# Patient Record
Sex: FEMALE | Race: WHITE | NOT HISPANIC OR LATINO | Employment: OTHER | URBAN - METROPOLITAN AREA
[De-identification: names, ages, dates, MRNs, and addresses within clinical notes are randomized per-mention and may not be internally consistent; named-entity substitution may affect disease eponyms.]

---

## 2019-10-07 ENCOUNTER — TELEPHONE (OUTPATIENT)
Dept: FAMILY MEDICINE | Facility: CLINIC | Age: 69
End: 2019-10-07

## 2019-10-07 NOTE — TELEPHONE ENCOUNTER
From [facility] action:       JEANNETTE CAMPUZANO 7/9/2019 2:28:28 PM > please call pt and remind her she's due for labs- ordered entered

## 2019-10-29 LAB
ALBUMIN SERPL-MCNC: 4.1 G/DL (ref 3.6–5.1)
ALBUMIN/GLOB SERPL: 1.7 (CALC) (ref 1–2.5)
ALP SERPL-CCNC: 18 U/L (ref 33–130)
ALT SERPL-CCNC: 35 U/L (ref 6–29)
AST SERPL-CCNC: 28 U/L (ref 10–35)
BASOPHILS # BLD AUTO: 21 CELLS/UL (ref 0–200)
BASOPHILS NFR BLD AUTO: 0.7 %
BILIRUB SERPL-MCNC: 0.7 MG/DL (ref 0.2–1.2)
BUN SERPL-MCNC: 24 MG/DL (ref 7–25)
BUN/CREAT SERPL: ABNORMAL (CALC) (ref 6–22)
CALCIUM SERPL-MCNC: 9.6 MG/DL (ref 8.6–10.4)
CHLORIDE SERPL-SCNC: 105 MMOL/L (ref 98–110)
CHOLEST SERPL-MCNC: 185 MG/DL
CHOLEST/HDLC SERPL: 2.4 (CALC)
CO2 SERPL-SCNC: 30 MMOL/L (ref 20–32)
CREAT SERPL-MCNC: 0.7 MG/DL (ref 0.5–0.99)
EOSINOPHIL # BLD AUTO: 93 CELLS/UL (ref 15–500)
EOSINOPHIL NFR BLD AUTO: 3.1 %
ERYTHROCYTE [DISTWIDTH] IN BLOOD BY AUTOMATED COUNT: 13.4 % (ref 11–15)
GFRSERPLBLD MDRD-ARVRAT: 88 ML/MIN/1.73M2
GLOBULIN SER CALC-MCNC: 2.4 G/DL (CALC) (ref 1.9–3.7)
GLUCOSE SERPL-MCNC: 83 MG/DL (ref 65–99)
HCT VFR BLD AUTO: 38.7 % (ref 35–45)
HDLC SERPL-MCNC: 78 MG/DL
HGB BLD-MCNC: 13.3 G/DL (ref 11.7–15.5)
LDLC SERPL CALC-MCNC: 94 MG/DL (CALC)
LYMPHOCYTES # BLD AUTO: 1434 CELLS/UL (ref 850–3900)
LYMPHOCYTES NFR BLD AUTO: 47.8 %
MCH RBC QN AUTO: 30.7 PG (ref 27–33)
MCHC RBC AUTO-ENTMCNC: 34.4 G/DL (ref 32–36)
MCV RBC AUTO: 89.4 FL (ref 80–100)
MONOCYTES # BLD AUTO: 264 CELLS/UL (ref 200–950)
MONOCYTES NFR BLD AUTO: 8.8 %
NEUTROPHILS # BLD AUTO: 1188 CELLS/UL (ref 1500–7800)
NEUTROPHILS NFR BLD AUTO: 39.6 %
NONHDLC SERPL-MCNC: 107 MG/DL (CALC)
PLATELET # BLD AUTO: 174 THOUSAND/UL (ref 140–400)
PMV BLD REES-ECKER: 12 FL (ref 7.5–12.5)
POTASSIUM SERPL-SCNC: 4.1 MMOL/L (ref 3.5–5.3)
PROT SERPL-MCNC: 6.5 G/DL (ref 6.1–8.1)
RBC # BLD AUTO: 4.33 MILLION/UL (ref 3.8–5.1)
SODIUM SERPL-SCNC: 143 MMOL/L (ref 135–146)
TRIGL SERPL-MCNC: 44 MG/DL
WBC # BLD AUTO: 3 THOUSAND/UL (ref 3.8–10.8)

## 2019-11-01 ENCOUNTER — TELEPHONE (OUTPATIENT)
Dept: FAMILY MEDICINE | Facility: CLINIC | Age: 69
End: 2019-11-01

## 2019-11-01 NOTE — PROGRESS NOTES
Please let pt know labs are stable. Cholesterol levels well controlled.Normal blood sugar, kidney and liver function. White blood cell count slightly below normal range but stable compared to previous labs.

## 2019-11-01 NOTE — TELEPHONE ENCOUNTER
Notes recorded by Eloise Gilbert NP on 10/31/2019 at 9:44 PM CDT  Please let pt know labs are stable. Cholesterol levels well controlled.Normal blood sugar, kidney and liver function. White blood cell count slightly below normal range but stable compared to previous labs.    11/1-LMTC /jose

## 2020-01-08 DIAGNOSIS — R92.8 ABNORMAL MAMMOGRAM: Primary | ICD-10-CM

## 2020-01-08 PROBLEM — Z98.84 HISTORY OF BARIATRIC SURGERY: Status: ACTIVE | Noted: 2020-01-08

## 2020-01-08 PROBLEM — E78.5 DYSLIPIDEMIA: Status: ACTIVE | Noted: 2017-12-18

## 2020-01-09 ENCOUNTER — OFFICE VISIT (OUTPATIENT)
Dept: FAMILY MEDICINE | Facility: CLINIC | Age: 70
End: 2020-01-09
Payer: MEDICARE

## 2020-01-09 VITALS
SYSTOLIC BLOOD PRESSURE: 154 MMHG | HEIGHT: 63 IN | WEIGHT: 143 LBS | DIASTOLIC BLOOD PRESSURE: 82 MMHG | HEART RATE: 60 BPM | BODY MASS INDEX: 25.34 KG/M2

## 2020-01-09 DIAGNOSIS — Z98.84 HISTORY OF BARIATRIC SURGERY: ICD-10-CM

## 2020-01-09 DIAGNOSIS — G47.33 OSA (OBSTRUCTIVE SLEEP APNEA): ICD-10-CM

## 2020-01-09 DIAGNOSIS — M19.049 PRIMARY OSTEOARTHRITIS OF HAND, UNSPECIFIED LATERALITY: ICD-10-CM

## 2020-01-09 DIAGNOSIS — Z78.0 MENOPAUSE: ICD-10-CM

## 2020-01-09 DIAGNOSIS — Z13.820 SPECIAL SCREENING FOR OSTEOPOROSIS: ICD-10-CM

## 2020-01-09 DIAGNOSIS — I10 ESSENTIAL HYPERTENSION: Primary | ICD-10-CM

## 2020-01-09 DIAGNOSIS — E78.5 DYSLIPIDEMIA: ICD-10-CM

## 2020-01-09 DIAGNOSIS — M17.10 PRIMARY LOCALIZED OSTEOARTHRITIS OF KNEE: ICD-10-CM

## 2020-01-09 PROCEDURE — 1159F PR MEDICATION LIST DOCUMENTED IN MEDICAL RECORD: ICD-10-PCS | Mod: S$GLB,,, | Performed by: FAMILY MEDICINE

## 2020-01-09 PROCEDURE — 3079F PR MOST RECENT DIASTOLIC BLOOD PRESSURE 80-89 MM HG: ICD-10-PCS | Mod: S$GLB,,, | Performed by: FAMILY MEDICINE

## 2020-01-09 PROCEDURE — 1101F PR PT FALLS ASSESS DOC 0-1 FALLS W/OUT INJ PAST YR: ICD-10-PCS | Mod: S$GLB,,, | Performed by: FAMILY MEDICINE

## 2020-01-09 PROCEDURE — 1159F MED LIST DOCD IN RCRD: CPT | Mod: S$GLB,,, | Performed by: FAMILY MEDICINE

## 2020-01-09 PROCEDURE — 3077F PR MOST RECENT SYSTOLIC BLOOD PRESSURE >= 140 MM HG: ICD-10-PCS | Mod: S$GLB,,, | Performed by: FAMILY MEDICINE

## 2020-01-09 PROCEDURE — 99214 OFFICE O/P EST MOD 30 MIN: CPT | Mod: S$GLB,,, | Performed by: FAMILY MEDICINE

## 2020-01-09 PROCEDURE — 99214 PR OFFICE/OUTPT VISIT, EST, LEVL IV, 30-39 MIN: ICD-10-PCS | Mod: S$GLB,,, | Performed by: FAMILY MEDICINE

## 2020-01-09 PROCEDURE — 1101F PT FALLS ASSESS-DOCD LE1/YR: CPT | Mod: S$GLB,,, | Performed by: FAMILY MEDICINE

## 2020-01-09 PROCEDURE — 3079F DIAST BP 80-89 MM HG: CPT | Mod: S$GLB,,, | Performed by: FAMILY MEDICINE

## 2020-01-09 PROCEDURE — 3077F SYST BP >= 140 MM HG: CPT | Mod: S$GLB,,, | Performed by: FAMILY MEDICINE

## 2020-01-09 RX ORDER — LOSARTAN POTASSIUM 50 MG/1
50 TABLET ORAL DAILY
Refills: 1 | COMMUNITY
Start: 2019-10-04 | End: 2020-01-09 | Stop reason: SDUPTHER

## 2020-01-09 RX ORDER — LOSARTAN POTASSIUM 50 MG/1
50 TABLET ORAL DAILY
Qty: 90 TABLET | Refills: 1 | Status: SHIPPED | OUTPATIENT
Start: 2020-01-09 | End: 2020-07-09 | Stop reason: SDUPTHER

## 2020-01-09 NOTE — PROGRESS NOTES
SUBJECTIVE:    Patient ID: Batool Mensah is a 69 y.o. female.    Chief Complaint: Hypertension (has only been taking 1/2 of BP pill for the last week due to running low. bottles brought -ac )    This 69-year-old female has had gastric sleeve surgery and has maintained her weight loss very well.  She was running out of her losartan 50 mg and has been taking half a tablet of that for a few weeks to get to our office.  She goes to water aerobics for exercise 5 times a week.  She had a colonoscopy 2016 and is  Not due back till 2026.  She is getting a follow-up mammogram here in January and is due for DEXA scan as well.      Orders Only on 10/28/2019   Component Date Value Ref Range Status    Cholesterol 10/28/2019 185  <200 mg/dL Final    HDL 10/28/2019 78  >50 mg/dL Final    Triglycerides 10/28/2019 44  <150 mg/dL Final    LDL Cholesterol 10/28/2019 94  mg/dL (calc) Final    Hdl/Cholesterol Ratio 10/28/2019 2.4  <5.0 (calc) Final    Non HDL Chol. (LDL+VLDL) 10/28/2019 107  <130 mg/dL (calc) Final    Glucose 10/28/2019 83  65 - 99 mg/dL Final    BUN, Bld 10/28/2019 24  7 - 25 mg/dL Final    Creatinine 10/28/2019 0.70  0.50 - 0.99 mg/dL Final    eGFR if non African American 10/28/2019 88  > OR = 60 mL/min/1.73m2 Final    eGFR if  10/28/2019 102  > OR = 60 mL/min/1.73m2 Final    BUN/Creatinine Ratio 10/28/2019 NOT APPLICABLE  6 - 22 (calc) Final    Sodium 10/28/2019 143  135 - 146 mmol/L Final    Potassium 10/28/2019 4.1  3.5 - 5.3 mmol/L Final    Chloride 10/28/2019 105  98 - 110 mmol/L Final    CO2 10/28/2019 30  20 - 32 mmol/L Final    Calcium 10/28/2019 9.6  8.6 - 10.4 mg/dL Final    Total Protein 10/28/2019 6.5  6.1 - 8.1 g/dL Final    Albumin 10/28/2019 4.1  3.6 - 5.1 g/dL Final    Globulin, Total 10/28/2019 2.4  1.9 - 3.7 g/dL (calc) Final    Albumin/Globulin Ratio 10/28/2019 1.7  1.0 - 2.5 (calc) Final    Total Bilirubin 10/28/2019 0.7  0.2 - 1.2 mg/dL Final     Alkaline Phosphatase 10/28/2019 18* 33 - 130 U/L Final    AST 10/28/2019 28  10 - 35 U/L Final    ALT 10/28/2019 35* 6 - 29 U/L Final    WBC 10/28/2019 3.0* 3.8 - 10.8 Thousand/uL Final    RBC 10/28/2019 4.33  3.80 - 5.10 Million/uL Final    Hemoglobin 10/28/2019 13.3  11.7 - 15.5 g/dL Final    Hematocrit 10/28/2019 38.7  35.0 - 45.0 % Final    Mean Corpuscular Volume 10/28/2019 89.4  80.0 - 100.0 fL Final    Mean Corpuscular Hemoglobin 10/28/2019 30.7  27.0 - 33.0 pg Final    Mean Corpuscular Hemoglobin Conc 10/28/2019 34.4  32.0 - 36.0 g/dL Final    RDW 10/28/2019 13.4  11.0 - 15.0 % Final    Platelets 10/28/2019 174  140 - 400 Thousand/uL Final    MPV 10/28/2019 12.0  7.5 - 12.5 fL Final    Neutrophils Absolute 10/28/2019 1,188* 1,500 - 7,800 cells/uL Final    Lymph # 10/28/2019 1,434  850 - 3,900 cells/uL Final    Mono # 10/28/2019 264  200 - 950 cells/uL Final    Eos # 10/28/2019 93  15 - 500 cells/uL Final    Baso # 10/28/2019 21  0 - 200 cells/uL Final    Neutrophils Relative 10/28/2019 39.6  % Final    Lymph% 10/28/2019 47.8  % Final    Mono% 10/28/2019 8.8  % Final    Eosinophil% 10/28/2019 3.1  % Final    Basophil% 10/28/2019 0.7  % Final       Past Medical History:   Diagnosis Date    Hypertension      Past Surgical History:   Procedure Laterality Date    BREAST LUMPECTOMY       SECTION      X2    COLONOSCOPY  2016    Dr. Parker -RTC 10 years    Gastric sleeve  2018    Dr Hernandez- hiatal hernia repair     History reviewed. No pertinent family history.    Marital Status:   Alcohol History:  has no alcohol history on file.  Tobacco History:  reports that she has never smoked. She has never used smokeless tobacco.  Drug History:  has no drug history on file.    Review of patient's allergies indicates:   Allergen Reactions    Penicillin g benzathine Other (See Comments)       Current Outpatient Medications:     losartan (COZAAR) 50 MG tablet, Take 50 mg by mouth once  "daily., Disp: , Rfl: 1    Review of Systems   Constitutional: Negative for appetite change, chills, fatigue, fever and unexpected weight change.   HENT: Negative for congestion, ear pain, sinus pain, sore throat and trouble swallowing.    Eyes: Negative for pain, discharge and visual disturbance.   Respiratory: Negative for apnea, cough, shortness of breath and wheezing.         Byron on cpap   Cardiovascular: Negative for chest pain, palpitations and leg swelling.   Gastrointestinal: Negative for abdominal pain, blood in stool, constipation (smooth move tea helps), diarrhea, nausea and vomiting.   Endocrine: Negative for heat intolerance, polydipsia and polyuria.   Genitourinary: Negative for difficulty urinating, dyspareunia, dysuria, frequency, hematuria and menstrual problem.        No nocturia   Musculoskeletal: Negative for arthralgias (rt knee aches  x yrs , can walk fine ), back pain, gait problem, joint swelling and myalgias.        Nodule on the right palm of the hand   Allergic/Immunologic: Negative for environmental allergies, food allergies and immunocompromised state.   Neurological: Negative for dizziness, tremors, seizures, numbness and headaches.   Psychiatric/Behavioral: Positive for sleep disturbance (cpap at  night). Negative for behavioral problems, confusion, hallucinations and suicidal ideas. The patient is not nervous/anxious.           Objective:      Vitals:    01/09/20 1435   BP: (!) 154/82   Pulse: 60   Weight: 64.9 kg (143 lb)   Height: 5' 3" (1.6 m)     Body mass index is 25.33 kg/m².  Physical Exam   Constitutional: She is oriented to person, place, and time. She appears well-developed and well-nourished.   Thin white female in no apparent distress   HENT:   Head: Normocephalic and atraumatic.   Right Ear: External ear normal.   Left Ear: External ear normal.   Nose: Nose normal.   Mouth/Throat: Oropharynx is clear and moist.   Eyes: Pupils are equal, round, and reactive to light. EOM " are normal.   Neck: Normal range of motion. Neck supple. Carotid bruit is not present. No thyromegaly present.   Cardiovascular: Normal rate, regular rhythm, normal heart sounds and intact distal pulses.   No murmur heard.  Pulmonary/Chest: Effort normal and breath sounds normal. She has no wheezes. She has no rales.   Abdominal: Soft. Bowel sounds are normal. She exhibits no distension. There is no hepatosplenomegaly. There is no tenderness.   Musculoskeletal: Normal range of motion. She exhibits no tenderness or deformity.        Lumbar back: Normal. She exhibits no pain and no spasm.   Bends 90 degrees at  Waist, shoulders and knees have full range of motion.  There is no crepitance to the knees.  She walks with no limp.  Her right hand has a 3rd finger palmar side small nodule.  She can close her hands in a fist well   Lymphadenopathy:     She has no cervical adenopathy.   Neurological: She is alert and oriented to person, place, and time. No cranial nerve deficit. Coordination normal.   Skin: Skin is warm and dry. No rash noted.   Psychiatric: She has a normal mood and affect. Her behavior is normal. Judgment and thought content normal.   Nursing note and vitals reviewed.        Assessment:       1. Essential hypertension    2. History of bariatric surgery    3. Special screening for osteoporosis    4. Dyslipidemia    5. Primary localized osteoarthritis of knee    6. Primary osteoarthritis of hand, unspecified laterality    7. HUI (obstructive sleep apnea)    8. Menopause         Plan:       Essential hypertension  Blood pressure will is slightly elevated due to her reducing the losartan to 25 mg.  Let's go back up to 50 mg daily  History of bariatric surgery  Doing well with her gastric sleeve.  She has bariatric surgery visit and lab work due in February of 2020  Special screening for osteoporosis  DEXA scan ordered  Dyslipidemia  Cholesterol is at goal on no medications for cholesterol  Primary localized  osteoarthritis of knee  She is managing this well with no medications  Primary osteoarthritis of hand, unspecified laterality    HUI (obstructive sleep apnea)  Continue CPAP at night  Menopause      Follow up in about 6 months (around 7/9/2020) for Eloise-checkup.

## 2020-01-22 ENCOUNTER — HOSPITAL ENCOUNTER (OUTPATIENT)
Dept: RADIOLOGY | Facility: HOSPITAL | Age: 70
Discharge: HOME | End: 2020-01-22
Attending: NURSE PRACTITIONER
Payer: MEDICARE

## 2020-01-22 ENCOUNTER — HOSPITAL ENCOUNTER (OUTPATIENT)
Dept: RADIOLOGY | Facility: HOSPITAL | Age: 70
Discharge: HOME | End: 2020-01-22
Attending: FAMILY MEDICINE
Payer: MEDICARE

## 2020-01-22 VITALS — BODY MASS INDEX: 25.01 KG/M2 | HEIGHT: 63 IN | WEIGHT: 141.13 LBS

## 2020-01-22 DIAGNOSIS — Z78.0 MENOPAUSE: ICD-10-CM

## 2020-01-22 DIAGNOSIS — Z13.820 SPECIAL SCREENING FOR OSTEOPOROSIS: ICD-10-CM

## 2020-01-22 DIAGNOSIS — R92.8 ABNORMAL MAMMOGRAM: ICD-10-CM

## 2020-01-22 PROCEDURE — 77080 DXA BONE DENSITY AXIAL: CPT | Mod: TC,PO

## 2020-01-22 PROCEDURE — 77062 BREAST TOMOSYNTHESIS BI: CPT | Mod: TC,PO

## 2020-01-23 ENCOUNTER — TELEPHONE (OUTPATIENT)
Dept: FAMILY MEDICINE | Facility: CLINIC | Age: 70
End: 2020-01-23

## 2020-01-23 NOTE — PROGRESS NOTES
Please let patient know diagnostic mammogram shows no change with no new suspicious areas.  Repeat screening mammogram in 1 year

## 2020-01-23 NOTE — TELEPHONE ENCOUNTER
----- Message from Eloise Gilbert NP sent at 1/22/2020  9:09 PM CST -----  Please let patient know diagnostic mammogram shows no change with no new suspicious areas.  Repeat screening mammogram in 1 year

## 2020-01-27 ENCOUNTER — TELEPHONE (OUTPATIENT)
Dept: FAMILY MEDICINE | Facility: CLINIC | Age: 70
End: 2020-01-27

## 2020-01-27 DIAGNOSIS — M81.0 OSTEOPOROSIS: Primary | ICD-10-CM

## 2020-01-27 RX ORDER — FERROUS SULFATE, DRIED 160(50) MG
1 TABLET, EXTENDED RELEASE ORAL 2 TIMES DAILY WITH MEALS
COMMUNITY

## 2020-01-27 NOTE — TELEPHONE ENCOUNTER
----- Message from Sage Dickson MD sent at 1/25/2020  7:40 PM CST -----  Call patient.  DEXA scan shows normal bone density in the lumbar spine.  It does show osteoporosis of the hip bones however.  We recommend calcium plus D vitamins twice a day and add alendronate 70 mg once a week.  We will call this in to your pharmacy # 4.    5 refills, we plan on using alendronate continually for up to 4 years.  Repeat DEXA scan in 2 years

## 2020-01-27 NOTE — TELEPHONE ENCOUNTER
Spoke to patient with results verbatim per Dr Dickson. Verbalized understanding on all. Said she will start calcium/vitamin D BID, added to med list, and understands Alendronate is being sent to pharmacy to take once a week. Order pended. Allergies and pharmacy verified.

## 2020-01-29 RX ORDER — ALENDRONATE SODIUM 70 MG/1
70 TABLET ORAL
Qty: 4 TABLET | Refills: 11 | Status: SHIPPED | OUTPATIENT
Start: 2020-01-29 | End: 2020-07-09 | Stop reason: SDUPTHER

## 2020-04-06 ENCOUNTER — TELEPHONE (OUTPATIENT)
Dept: FAMILY MEDICINE | Facility: CLINIC | Age: 70
End: 2020-04-06

## 2020-04-06 NOTE — TELEPHONE ENCOUNTER
Spoke with pt she states pharmacy told her there is no refill left on her Losartan. Spoke with pharmacy to let them know there was a 6 month supply sent in on 1/9/2020, pharmacy states they found it and will refill it for her.

## 2020-04-06 NOTE — TELEPHONE ENCOUNTER
----- Message from Loraine Tesfaye sent at 4/6/2020  8:10 AM CDT -----   8:04 [address] L[address]

## 2020-07-02 ENCOUNTER — TELEPHONE (OUTPATIENT)
Dept: FAMILY MEDICINE | Facility: CLINIC | Age: 70
End: 2020-07-02

## 2020-07-02 NOTE — TELEPHONE ENCOUNTER
Spoke to pt regarding her appt on 7/9. Pt stated she wants in office appt. Pt advised to wear mask/call upon arrival

## 2020-07-09 ENCOUNTER — OFFICE VISIT (OUTPATIENT)
Dept: FAMILY MEDICINE | Facility: CLINIC | Age: 70
End: 2020-07-09
Payer: MEDICARE

## 2020-07-09 VITALS
BODY MASS INDEX: 27.46 KG/M2 | SYSTOLIC BLOOD PRESSURE: 138 MMHG | WEIGHT: 155 LBS | HEIGHT: 63 IN | TEMPERATURE: 99 F | HEART RATE: 52 BPM | DIASTOLIC BLOOD PRESSURE: 68 MMHG

## 2020-07-09 DIAGNOSIS — I10 ESSENTIAL HYPERTENSION: Primary | ICD-10-CM

## 2020-07-09 DIAGNOSIS — N64.52 DISCHARGE FROM RIGHT NIPPLE: ICD-10-CM

## 2020-07-09 DIAGNOSIS — R79.89 ELEVATED LFTS: ICD-10-CM

## 2020-07-09 DIAGNOSIS — D70.9 NEUTROPENIA, UNSPECIFIED TYPE: ICD-10-CM

## 2020-07-09 DIAGNOSIS — M81.0 AGE-RELATED OSTEOPOROSIS WITHOUT CURRENT PATHOLOGICAL FRACTURE: ICD-10-CM

## 2020-07-09 PROCEDURE — 1101F PR PT FALLS ASSESS DOC 0-1 FALLS W/OUT INJ PAST YR: ICD-10-PCS | Mod: S$GLB,,, | Performed by: NURSE PRACTITIONER

## 2020-07-09 PROCEDURE — 1159F MED LIST DOCD IN RCRD: CPT | Mod: S$GLB,,, | Performed by: NURSE PRACTITIONER

## 2020-07-09 PROCEDURE — 3008F PR BODY MASS INDEX (BMI) DOCUMENTED: ICD-10-PCS | Mod: S$GLB,,, | Performed by: NURSE PRACTITIONER

## 2020-07-09 PROCEDURE — 1101F PT FALLS ASSESS-DOCD LE1/YR: CPT | Mod: S$GLB,,, | Performed by: NURSE PRACTITIONER

## 2020-07-09 PROCEDURE — 1159F PR MEDICATION LIST DOCUMENTED IN MEDICAL RECORD: ICD-10-PCS | Mod: S$GLB,,, | Performed by: NURSE PRACTITIONER

## 2020-07-09 PROCEDURE — 3075F PR MOST RECENT SYSTOLIC BLOOD PRESS GE 130-139MM HG: ICD-10-PCS | Mod: S$GLB,,, | Performed by: NURSE PRACTITIONER

## 2020-07-09 PROCEDURE — 3075F SYST BP GE 130 - 139MM HG: CPT | Mod: S$GLB,,, | Performed by: NURSE PRACTITIONER

## 2020-07-09 PROCEDURE — 3078F PR MOST RECENT DIASTOLIC BLOOD PRESSURE < 80 MM HG: ICD-10-PCS | Mod: S$GLB,,, | Performed by: NURSE PRACTITIONER

## 2020-07-09 PROCEDURE — 99214 OFFICE O/P EST MOD 30 MIN: CPT | Mod: S$GLB,,, | Performed by: NURSE PRACTITIONER

## 2020-07-09 PROCEDURE — 3078F DIAST BP <80 MM HG: CPT | Mod: S$GLB,,, | Performed by: NURSE PRACTITIONER

## 2020-07-09 PROCEDURE — 3008F BODY MASS INDEX DOCD: CPT | Mod: S$GLB,,, | Performed by: NURSE PRACTITIONER

## 2020-07-09 PROCEDURE — 99214 PR OFFICE/OUTPT VISIT, EST, LEVL IV, 30-39 MIN: ICD-10-PCS | Mod: S$GLB,,, | Performed by: NURSE PRACTITIONER

## 2020-07-09 RX ORDER — LOSARTAN POTASSIUM 50 MG/1
50 TABLET ORAL DAILY
Qty: 90 TABLET | Refills: 1 | Status: SHIPPED | OUTPATIENT
Start: 2020-07-09 | End: 2021-01-04 | Stop reason: SDUPTHER

## 2020-07-09 RX ORDER — ALENDRONATE SODIUM 70 MG/1
70 TABLET ORAL
Qty: 12 TABLET | Refills: 3 | Status: SHIPPED | OUTPATIENT
Start: 2020-07-09 | End: 2021-01-04 | Stop reason: SDUPTHER

## 2020-07-09 NOTE — PROGRESS NOTES
SUBJECTIVE:    Patient ID: Batool Mensah is a 70 y.o. female.    Chief Complaint: Medication Refill (6mth, brought bottles// SW)    Pt here for regular f/u. Reports overall has been doing well but admits hasn't been exercising d/t COVID19 and eating more so has gained some weight. Pre COVID19 was exercising at water aerobics 5 days/week.  Pt reports for over 6 months she's noticed an occasional clear discharge from right nipple- first thought it was from water aerobics but a couple times the past few months has noticed small amt of discharge on bra or gown. Denies any blood discharge, breast pain or inverted nipple. Had diagnostic mammo in 1/2020 which showed stable small microcalcifications right breast  She brings in copy of labs she had drawn in April ordered by bariatric surgeon for us to review      No visits with results within 6 Month(s) from this visit.   Latest known visit with results is:   Orders Only on 10/28/2019   Component Date Value Ref Range Status    Cholesterol 10/28/2019 185  <200 mg/dL Final    HDL 10/28/2019 78  >50 mg/dL Final    Triglycerides 10/28/2019 44  <150 mg/dL Final    LDL Cholesterol 10/28/2019 94  mg/dL (calc) Final    Hdl/Cholesterol Ratio 10/28/2019 2.4  <5.0 (calc) Final    Non HDL Chol. (LDL+VLDL) 10/28/2019 107  <130 mg/dL (calc) Final    Glucose 10/28/2019 83  65 - 99 mg/dL Final    BUN, Bld 10/28/2019 24  7 - 25 mg/dL Final    Creatinine 10/28/2019 0.70  0.50 - 0.99 mg/dL Final    eGFR if non African American 10/28/2019 88  > OR = 60 mL/min/1.73m2 Final    eGFR if  10/28/2019 102  > OR = 60 mL/min/1.73m2 Final    BUN/Creatinine Ratio 10/28/2019 NOT APPLICABLE  6 - 22 (calc) Final    Sodium 10/28/2019 143  135 - 146 mmol/L Final    Potassium 10/28/2019 4.1  3.5 - 5.3 mmol/L Final    Chloride 10/28/2019 105  98 - 110 mmol/L Final    CO2 10/28/2019 30  20 - 32 mmol/L Final    Calcium 10/28/2019 9.6  8.6 - 10.4 mg/dL Final    Total  Protein 10/28/2019 6.5  6.1 - 8.1 g/dL Final    Albumin 10/28/2019 4.1  3.6 - 5.1 g/dL Final    Globulin, Total 10/28/2019 2.4  1.9 - 3.7 g/dL (calc) Final    Albumin/Globulin Ratio 10/28/2019 1.7  1.0 - 2.5 (calc) Final    Total Bilirubin 10/28/2019 0.7  0.2 - 1.2 mg/dL Final    Alkaline Phosphatase 10/28/2019 18* 33 - 130 U/L Final    AST 10/28/2019 28  10 - 35 U/L Final    ALT 10/28/2019 35* 6 - 29 U/L Final    WBC 10/28/2019 3.0* 3.8 - 10.8 Thousand/uL Final    RBC 10/28/2019 4.33  3.80 - 5.10 Million/uL Final    Hemoglobin 10/28/2019 13.3  11.7 - 15.5 g/dL Final    Hematocrit 10/28/2019 38.7  35.0 - 45.0 % Final    Mean Corpuscular Volume 10/28/2019 89.4  80.0 - 100.0 fL Final    Mean Corpuscular Hemoglobin 10/28/2019 30.7  27.0 - 33.0 pg Final    Mean Corpuscular Hemoglobin Conc 10/28/2019 34.4  32.0 - 36.0 g/dL Final    RDW 10/28/2019 13.4  11.0 - 15.0 % Final    Platelets 10/28/2019 174  140 - 400 Thousand/uL Final    MPV 10/28/2019 12.0  7.5 - 12.5 fL Final    Neutrophils Absolute 10/28/2019 1,188* 1,500 - 7,800 cells/uL Final    Lymph # 10/28/2019 1,434  850 - 3,900 cells/uL Final    Mono # 10/28/2019 264  200 - 950 cells/uL Final    Eos # 10/28/2019 93  15 - 500 cells/uL Final    Baso # 10/28/2019 21  0 - 200 cells/uL Final    Neutrophils Relative 10/28/2019 39.6  % Final    Lymph% 10/28/2019 47.8  % Final    Mono% 10/28/2019 8.8  % Final    Eosinophil% 10/28/2019 3.1  % Final    Basophil% 10/28/2019 0.7  % Final       Past Medical History:   Diagnosis Date    Hypertension      Past Surgical History:   Procedure Laterality Date    BREAST BIOPSY Left     BREAST LUMPECTOMY       SECTION      X2    COLONOSCOPY  2016    Dr. Parker -RTC 10 years    Gastric sleeve  2018    Dr Hernandez- hiatal hernia repair     History reviewed. No pertinent family history.    Marital Status:   Alcohol History:  has no history on file for alcohol.  Tobacco History:  reports that she  has never smoked. She has never used smokeless tobacco.  Drug History:  has no history on file for drug.    Health Maintenance Topics with due status: Not Due       Topic Last Completion Date    Colorectal Cancer Screening 09/06/2016    Influenza Vaccine 10/18/2019    Lipid Panel 10/28/2019    Mammogram 01/22/2020    DEXA SCAN 01/22/2020     Immunization History   Administered Date(s) Administered    Influenza - Trivalent (ADULT) 10/15/2014    Pneumococcal Conjugate - 13 Valent 12/08/2016    Pneumococcal Polysaccharide - 23 Valent 01/03/2019    Zoster Recombinant 05/17/2019, 07/17/2019       Review of patient's allergies indicates:   Allergen Reactions    Penicillin g benzathine Other (See Comments)       Current Outpatient Medications:     alendronate (FOSAMAX) 70 MG tablet, Take 1 tablet (70 mg total) by mouth every 7 days. Take on empty stomach with full glass of water-do not eat or lie down for 1 hour For osteoporosis, Disp: 12 tablet, Rfl: 3    calcium-vitamin D3 (CALCIUM 500 + D) 500 mg(1,250mg) -200 unit per tablet, Take 1 tablet by mouth 2 (two) times daily with meals., Disp: , Rfl:     losartan (COZAAR) 50 MG tablet, Take 1 tablet (50 mg total) by mouth once daily., Disp: 90 tablet, Rfl: 1    Review of Systems   Constitutional: Positive for unexpected weight change (weight up 14lbs since Jan 2020). Negative for appetite change, chills and fever.   Respiratory: Negative for cough, shortness of breath and wheezing.    Cardiovascular: Negative for chest pain, palpitations and leg swelling.   Gastrointestinal: Positive for constipation (controlled with stool softner). Negative for abdominal pain and diarrhea.   Genitourinary: Negative for dysuria, frequency and hematuria.   Musculoskeletal: Negative for back pain and gait problem.   Skin: Negative for rash.   Neurological: Negative for dizziness, syncope and numbness.   Psychiatric/Behavioral: Negative for dysphoric mood. The patient is not  "nervous/anxious.           Objective:      Vitals:    07/09/20 1424   BP: 138/68   Pulse: (!) 52   Temp: 98.9 °F (37.2 °C)   Weight: 70.3 kg (155 lb)   Height: 5' 3" (1.6 m)     Physical Exam  Vitals signs and nursing note reviewed.   Constitutional:       Appearance: She is well-developed.   HENT:      Head: Normocephalic and atraumatic.      Right Ear: Tympanic membrane and ear canal normal.      Left Ear: Tympanic membrane and ear canal normal.      Mouth/Throat:      Pharynx: No posterior oropharyngeal erythema.   Neck:      Musculoskeletal: Neck supple.      Vascular: No carotid bruit.   Cardiovascular:      Rate and Rhythm: Normal rate and regular rhythm.      Heart sounds: No murmur. No friction rub. No gallop.    Pulmonary:      Effort: Pulmonary effort is normal. No respiratory distress.      Breath sounds: Normal breath sounds. No wheezing or rales.   Chest:      Breasts:         Right: Mass and nipple discharge (small clear thin discharge expressed) present. No swelling, inverted nipple, skin change or tenderness.         Left: No swelling, inverted nipple, mass or nipple discharge.       Abdominal:      General: There is no distension.      Palpations: Abdomen is soft.      Tenderness: There is no abdominal tenderness.   Lymphadenopathy:      Cervical: No cervical adenopathy.      Upper Body:      Right upper body: No axillary adenopathy.      Left upper body: No axillary adenopathy.   Skin:     General: Skin is warm and dry.      Findings: No rash.   Neurological:      Mental Status: She is alert and oriented to person, place, and time.      Gait: Gait normal.           Assessment:       1. Essential hypertension    2. Age-related osteoporosis without current pathological fracture    3. Discharge from right nipple    4. Elevated LFTs    5. Neutropenia, unspecified type           Plan:       Essential hypertension  Comments:  BP stable  Orders:  -     losartan (COZAAR) 50 MG tablet; Take 1 tablet (50 mg " total) by mouth once daily.  Dispense: 90 tablet; Refill: 1    Osteoporosis  Comments:  reviewed last DEXA with new dx of osteoporosis- now on biphosphanate and stressed Calcium/vitamin D supplement and weight bearing exercise melissa given bariatric surgery/weight loss  Orders:  -     alendronate (FOSAMAX) 70 MG tablet; Take 1 tablet (70 mg total) by mouth every 7 days. Take on empty stomach with full glass of water-do not eat or lie down for 1 hour For osteoporosis  Dispense: 12 tablet; Refill: 3    Discharge from right nipple  Comments:  recommend breast US and diagnostic mammo for eval  Orders:  -     US Breast Left Limited; Future; Expected date: 07/09/2020  -     Mammo Digital Diagnostic Left w/ Deuce; Future; Expected date: 07/09/2020    Elevated LFTs  Comments:  reviewed labs from April ordered by bariatric surgeon- LFTs elevated- advised may be diet/weight gain related- encouraged getting back on track with diet/exercise and will recheck in August  Orders:  -     Comprehensive metabolic panel; Future; Expected date: 07/09/2020  -     Hepatitis Panel, Acute; Future; Expected date: 07/09/2020    Neutropenia, unspecified type  Comments:  mild chronic neutropenia which is stable  Orders:  -     CBC auto differential; Future; Expected date: 07/09/2020      Follow up in about 6 months (around 1/9/2021) for labs in August, HTN, Dr. Dickson next visit.        7/10/2020 Eloise Gilbert NP

## 2020-07-15 ENCOUNTER — HOSPITAL ENCOUNTER (OUTPATIENT)
Dept: RADIOLOGY | Facility: HOSPITAL | Age: 70
Discharge: HOME | End: 2020-07-15
Attending: NURSE PRACTITIONER
Payer: MEDICARE

## 2020-07-15 ENCOUNTER — LAB VISIT (OUTPATIENT)
Dept: PRIMARY CARE CLINIC | Facility: OTHER | Age: 70
End: 2020-07-15
Attending: INTERNAL MEDICINE
Payer: MEDICARE

## 2020-07-15 VITALS — BODY MASS INDEX: 27.46 KG/M2 | HEIGHT: 63 IN | WEIGHT: 155 LBS

## 2020-07-15 DIAGNOSIS — N64.52 DISCHARGE FROM RIGHT NIPPLE: ICD-10-CM

## 2020-07-15 DIAGNOSIS — Z11.59 SPECIAL SCREENING EXAMINATION FOR UNSPECIFIED VIRAL DISEASE: Primary | ICD-10-CM

## 2020-07-15 PROCEDURE — 77061 BREAST TOMOSYNTHESIS UNI: CPT | Mod: TC,PO,LT

## 2020-07-15 PROCEDURE — 76642 ULTRASOUND BREAST LIMITED: CPT | Mod: TC,PO,LT

## 2020-07-15 PROCEDURE — U0003 INFECTIOUS AGENT DETECTION BY NUCLEIC ACID (DNA OR RNA); SEVERE ACUTE RESPIRATORY SYNDROME CORONAVIRUS 2 (SARS-COV-2) (CORONAVIRUS DISEASE [COVID-19]), AMPLIFIED PROBE TECHNIQUE, MAKING USE OF HIGH THROUGHPUT TECHNOLOGIES AS DESCRIBED BY CMS-2020-01-R: HCPCS

## 2020-07-15 PROCEDURE — 77065 DX MAMMO INCL CAD UNI: CPT | Mod: TC,PO,LT

## 2020-07-16 ENCOUNTER — TELEPHONE (OUTPATIENT)
Dept: FAMILY MEDICINE | Facility: CLINIC | Age: 70
End: 2020-07-16

## 2020-07-16 NOTE — TELEPHONE ENCOUNTER
Spoke to patient with result verbatim per Eloise. Verbalized understanding. Remind me for 6 month f/u.

## 2020-07-16 NOTE — PROGRESS NOTES
Please call pt and let her know breast US/mammo does not show any suspicous masses- discharge likely from dilated breast ducts in the left breast however radiologist recommends to repeat breast US in 6mos.

## 2020-07-16 NOTE — TELEPHONE ENCOUNTER
----- Message from Eloise Gilbert NP sent at 7/15/2020  7:37 PM CDT -----  Please call pt and let her know breast US/mammo does not show any suspicous masses- discharge likely from dilated breast ducts in the left breast however radiologist recommends to repeat breast US in 6mos.

## 2020-07-19 LAB — SARS-COV-2 RNA RESP QL NAA+PROBE: NEGATIVE

## 2020-08-27 LAB
ALBUMIN SERPL-MCNC: 4.1 G/DL (ref 3.6–5.1)
ALBUMIN/GLOB SERPL: 2.3 (CALC) (ref 1–2.5)
ALP SERPL-CCNC: 9 U/L (ref 37–153)
ALT SERPL-CCNC: 23 U/L (ref 6–29)
AST SERPL-CCNC: 21 U/L (ref 10–35)
BASOPHILS # BLD AUTO: 30 CELLS/UL (ref 0–200)
BASOPHILS NFR BLD AUTO: 1 %
BILIRUB SERPL-MCNC: 0.7 MG/DL (ref 0.2–1.2)
BUN SERPL-MCNC: 21 MG/DL (ref 7–25)
BUN/CREAT SERPL: ABNORMAL (CALC) (ref 6–22)
CALCIUM SERPL-MCNC: 8.7 MG/DL (ref 8.6–10.4)
CHLORIDE SERPL-SCNC: 108 MMOL/L (ref 98–110)
CO2 SERPL-SCNC: 32 MMOL/L (ref 20–32)
COMMENT: NORMAL
CREAT SERPL-MCNC: 0.77 MG/DL (ref 0.6–0.93)
EOSINOPHIL # BLD AUTO: 129 CELLS/UL (ref 15–500)
EOSINOPHIL NFR BLD AUTO: 4.3 %
ERYTHROCYTE [DISTWIDTH] IN BLOOD BY AUTOMATED COUNT: 13.4 % (ref 11–15)
GFRSERPLBLD MDRD-ARVRAT: 78 ML/MIN/1.73M2
GLOBULIN SER CALC-MCNC: 1.8 G/DL (CALC) (ref 1.9–3.7)
GLUCOSE SERPL-MCNC: 84 MG/DL (ref 65–99)
HAV IGM SERPL QL IA: NORMAL
HBV CORE IGM SERPL QL IA: NORMAL
HBV SURFACE AG SERPL QL IA: NORMAL
HCT VFR BLD AUTO: 39.1 % (ref 35–45)
HCV AB S/CO SERPL IA: 0.01
HCV AB SERPL QL IA: NORMAL
HGB BLD-MCNC: 12.6 G/DL (ref 11.7–15.5)
LYMPHOCYTES # BLD AUTO: 1536 CELLS/UL (ref 850–3900)
LYMPHOCYTES NFR BLD AUTO: 51.2 %
MCH RBC QN AUTO: 29.5 PG (ref 27–33)
MCHC RBC AUTO-ENTMCNC: 32.2 G/DL (ref 32–36)
MCV RBC AUTO: 91.6 FL (ref 80–100)
MONOCYTES # BLD AUTO: 279 CELLS/UL (ref 200–950)
MONOCYTES NFR BLD AUTO: 9.3 %
NEUTROPHILS # BLD AUTO: 1026 CELLS/UL (ref 1500–7800)
NEUTROPHILS NFR BLD AUTO: 34.2 %
PLATELET # BLD AUTO: 144 THOUSAND/UL (ref 140–400)
PMV BLD REES-ECKER: 11.9 FL (ref 7.5–12.5)
POTASSIUM SERPL-SCNC: 4.3 MMOL/L (ref 3.5–5.3)
PROT SERPL-MCNC: 5.9 G/DL (ref 6.1–8.1)
RBC # BLD AUTO: 4.27 MILLION/UL (ref 3.8–5.1)
SODIUM SERPL-SCNC: 144 MMOL/L (ref 135–146)
WBC # BLD AUTO: 3 THOUSAND/UL (ref 3.8–10.8)

## 2020-10-18 ENCOUNTER — HOSPITAL ENCOUNTER (INPATIENT)
Facility: HOSPITAL | Age: 70
LOS: 2 days | Discharge: HOME | DRG: 340 | End: 2020-10-21
Attending: EMERGENCY MEDICINE | Admitting: HOSPITALIST
Payer: MEDICARE

## 2020-10-18 DIAGNOSIS — K35.32 RUPTURED APPENDICITIS: Primary | ICD-10-CM

## 2020-10-18 DIAGNOSIS — K35.80 ACUTE APPENDICITIS, UNSPECIFIED ACUTE APPENDICITIS TYPE: ICD-10-CM

## 2020-10-18 DIAGNOSIS — Z90.49 S/P LAPAROSCOPIC APPENDECTOMY: ICD-10-CM

## 2020-10-18 DIAGNOSIS — K35.30 ACUTE APPENDICITIS WITH LOCALIZED PERITONITIS, WITHOUT PERFORATION, ABSCESS, OR GANGRENE: ICD-10-CM

## 2020-10-18 LAB
ALBUMIN SERPL BCP-MCNC: 4.2 G/DL (ref 3.5–5.2)
ALP SERPL-CCNC: 12 U/L (ref 55–135)
ALT SERPL W/O P-5'-P-CCNC: 23 U/L (ref 10–44)
ANION GAP SERPL CALC-SCNC: 12 MMOL/L (ref 8–16)
AST SERPL-CCNC: 25 U/L (ref 10–40)
BACTERIA #/AREA URNS HPF: ABNORMAL /HPF
BASOPHILS # BLD AUTO: 0.01 K/UL (ref 0–0.2)
BASOPHILS NFR BLD: 0.1 % (ref 0–1.9)
BILIRUB SERPL-MCNC: 1.2 MG/DL (ref 0.1–1)
BILIRUB UR QL STRIP: ABNORMAL
BUN SERPL-MCNC: 25 MG/DL (ref 8–23)
CALCIUM SERPL-MCNC: 9.5 MG/DL (ref 8.7–10.5)
CHLORIDE SERPL-SCNC: 97 MMOL/L (ref 95–110)
CLARITY UR: CLEAR
CO2 SERPL-SCNC: 26 MMOL/L (ref 23–29)
COLOR UR: YELLOW
CREAT SERPL-MCNC: 0.9 MG/DL (ref 0.5–1.4)
DIFFERENTIAL METHOD: ABNORMAL
EOSINOPHIL # BLD AUTO: 0 K/UL (ref 0–0.5)
EOSINOPHIL NFR BLD: 0 % (ref 0–8)
ERYTHROCYTE [DISTWIDTH] IN BLOOD BY AUTOMATED COUNT: 12.5 % (ref 11.5–14.5)
EST. GFR  (AFRICAN AMERICAN): >60 ML/MIN/1.73 M^2
EST. GFR  (NON AFRICAN AMERICAN): >60 ML/MIN/1.73 M^2
GLUCOSE SERPL-MCNC: 112 MG/DL (ref 70–110)
GLUCOSE UR QL STRIP: NEGATIVE
HCT VFR BLD AUTO: 43.1 % (ref 37–48.5)
HGB BLD-MCNC: 14.7 G/DL (ref 12–16)
HGB UR QL STRIP: ABNORMAL
HYALINE CASTS #/AREA URNS LPF: 78 /LPF
IMM GRANULOCYTES # BLD AUTO: 0.03 K/UL (ref 0–0.04)
IMM GRANULOCYTES NFR BLD AUTO: 0.3 % (ref 0–0.5)
KETONES UR QL STRIP: NEGATIVE
LEUKOCYTE ESTERASE UR QL STRIP: ABNORMAL
LIPASE SERPL-CCNC: 27 U/L (ref 4–60)
LYMPHOCYTES # BLD AUTO: 1.1 K/UL (ref 1–4.8)
LYMPHOCYTES NFR BLD: 10.9 % (ref 18–48)
MCH RBC QN AUTO: 30.3 PG (ref 27–31)
MCHC RBC AUTO-ENTMCNC: 34.1 G/DL (ref 32–36)
MCV RBC AUTO: 89 FL (ref 82–98)
MICROSCOPIC COMMENT: ABNORMAL
MONOCYTES # BLD AUTO: 0.5 K/UL (ref 0.3–1)
MONOCYTES NFR BLD: 4.6 % (ref 4–15)
NEUTROPHILS # BLD AUTO: 8.7 K/UL (ref 1.8–7.7)
NEUTROPHILS NFR BLD: 84.1 % (ref 38–73)
NITRITE UR QL STRIP: NEGATIVE
NRBC BLD-RTO: 0 /100 WBC
PH UR STRIP: 6 [PH] (ref 5–8)
PLATELET # BLD AUTO: 169 K/UL (ref 150–350)
PMV BLD AUTO: 11.9 FL (ref 9.2–12.9)
POTASSIUM SERPL-SCNC: 3.9 MMOL/L (ref 3.5–5.1)
PROT SERPL-MCNC: 7.6 G/DL (ref 6–8.4)
PROT UR QL STRIP: NEGATIVE
RBC # BLD AUTO: 4.85 M/UL (ref 4–5.4)
RBC #/AREA URNS HPF: 1 /HPF (ref 0–4)
SARS-COV-2 RDRP RESP QL NAA+PROBE: NEGATIVE
SODIUM SERPL-SCNC: 135 MMOL/L (ref 136–145)
SP GR UR STRIP: 1.02 (ref 1–1.03)
SQUAMOUS #/AREA URNS HPF: 2 /HPF
URN SPEC COLLECT METH UR: ABNORMAL
UROBILINOGEN UR STRIP-ACNC: NEGATIVE EU/DL
WBC # BLD AUTO: 10.34 K/UL (ref 3.9–12.7)
WBC #/AREA URNS HPF: 31 /HPF (ref 0–5)

## 2020-10-18 PROCEDURE — 87086 URINE CULTURE/COLONY COUNT: CPT

## 2020-10-18 PROCEDURE — 85025 COMPLETE CBC W/AUTO DIFF WBC: CPT

## 2020-10-18 PROCEDURE — U0002 COVID-19 LAB TEST NON-CDC: HCPCS

## 2020-10-18 PROCEDURE — 25500020 PHARM REV CODE 255: Performed by: NURSE PRACTITIONER

## 2020-10-18 PROCEDURE — 80053 COMPREHEN METABOLIC PANEL: CPT

## 2020-10-18 PROCEDURE — 63600175 PHARM REV CODE 636 W HCPCS: Performed by: NURSE PRACTITIONER

## 2020-10-18 PROCEDURE — 81001 URINALYSIS AUTO W/SCOPE: CPT

## 2020-10-18 PROCEDURE — 96374 THER/PROPH/DIAG INJ IV PUSH: CPT

## 2020-10-18 PROCEDURE — 83690 ASSAY OF LIPASE: CPT

## 2020-10-18 PROCEDURE — G0378 HOSPITAL OBSERVATION PER HR: HCPCS

## 2020-10-18 PROCEDURE — 96375 TX/PRO/DX INJ NEW DRUG ADDON: CPT

## 2020-10-18 PROCEDURE — 99285 EMERGENCY DEPT VISIT HI MDM: CPT | Mod: 25

## 2020-10-18 RX ORDER — SODIUM CHLORIDE 0.9 % (FLUSH) 0.9 %
10 SYRINGE (ML) INJECTION
Status: DISCONTINUED | OUTPATIENT
Start: 2020-10-18 | End: 2020-10-21 | Stop reason: HOSPADM

## 2020-10-18 RX ORDER — MORPHINE SULFATE 2 MG/ML
1 INJECTION, SOLUTION INTRAMUSCULAR; INTRAVENOUS EVERY 4 HOURS PRN
Status: DISCONTINUED | OUTPATIENT
Start: 2020-10-18 | End: 2020-10-21 | Stop reason: HOSPADM

## 2020-10-18 RX ORDER — FERROUS SULFATE, DRIED 160(50) MG
1 TABLET, EXTENDED RELEASE ORAL 2 TIMES DAILY WITH MEALS
Status: DISCONTINUED | OUTPATIENT
Start: 2020-10-19 | End: 2020-10-21 | Stop reason: HOSPADM

## 2020-10-18 RX ORDER — MORPHINE SULFATE 2 MG/ML
2 INJECTION, SOLUTION INTRAMUSCULAR; INTRAVENOUS
Status: COMPLETED | OUTPATIENT
Start: 2020-10-18 | End: 2020-10-18

## 2020-10-18 RX ORDER — LEVOFLOXACIN 5 MG/ML
750 INJECTION, SOLUTION INTRAVENOUS
Status: DISCONTINUED | OUTPATIENT
Start: 2020-10-18 | End: 2020-10-19

## 2020-10-18 RX ORDER — ONDANSETRON 2 MG/ML
4 INJECTION INTRAMUSCULAR; INTRAVENOUS EVERY 8 HOURS PRN
Status: DISCONTINUED | OUTPATIENT
Start: 2020-10-18 | End: 2020-10-21 | Stop reason: HOSPADM

## 2020-10-18 RX ORDER — LEVOFLOXACIN 5 MG/ML
750 INJECTION, SOLUTION INTRAVENOUS
Status: DISCONTINUED | OUTPATIENT
Start: 2020-10-18 | End: 2020-10-18

## 2020-10-18 RX ORDER — ACETAMINOPHEN 325 MG/1
650 TABLET ORAL EVERY 8 HOURS PRN
Status: DISCONTINUED | OUTPATIENT
Start: 2020-10-18 | End: 2020-10-21 | Stop reason: HOSPADM

## 2020-10-18 RX ORDER — FAMOTIDINE 10 MG/ML
20 INJECTION INTRAVENOUS DAILY
Status: DISCONTINUED | OUTPATIENT
Start: 2020-10-19 | End: 2020-10-19

## 2020-10-18 RX ORDER — LOSARTAN POTASSIUM 50 MG/1
50 TABLET ORAL DAILY
Status: DISCONTINUED | OUTPATIENT
Start: 2020-10-19 | End: 2020-10-21 | Stop reason: HOSPADM

## 2020-10-18 RX ORDER — TALC
6 POWDER (GRAM) TOPICAL NIGHTLY PRN
Status: DISCONTINUED | OUTPATIENT
Start: 2020-10-18 | End: 2020-10-21 | Stop reason: HOSPADM

## 2020-10-18 RX ORDER — SODIUM CHLORIDE, SODIUM LACTATE, POTASSIUM CHLORIDE, CALCIUM CHLORIDE 600; 310; 30; 20 MG/100ML; MG/100ML; MG/100ML; MG/100ML
INJECTION, SOLUTION INTRAVENOUS CONTINUOUS
Status: ACTIVE | OUTPATIENT
Start: 2020-10-18 | End: 2020-10-20

## 2020-10-18 RX ORDER — ONDANSETRON 2 MG/ML
4 INJECTION INTRAMUSCULAR; INTRAVENOUS
Status: COMPLETED | OUTPATIENT
Start: 2020-10-18 | End: 2020-10-18

## 2020-10-18 RX ADMIN — ONDANSETRON 4 MG: 2 INJECTION INTRAMUSCULAR; INTRAVENOUS at 07:10

## 2020-10-18 RX ADMIN — MORPHINE SULFATE 1 MG: 2 INJECTION, SOLUTION INTRAMUSCULAR; INTRAVENOUS at 11:10

## 2020-10-18 RX ADMIN — IOHEXOL 100 ML: 350 INJECTION, SOLUTION INTRAVENOUS at 09:10

## 2020-10-18 RX ADMIN — LEVOFLOXACIN 750 MG: 750 INJECTION, SOLUTION INTRAVENOUS at 08:10

## 2020-10-18 RX ADMIN — MORPHINE SULFATE 2 MG: 2 INJECTION, SOLUTION INTRAMUSCULAR; INTRAVENOUS at 07:10

## 2020-10-18 NOTE — FIRST PROVIDER EVALUATION
Medical screening exam completed.  I have conducted a focused provider triage encounter, findings are as follows:    Brief history of present illness:  Presents with generalized abdominal pain  Onset yesterday  Denies NVD  Pt reports [id number]  She is afebrile here and has not taken anything for the fever    There were no vitals filed for this visit.    Pertinent physical exam:  Abdominal pain generalized with rebound right lower quadrant     Brief workup plan:  ABd pain work up     Preliminary workup initiated; this workup will be continued and followed by the physician or advanced practice provider that is assigned to the patient when roomed.

## 2020-10-19 ENCOUNTER — ANESTHESIA (OUTPATIENT)
Dept: SURGERY | Facility: HOSPITAL | Age: 70
DRG: 340 | End: 2020-10-19
Payer: MEDICARE

## 2020-10-19 ENCOUNTER — ANESTHESIA EVENT (OUTPATIENT)
Dept: SURGERY | Facility: HOSPITAL | Age: 70
DRG: 340 | End: 2020-10-19
Payer: MEDICARE

## 2020-10-19 PROBLEM — K35.32 RUPTURED APPENDICITIS: Status: ACTIVE | Noted: 2020-10-19

## 2020-10-19 LAB
ALBUMIN SERPL BCP-MCNC: 3.5 G/DL (ref 3.5–5.2)
ALP SERPL-CCNC: 14 U/L (ref 55–135)
ALT SERPL W/O P-5'-P-CCNC: 18 U/L (ref 10–44)
ANION GAP SERPL CALC-SCNC: 11 MMOL/L (ref 8–16)
AST SERPL-CCNC: 17 U/L (ref 10–40)
BASOPHILS # BLD AUTO: 0.01 K/UL (ref 0–0.2)
BASOPHILS NFR BLD: 0.1 % (ref 0–1.9)
BILIRUB SERPL-MCNC: 1.3 MG/DL (ref 0.1–1)
BUN SERPL-MCNC: 21 MG/DL (ref 8–23)
CALCIUM SERPL-MCNC: 8.9 MG/DL (ref 8.7–10.5)
CHLORIDE SERPL-SCNC: 98 MMOL/L (ref 95–110)
CO2 SERPL-SCNC: 27 MMOL/L (ref 23–29)
CREAT SERPL-MCNC: 0.8 MG/DL (ref 0.5–1.4)
CRP SERPL-MCNC: 34.88 MG/DL
DIFFERENTIAL METHOD: ABNORMAL
EOSINOPHIL # BLD AUTO: 0 K/UL (ref 0–0.5)
EOSINOPHIL NFR BLD: 0.2 % (ref 0–8)
ERYTHROCYTE [DISTWIDTH] IN BLOOD BY AUTOMATED COUNT: 12.6 % (ref 11.5–14.5)
EST. GFR  (AFRICAN AMERICAN): >60 ML/MIN/1.73 M^2
EST. GFR  (NON AFRICAN AMERICAN): >60 ML/MIN/1.73 M^2
GLUCOSE SERPL-MCNC: 91 MG/DL (ref 70–110)
HCT VFR BLD AUTO: 39.3 % (ref 37–48.5)
HGB BLD-MCNC: 13.1 G/DL (ref 12–16)
IMM GRANULOCYTES # BLD AUTO: 0.03 K/UL (ref 0–0.04)
IMM GRANULOCYTES NFR BLD AUTO: 0.4 % (ref 0–0.5)
LYMPHOCYTES # BLD AUTO: 0.7 K/UL (ref 1–4.8)
LYMPHOCYTES NFR BLD: 8.5 % (ref 18–48)
MAGNESIUM SERPL-MCNC: 2 MG/DL (ref 1.6–2.6)
MCH RBC QN AUTO: 29.8 PG (ref 27–31)
MCHC RBC AUTO-ENTMCNC: 33.3 G/DL (ref 32–36)
MCV RBC AUTO: 89 FL (ref 82–98)
MONOCYTES # BLD AUTO: 0.5 K/UL (ref 0.3–1)
MONOCYTES NFR BLD: 5.6 % (ref 4–15)
NEUTROPHILS # BLD AUTO: 7.1 K/UL (ref 1.8–7.7)
NEUTROPHILS NFR BLD: 85.2 % (ref 38–73)
NRBC BLD-RTO: 0 /100 WBC
PHOSPHATE SERPL-MCNC: 3.2 MG/DL (ref 2.7–4.5)
PLATELET # BLD AUTO: 146 K/UL (ref 150–350)
PMV BLD AUTO: 12 FL (ref 9.2–12.9)
POTASSIUM SERPL-SCNC: 4 MMOL/L (ref 3.5–5.1)
PROT SERPL-MCNC: 6.8 G/DL (ref 6–8.4)
RBC # BLD AUTO: 4.4 M/UL (ref 4–5.4)
SODIUM SERPL-SCNC: 136 MMOL/L (ref 136–145)
WBC # BLD AUTO: 8.33 K/UL (ref 3.9–12.7)

## 2020-10-19 PROCEDURE — 25000003 PHARM REV CODE 250: Performed by: NURSE ANESTHETIST, CERTIFIED REGISTERED

## 2020-10-19 PROCEDURE — 63600175 PHARM REV CODE 636 W HCPCS: Performed by: NURSE ANESTHETIST, CERTIFIED REGISTERED

## 2020-10-19 PROCEDURE — 63600175 PHARM REV CODE 636 W HCPCS: Performed by: STUDENT IN AN ORGANIZED HEALTH CARE EDUCATION/TRAINING PROGRAM

## 2020-10-19 PROCEDURE — 63600175 PHARM REV CODE 636 W HCPCS: Performed by: SURGERY

## 2020-10-19 PROCEDURE — 36000709 HC OR TIME LEV III EA ADD 15 MIN: Performed by: SURGERY

## 2020-10-19 PROCEDURE — 44970 PR LAP,APPENDECTOMY: ICD-10-PCS | Mod: ,,, | Performed by: SURGERY

## 2020-10-19 PROCEDURE — 25000003 PHARM REV CODE 250: Performed by: SURGERY

## 2020-10-19 PROCEDURE — 27000284 HC CANNULA NASAL: Performed by: STUDENT IN AN ORGANIZED HEALTH CARE EDUCATION/TRAINING PROGRAM

## 2020-10-19 PROCEDURE — 27000671 HC TUBING MICROBORE EXT: Performed by: STUDENT IN AN ORGANIZED HEALTH CARE EDUCATION/TRAINING PROGRAM

## 2020-10-19 PROCEDURE — 27000673 HC TUBING BLOOD Y: Performed by: STUDENT IN AN ORGANIZED HEALTH CARE EDUCATION/TRAINING PROGRAM

## 2020-10-19 PROCEDURE — 25000003 PHARM REV CODE 250: Performed by: STUDENT IN AN ORGANIZED HEALTH CARE EDUCATION/TRAINING PROGRAM

## 2020-10-19 PROCEDURE — 27202107 HC XP QUATRO SENSOR: Performed by: STUDENT IN AN ORGANIZED HEALTH CARE EDUCATION/TRAINING PROGRAM

## 2020-10-19 PROCEDURE — 84100 ASSAY OF PHOSPHORUS: CPT

## 2020-10-19 PROCEDURE — 37000008 HC ANESTHESIA 1ST 15 MINUTES: Performed by: SURGERY

## 2020-10-19 PROCEDURE — 85025 COMPLETE CBC W/AUTO DIFF WBC: CPT

## 2020-10-19 PROCEDURE — 36415 COLL VENOUS BLD VENIPUNCTURE: CPT

## 2020-10-19 PROCEDURE — 71000033 HC RECOVERY, INTIAL HOUR: Performed by: SURGERY

## 2020-10-19 PROCEDURE — 87040 BLOOD CULTURE FOR BACTERIA: CPT

## 2020-10-19 PROCEDURE — 44970 LAPAROSCOPY APPENDECTOMY: CPT | Mod: ,,, | Performed by: SURGERY

## 2020-10-19 PROCEDURE — 36000708 HC OR TIME LEV III 1ST 15 MIN: Performed by: SURGERY

## 2020-10-19 PROCEDURE — 83735 ASSAY OF MAGNESIUM: CPT

## 2020-10-19 PROCEDURE — C9290 INJ, BUPIVACAINE LIPOSOME: HCPCS | Performed by: SURGERY

## 2020-10-19 PROCEDURE — 12000002 HC ACUTE/MED SURGE SEMI-PRIVATE ROOM

## 2020-10-19 PROCEDURE — 71000039 HC RECOVERY, EACH ADD'L HOUR: Performed by: SURGERY

## 2020-10-19 PROCEDURE — 80053 COMPREHEN METABOLIC PANEL: CPT

## 2020-10-19 PROCEDURE — 37000009 HC ANESTHESIA EA ADD 15 MINS: Performed by: SURGERY

## 2020-10-19 PROCEDURE — 27201107 HC STYLET, STANDARD: Performed by: STUDENT IN AN ORGANIZED HEALTH CARE EDUCATION/TRAINING PROGRAM

## 2020-10-19 PROCEDURE — 88304 TISSUE EXAM BY PATHOLOGIST: CPT | Mod: TC

## 2020-10-19 PROCEDURE — 25000003 PHARM REV CODE 250: Performed by: NURSE PRACTITIONER

## 2020-10-19 PROCEDURE — 86140 C-REACTIVE PROTEIN: CPT

## 2020-10-19 PROCEDURE — 27201423 OPTIME MED/SURG SUP & DEVICES STERILE SUPPLY: Performed by: SURGERY

## 2020-10-19 PROCEDURE — 63600175 PHARM REV CODE 636 W HCPCS: Performed by: NURSE PRACTITIONER

## 2020-10-19 RX ORDER — HYDROMORPHONE HYDROCHLORIDE 1 MG/ML
0.2 INJECTION, SOLUTION INTRAMUSCULAR; INTRAVENOUS; SUBCUTANEOUS
Status: DISCONTINUED | OUTPATIENT
Start: 2020-10-19 | End: 2020-10-21 | Stop reason: HOSPADM

## 2020-10-19 RX ORDER — LIDOCAINE HYDROCHLORIDE 10 MG/ML
INJECTION, SOLUTION EPIDURAL; INFILTRATION; INTRACAUDAL; PERINEURAL
Status: DISCONTINUED | OUTPATIENT
Start: 2020-10-19 | End: 2020-10-19

## 2020-10-19 RX ORDER — SODIUM CHLORIDE 9 MG/ML
INJECTION, SOLUTION INTRAVENOUS CONTINUOUS
Status: DISCONTINUED | OUTPATIENT
Start: 2020-10-19 | End: 2020-10-20

## 2020-10-19 RX ORDER — HYDROCODONE BITARTRATE AND ACETAMINOPHEN 5; 325 MG/1; MG/1
1 TABLET ORAL EVERY 4 HOURS PRN
Status: DISCONTINUED | OUTPATIENT
Start: 2020-10-19 | End: 2020-10-21 | Stop reason: HOSPADM

## 2020-10-19 RX ORDER — BUPIVACAINE HYDROCHLORIDE AND EPINEPHRINE 2.5; 5 MG/ML; UG/ML
INJECTION, SOLUTION EPIDURAL; INFILTRATION; INTRACAUDAL; PERINEURAL
Status: DISCONTINUED | OUTPATIENT
Start: 2020-10-19 | End: 2020-10-19 | Stop reason: HOSPADM

## 2020-10-19 RX ORDER — ONDANSETRON 2 MG/ML
4 INJECTION INTRAMUSCULAR; INTRAVENOUS DAILY PRN
Status: DISCONTINUED | OUTPATIENT
Start: 2020-10-19 | End: 2020-10-21 | Stop reason: HOSPADM

## 2020-10-19 RX ORDER — ONDANSETRON 4 MG/1
8 TABLET, ORALLY DISINTEGRATING ORAL EVERY 8 HOURS PRN
Status: DISCONTINUED | OUTPATIENT
Start: 2020-10-19 | End: 2020-10-21 | Stop reason: HOSPADM

## 2020-10-19 RX ORDER — OXYCODONE HYDROCHLORIDE 5 MG/1
5 TABLET ORAL
Status: DISCONTINUED | OUTPATIENT
Start: 2020-10-19 | End: 2020-10-21 | Stop reason: HOSPADM

## 2020-10-19 RX ORDER — ONDANSETRON 2 MG/ML
INJECTION INTRAMUSCULAR; INTRAVENOUS
Status: DISCONTINUED | OUTPATIENT
Start: 2020-10-19 | End: 2020-10-19

## 2020-10-19 RX ORDER — DIPHENHYDRAMINE HYDROCHLORIDE 50 MG/ML
12.5 INJECTION INTRAMUSCULAR; INTRAVENOUS
Status: DISCONTINUED | OUTPATIENT
Start: 2020-10-19 | End: 2020-10-21 | Stop reason: HOSPADM

## 2020-10-19 RX ORDER — SODIUM CHLORIDE, SODIUM LACTATE, POTASSIUM CHLORIDE, CALCIUM CHLORIDE 600; 310; 30; 20 MG/100ML; MG/100ML; MG/100ML; MG/100ML
INJECTION, SOLUTION INTRAVENOUS CONTINUOUS PRN
Status: DISCONTINUED | OUTPATIENT
Start: 2020-10-19 | End: 2020-10-19

## 2020-10-19 RX ORDER — HYDROMORPHONE HYDROCHLORIDE 1 MG/ML
1 INJECTION, SOLUTION INTRAMUSCULAR; INTRAVENOUS; SUBCUTANEOUS EVERY 4 HOURS PRN
Status: DISCONTINUED | OUTPATIENT
Start: 2020-10-19 | End: 2020-10-21 | Stop reason: HOSPADM

## 2020-10-19 RX ORDER — FAMOTIDINE 20 MG/1
20 TABLET, FILM COATED ORAL 2 TIMES DAILY
Status: DISCONTINUED | OUTPATIENT
Start: 2020-10-19 | End: 2020-10-21 | Stop reason: HOSPADM

## 2020-10-19 RX ORDER — PROPOFOL 10 MG/ML
VIAL (ML) INTRAVENOUS
Status: DISCONTINUED | OUTPATIENT
Start: 2020-10-19 | End: 2020-10-19

## 2020-10-19 RX ORDER — ROCURONIUM BROMIDE 10 MG/ML
INJECTION, SOLUTION INTRAVENOUS
Status: DISCONTINUED | OUTPATIENT
Start: 2020-10-19 | End: 2020-10-19

## 2020-10-19 RX ORDER — FENTANYL CITRATE 50 UG/ML
INJECTION, SOLUTION INTRAMUSCULAR; INTRAVENOUS
Status: DISCONTINUED | OUTPATIENT
Start: 2020-10-19 | End: 2020-10-19

## 2020-10-19 RX ORDER — MEPERIDINE HYDROCHLORIDE 50 MG/ML
12.5 INJECTION INTRAMUSCULAR; INTRAVENOUS; SUBCUTANEOUS EVERY 10 MIN PRN
Status: ACTIVE | OUTPATIENT
Start: 2020-10-19 | End: 2020-10-19

## 2020-10-19 RX ORDER — FAMOTIDINE 10 MG/ML
INJECTION INTRAVENOUS
Status: DISCONTINUED | OUTPATIENT
Start: 2020-10-19 | End: 2020-10-19

## 2020-10-19 RX ORDER — MUPIROCIN 20 MG/G
1 OINTMENT TOPICAL 2 TIMES DAILY
Status: DISCONTINUED | OUTPATIENT
Start: 2020-10-19 | End: 2020-10-21 | Stop reason: HOSPADM

## 2020-10-19 RX ORDER — SUCCINYLCHOLINE CHLORIDE 20 MG/ML
INJECTION INTRAMUSCULAR; INTRAVENOUS
Status: DISCONTINUED | OUTPATIENT
Start: 2020-10-19 | End: 2020-10-19

## 2020-10-19 RX ORDER — SODIUM CHLORIDE 0.9 % (FLUSH) 0.9 %
10 SYRINGE (ML) INJECTION
Status: DISCONTINUED | OUTPATIENT
Start: 2020-10-19 | End: 2020-10-21 | Stop reason: HOSPADM

## 2020-10-19 RX ADMIN — LIDOCAINE HYDROCHLORIDE 50 MG: 10 INJECTION, SOLUTION EPIDURAL; INFILTRATION; INTRACAUDAL; PERINEURAL at 11:10

## 2020-10-19 RX ADMIN — SODIUM CHLORIDE, SODIUM LACTATE, POTASSIUM CHLORIDE, AND CALCIUM CHLORIDE: .6; .31; .03; .02 INJECTION, SOLUTION INTRAVENOUS at 12:10

## 2020-10-19 RX ADMIN — FAMOTIDINE 20 MG: 10 INJECTION, SOLUTION INTRAVENOUS at 11:10

## 2020-10-19 RX ADMIN — MEROPENEM 1 G: 1 INJECTION, POWDER, FOR SOLUTION INTRAVENOUS at 05:10

## 2020-10-19 RX ADMIN — MORPHINE SULFATE 1 MG: 2 INJECTION, SOLUTION INTRAMUSCULAR; INTRAVENOUS at 05:10

## 2020-10-19 RX ADMIN — FAMOTIDINE 20 MG: 20 TABLET ORAL at 09:10

## 2020-10-19 RX ADMIN — MEROPENEM 1 G: 1 INJECTION, POWDER, FOR SOLUTION INTRAVENOUS at 10:10

## 2020-10-19 RX ADMIN — ONDANSETRON 4 MG: 2 INJECTION INTRAMUSCULAR; INTRAVENOUS at 02:10

## 2020-10-19 RX ADMIN — OXYCODONE HYDROCHLORIDE 5 MG: 5 TABLET ORAL at 06:10

## 2020-10-19 RX ADMIN — OXYCODONE HYDROCHLORIDE 5 MG: 5 TABLET ORAL at 02:10

## 2020-10-19 RX ADMIN — ONDANSETRON 4 MG: 2 INJECTION INTRAMUSCULAR; INTRAVENOUS at 11:10

## 2020-10-19 RX ADMIN — SODIUM CHLORIDE, SODIUM LACTATE, POTASSIUM CHLORIDE, AND CALCIUM CHLORIDE: .6; .31; .03; .02 INJECTION, SOLUTION INTRAVENOUS at 11:10

## 2020-10-19 RX ADMIN — SUGAMMADEX 200 MG: 100 INJECTION, SOLUTION INTRAVENOUS at 12:10

## 2020-10-19 RX ADMIN — Medication 120 MG: at 11:10

## 2020-10-19 RX ADMIN — SODIUM CHLORIDE: 0.9 INJECTION, SOLUTION INTRAVENOUS at 05:10

## 2020-10-19 RX ADMIN — ROCURONIUM BROMIDE 5 MG: 10 INJECTION, SOLUTION INTRAVENOUS at 11:10

## 2020-10-19 RX ADMIN — FENTANYL CITRATE 50 MCG: 50 INJECTION INTRAMUSCULAR; INTRAVENOUS at 11:10

## 2020-10-19 RX ADMIN — ROCURONIUM BROMIDE 25 MG: 10 INJECTION, SOLUTION INTRAVENOUS at 11:10

## 2020-10-19 RX ADMIN — MEROPENEM 1 G: 1 INJECTION, POWDER, FOR SOLUTION INTRAVENOUS at 02:10

## 2020-10-19 RX ADMIN — SODIUM CHLORIDE, SODIUM LACTATE, POTASSIUM CHLORIDE, AND CALCIUM CHLORIDE: .6; .31; .03; .02 INJECTION, SOLUTION INTRAVENOUS at 10:10

## 2020-10-19 RX ADMIN — PROPOFOL 110 MG: 10 INJECTION, EMULSION INTRAVENOUS at 11:10

## 2020-10-19 RX ADMIN — Medication 1 TABLET: at 05:10

## 2020-10-19 RX ADMIN — FAMOTIDINE 20 MG: 10 INJECTION INTRAVENOUS at 09:10

## 2020-10-19 NOTE — PROGRESS NOTES
Pt seen and examined at bedside.  Abdominal pain controlled, NPO  Admitted for acute appendicitis   right lower tenderness+ with sluggish bowel sounds   Marked elevation of CRP, retrocecal appendicitis without abscess on CT  On IV fluids and pain  Awaiting for surgery consult  Blood pressure controlled.  Afebrile.

## 2020-10-19 NOTE — TRANSFER OF CARE
"Anesthesia Transfer of Care Note    Patient: Batool Mensah    Procedure(s) Performed: Procedure(s) (LRB):  APPENDECTOMY, LAPAROSCOPIC (N/A)    Patient location: PACU    Anesthesia Type: general    Transport from OR: Transported from OR on room air with adequate spontaneous ventilation    Post pain: adequate analgesia    Post assessment: no apparent anesthetic complications    Post vital signs: stable    Level of consciousness: awake and alert    Nausea/Vomiting: no nausea/vomiting    Complications: none    Transfer of care protocol was followed      Last vitals:   Visit Vitals  /62   Pulse 73   Temp 36.7 °C (98 °F) (Oral)   Resp 18   Ht 5' 3" (1.6 m)   Wt 71.5 kg (157 lb 10.1 oz)   SpO2 97%   Breastfeeding No   BMI 27.92 kg/m²     "

## 2020-10-19 NOTE — ANESTHESIA PREPROCEDURE EVALUATION
10/19/2020  Batool Mensah is a 70 y.o., female.      Patient Active Problem List   Diagnosis    Dyslipidemia    Essential hypertension    OA (osteoarthritis)    HUI (obstructive sleep apnea)    Primary localized osteoarthritis of knee    History of bariatric surgery    Menopause    Acute appendicitis       Past Surgical History:   Procedure Laterality Date    BREAST BIOPSY Left     BREAST LUMPECTOMY       SECTION      X2    COLONOSCOPY  2016    Dr. Parker -RTC 10 years    Gastric sleeve  2018    Dr Hernandez- hiatal hernia repair        Tobacco Use:  The patient  reports that she has never smoked. She has never used smokeless tobacco.     No results found for this or any previous visit.     Imaging Results          CT Abdomen Pelvis With Contrast (Final result)  Result time 10/18/20 19:04:00    Final result by Boston Rodriguez MD (10/18/20 19:04:00)                 Narrative:    EXAM DESCRIPTION: CT ABDOMEN PELVIS WITH CONTRAST 10/18/2020 9:37 PM CDT    CLINICAL HISTORY: 70 years, Female, RLQ abdominal pain, appendicitis suspected (Age => 14y)    COMPARISON: None    PROCEDURE:    Contrast-enhanced images of the abdomen and pelvis were performed utilizing 2 mm slice thickness at 2 mm interval reconstruction from the lung bases to the ischial tuberosities after the administration of 100 cc of Omnipaque 350 at the rate of 3.0 cc/sec.  In addition multiplanar reformats in the coronal and sagittal plane were obtained and reviewed.  An individualized dose optimization technique, Automated Exposure Control, was utilized for the performed procedure.    FINDINGS:    The lung bases demonstrate very minimal dependent atelectatic changes lung bases.    There is distended gallbladder with the presence of cholelithiasis.    There is a retrocecal posterior tubular structure with enhancement and radial  lines material at the midportion/tip measuring 14 mm on axial 115 with surrounding haziness corresponding to infection/inflammation. Findings are highly suggestive of retrocecal posterior acute appendicitis. There is no evidence for abscess formation and/or perforation. Significant surrounding inflammatory changes are demonstrated.    The liver, pancreas, spleen and adrenal glands demonstrate to be unremarkable, no focal lesions are noted.  The kidneys demonstrate normal uptake of contrast media.  No hydronephrosis and/or stones were identified. There is a lower pole parapelvic left renal cyst on image 66 and 53  Grossly the unopacified stomach demonstrate the presence of a previous gastric bypass sleeve, small bowel and large bowel demonstrate to be within normal limits. Fecal residue and underdistention within the large bowel limits the evaluation.  Left site colon is decompressed limiting diagnostic value.  The urinary bladder demonstrate to be unremarkable.  The uterus demonstrate to be within normal limits. Minimal trace of left fluid posterior cul-de-sac. Benign consolidations within the pelvic floor corresponding to phleboliths.  The aorta demonstrate demonstrate to be within normal limits.  There is no retroperitoneal lymphadenopathy. There is no evidence for ascites and/or significant abnormal fluid collections. The bone windows demonstrate minimal anterior spondylosis with no significant skeletal lesions.    IMPRESSION:    PREVIOUS GASTRIC BYPASS SURGERY.    CHOLELITHIASIS WITH MINIMALLY DISTENDED GALLBLADDER.    FINDINGS ARE HIGHLY SUGGESTIVE OF ACUTE APPENDICITIS WHICH IS RETROCECAL AND EXTENDING JUST INFERIOR TO THE EDGE OF THE LIVER WITH SURROUNDING INFLAMMATORY CHANGES AND APPENDICOLITH. NO EVIDENCE FOR ABSCESS FORMATION AND/OR PERFORATION.      Electronically signed by:  Boston Rodriguez MD  10/18/2020 9:44 PM CDT Workstation: [id number]                               Lab Results   Component Value Date     WBC 8.33 10/19/2020    HGB 13.1 10/19/2020    HCT 39.3 10/19/2020    MCV 89 10/19/2020     (L) 10/19/2020     BMP  Lab Results   Component Value Date     10/19/2020    K 4.0 10/19/2020    CL 98 10/19/2020    CO2 27 10/19/2020    BUN 21 10/19/2020    CREATININE 0.8 10/19/2020    CALCIUM 8.9 10/19/2020    ANIONGAP 11 10/19/2020    GLU 91 10/19/2020     (H) 10/18/2020    GLU 84 08/26/2020       No results found for this or any previous visit.          Anesthesia Evaluation    I have reviewed the Patient Summary Reports.    I have reviewed the Nursing Notes. I have reviewed the NPO Status.   I have reviewed the Medications.     Review of Systems  Anesthesia Hx:  Denies Family Hx of Anesthesia complications.   Denies Personal Hx of Anesthesia complications.   Social:  Non-Smoker    Cardiovascular:   Hypertension ECG has been reviewed.    Pulmonary:   Sleep Apnea, CPAP    Education provided regarding risk of obstructive sleep apnea     Musculoskeletal:   Arthritis         Physical Exam  General:  Well nourished    Airway/Jaw/Neck:  Airway Findings: Mouth Opening: Normal Tongue: Normal  General Airway Assessment: Adult  Mallampati: II  TM Distance: Normal, at least 6 cm  Jaw/Neck Findings:  Neck ROM: Normal ROM      Dental:  Dental Findings: In tact   Chest/Lungs:  Chest/Lungs Findings: Clear to auscultation, Normal Respiratory Rate     Heart/Vascular:  Heart Findings: Rate: Normal  Rhythm: Regular Rhythm  Sounds: Normal        Mental Status:  Mental Status Findings:  Alert and Oriented         Anesthesia Plan  Type of Anesthesia, risks & benefits discussed:  Anesthesia Type:  general  Patient's Preference:   Intra-op Monitoring Plan: standard ASA monitors  Intra-op Monitoring Plan Comments:   Post Op Pain Control Plan: multimodal analgesia  Post Op Pain Control Plan Comments:   Induction:   IV  Beta Blocker:  Patient is not currently on a Beta-Blocker (No further documentation required).       Informed  Consent: Patient understands risks and agrees with Anesthesia plan.  Questions answered. Anesthesia consent signed with patient.  ASA Score: 2     Day of Surgery Review of History & Physical:        Anesthesia Plan Notes: GETA  Pepcid 40 mg   1g Ofirmev        Ready For Surgery From Anesthesia Perspective.

## 2020-10-19 NOTE — OP NOTE
Surgery Date: 10/19/2020     Surgeon(s) and Role:     * Konrad Almonte III, MD - Primary    Assisting Surgeon: None    Pre-op Diagnosis:  Acute appendicitis with localized peritonitis, without perforation, abscess, or gangrene [K35.30]    Post-op Diagnosis:  Post-Op Diagnosis Codes:     * Acute appendicitis with localized peritonitis, with perforation, without gangrene    Procedure(s) (LRB):  APPENDECTOMY, LAPAROSCOPIC (N/A)    Anesthesia: General    Description of Procedure: The patient was taken to the operating room and transferred to the operating room table in the supine position.  The patient was given general anesthesia and intubated.  Armenta catheter was placed.  Abdomen was sterilely prepped and draped.  A small left upper quadrant incision was made.  Veress needle placed through the abdominal wall and the abdomen was insufflated.  The abdomen was then entered using the 5 mm Visiport.  She had omental adhesions to the anterior abdominal wall at the umbilicus and below toward the pelvis. The patient was put in Trendelenburg position.  Under direct visualization, a 5 mm port was placed in the suprapubic position in the midline.  Another 5 mm port was placed in the left lower quadrant.  The adhesions were taken down with LigaSure.  This cleared the omental adhesions from the anterior abdominal wall.  The patient was then tilted a little to the left and the small bowel was swept away from the right lower quadrant.  The cecum was identified.  The appendix was identified.  It was retrocecal.  It was extremely inflamed and dilated. It was very firm.   Appendix was retracted anteriorly.  The Ligasure was used to divide mesoappendix.  Once the appendix was dissected from behind the cecum we could see that the mid appendix was ruptured. The appendix was then transected at its base using a laparoscopic stapler.  The appendix was removed using an endocatch bag. The lower quadrants of the abdomen and pelvis were  irrigated with suction .  A 10 flat ARVIND was left in the right lower quadrant.  There was no evidence of any bleeding or leak at the end of the case, so our instruments were removed, the ports were removed.  The abdomen was deflated. The skin sites were closed using 4-0 Monocryl.  The patient's Armenta catheter was removed.  Patient was extubated and brought to the recovery room in stable condition.    Description of the findings of the procedure:  Retrocecal perforated appendicitis    Estimated Blood Loss: 10 mL    Estimated Blood Loss has been documented.     Complications none    Instrument counts correct         Specimens:   Specimen (12h ago, onward)     Start     Ordered    10/19/20 1200  Specimen to Pathology - Surgery  Once     Comments: Pre-op Diagnosis: Acute appendicitis with localized peritonitis, without perforation, abscess, or gangrene [K35.30]Procedure(s):APPENDECTOMY, LAPAROSCOPIC Number of specimens: 1Name of specimens: appendix      10/19/20 1201                [id number]

## 2020-10-19 NOTE — NURSING
Patient admitted to room 1223 via stretcher from the ER.  Patient ambulatory from stretcher to the bathroom without assistance.  NAD.  AA&O x4.

## 2020-10-19 NOTE — ED PROVIDER NOTES
Encounter Date: 10/18/2020       History     Chief Complaint   Patient presents with    Abdominal Pain     rebound tenderness in RLQ. Still has appendix     Presents with complaint of generalized abdominal pain  Onset yesterday  Worse to the right lower quadrant  Denies NVD  Reports temp earlier as 100.1 She is afebrile at present pt increased pain hitting bump while riding in the car  She reports decreased appetite         Review of patient's allergies indicates:   Allergen Reactions    Penicillin g benzathine Other (See Comments)     Past Medical History:   Diagnosis Date    Hypertension      Past Surgical History:   Procedure Laterality Date    BREAST BIOPSY Left     BREAST LUMPECTOMY       SECTION      X2    COLONOSCOPY  2016    Dr. Parker -RTC 10 years    Gastric sleeve  2018    Dr Hernandez- hiatal hernia repair     No family history on file.  Social History     Tobacco Use    Smoking status: Never Smoker    Smokeless tobacco: Never Used   Substance Use Topics    Alcohol use: Not on file    Drug use: Not on file     Review of Systems   Constitutional: Positive for fever.   Respiratory: Negative for cough, shortness of breath and wheezing.    Cardiovascular: Negative for chest pain, palpitations and leg swelling.   Gastrointestinal: Positive for abdominal pain. Negative for diarrhea, nausea and vomiting.   Genitourinary: Negative for dysuria.   Musculoskeletal: Negative for back pain.   Skin: Negative for rash.   Neurological: Negative for weakness.       Physical Exam     Initial Vitals [10/18/20 1901]   BP Pulse Resp Temp SpO2   125/71 82 18 98.8 °F (37.1 °C) 98 %      MAP       --         Physical Exam    Constitutional: She appears well-developed and well-nourished.   HENT:   Head: Normocephalic and atraumatic.   Mouth/Throat: Oropharynx is clear and moist.   Eyes: Conjunctivae are normal.   Neck: Normal range of motion. Neck supple.   Cardiovascular: Normal rate, regular rhythm and normal  heart sounds.   Pulmonary/Chest: Breath sounds normal. No respiratory distress.   Abdominal: Soft. Bowel sounds are normal. She exhibits no distension. There is abdominal tenderness. There is rebound.   Generalized pain with palpation Rebound tenderness right lower quadrant    Musculoskeletal: Normal range of motion.   Neurological: She is alert and oriented to person, place, and time. No sensory deficit. GCS score is 15. GCS eye subscore is 4. GCS verbal subscore is 5. GCS motor subscore is 6.   Skin: Skin is warm and dry. Capillary refill takes less than 2 seconds.   Psychiatric: She has a normal mood and affect. Thought content normal.         ED Course   Procedures  Labs Reviewed   CBC W/ AUTO DIFFERENTIAL - Abnormal; Notable for the following components:       Result Value    Gran # (ANC) 8.7 (*)     Gran% 84.1 (*)     Lymph% 10.9 (*)     All other components within normal limits   COMPREHENSIVE METABOLIC PANEL - Abnormal; Notable for the following components:    Sodium 135 (*)     Glucose 112 (*)     BUN, Bld 25 (*)     Total Bilirubin 1.2 (*)     Alkaline Phosphatase 12 (*)     All other components within normal limits   URINALYSIS, REFLEX TO URINE CULTURE - Abnormal; Notable for the following components:    Bilirubin (UA) 1+ (*)     Occult Blood UA Trace (*)     Leukocytes, UA 1+ (*)     All other components within normal limits    Narrative:     Specimen Source->Urine   URINALYSIS MICROSCOPIC - Abnormal; Notable for the following components:    WBC, UA 31 (*)     Bacteria Moderate (*)     Hyaline Casts, UA 78 (*)     All other components within normal limits    Narrative:     Specimen Source->Urine   CULTURE, URINE   LIPASE   SARS-COV-2 RNA AMPLIFICATION, QUAL          Imaging Results          CT Abdomen Pelvis With Contrast (Final result)  Result time 10/18/20 19:04:00    Final result by Boston Rodriguez MD (10/18/20 19:04:00)                 Narrative:    EXAM DESCRIPTION: CT ABDOMEN PELVIS WITH CONTRAST  10/18/2020 9:37 PM CDT    CLINICAL HISTORY: 70 years, Female, RLQ abdominal pain, appendicitis suspected (Age => 14y)    COMPARISON: None    PROCEDURE:    Contrast-enhanced images of the abdomen and pelvis were performed utilizing 2 mm slice thickness at 2 mm interval reconstruction from the lung bases to the ischial tuberosities after the administration of 100 cc of Omnipaque 350 at the rate of 3.0 cc/sec.  In addition multiplanar reformats in the coronal and sagittal plane were obtained and reviewed.  An individualized dose optimization technique, Automated Exposure Control, was utilized for the performed procedure.    FINDINGS:    The lung bases demonstrate very minimal dependent atelectatic changes lung bases.    There is distended gallbladder with the presence of cholelithiasis.    There is a retrocecal posterior tubular structure with enhancement and radial lines material at the midportion/tip measuring 14 mm on axial 115 with surrounding haziness corresponding to infection/inflammation. Findings are highly suggestive of retrocecal posterior acute appendicitis. There is no evidence for abscess formation and/or perforation. Significant surrounding inflammatory changes are demonstrated.    The liver, pancreas, spleen and adrenal glands demonstrate to be unremarkable, no focal lesions are noted.  The kidneys demonstrate normal uptake of contrast media.  No hydronephrosis and/or stones were identified. There is a lower pole parapelvic left renal cyst on image 66 and 53  Grossly the unopacified stomach demonstrate the presence of a previous gastric bypass sleeve, small bowel and large bowel demonstrate to be within normal limits. Fecal residue and underdistention within the large bowel limits the evaluation.  Left site colon is decompressed limiting diagnostic value.  The urinary bladder demonstrate to be unremarkable.  The uterus demonstrate to be within normal limits. Minimal trace of left fluid posterior  cul-de-sac. Benign consolidations within the pelvic floor corresponding to phleboliths.  The aorta demonstrate demonstrate to be within normal limits.  There is no retroperitoneal lymphadenopathy. There is no evidence for ascites and/or significant abnormal fluid collections. The bone windows demonstrate minimal anterior spondylosis with no significant skeletal lesions.    IMPRESSION:    PREVIOUS GASTRIC BYPASS SURGERY.    CHOLELITHIASIS WITH MINIMALLY DISTENDED GALLBLADDER.    FINDINGS ARE HIGHLY SUGGESTIVE OF ACUTE APPENDICITIS WHICH IS RETROCECAL AND EXTENDING JUST INFERIOR TO THE EDGE OF THE LIVER WITH SURROUNDING INFLAMMATORY CHANGES AND APPENDICOLITH. NO EVIDENCE FOR ABSCESS FORMATION AND/OR PERFORATION.      Electronically signed by:  Boston Rodriguez MD  10/18/2020 9:44 PM CDT Workstation: [id number]                               Medical Decision Making:   Initial Assessment:   Presents with generalized abdominal pain Onset yesterday. worse at right lower quadrant pain. Pt reports temp of 100.1 today She is afebrile at present Denies NVD  Differential Diagnosis:   Constipation   ED Management:  Pt has been given morphine and zofran for pain   Dr. Abdi in to examine pt  CT report reveals acute appendicitis pt will be admitted               Attending Attestation:             Attending ED Notes:   I did see and evaluate this patient.  Patient presents right lower quadrant pain it started yesterday.  Patient tender on exam the right lower quadrant over McBurney's point.  Imaging does indeed reveal findings discussed above acute appendicitis.  Patient consulted to Dr. Almonte who will see the patient in the morning and likely surgery will be in the morning.  Patient received Levaquin in the emergency department she has a penicillin allergy.  She remains clinically stable.                    Clinical Impression:     ICD-10-CM ICD-9-CM   1. Acute appendicitis, unspecified acute appendicitis type  K35.80 540.9                           ED Disposition Condition    Admit                             Dino Abdi MD  10/18/20 2462

## 2020-10-19 NOTE — PLAN OF CARE
Initial discharge planning assessment completed at bedside with patient and spouse, Luisito at bedside. Patient is independent of ADLs, drives and drives. PCP Dr. Sage Dickson. Pharm [facility] on [address]. No prior [facility]. Case management following.      10/19/20 0918   Discharge Assessment   Assessment Type Discharge Planning Assessment   Confirmed/corrected address and phone number on facesheet? Yes   Assessment information obtained from? Patient   Expected Length of Stay (days) 2   Communicated expected length of stay with patient/caregiver yes   Prior to hospitilization cognitive status: Alert/Oriented   Prior to hospitalization functional status: Independent   Current cognitive status: Alert/Oriented   Current Functional Status: Independent   Lives With spouse   Able to Return to Prior Arrangements yes   Is patient able to care for self after discharge? Yes   Patient's perception of discharge disposition home or selfcare   Readmission Within the Last 30 Days no previous admission in last 30 days   Patient currently being followed by outpatient case management? No   Patient currently receives any other outside agency services? No   Equipment Currently Used at Home none   Part D Coverage PHN   Do you have any problems affording any of your prescribed medications? No   Is the patient taking medications as prescribed? yes   Does the patient have transportation home? Yes   Transportation Anticipated family or friend will provide   Does the patient receive services at the [facility]? No   Discharge Plan A Home   Discharge Plan B Home Health   DME Needed Upon Discharge  none   Patient/Family in Agreement with Plan yes

## 2020-10-19 NOTE — ANESTHESIA POSTPROCEDURE EVALUATION
Anesthesia Post Evaluation    Patient: Batool Mensah    Procedure(s) Performed: Procedure(s) (LRB):  APPENDECTOMY, LAPAROSCOPIC (N/A)    Final Anesthesia Type: general    Patient location during evaluation: PACU  Patient participation: Yes- Able to Participate  Level of consciousness: awake and alert  Post-procedure vital signs: reviewed and stable  Pain management: adequate  Airway patency: patent  HUI mitigation strategies: Multimodal analgesia, Extubation while patient is awake and Extubation and recovery carried out in lateral, semiupright, or other nonsupine position  PONV status at discharge: No PONV  Anesthetic complications: no      Cardiovascular status: blood pressure returned to baseline  Respiratory status: unassisted  Hydration status: euvolemic  Follow-up not needed.          Vitals Value Taken Time   /61 10/19/20 1345   Temp 36.7 °C (98 °F) 10/19/20 1330   Pulse 77 10/19/20 1347   Resp 16 10/19/20 1347   SpO2 97 % 10/19/20 1347   Vitals shown include unvalidated device data.      Event Time   Out of Recovery 10/19/2020 13:46:53         Pain/Fartun Score: Pain Rating Prior to Med Admin: 6 (10/19/2020  5:35 AM)  Fatrun Score: 10 (10/19/2020  1:45 PM)

## 2020-10-19 NOTE — CONSULTS
[facility]  General Surgery  Consult Note    Inpatient consult to General surgery  Consult performed by: Konrad Almonte III, MD  Consult ordered by: Dino Abdi MD  Reason for consult: acute appendicitis         Subjective:     Chief Complaint/Reason for Admission:  Acute appendicitis    History of Present Illness:  Patient is 70-year-old female admitted overnight with acute appendicitis.  She presented with one-day history of severe diffuse abdominal pain worse in the right mid to lower abdomen.  CT scan showed dilated and inflamed retrocecal appendix without evidence of abscess or rupture.  Patient was admitted and started on meropenem.  She states that pain is associated with nausea without vomiting.  She denies fevers or chills.  She has history of multiple abdominal operations including laparoscopic gastric sleeve 2 years ago and multiple C-sections via mid lower midline incision.     No current facility-administered medications on file prior to encounter.      Current Outpatient Medications on File Prior to Encounter   Medication Sig    alendronate (FOSAMAX) 70 MG tablet Take 1 tablet (70 mg total) by mouth every 7 days. Take on empty stomach with full glass of water-do not eat or lie down for 1 hour For osteoporosis    calcium-vitamin D3 (CALCIUM 500 + D) 500 mg(1,250mg) -200 unit per tablet Take 1 tablet by mouth 2 (two) times daily with meals.    losartan (COZAAR) 50 MG tablet Take 1 tablet (50 mg total) by mouth once daily.       Review of patient's allergies indicates:   Allergen Reactions    Penicillin g benzathine Other (See Comments)       Past Medical History:   Diagnosis Date    Hypertension      Past Surgical History:   Procedure Laterality Date    BREAST BIOPSY Left     BREAST LUMPECTOMY       SECTION      X2    COLONOSCOPY  2016    Dr. Parker -RTC 10 years    Gastric sleeve  2018    Dr Hernandez- hiatal hernia repair     Family History     None        Tobacco  Use    Smoking status: Never Smoker    Smokeless tobacco: Never Used   Substance and Sexual Activity    Alcohol use: Not on file    Drug use: Not on file    Sexual activity: Not on file     Review of Systems   Constitutional: Negative for appetite change, chills, fever and unexpected weight change.   HENT: Negative for hearing loss, rhinorrhea, sore throat and voice change.    Eyes: Negative for photophobia and visual disturbance.   Respiratory: Negative for cough, choking and shortness of breath.    Cardiovascular: Negative for chest pain, palpitations and leg swelling.   Gastrointestinal: Negative for abdominal pain, blood in stool, constipation, diarrhea, nausea and vomiting.   Endocrine: Negative for cold intolerance, heat intolerance, polydipsia and polyuria.   Musculoskeletal: Negative for arthralgias, back pain, joint swelling and neck stiffness.   Skin: Negative for color change, pallor and rash.   Neurological: Negative for dizziness, seizures, syncope and headaches.   Hematological: Negative for adenopathy. Does not bruise/bleed easily.   Psychiatric/Behavioral: Negative for agitation, behavioral problems and confusion.     Objective:     Vital Signs (Most Recent):  Temp: 98 °F (36.7 °C) (10/19/20 0753)  Pulse: 73 (10/19/20 0753)  Resp: 18 (10/19/20 0753)  BP: 107/62 (10/19/20 0753)  SpO2: 97 % (10/19/20 0753) Vital Signs (24h Range):  Temp:  [98 °F (36.7 °C)-98.9 °F (37.2 °C)] 98 °F (36.7 °C)  Pulse:  [72-87] 73  Resp:  [16-18] 18  SpO2:  [97 %-99 %] 97 %  BP: (100-125)/(50-71) 107/62     Weight: 71.5 kg (157 lb 10.1 oz)  Body mass index is 27.92 kg/m².    No intake or output data in the 24 hours ending 10/19/20 0853    Physical Exam  Constitutional:       General: She is not in acute distress.     Appearance: Normal appearance. She is well-developed. She is not toxic-appearing.   HENT:      Head: Normocephalic and atraumatic. No abrasion or laceration.      Right Ear: External ear normal.      Left  Ear: External ear normal.      Nose: Nose normal.   Eyes:      Pupils: Pupils are equal, round, and reactive to light.   Neck:      Musculoskeletal: Neck supple. Normal range of motion.      Trachea: Trachea and phonation normal. No tracheal deviation.   Cardiovascular:      Rate and Rhythm: Normal rate and regular rhythm.   Pulmonary:      Effort: Pulmonary effort is normal. No tachypnea, accessory muscle usage or respiratory distress.   Abdominal:      General: There is no distension.      Palpations: Abdomen is soft.      Tenderness: There is abdominal tenderness in the right upper quadrant and right lower quadrant. There is guarding and rebound.      Comments: abd tenderness with guarding along right abdomen.    Lymphadenopathy:      Cervical: No cervical adenopathy.   Skin:     General: Skin is warm.      Coloration: Skin is not jaundiced.   Neurological:      Mental Status: She is alert and oriented to person, place, and time.      Coordination: Coordination normal.      Gait: Gait normal.   Psychiatric:         Speech: Speech normal.         Behavior: Behavior normal.         Significant Labs:  CBC:   Recent Labs   Lab 10/19/20  0518   WBC 8.33   RBC 4.40   HGB 13.1   HCT 39.3   *   MCV 89   MCH 29.8   MCHC 33.3     CMP:   Recent Labs   Lab 10/19/20  0518   GLU 91   CALCIUM 8.9   ALBUMIN 3.5   PROT 6.8      K 4.0   CO2 27   CL 98   BUN 21   CREATININE 0.8   ALKPHOS 14*   ALT 18   AST 17   BILITOT 1.3*       Significant Diagnostics:  I have reviewed all pertinent imaging results/findings within the past 24 hours.     PREVIOUS GASTRIC BYPASS SURGERY.     CHOLELITHIASIS WITH MINIMALLY DISTENDED GALLBLADDER.     FINDINGS ARE HIGHLY SUGGESTIVE OF ACUTE APPENDICITIS WHICH IS RETROCECAL AND EXTENDING JUST INFERIOR TO THE EDGE OF THE LIVER WITH SURROUNDING INFLAMMATORY CHANGES AND APPENDICOLITH. NO EVIDENCE FOR ABSCESS FORMATION AND/OR PERFORATION.    Assessment/Plan:     Active Diagnoses:    Diagnosis  Date Noted POA    PRINCIPAL PROBLEM:  Acute appendicitis [K35.80] 10/18/2020 Yes    History of bariatric surgery [Z98.84] 01/08/2020 Not Applicable    Dyslipidemia [E78.5] 12/18/2017 Yes    Essential hypertension [I10] 05/27/2015 Yes      Problems Resolved During this Admission:     -patient admitted and started on meropenem.  She will go the operating room for appendectomy today.  The risks and benefits of surgery discussed with the patient and family.     Thank you for your consult.     Konrad Almonte III, MD  General Surgery  [facility] hospital

## 2020-10-19 NOTE — ANESTHESIA PROCEDURE NOTES
Intubation  Performed by: Dakotah Campo CRNA  Authorized by: Gurvinder Clarke MD     Intubation:     Induction:  Intravenous    Intubated:  Postinduction    Mask Ventilation:  N/a    Attempts:  1    Attempted By:  CRNA    Method of Intubation:  Direct    Blade:  Dalton 2    Laryngeal View Grade: Grade I - full view of chords      Difficult Airway Encountered?: No      Complications:  None    Airway Device:  Oral endotracheal tube    Airway Device Size:  7.5    Style/Cuff Inflation:  Cuffed    Inflation Amount (mL):  8    Tube secured:  21    Secured at:  The lips    Placement Verified By:  Capnometry    Complicating Factors:  None    Findings Post-Intubation:  BS equal bilateral

## 2020-10-19 NOTE — H&P
[facility] History & Physical Examination   Patient Name: Batool Mensah  MRN: [id number]  Patient Class: OP- Observation   Admission Date: 10/18/2020  7:04 PM  Length of Stay: 0  Attending Physician:   Primary Care Provider: Sage Dickson MD  Face-to-Face encounter date: 10/18/2020  Code Status:Full Code  MPOA:  Chief Complaint: Abdominal Pain (rebound tenderness in RLQ. Still has appendix)        Patient information was obtained from patient, past medical records and ER records.   HISTORY OF PRESENT ILLNESS:   Batool Mensah is a 70 y.o. old female who  has a past medical history of Hypertension.. The patient presented to [facility] on 10/18/2020 with a primary complaint of Abdominal Pain (rebound tenderness in RLQ. Still has appendix)  .   70-year-old  female presents emergency room with abdominal pain.      The patient states for the past 2 days she has been having diffuse lower quadrant abdominal pain.      Today the pain was primarily located to her right lower quadrant.  She endorse some chills and subjective fever and nausea.  She described the pain as a tight sharp burning sensation with no alleviating or exacerbating factors.      Palpating her abdomen worsens the abdominal pain.      She denied hematemesis hemoptysis black or bloody stools lightheadedness dizziness or syncope.  She describes her symptoms as severe in severity.    The ER doctor did speak with the surgeon Dr. Almonte who will see the patient in consult    REVIEW OF SYSTEMS:   10 Point Review of System was performed and was found to be negative except for that mentioned already in the HPI and   Review of Systems (Negative unless checked off)  Review of Systems   Constitutional: Positive for chills and fever.   HENT: Negative.    Eyes: Negative.    Respiratory: Negative.    Cardiovascular: Negative.    Gastrointestinal: Positive for abdominal pain and nausea.   Genitourinary:  "Negative.    Musculoskeletal: Negative.    Skin: Negative.    Neurological: Negative.    Endo/Heme/Allergies: Negative.    Psychiatric/Behavioral: Negative.            PAST MEDICAL HISTORY:     Past Medical History:   Diagnosis Date    Hypertension        PAST SURGICAL HISTORY:     Past Surgical History:   Procedure Laterality Date    BREAST BIOPSY Left     BREAST LUMPECTOMY       SECTION      X2    COLONOSCOPY  2016    Dr. Parker -RTC 10 years    Gastric sleeve  2018    Dr Hernandez- hiatal hernia repair       ALLERGIES:   Penicillin g benzathine    FAMILY HISTORY:   No family history on file.    SOCIAL HISTORY:     Social History     Tobacco Use    Smoking status: Never Smoker    Smokeless tobacco: Never Used   Substance Use Topics    Alcohol use: Not on file        Social History     Substance and Sexual Activity   Sexual Activity Not on file        HOME MEDICATIONS:     Prior to Admission medications    Medication Sig Start Date End Date Taking? Authorizing Provider   alendronate (FOSAMAX) 70 MG tablet Take 1 tablet (70 mg total) by mouth every 7 days. Take on empty stomach with full glass of water-do not eat or lie down for 1 hour For osteoporosis 20  Eloise Gilbert NP   calcium-vitamin D3 (CALCIUM 500 + D) 500 mg(1,250mg) -200 unit per tablet Take 1 tablet by mouth 2 (two) times daily with meals.    Historical Provider   losartan (COZAAR) 50 MG tablet Take 1 tablet (50 mg total) by mouth once daily. 20   Eloise Gilbert NP         PHYSICAL EXAM:   BP (!) 100/50   Pulse 76   Temp 98.8 °F (37.1 °C)   Resp 18   Ht 5' 3" (1.6 m)   Wt 72.6 kg (160 lb)   SpO2 98%   BMI 28.34 kg/m²   Vitals Reviewed  General appearance: Well-developed, well-nourished female in no apparent distress.  Skin: No Rash.   Neuro: Motor and sensory exams grossly intact. Good tone. Power in all 4 extremities 5/5.   HENT: Atraumatic head. Moist mucous membranes of oral cavity.  Eyes: Normal extraocular " movements.   Neck: Supple. No evidence of lymphadenopathy. No thyroidomegaly.  Lungs: Clear to auscultation bilaterally. No wheezing present.   Heart: Regular rate and rhythm. S1 and S2 present with no murmurs/gallop/rub. No pedal edema. No JVD present.   Abdomen: Soft, non-distended, +tenderness to Rt>Lt lower abdomen quads. No rebound tenderness/guarding. No masses or organomegaly. Bowel sounds are normal. Bladder is not palpable.   Extremities: No cyanosis, clubbing, or edema.  Psych/mental status: Alert and oriented. Cooperative. Responds appropriately to questions.   EMERGENCY DEPARTMENT LABS AND IMAGING:   Following labs were Reviewed   Recent Labs   Lab 10/18/20  1920   WBC 10.34   HGB 14.7   HCT 43.1      CALCIUM 9.5   ALBUMIN 4.2   PROT 7.6   *   K 3.9   CO2 26   CL 97   BUN 25*   CREATININE 0.9   ALKPHOS 12*   ALT 23   AST 25   BILITOT 1.2*         BMP:   Recent Labs   Lab 10/18/20  1920   *   *   K 3.9   CL 97   CO2 26   BUN 25*   CREATININE 0.9   CALCIUM 9.5   , CMP   Recent Labs   Lab 10/18/20  1920   *   K 3.9   CL 97   CO2 26   *   BUN 25*   CREATININE 0.9   CALCIUM 9.5   PROT 7.6   ALBUMIN 4.2   BILITOT 1.2*   ALKPHOS 12*   AST 25   ALT 23   ANIONGAP 12   ESTGFRAFRICA >60.0   EGFRNONAA >60.0   , CBC   Recent Labs   Lab 10/18/20  1920   WBC 10.34   HGB 14.7   HCT 43.1      , INR No results found for: INR, PROTIME, Lipid Panel   Lab Results   Component Value Date    CHOL 185 10/28/2019    HDL 78 10/28/2019    LDLCALC 94 10/28/2019    TRIG 44 10/28/2019    CHOLHDL 2.4 10/28/2019   , Troponin No results for input(s): TROPONINI in the last 168 hours., A1C: No results for input(s): HGBA1C in the last 4320 hours. and All labs within the past 24 hours have been reviewed  Microbiology Results (last 7 days)     Procedure Component Value Units Date/Time    Urine culture [[id number]] Collected: 10/18/20 1930    Order Status: No result Specimen: Urine Updated: 10/18/20  1956        CT Abdomen Pelvis With Contrast   Final Result        Ct Abdomen Pelvis With Contrast    Result Date: 10/18/2020  EXAM DESCRIPTION: CT ABDOMEN PELVIS WITH CONTRAST 10/18/2020 9:37 PM CDT CLINICAL HISTORY: 70 years, Female, RLQ abdominal pain, appendicitis suspected (Age => 14y) COMPARISON: None PROCEDURE: Contrast-enhanced images of the abdomen and pelvis were performed utilizing 2 mm slice thickness at 2 mm interval reconstruction from the lung bases to the ischial tuberosities after the administration of 100 cc of Omnipaque 350 at the rate of 3.0 cc/sec. In addition multiplanar reformats in the coronal and sagittal plane were obtained and reviewed. An individualized dose optimization technique, Automated Exposure Control, was utilized for the performed procedure. FINDINGS: The lung bases demonstrate very minimal dependent atelectatic changes lung bases. There is distended gallbladder with the presence of cholelithiasis. There is a retrocecal posterior tubular structure with enhancement and radial lines material at the midportion/tip measuring 14 mm on axial 115 with surrounding haziness corresponding to infection/inflammation. Findings are highly suggestive of retrocecal posterior acute appendicitis. There is no evidence for abscess formation and/or perforation. Significant surrounding inflammatory changes are demonstrated. The liver, pancreas, spleen and adrenal glands demonstrate to be unremarkable, no focal lesions are noted. The kidneys demonstrate normal uptake of contrast media.  No hydronephrosis and/or stones were identified. There is a lower pole parapelvic left renal cyst on image 66 and 53 Grossly the unopacified stomach demonstrate the presence of a previous gastric bypass sleeve, small bowel and large bowel demonstrate to be within normal limits. Fecal residue and underdistention within the large bowel limits the evaluation.  Left site colon is decompressed limiting diagnostic value. The  urinary bladder demonstrate to be unremarkable.  The uterus demonstrate to be within normal limits. Minimal trace of left fluid posterior cul-de-sac. Benign consolidations within the pelvic floor corresponding to phleboliths.  The aorta demonstrate demonstrate to be within normal limits.  There is no retroperitoneal lymphadenopathy. There is no evidence for ascites and/or significant abnormal fluid collections. The bone windows demonstrate minimal anterior spondylosis with no significant skeletal lesions. IMPRESSION: PREVIOUS GASTRIC BYPASS SURGERY. CHOLELITHIASIS WITH MINIMALLY DISTENDED GALLBLADDER. FINDINGS ARE HIGHLY SUGGESTIVE OF ACUTE APPENDICITIS WHICH IS RETROCECAL AND EXTENDING JUST INFERIOR TO THE EDGE OF THE LIVER WITH SURROUNDING INFLAMMATORY CHANGES AND APPENDICOLITH. NO EVIDENCE FOR ABSCESS FORMATION AND/OR PERFORATION. Electronically signed by:  Boston Rodriguez MD  10/18/2020 9:44 PM CDT Workstation: [id number]        ASSESSMENT & PLAN:   Batool Mensah is a 70 y.o. female admitted for    1. Acute appendicitis  -consult general surgery  -NPO after midnight  -IV antibiotics with Levaquin  -IV hydration  -pain management      2.  Essential hypertension  -continue antihypertensive medications    3.  History of gastric sleeve  -chronic issue and stable    DVT Prophylaxis: will be placed on SCDs prophylaxis and will be advised to be as mobile as possible and sit in a chair as tolerated.   ________________________________________________________________________________      Face-to-Face encounter date: 10/18/2020  Encounter included review of the medical records, interviewing and examining the patient face-to-face, discussion with family and other health care providers including emergency medicine physician, admission orders, interpreting lab/test results and formulating a plan of care.   Medical Decision Making during this encounter was  [_] Low Complexity  [_] Moderate Complexity  [x] High  Complexity  _________________________________________________________________________________    INPATIENT LIST OF MEDICATIONS     Current Facility-Administered Medications:     acetaminophen tablet 650 mg, 650 mg, Oral, Q8H PRN, January Parsons NP    [START ON 10/19/2020] calcium-vitamin D3 500 mg(1,250mg) -200 unit per tablet 1 tablet, 1 tablet, Oral, BID WM, January Parsons NP    [START ON 10/19/2020] famotidine (PF) injection 20 mg, 20 mg, Intravenous, Daily, January Parsons NP    lactated ringers infusion, , Intravenous, Continuous, January Parsons NP    levoFLOXacin 750 mg/150 mL IVPB 750 mg, 750 mg, Intravenous, Q24H, Christel Hollis NP, Last Rate: 100 mL/hr at 10/18/20 2050, 750 mg at 10/18/20 2050    levoFLOXacin 750 mg/150 mL IVPB 750 mg, 750 mg, Intravenous, Q24H, January Parsons NP    [START ON 10/19/2020] losartan tablet 50 mg, 50 mg, Oral, Daily, January Parsons NP    melatonin tablet 6 mg, 6 mg, Oral, Nightly PRN, January Parsons NP    morphine injection 1 mg, 1 mg, Intravenous, Q4H PRN, January Parsons NP    ondansetron injection 4 mg, 4 mg, Intravenous, Q8H PRN, January Parsons NP    sodium chloride 0.9% flush 10 mL, 10 mL, Intravenous, PRN, January Parsons NP    Current Outpatient Medications:     alendronate (FOSAMAX) 70 MG tablet, Take 1 tablet (70 mg total) by mouth every 7 days. Take on empty stomach with full glass of water-do not eat or lie down for 1 hour For osteoporosis, Disp: 12 tablet, Rfl: 3    calcium-vitamin D3 (CALCIUM 500 + D) 500 mg(1,250mg) -200 unit per tablet, Take 1 tablet by mouth 2 (two) times daily with meals., Disp: , Rfl:     losartan (COZAAR) 50 MG tablet, Take 1 tablet (50 mg total) by mouth once daily., Disp: 90 tablet, Rfl: 1      Scheduled Meds:   [START ON 10/19/2020] calcium-vitamin D3  1 tablet Oral BID WM    [START ON 10/19/2020] famotidine (PF)  20 mg Intravenous Daily    levoFLOXacin  750 mg Intravenous Q24H    levoFLOXacin  750 mg Intravenous Q24H     [START ON 10/19/2020] losartan  50 mg Oral Daily     Continuous Infusions:   lactated ringers       PRN Meds:.acetaminophen, melatonin, morphine, ondansetron, sodium chloride 0.9%      January Parsons  [facility] Hospitalist NP  10/18/2020

## 2020-10-19 NOTE — BRIEF OP NOTE
[facility]  Brief Operative Note    SUMMARY     Surgery Date: 10/19/2020     Surgeon(s) and Role:     * Konrad Almonte III, MD - Primary    Assisting Surgeon: None    Pre-op Diagnosis:  Acute appendicitis with localized peritonitis, without perforation, abscess, or gangrene [K35.30]    Post-op Diagnosis:  Post-Op Diagnosis Codes:     * Acute appendicitis with localized peritonitis, with perforation, without gangrene    Procedure(s) (LRB):  APPENDECTOMY, LAPAROSCOPIC (N/A)    Anesthesia: General    Description of Procedure: The patient was taken to the operating room and transferred to the operating room table in the supine position.  The patient was given general anesthesia and intubated.  Armenta catheter was placed.  Abdomen was sterilely prepped and draped.  A small left upper quadrant incision was made.  Veress needle placed through the abdominal wall and the abdomen was insufflated.  The abdomen was then entered using the 5 mm Visiport.  She had omental adhesions to the anterior abdominal wall at the umbilicus and below toward the pelvis. The patient was put in Trendelenburg position.  Under direct visualization, a 5 mm port was placed in the suprapubic position in the midline.  Another 5 mm port was placed in the left lower quadrant.  The adhesions were taken down with LigaSure.  This cleared the omental adhesions from the anterior abdominal wall.  The patient was then tilted a little to the left and the small bowel was swept away from the right lower quadrant.  The cecum was identified.  The appendix was identified.  It was retrocecal.  It was extremely inflamed and dilated. It was very firm.   Appendix was retracted anteriorly.  The Ligasure was used to divide mesoappendix.  Once the appendix was dissected from behind the cecum we could see that the mid appendix was ruptured. The appendix was then transected at its base using a laparoscopic stapler.  The appendix was removed using an  endocatch bag. The lower quadrants of the abdomen and pelvis were irrigated with suction .  A 10 flat ARVIND was left in the right lower quadrant.  There was no evidence of any bleeding or leak at the end of the case, so our instruments were removed, the ports were removed.  The abdomen was deflated. The skin sites were closed using 4-0 Monocryl.  The patient's Armenta catheter was removed.  Patient was extubated and brought to the recovery room in stable condition.    Description of the findings of the procedure:  Retrocecal perforated appendicitis    Estimated Blood Loss: 10 mL    Estimated Blood Loss has been documented.     Complications none    Instrument counts correct         Specimens:   Specimen (12h ago, onward)     Start     Ordered    10/19/20 1200  Specimen to Pathology - Surgery  Once     Comments: Pre-op Diagnosis: Acute appendicitis with localized peritonitis, without perforation, abscess, or gangrene [K35.30]Procedure(s):APPENDECTOMY, LAPAROSCOPIC Number of specimens: 1Name of specimens: appendix      10/19/20 1201                [id number]

## 2020-10-20 LAB
ALBUMIN SERPL BCP-MCNC: 2.9 G/DL (ref 3.5–5.2)
ALP SERPL-CCNC: 13 U/L (ref 55–135)
ALT SERPL W/O P-5'-P-CCNC: 17 U/L (ref 10–44)
ANION GAP SERPL CALC-SCNC: 10 MMOL/L (ref 8–16)
AST SERPL-CCNC: 18 U/L (ref 10–40)
BACTERIA UR CULT: NORMAL
BACTERIA UR CULT: NORMAL
BASOPHILS # BLD AUTO: 0.01 K/UL (ref 0–0.2)
BASOPHILS NFR BLD: 0.1 % (ref 0–1.9)
BILIRUB SERPL-MCNC: 0.7 MG/DL (ref 0.1–1)
BUN SERPL-MCNC: 17 MG/DL (ref 8–23)
CALCIUM SERPL-MCNC: 8.3 MG/DL (ref 8.7–10.5)
CHLORIDE SERPL-SCNC: 104 MMOL/L (ref 95–110)
CO2 SERPL-SCNC: 24 MMOL/L (ref 23–29)
CREAT SERPL-MCNC: 0.5 MG/DL (ref 0.5–1.4)
CRP SERPL-MCNC: 33.25 MG/DL
DIFFERENTIAL METHOD: ABNORMAL
EOSINOPHIL # BLD AUTO: 0 K/UL (ref 0–0.5)
EOSINOPHIL NFR BLD: 0.3 % (ref 0–8)
ERYTHROCYTE [DISTWIDTH] IN BLOOD BY AUTOMATED COUNT: 12.5 % (ref 11.5–14.5)
EST. GFR  (AFRICAN AMERICAN): >60 ML/MIN/1.73 M^2
EST. GFR  (NON AFRICAN AMERICAN): >60 ML/MIN/1.73 M^2
GLUCOSE SERPL-MCNC: 117 MG/DL (ref 70–110)
HCT VFR BLD AUTO: 33.1 % (ref 37–48.5)
HGB BLD-MCNC: 11.1 G/DL (ref 12–16)
IMM GRANULOCYTES # BLD AUTO: 0.03 K/UL (ref 0–0.04)
IMM GRANULOCYTES NFR BLD AUTO: 0.4 % (ref 0–0.5)
LYMPHOCYTES # BLD AUTO: 0.7 K/UL (ref 1–4.8)
LYMPHOCYTES NFR BLD: 9.6 % (ref 18–48)
MAGNESIUM SERPL-MCNC: 1.9 MG/DL (ref 1.6–2.6)
MCH RBC QN AUTO: 30.1 PG (ref 27–31)
MCHC RBC AUTO-ENTMCNC: 33.5 G/DL (ref 32–36)
MCV RBC AUTO: 90 FL (ref 82–98)
MONOCYTES # BLD AUTO: 0.5 K/UL (ref 0.3–1)
MONOCYTES NFR BLD: 6.6 % (ref 4–15)
NEUTROPHILS # BLD AUTO: 6 K/UL (ref 1.8–7.7)
NEUTROPHILS NFR BLD: 83 % (ref 38–73)
NRBC BLD-RTO: 0 /100 WBC
PHOSPHATE SERPL-MCNC: 3.4 MG/DL (ref 2.7–4.5)
PLATELET # BLD AUTO: 141 K/UL (ref 150–350)
PMV BLD AUTO: 12.4 FL (ref 9.2–12.9)
POTASSIUM SERPL-SCNC: 4 MMOL/L (ref 3.5–5.1)
PROT SERPL-MCNC: 5.7 G/DL (ref 6–8.4)
RBC # BLD AUTO: 3.69 M/UL (ref 4–5.4)
SODIUM SERPL-SCNC: 138 MMOL/L (ref 136–145)
WBC # BLD AUTO: 7.26 K/UL (ref 3.9–12.7)

## 2020-10-20 PROCEDURE — 86140 C-REACTIVE PROTEIN: CPT

## 2020-10-20 PROCEDURE — 25000003 PHARM REV CODE 250: Performed by: NURSE PRACTITIONER

## 2020-10-20 PROCEDURE — 83735 ASSAY OF MAGNESIUM: CPT

## 2020-10-20 PROCEDURE — 85025 COMPLETE CBC W/AUTO DIFF WBC: CPT

## 2020-10-20 PROCEDURE — 84100 ASSAY OF PHOSPHORUS: CPT

## 2020-10-20 PROCEDURE — 63600175 PHARM REV CODE 636 W HCPCS: Performed by: SURGERY

## 2020-10-20 PROCEDURE — 25000003 PHARM REV CODE 250: Performed by: SURGERY

## 2020-10-20 PROCEDURE — 12000002 HC ACUTE/MED SURGE SEMI-PRIVATE ROOM

## 2020-10-20 PROCEDURE — 25000003 PHARM REV CODE 250: Performed by: STUDENT IN AN ORGANIZED HEALTH CARE EDUCATION/TRAINING PROGRAM

## 2020-10-20 PROCEDURE — 80053 COMPREHEN METABOLIC PANEL: CPT

## 2020-10-20 PROCEDURE — 36415 COLL VENOUS BLD VENIPUNCTURE: CPT

## 2020-10-20 RX ADMIN — OXYCODONE HYDROCHLORIDE 5 MG: 5 TABLET ORAL at 12:10

## 2020-10-20 RX ADMIN — OXYCODONE HYDROCHLORIDE 5 MG: 5 TABLET ORAL at 09:10

## 2020-10-20 RX ADMIN — OXYCODONE HYDROCHLORIDE 5 MG: 5 TABLET ORAL at 06:10

## 2020-10-20 RX ADMIN — MEROPENEM 1 G: 1 INJECTION, POWDER, FOR SOLUTION INTRAVENOUS at 05:10

## 2020-10-20 RX ADMIN — Medication 1 TABLET: at 08:10

## 2020-10-20 RX ADMIN — MEROPENEM 1 G: 1 INJECTION, POWDER, FOR SOLUTION INTRAVENOUS at 10:10

## 2020-10-20 RX ADMIN — Medication 1 TABLET: at 05:10

## 2020-10-20 RX ADMIN — HYDROCODONE BITARTRATE AND ACETAMINOPHEN 1 TABLET: 5; 325 TABLET ORAL at 08:10

## 2020-10-20 RX ADMIN — OXYCODONE HYDROCHLORIDE 5 MG: 5 TABLET ORAL at 03:10

## 2020-10-20 RX ADMIN — FAMOTIDINE 20 MG: 20 TABLET ORAL at 08:10

## 2020-10-20 RX ADMIN — LOSARTAN POTASSIUM 50 MG: 50 TABLET, FILM COATED ORAL at 08:10

## 2020-10-20 RX ADMIN — MUPIROCIN 1 G: 20 OINTMENT TOPICAL at 08:10

## 2020-10-20 RX ADMIN — MEROPENEM 1 G: 1 INJECTION, POWDER, FOR SOLUTION INTRAVENOUS at 01:10

## 2020-10-20 NOTE — PLAN OF CARE
Important Message from Medicare was sign, explained and given to patient/caregiver on 10/20/2020 at 9:36am     answered all questions.

## 2020-10-20 NOTE — PROGRESS NOTES
[facility] Medicine  Progress Note    Patient name: Batool Mensah  MRN: [id number]  Admit Date: 10/18/2020   LOS: 1 day     SUBJECTIVE:     Principal problem: Acute appendicitis    Interval History:   I, Dr. Marshall, have assumed care on 10/20/2020.   S/p lap appendectomy POD#1. Patient reports feeling well. Tolerating diet. Ambulating around the [place].  Serous sanguinous fluid draining from ARVIND drain.  Denies flatus or bowel movement.    Scheduled Meds:   calcium-vitamin D3  1 tablet Oral BID WM    famotidine  20 mg Oral BID    losartan  50 mg Oral Daily    meropenem (MERREM) IVPB  1 g Intravenous Q8H    mupirocin  1 g Nasal BID     Continuous Infusions:   sodium chloride 0.9% 125 mL/hr at 10/19/20 1722     PRN Meds:acetaminophen, diphenhydrAMINE, HYDROcodone-acetaminophen, HYDROmorphone, HYDROmorphone, melatonin, morphine, ondansetron, ondansetron, ondansetron, oxyCODONE, promethazine (PHENERGAN) IVPB, sodium chloride 0.9%, sodium chloride 0.9%    Review of patient's allergies indicates:   Allergen Reactions    Penicillin g benzathine Other (See Comments)       Review of Systems  As per subjective    OBJECTIVE:     Vital Signs (Most Recent)  Temp: 97.8 °F (36.6 °C) (10/20/20 1509)  Pulse: (!) 55 (10/20/20 1509)  Resp: 18 (10/20/20 1509)  BP: 112/69 (10/20/20 1509)  SpO2: 98 % (10/20/20 1509)    Vital Signs Range (Last 24H):  Temp:  [97.4 °F (36.3 °C)-98.8 °F (37.1 °C)]   Pulse:  [55-78]   Resp:  [16-18]   BP: (111-134)/(60-78)   SpO2:  [96 %-98 %]     I & O (Last 24H):    Intake/Output Summary (Last 24 hours) at 10/20/2020 1530  Last data filed at 10/20/2020 0951  Gross per 24 hour   Intake 1421.67 ml   Output 2470 ml   Net -1048.33 ml       Physical Exam:  General: Patient resting comfortably in no acute distress. Appears as stated age. Calm  Eyes: EOM intact. No conjunctivae injection. No scleral icterus.  ENT: Hearing grossly intact. No discharge from ears. No nasal discharge.    CVS: RRR. No LE edema BL.  Lungs: CTA BL, no wheezing or crackles. Good breath sounds. No accessory muscle use. No acute respiratory distress  Ab: soft, NTND, +BS, ARVIND drain  Neuro: AOx3. GCS 15. Cranial nerves grossly intact. Moves all extremities equally. Follows commands. Responds appropriately     Laboratory:  CBC:   Recent Labs   Lab 10/18/20  1920 10/19/20  0518 10/20/20  0434   WBC 10.34 8.33 7.26   HGB 14.7 13.1 11.1*   HCT 43.1 39.3 33.1*    146* 141*     CMP:   Recent Labs   Lab 10/18/20  1920 10/19/20  0518 10/20/20  0434   * 136 138   K 3.9 4.0 4.0   CL 97 98 104   CO2 26 27 24   * 91 117*   BUN 25* 21 17   CREATININE 0.9 0.8 0.5   CALCIUM 9.5 8.9 8.3*   PROT 7.6 6.8 5.7*   ALBUMIN 4.2 3.5 2.9*   BILITOT 1.2* 1.3* 0.7   ALKPHOS 12* 14* 13*   AST 25 17 18   ALT 23 18 17   ANIONGAP 12 11 10   EGFRNONAA >60.0 >60.0 >60.0       Microbiology Results (last 7 days)     Procedure Component Value Units Date/Time    Urine culture [[id number]] Collected: 10/18/20 1930    Order Status: Completed Specimen: Urine Updated: 10/20/20 0642     Urine Culture, Routine Multiple organisms isolated. None in predominance.  Repeat if      clinically necessary.    Narrative:      Specimen Source->Urine    Blood culture [[id number]] Collected: 10/19/20 0518    Order Status: Completed Specimen: Blood from Peripheral, Left Hand Updated: 10/20/20 0632     Blood Culture, Routine No Growth to date      No Growth to date    Blood culture [[id number]] Collected: 10/19/20 0519    Order Status: Completed Specimen: Blood from Antecubital, Right Updated: 10/20/20 0632     Blood Culture, Routine No Growth to date      No Growth to date           Diagnostic Results:      ASSESSMENT/PLAN:     Active Hospital Problems    Diagnosis  POA    *Acute appendicitis [K35.80]  Yes    Ruptured appendicitis [K35.32]  Yes    History of bariatric surgery [Z98.84]  Not Applicable    Dyslipidemia [E78.5]  Yes    Essential hypertension  [I10]  Yes      Resolved Hospital Problems   No resolved problems to display.           Plan:   S/p lap appendectomy that found perforation with ARVIND drain in place on 10/19/2020  Empiric IV antibiotics  Tolerating diet. D/c IVF  Continue home medications    Consulted general surgery  Appreciate consult      VTE Risk Mitigation (From admission, onward)         Ordered     IP VTE LOW RISK PATIENT  Once      10/18/20 2239     Place sequential compression device  Until discontinued      10/18/20 2239                    Medical Decision Making during this encounter was  [] Low Complexity  [x] Moderate Complexity  [] High Complexity        Department Hospital Medicine  [facility]  Sage Marshall MD  Date of service: 10/20/2020

## 2020-10-20 NOTE — PLAN OF CARE
Problem: Adult Inpatient Plan of Care  Goal: Plan of Care Review  Outcome: Ongoing, Progressing     Problem: Adult Inpatient Plan of Care  Goal: Patient-Specific Goal (Individualization)  Outcome: Ongoing, Progressing     Problem: Adult Inpatient Plan of Care  Goal: Optimal Comfort and Wellbeing  Outcome: Ongoing, Progressing     Problem: Pain Acute  Goal: Optimal Pain Control  Outcome: Ongoing, Progressing     Problem: Fall Injury Risk  Goal: Absence of Fall and Fall-Related Injury  Outcome: Ongoing, Progressing     Problem: Infection  Goal: Infection Symptom Resolution  Outcome: Ongoing, Progressing

## 2020-10-21 ENCOUNTER — TELEPHONE (OUTPATIENT)
Dept: FAMILY MEDICINE | Facility: CLINIC | Age: 70
End: 2020-10-21

## 2020-10-21 VITALS
TEMPERATURE: 98 F | WEIGHT: 157.63 LBS | SYSTOLIC BLOOD PRESSURE: 135 MMHG | HEART RATE: 56 BPM | BODY MASS INDEX: 27.93 KG/M2 | RESPIRATION RATE: 18 BRPM | OXYGEN SATURATION: 100 % | DIASTOLIC BLOOD PRESSURE: 81 MMHG | HEIGHT: 63 IN

## 2020-10-21 PROBLEM — K35.80 ACUTE APPENDICITIS: Status: RESOLVED | Noted: 2020-10-18 | Resolved: 2020-10-21

## 2020-10-21 PROBLEM — Z90.49 S/P LAPAROSCOPIC APPENDECTOMY: Status: ACTIVE | Noted: 2020-10-21

## 2020-10-21 LAB
ALBUMIN SERPL BCP-MCNC: 3.1 G/DL (ref 3.5–5.2)
ALP SERPL-CCNC: 14 U/L (ref 55–135)
ALT SERPL W/O P-5'-P-CCNC: 18 U/L (ref 10–44)
ANION GAP SERPL CALC-SCNC: 11 MMOL/L (ref 8–16)
AST SERPL-CCNC: 21 U/L (ref 10–40)
BASOPHILS # BLD AUTO: 0.01 K/UL (ref 0–0.2)
BASOPHILS NFR BLD: 0.3 % (ref 0–1.9)
BILIRUB SERPL-MCNC: 0.8 MG/DL (ref 0.1–1)
BUN SERPL-MCNC: 19 MG/DL (ref 8–23)
CALCIUM SERPL-MCNC: 8.7 MG/DL (ref 8.7–10.5)
CHLORIDE SERPL-SCNC: 100 MMOL/L (ref 95–110)
CO2 SERPL-SCNC: 27 MMOL/L (ref 23–29)
CREAT SERPL-MCNC: 0.6 MG/DL (ref 0.5–1.4)
DIFFERENTIAL METHOD: ABNORMAL
EOSINOPHIL # BLD AUTO: 0.1 K/UL (ref 0–0.5)
EOSINOPHIL NFR BLD: 2.5 % (ref 0–8)
ERYTHROCYTE [DISTWIDTH] IN BLOOD BY AUTOMATED COUNT: 12.8 % (ref 11.5–14.5)
EST. GFR  (AFRICAN AMERICAN): >60 ML/MIN/1.73 M^2
EST. GFR  (NON AFRICAN AMERICAN): >60 ML/MIN/1.73 M^2
GLUCOSE SERPL-MCNC: 94 MG/DL (ref 70–110)
HCT VFR BLD AUTO: 33.5 % (ref 37–48.5)
HGB BLD-MCNC: 11 G/DL (ref 12–16)
IMM GRANULOCYTES # BLD AUTO: 0.01 K/UL (ref 0–0.04)
IMM GRANULOCYTES NFR BLD AUTO: 0.3 % (ref 0–0.5)
LYMPHOCYTES # BLD AUTO: 0.9 K/UL (ref 1–4.8)
LYMPHOCYTES NFR BLD: 22.8 % (ref 18–48)
MAGNESIUM SERPL-MCNC: 2.2 MG/DL (ref 1.6–2.6)
MCH RBC QN AUTO: 29.7 PG (ref 27–31)
MCHC RBC AUTO-ENTMCNC: 32.8 G/DL (ref 32–36)
MCV RBC AUTO: 91 FL (ref 82–98)
MONOCYTES # BLD AUTO: 0.4 K/UL (ref 0.3–1)
MONOCYTES NFR BLD: 10 % (ref 4–15)
NEUTROPHILS # BLD AUTO: 2.6 K/UL (ref 1.8–7.7)
NEUTROPHILS NFR BLD: 64.1 % (ref 38–73)
NRBC BLD-RTO: 0 /100 WBC
PHOSPHATE SERPL-MCNC: 2.4 MG/DL (ref 2.7–4.5)
PLATELET # BLD AUTO: 145 K/UL (ref 150–350)
PMV BLD AUTO: 12.5 FL (ref 9.2–12.9)
POTASSIUM SERPL-SCNC: 3.8 MMOL/L (ref 3.5–5.1)
PROT SERPL-MCNC: 6.2 G/DL (ref 6–8.4)
RBC # BLD AUTO: 3.7 M/UL (ref 4–5.4)
SODIUM SERPL-SCNC: 138 MMOL/L (ref 136–145)
WBC # BLD AUTO: 4 K/UL (ref 3.9–12.7)

## 2020-10-21 PROCEDURE — 25000003 PHARM REV CODE 250: Performed by: NURSE PRACTITIONER

## 2020-10-21 PROCEDURE — 25000003 PHARM REV CODE 250: Performed by: SURGERY

## 2020-10-21 PROCEDURE — 84100 ASSAY OF PHOSPHORUS: CPT

## 2020-10-21 PROCEDURE — 85025 COMPLETE CBC W/AUTO DIFF WBC: CPT

## 2020-10-21 PROCEDURE — 63600175 PHARM REV CODE 636 W HCPCS: Performed by: SURGERY

## 2020-10-21 PROCEDURE — 83735 ASSAY OF MAGNESIUM: CPT

## 2020-10-21 PROCEDURE — 80053 COMPREHEN METABOLIC PANEL: CPT

## 2020-10-21 PROCEDURE — 36415 COLL VENOUS BLD VENIPUNCTURE: CPT

## 2020-10-21 RX ORDER — ONDANSETRON 4 MG/1
4 TABLET, FILM COATED ORAL EVERY 6 HOURS PRN
Qty: 20 TABLET | Refills: 0 | Status: SHIPPED | OUTPATIENT
Start: 2020-10-21 | End: 2020-11-03

## 2020-10-21 RX ORDER — CIPROFLOXACIN 500 MG/1
500 TABLET ORAL 2 TIMES DAILY
Qty: 20 TABLET | Refills: 0 | Status: SHIPPED | OUTPATIENT
Start: 2020-10-21 | End: 2020-10-31

## 2020-10-21 RX ORDER — METRONIDAZOLE 500 MG/1
500 TABLET ORAL EVERY 12 HOURS
Qty: 20 TABLET | Refills: 0 | Status: SHIPPED | OUTPATIENT
Start: 2020-10-21 | End: 2020-10-31

## 2020-10-21 RX ORDER — OXYCODONE HYDROCHLORIDE 5 MG/1
5 TABLET ORAL
Qty: 20 TABLET | Refills: 0 | Status: SHIPPED | OUTPATIENT
Start: 2020-10-21 | End: 2020-11-03

## 2020-10-21 RX ADMIN — MEROPENEM 1 G: 1 INJECTION, POWDER, FOR SOLUTION INTRAVENOUS at 09:10

## 2020-10-21 RX ADMIN — LOSARTAN POTASSIUM 50 MG: 50 TABLET, FILM COATED ORAL at 09:10

## 2020-10-21 RX ADMIN — MEROPENEM 1 G: 1 INJECTION, POWDER, FOR SOLUTION INTRAVENOUS at 03:10

## 2020-10-21 RX ADMIN — MUPIROCIN 1 G: 20 OINTMENT TOPICAL at 09:10

## 2020-10-21 RX ADMIN — Medication 1 TABLET: at 09:10

## 2020-10-21 RX ADMIN — HYDROCODONE BITARTRATE AND ACETAMINOPHEN 1 TABLET: 5; 325 TABLET ORAL at 03:10

## 2020-10-21 RX ADMIN — FAMOTIDINE 20 MG: 20 TABLET ORAL at 09:10

## 2020-10-21 NOTE — PLAN OF CARE
10/21/20 1312   Final Note   Assessment Type Final Discharge Note   Anticipated Discharge Disposition Home   No needs at this time.

## 2020-10-21 NOTE — TELEPHONE ENCOUNTER
----- Message from Loraine Tesfaye sent at 10/21/2020  2:52 PM CDT -----  Pt calling she was admitted thru the ER at [facility] on 10-18 for appendicitis had surgery 10-19, also treated for UTI, d/c today with one drain to fu with surgeon Dr. Almonte.  Pt wants to know if we need to see her, if not consider this an FYI. Cb # [phone]

## 2020-10-21 NOTE — HOSPITAL COURSE
Patient admitted for acute appendicitis.  Patient was started on IV antibiotics.  Patient had laparoscopic appendectomy that found perforation on 10/19/2020. ARVIND drain was left in place.  Blood cultures remain no growth to date during hospitalization.  Patient tolerated procedure well.  On day of discharge, patient tolerated diet and was passing flatus.  Patient was switched to oral antibiotics.  Patient was stable discharge to home with ARVIND drain and instructions to follow up with General surgery, PCP.    General: Patient resting comfortably in no acute distress. Appears as stated age. Calm  Eyes: EOM intact. No conjunctivae injection. No scleral icterus.  ENT: Hearing grossly intact. No discharge from ears. No nasal discharge.   CVS: RRR. No LE edema BL.  Lungs: CTA BL, no wheezing or crackles. Good breath sounds. No accessory muscle use. No acute respiratory distress  Ab: soft, NTND, +BS, ARVIND drain  Neuro: AOx3. GCS 15. Cranial nerves grossly intact. Moves all extremities equally. Follows commands. Responds appropriately     Case discussed with Dr. Almonte prior to discharge

## 2020-10-21 NOTE — PROGRESS NOTES
[facility]  General Surgery  Progress Note    Subjective:     Interval History:  Patient feeling better again today.  She has started passing flatus.  She is tolerating diet.  She has been afebrile.  ARVIND drain output is serous    Post-Op Info:  Procedure(s) (LRB):  APPENDECTOMY, LAPAROSCOPIC (N/A)   2 Days Post-Op      Medications:  Continuous Infusions:  Scheduled Meds:   calcium-vitamin D3  1 tablet Oral BID WM    famotidine  20 mg Oral BID    losartan  50 mg Oral Daily    meropenem (MERREM) IVPB  1 g Intravenous Q8H    mupirocin  1 g Nasal BID     PRN Meds:acetaminophen, diphenhydrAMINE, HYDROcodone-acetaminophen, HYDROmorphone, HYDROmorphone, melatonin, morphine, ondansetron, ondansetron, ondansetron, oxyCODONE, promethazine (PHENERGAN) IVPB, sodium chloride 0.9%, sodium chloride 0.9%     Objective:     Vital Signs (Most Recent):  Temp: 98.2 °F (36.8 °C) (10/21/20 1207)  Pulse: (!) 56 (10/21/20 1207)  Resp: 18 (10/21/20 1207)  BP: 135/81 (10/21/20 1207)  SpO2: 100 % (10/21/20 1207) Vital Signs (24h Range):  Temp:  [97.7 °F (36.5 °C)-98.9 °F (37.2 °C)] 98.2 °F (36.8 °C)  Pulse:  [54-60] 56  Resp:  [16-18] 18  SpO2:  [97 %-100 %] 100 %  BP: (111-148)/(66-81) 135/81       Intake/Output Summary (Last 24 hours) at 10/21/2020 1527  Last data filed at 10/21/2020 1359  Gross per 24 hour   Intake 650 ml   Output 120 ml   Net 530 ml       Physical Exam  Pulmonary:      Effort: Pulmonary effort is normal. No respiratory distress.   Abdominal:      General: There is no distension.      Palpations: Abdomen is soft.      Tenderness: There is abdominal tenderness. There is no guarding or rebound.          Comments: ARVIND serous     Incisions intact     Appropriate tenderness    Neurological:      Mental Status: She is alert and oriented to person, place, and time.         Significant Labs:  CBC:   Recent Labs   Lab 10/21/20  0434   WBC 4.00   RBC 3.70*   HGB 11.0*   HCT 33.5*   *   MCV 91   MCH 29.7    MCHC 32.8     CMP:   Recent Labs   Lab 10/21/20  0434   GLU 94   CALCIUM 8.7   ALBUMIN 3.1*   PROT 6.2      K 3.8   CO2 27      BUN 19   CREATININE 0.6   ALKPHOS 14*   ALT 18   AST 21   BILITOT 0.8         Assessment/Plan:     Active Diagnoses:    Diagnosis Date Noted POA    PRINCIPAL PROBLEM:  S/P laparoscopic appendectomy [Z90.49] 10/21/2020 Not Applicable    Ruptured appendicitis [K35.32] 10/19/2020 Yes    History of bariatric surgery [Z98.84] 01/08/2020 Not Applicable    Dyslipidemia [E78.5] 12/18/2017 Yes    Essential hypertension [I10] 05/27/2015 Yes      Problems Resolved During this Admission:    Diagnosis Date Noted Date Resolved POA    Acute appendicitis [K35.80] 10/18/2020 10/21/2020 Yes     -patient doing very well.  ARVIND drain output is more serous.  Will continue to leave that in place for about a week.  I will remove this in the office.  Okay to switch to p.o. antibiotics from surgery standpoint.  She is allergic to penicillin.  Recommend Cipro and Flagyl.    Konrad Almotne III, MD  General Surgery  [facility]

## 2020-10-21 NOTE — PROGRESS NOTES
[facility]  General Surgery  Progress Note    Subjective:     Interval History: no acute events overnight. Afebrile. Tolerating diet.     Post-Op Info:  Procedure(s) (LRB):  APPENDECTOMY, LAPAROSCOPIC (N/A)   2 Days Post-Op      Medications:  Continuous Infusions:  Scheduled Meds:   calcium-vitamin D3  1 tablet Oral BID WM    famotidine  20 mg Oral BID    losartan  50 mg Oral Daily    meropenem (MERREM) IVPB  1 g Intravenous Q8H    mupirocin  1 g Nasal BID     PRN Meds:acetaminophen, diphenhydrAMINE, HYDROcodone-acetaminophen, HYDROmorphone, HYDROmorphone, melatonin, morphine, ondansetron, ondansetron, ondansetron, oxyCODONE, promethazine (PHENERGAN) IVPB, sodium chloride 0.9%, sodium chloride 0.9%     Objective:     Vital Signs (Most Recent):  Temp: 97.7 °F (36.5 °C) (10/20/20 2300)  Pulse: (!) 59 (10/20/20 2300)  Resp: 18 (10/20/20 2300)  BP: 111/66 (10/20/20 2300)  SpO2: 97 % (10/20/20 2300) Vital Signs (24h Range):  Temp:  [97.4 °F (36.3 °C)-98.3 °F (36.8 °C)] 97.7 °F (36.5 °C)  Pulse:  [55-70] 59  Resp:  [16-18] 18  SpO2:  [97 %-99 %] 97 %  BP: (111-127)/(66-78) 111/66       Intake/Output Summary (Last 24 hours) at 10/21/2020 0010  Last data filed at 10/20/2020 1731  Gross per 24 hour   Intake --   Output 460 ml   Net -460 ml       Physical Exam  Pulmonary:      Effort: Pulmonary effort is normal. No respiratory distress.   Abdominal:      Comments: ARVIND serous     Incisions intact     Appropriate tenderness    Neurological:      Mental Status: She is alert and oriented to person, place, and time.         Significant Labs:  CBC:   Recent Labs   Lab 10/20/20  0434   WBC 7.26   RBC 3.69*   HGB 11.1*   HCT 33.1*   *   MCV 90   MCH 30.1   MCHC 33.5     CMP:   Recent Labs   Lab 10/20/20  0434   *   CALCIUM 8.3*   ALBUMIN 2.9*   PROT 5.7*      K 4.0   CO2 24      BUN 17   CREATININE 0.5   ALKPHOS 13*   ALT 17   AST 18   BILITOT 0.7         Assessment/Plan:     Active  Diagnoses:    Diagnosis Date Noted POA    PRINCIPAL PROBLEM:  Acute appendicitis [K35.80] 10/18/2020 Yes    Ruptured appendicitis [K35.32] 10/19/2020 Yes    History of bariatric surgery [Z98.84] 01/08/2020 Not Applicable    Dyslipidemia [E78.5] 12/18/2017 Yes    Essential hypertension [I10] 05/27/2015 Yes      Problems Resolved During this Admission:     -doing well.   -Would stay on iv meropenem  due to the perforation today.   -stay on liquids today.   -switch to PO antibiotics before discharge.       Konrad Almonte III, MD  General Surgery  [facility]

## 2020-10-21 NOTE — HPI
Batool Mensah is a 70 y.o. old female who  has a past medical history of Hypertension.. The patient presented to [facility] on 10/18/2020 with a primary complaint of Abdominal Pain (rebound tenderness in RLQ. Still has appendix)  .   70-year-old  female presents emergency room with abdominal pain.       The patient states for the past 2 days she has been having diffuse lower quadrant abdominal pain.       Today the pain was primarily located to her right lower quadrant.  She endorse some chills and subjective fever and nausea.  She described the pain as a tight sharp burning sensation with no alleviating or exacerbating factors.       Palpating her abdomen worsens the abdominal pain.       She denied hematemesis hemoptysis black or bloody stools lightheadedness dizziness or syncope.  She describes her symptoms as severe in severity.     The ER doctor did speak with the surgeon Dr. Almonte who will see the patient in consult

## 2020-10-21 NOTE — DISCHARGE SUMMARY
[facility] Medicine  Discharge Summary      Patient Name: Batool Mensah  MRN: [id number]  Admission Date: 10/18/2020  Hospital Length of Stay: 2 days  Discharge Date and Time: 10/21/2020  1:59 PM  Attending Physician: No att. providers found   Discharging Provider: Sage Marshall MD  Primary Care Provider: Sage Dickson MD      HPI:   Batool Mensah is a 70 y.o. old female who  has a past medical history of Hypertension.. The patient presented to [facility] on 10/18/2020 with a primary complaint of Abdominal Pain (rebound tenderness in RLQ. Still has appendix)  .   70-year-old  female presents emergency room with abdominal pain.       The patient states for the past 2 days she has been having diffuse lower quadrant abdominal pain.       Today the pain was primarily located to her right lower quadrant.  She endorse some chills and subjective fever and nausea.  She described the pain as a tight sharp burning sensation with no alleviating or exacerbating factors.       Palpating her abdomen worsens the abdominal pain.       She denied hematemesis hemoptysis black or bloody stools lightheadedness dizziness or syncope.  She describes her symptoms as severe in severity.     The ER doctor did speak with the surgeon Dr. Almonte who will see the patient in consult    Procedure(s) (LRB):  APPENDECTOMY, LAPAROSCOPIC (N/A)      Hospital Course:   Patient admitted for acute appendicitis.  Patient was started on IV antibiotics.  Patient had laparoscopic appendectomy that found perforation on 10/19/2020. ARVIND drain was left in place.  Blood cultures remain no growth to date during hospitalization.  Patient tolerated procedure well.  On day of discharge, patient tolerated diet and was passing flatus.  Patient was switched to oral antibiotics.  Patient was stable discharge to home with ARVIND drain and instructions to follow up with General surgery, PCP.    General: Patient  resting comfortably in no acute distress. Appears as stated age. Calm  Eyes: EOM intact. No conjunctivae injection. No scleral icterus.  ENT: Hearing grossly intact. No discharge from ears. No nasal discharge.   CVS: RRR. No LE edema BL.  Lungs: CTA BL, no wheezing or crackles. Good breath sounds. No accessory muscle use. No acute respiratory distress  Ab: soft, NTND, +BS, ARVIND drain  Neuro: AOx3. GCS 15. Cranial nerves grossly intact. Moves all extremities equally. Follows commands. Responds appropriately     Case discussed with Dr. Almonte prior to discharge     Consults:   Consults (From admission, onward)        Status Ordering Provider     Inpatient consult to General surgery  Once     Provider:  Konrad Almonte III, MD    Completed ROME BARTH     Inpatient consult to Hospitalist  Once     Provider:  Harley Mooney MD    Acknowledged ROME BARTH          No new Assessment & Plan notes have been filed under this hospital service since the last note was generated.  Service: Hospital Medicine    Final Active Diagnoses:    Diagnosis Date Noted POA    PRINCIPAL PROBLEM:  S/P laparoscopic appendectomy [Z90.49] 10/21/2020 Not Applicable    Ruptured appendicitis [K35.32] 10/19/2020 Yes    History of bariatric surgery [Z98.84] 01/08/2020 Not Applicable    Dyslipidemia [E78.5] 12/18/2017 Yes    Essential hypertension [I10] 05/27/2015 Yes      Problems Resolved During this Admission:    Diagnosis Date Noted Date Resolved POA    Acute appendicitis [K35.80] 10/18/2020 10/21/2020 Yes       Discharged Condition: fair    Disposition: Home or Self Care    Follow Up:  Follow-up Information     Konrad Almonte III, MD In 1 week.    Specialties: General Surgery, Surgery  Why: Surgery follow-up, For check up s/p hospital discharge  Contact information:  [address] [facility]  [address]  [facility] [address]  [phone]             Sage Dickson MD.    Specialty: Family Medicine  Why: As needed  Contact  information:  [address]  [address]  [facility] [address]  [phone]             [facility]    Specialty: Emergency Medicine  Why: As needed  Contact information:  [address] [place] [address] [phone]  Additional information:  1st floor               Patient Instructions:      Diet Cardiac     Notify your health care provider if you experience any of the following:  temperature >100.4     Notify your health care provider if you experience any of the following:  persistent nausea and vomiting or diarrhea     Notify your health care provider if you experience any of the following:  severe uncontrolled pain     Notify your health care provider if you experience any of the following:  redness, tenderness, or signs of infection (pain, swelling, redness, odor or green/yellow discharge around incision site)     Notify your health care provider if you experience any of the following:  difficulty breathing or increased cough     Notify your health care provider if you experience any of the following:  severe persistent headache     Notify your health care provider if you experience any of the following:  worsening rash     Notify your health care provider if you experience any of the following:  persistent dizziness, light-headedness, or visual disturbances     Notify your health care provider if you experience any of the following:  increased confusion or weakness     Activity as tolerated       Significant Diagnostic Studies: Labs:   CMP   Recent Labs   Lab 10/20/20  0434 10/21/20  0434    138   K 4.0 3.8    100   CO2 24 27   * 94   BUN 17 19   CREATININE 0.5 0.6   CALCIUM 8.3* 8.7   PROT 5.7* 6.2   ALBUMIN 2.9* 3.1*   BILITOT 0.7 0.8   ALKPHOS 13* 14*   AST 18 21   ALT 17 18   ANIONGAP 10 11   ESTGFRAFRICA >60.0 >60.0   EGFRNONAA >60.0 >60.0    and CBC   Recent Labs   Lab 10/20/20  0434 10/21/20  0434   WBC 7.26 4.00   HGB 11.1*  11.0*   HCT 33.1* 33.5*   * 145*     Microbiology:   Blood Culture   Lab Results   Component Value Date    LABBLOO No Growth to date 10/19/2020    LABBLOO No Growth to date 10/19/2020    LABBLOO No Growth to date 10/19/2020       Pending Diagnostic Studies:     None         Medications:  Reconciled Home Medications:      Medication List      START taking these medications    ciprofloxacin HCl 500 MG tablet  Commonly known as: CIPRO  Take 1 tablet (500 mg total) by mouth 2 (two) times daily. for 10 days     metroNIDAZOLE 500 MG tablet  Commonly known as: FLAGYL  Take 1 tablet (500 mg total) by mouth every 12 (twelve) hours. for 10 days     ondansetron 4 MG tablet  Commonly known as: ZOFRAN  Take 1 tablet (4 mg total) by mouth every 6 (six) hours as needed for Nausea.     oxyCODONE 5 MG immediate release tablet  Commonly known as: ROXICODONE  Take 1 tablet (5 mg total) by mouth every 3 (three) hours as needed (moderate pain 2-5/10 pain scale).        CONTINUE taking these medications    alendronate 70 MG tablet  Commonly known as: FOSAMAX  Take 1 tablet (70 mg total) by mouth every 7 days. Take on empty stomach with full glass of water-do not eat or lie down for 1 hour For osteoporosis     CALCIUM 500 + D 500 mg(1,250mg) -200 unit per tablet  Generic drug: calcium-vitamin D3  Take 1 tablet by mouth 2 (two) times daily with meals.     losartan 50 MG tablet  Commonly known as: COZAAR  Take 1 tablet (50 mg total) by mouth once daily.            Indwelling Lines/Drains at time of discharge:   Lines/Drains/Airways     Drain                 Closed/Suction Drain 10/19/20 1232 Left LLQ Bulb 7 Fr. 1 day                Time spent on the discharge of patient: 35 minutes  Patient was seen and examined on the date of discharge and determined to be suitable for discharge.         Sage Marshall MD  Department of Hospital Medicine  [facility]  Date of service: 10/21/2020

## 2020-10-21 NOTE — NURSING
Gave pt discharge instructions, pt verbalized understanding    Iv removed.   Tolerated well.  Left via wheelchair with

## 2020-10-22 ENCOUNTER — TELEPHONE (OUTPATIENT)
Dept: FAMILY MEDICINE | Facility: CLINIC | Age: 70
End: 2020-10-22

## 2020-10-24 LAB
BACTERIA BLD CULT: NORMAL
BACTERIA BLD CULT: NORMAL

## 2020-11-03 ENCOUNTER — OFFICE VISIT (OUTPATIENT)
Dept: SURGERY | Facility: CLINIC | Age: 70
End: 2020-11-03
Payer: MEDICARE

## 2020-11-03 VITALS — TEMPERATURE: 98 F | WEIGHT: 157 LBS | BODY MASS INDEX: 27.82 KG/M2 | HEIGHT: 63 IN

## 2020-11-03 DIAGNOSIS — K35.32 ACUTE APPENDICITIS WITH PERFORATION AND LOCALIZED PERITONITIS, WITHOUT ABSCESS OR GANGRENE: Primary | ICD-10-CM

## 2020-11-03 PROCEDURE — 99024 POSTOP FOLLOW-UP VISIT: CPT | Mod: S$GLB,,, | Performed by: SURGERY

## 2020-11-03 PROCEDURE — 99024 PR POST-OP FOLLOW-UP VISIT: ICD-10-PCS | Mod: S$GLB,,, | Performed by: SURGERY

## 2020-11-03 RX ORDER — A/SINGAPORE/GP1908/2015 IVR-180 (AN A/MICHIGAN/45/2015 (H1N1)PDM09-LIKE VIRUS, A/HONG KONG/4801/2014, NYMC X-263B (H3N2) (AN A/HONG KONG/4801/2014-LIKE VIRUS), AND B/BRISBANE/60/2008, WILD TYPE (A B/BRISBANE/60/2008-LIKE VIRUS) 15; 15; 15 UG/.5ML; UG/.5ML; UG/.5ML
INJECTION, SUSPENSION INTRAMUSCULAR
COMMUNITY
Start: 2020-09-11 | End: 2020-11-03 | Stop reason: ALTCHOICE

## 2020-11-03 NOTE — PROGRESS NOTES
Subjective:       Patient ID: Batool Mensah is a 70 y.o. female.    Chief Complaint: Post-op Evaluation (FU DOS 10/19/2020 L/S Appy)      HPI:  Patient is status post appendectomy for acute appendicitis on the 19th of October.  Appendix appeared ruptured. ARVIND drain was placed.  She states she has been feeling very well.  ARVIND drain output is serous.  Pathology has returned acute perforated appendicitis.  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    Review of Systems    Objective:      Physical Exam  Constitutional:       General: She is not in acute distress.  Pulmonary:      Effort: Pulmonary effort is normal. No respiratory distress.   Abdominal:      General: There is no distension.      Palpations: Abdomen is soft.      Tenderness: There is no abdominal tenderness. There is no guarding or rebound.      Comments: ARVIND drain output is serous   Skin:     Comments: Incisions are clean, dry and intact  There is no evidence of infection, hematoma or seroma    Neurological:      Mental Status: She is alert and oriented to person, place, and time.   Psychiatric:         Behavior: Behavior is cooperative.         Assessment/Plan:   Acute appendicitis with perforation and localized peritonitis, without abscess or gangrene      Status post appendectomy for perforated appendicitis.  Pathology is benign.  There was an adenoma present.  There is no evidence of malignancy or dysplasia.  A ARVIND drain was removed in the office today.  Antibiotics are complete.  She is doing well.  Return to clinic p.r.n.

## 2021-01-04 DIAGNOSIS — I10 ESSENTIAL HYPERTENSION: ICD-10-CM

## 2021-01-04 DIAGNOSIS — M81.0 AGE-RELATED OSTEOPOROSIS WITHOUT CURRENT PATHOLOGICAL FRACTURE: ICD-10-CM

## 2021-01-04 RX ORDER — ALENDRONATE SODIUM 70 MG/1
70 TABLET ORAL
Qty: 12 TABLET | Refills: 3 | Status: SHIPPED | OUTPATIENT
Start: 2021-01-04 | End: 2022-01-04 | Stop reason: SDUPTHER

## 2021-01-04 RX ORDER — LOSARTAN POTASSIUM 50 MG/1
50 TABLET ORAL DAILY
Qty: 90 TABLET | Refills: 1 | Status: SHIPPED | OUTPATIENT
Start: 2021-01-04 | End: 2021-07-09 | Stop reason: SDUPTHER

## 2021-01-11 ENCOUNTER — OFFICE VISIT (OUTPATIENT)
Dept: FAMILY MEDICINE | Facility: CLINIC | Age: 71
End: 2021-01-11
Payer: MEDICARE

## 2021-01-11 VITALS
SYSTOLIC BLOOD PRESSURE: 128 MMHG | WEIGHT: 164 LBS | HEIGHT: 63 IN | BODY MASS INDEX: 29.06 KG/M2 | DIASTOLIC BLOOD PRESSURE: 76 MMHG

## 2021-01-11 DIAGNOSIS — G47.33 OSA (OBSTRUCTIVE SLEEP APNEA): ICD-10-CM

## 2021-01-11 DIAGNOSIS — Z90.49 S/P LAPAROSCOPIC APPENDECTOMY: ICD-10-CM

## 2021-01-11 DIAGNOSIS — M17.10 PRIMARY LOCALIZED OSTEOARTHRITIS OF KNEE: ICD-10-CM

## 2021-01-11 DIAGNOSIS — Z98.84 HISTORY OF BARIATRIC SURGERY: ICD-10-CM

## 2021-01-11 DIAGNOSIS — Z78.0 MENOPAUSE: ICD-10-CM

## 2021-01-11 DIAGNOSIS — Z12.31 OTHER SCREENING MAMMOGRAM: ICD-10-CM

## 2021-01-11 DIAGNOSIS — E78.5 DYSLIPIDEMIA: ICD-10-CM

## 2021-01-11 DIAGNOSIS — I10 ESSENTIAL HYPERTENSION: Primary | ICD-10-CM

## 2021-01-11 DIAGNOSIS — R92.8 ABNORMAL MAMMOGRAM: ICD-10-CM

## 2021-01-11 PROCEDURE — 3078F PR MOST RECENT DIASTOLIC BLOOD PRESSURE < 80 MM HG: ICD-10-PCS | Mod: S$GLB,,, | Performed by: FAMILY MEDICINE

## 2021-01-11 PROCEDURE — 3074F SYST BP LT 130 MM HG: CPT | Mod: S$GLB,,, | Performed by: FAMILY MEDICINE

## 2021-01-11 PROCEDURE — 3074F PR MOST RECENT SYSTOLIC BLOOD PRESSURE < 130 MM HG: ICD-10-PCS | Mod: S$GLB,,, | Performed by: FAMILY MEDICINE

## 2021-01-11 PROCEDURE — 3078F DIAST BP <80 MM HG: CPT | Mod: S$GLB,,, | Performed by: FAMILY MEDICINE

## 2021-01-11 PROCEDURE — 3008F BODY MASS INDEX DOCD: CPT | Mod: S$GLB,,, | Performed by: FAMILY MEDICINE

## 2021-01-11 PROCEDURE — 99214 PR OFFICE/OUTPT VISIT, EST, LEVL IV, 30-39 MIN: ICD-10-PCS | Mod: S$GLB,,, | Performed by: FAMILY MEDICINE

## 2021-01-11 PROCEDURE — 3008F PR BODY MASS INDEX (BMI) DOCUMENTED: ICD-10-PCS | Mod: S$GLB,,, | Performed by: FAMILY MEDICINE

## 2021-01-11 PROCEDURE — 99214 OFFICE O/P EST MOD 30 MIN: CPT | Mod: S$GLB,,, | Performed by: FAMILY MEDICINE

## 2021-01-11 PROCEDURE — 1159F MED LIST DOCD IN RCRD: CPT | Mod: S$GLB,,, | Performed by: FAMILY MEDICINE

## 2021-01-11 PROCEDURE — 1159F PR MEDICATION LIST DOCUMENTED IN MEDICAL RECORD: ICD-10-PCS | Mod: S$GLB,,, | Performed by: FAMILY MEDICINE

## 2021-01-15 ENCOUNTER — IMMUNIZATION (OUTPATIENT)
Dept: PRIMARY CARE CLINIC | Facility: CLINIC | Age: 71
End: 2021-01-15
Payer: MEDICARE

## 2021-01-15 DIAGNOSIS — Z23 NEED FOR VACCINATION: Primary | ICD-10-CM

## 2021-01-15 PROCEDURE — 0001A COVID-19, MRNA, LNP-S, PF, 30 MCG/0.3 ML DOSE VACCINE: ICD-10-PCS | Mod: S$GLB,,, | Performed by: FAMILY MEDICINE

## 2021-01-15 PROCEDURE — 91300 COVID-19, MRNA, LNP-S, PF, 30 MCG/0.3 ML DOSE VACCINE: CPT | Mod: S$GLB,,, | Performed by: FAMILY MEDICINE

## 2021-01-15 PROCEDURE — 91300 COVID-19, MRNA, LNP-S, PF, 30 MCG/0.3 ML DOSE VACCINE: ICD-10-PCS | Mod: S$GLB,,, | Performed by: FAMILY MEDICINE

## 2021-01-15 PROCEDURE — 0001A COVID-19, MRNA, LNP-S, PF, 30 MCG/0.3 ML DOSE VACCINE: CPT | Mod: S$GLB,,, | Performed by: FAMILY MEDICINE

## 2021-02-01 ENCOUNTER — HOSPITAL ENCOUNTER (OUTPATIENT)
Dept: RADIOLOGY | Facility: HOSPITAL | Age: 71
Discharge: HOME | End: 2021-02-01
Attending: FAMILY MEDICINE
Payer: MEDICARE

## 2021-02-01 DIAGNOSIS — R92.8 ABNORMAL MAMMOGRAM: ICD-10-CM

## 2021-02-01 PROCEDURE — 76642 ULTRASOUND BREAST LIMITED: CPT | Mod: TC,PO,LT

## 2021-02-05 ENCOUNTER — IMMUNIZATION (OUTPATIENT)
Dept: PRIMARY CARE CLINIC | Facility: CLINIC | Age: 71
End: 2021-02-05
Payer: MEDICARE

## 2021-02-05 DIAGNOSIS — Z23 NEED FOR VACCINATION: Primary | ICD-10-CM

## 2021-02-05 PROCEDURE — 91300 COVID-19, MRNA, LNP-S, PF, 30 MCG/0.3 ML DOSE VACCINE: CPT | Mod: S$GLB,,, | Performed by: FAMILY MEDICINE

## 2021-02-05 PROCEDURE — 0002A COVID-19, MRNA, LNP-S, PF, 30 MCG/0.3 ML DOSE VACCINE: CPT | Mod: CV19,S$GLB,, | Performed by: FAMILY MEDICINE

## 2021-02-05 PROCEDURE — 0002A COVID-19, MRNA, LNP-S, PF, 30 MCG/0.3 ML DOSE VACCINE: ICD-10-PCS | Mod: CV19,S$GLB,, | Performed by: FAMILY MEDICINE

## 2021-02-05 PROCEDURE — 91300 COVID-19, MRNA, LNP-S, PF, 30 MCG/0.3 ML DOSE VACCINE: ICD-10-PCS | Mod: S$GLB,,, | Performed by: FAMILY MEDICINE

## 2021-02-08 ENCOUNTER — TELEPHONE (OUTPATIENT)
Dept: FAMILY MEDICINE | Facility: CLINIC | Age: 71
End: 2021-02-08

## 2021-05-18 ENCOUNTER — OFFICE VISIT (OUTPATIENT)
Dept: FAMILY MEDICINE | Facility: CLINIC | Age: 71
End: 2021-05-18
Payer: MEDICARE

## 2021-05-18 VITALS
HEIGHT: 63 IN | BODY MASS INDEX: 29.06 KG/M2 | WEIGHT: 164 LBS | SYSTOLIC BLOOD PRESSURE: 138 MMHG | HEART RATE: 72 BPM | OXYGEN SATURATION: 99 % | DIASTOLIC BLOOD PRESSURE: 82 MMHG

## 2021-05-18 DIAGNOSIS — J02.9 PHARYNGITIS, UNSPECIFIED ETIOLOGY: Primary | ICD-10-CM

## 2021-05-18 PROCEDURE — 1159F PR MEDICATION LIST DOCUMENTED IN MEDICAL RECORD: ICD-10-PCS | Mod: S$GLB,,, | Performed by: NURSE PRACTITIONER

## 2021-05-18 PROCEDURE — 99213 PR OFFICE/OUTPT VISIT, EST, LEVL III, 20-29 MIN: ICD-10-PCS | Mod: 25,S$GLB,, | Performed by: NURSE PRACTITIONER

## 2021-05-18 PROCEDURE — 3008F BODY MASS INDEX DOCD: CPT | Mod: S$GLB,,, | Performed by: NURSE PRACTITIONER

## 2021-05-18 PROCEDURE — 96372 THER/PROPH/DIAG INJ SC/IM: CPT | Mod: S$GLB,,, | Performed by: NURSE PRACTITIONER

## 2021-05-18 PROCEDURE — 1159F MED LIST DOCD IN RCRD: CPT | Mod: S$GLB,,, | Performed by: NURSE PRACTITIONER

## 2021-05-18 PROCEDURE — 1101F PR PT FALLS ASSESS DOC 0-1 FALLS W/OUT INJ PAST YR: ICD-10-PCS | Mod: S$GLB,,, | Performed by: NURSE PRACTITIONER

## 2021-05-18 PROCEDURE — 1101F PT FALLS ASSESS-DOCD LE1/YR: CPT | Mod: S$GLB,,, | Performed by: NURSE PRACTITIONER

## 2021-05-18 PROCEDURE — 96372 PR INJECTION,THERAP/PROPH/DIAG2ST, IM OR SUBCUT: ICD-10-PCS | Mod: S$GLB,,, | Performed by: NURSE PRACTITIONER

## 2021-05-18 PROCEDURE — 3288F PR FALLS RISK ASSESSMENT DOCUMENTED: ICD-10-PCS | Mod: S$GLB,,, | Performed by: NURSE PRACTITIONER

## 2021-05-18 PROCEDURE — 3008F PR BODY MASS INDEX (BMI) DOCUMENTED: ICD-10-PCS | Mod: S$GLB,,, | Performed by: NURSE PRACTITIONER

## 2021-05-18 PROCEDURE — 99213 OFFICE O/P EST LOW 20 MIN: CPT | Mod: 25,S$GLB,, | Performed by: NURSE PRACTITIONER

## 2021-05-18 PROCEDURE — 3288F FALL RISK ASSESSMENT DOCD: CPT | Mod: S$GLB,,, | Performed by: NURSE PRACTITIONER

## 2021-05-18 RX ORDER — AZITHROMYCIN 500 MG/1
500 TABLET, FILM COATED ORAL DAILY
Qty: 7 TABLET | Refills: 0 | Status: SHIPPED | OUTPATIENT
Start: 2021-05-18 | End: 2021-07-14

## 2021-05-18 RX ORDER — DEXAMETHASONE SODIUM PHOSPHATE 4 MG/ML
8 INJECTION, SOLUTION INTRA-ARTICULAR; INTRALESIONAL; INTRAMUSCULAR; INTRAVENOUS; SOFT TISSUE ONCE
Status: COMPLETED | OUTPATIENT
Start: 2021-05-18 | End: 2021-05-18

## 2021-05-18 RX ADMIN — DEXAMETHASONE SODIUM PHOSPHATE 8 MG: 4 INJECTION, SOLUTION INTRA-ARTICULAR; INTRALESIONAL; INTRAMUSCULAR; INTRAVENOUS; SOFT TISSUE at 12:05

## 2021-06-30 ENCOUNTER — TELEPHONE (OUTPATIENT)
Dept: FAMILY MEDICINE | Facility: CLINIC | Age: 71
End: 2021-06-30

## 2021-07-09 DIAGNOSIS — I10 ESSENTIAL HYPERTENSION: ICD-10-CM

## 2021-07-09 RX ORDER — LOSARTAN POTASSIUM 50 MG/1
50 TABLET ORAL DAILY
Qty: 90 TABLET | Refills: 1 | Status: SHIPPED | OUTPATIENT
Start: 2021-07-09 | End: 2022-01-04 | Stop reason: SDUPTHER

## 2021-07-10 LAB
ALBUMIN SERPL-MCNC: 4.1 G/DL (ref 3.6–5.1)
ALBUMIN/GLOB SERPL: 1.6 (CALC) (ref 1–2.5)
ALP SERPL-CCNC: 11 U/L (ref 37–153)
ALT SERPL-CCNC: 15 U/L (ref 6–29)
APPEARANCE UR: CLEAR
AST SERPL-CCNC: 18 U/L (ref 10–35)
BACTERIA #/AREA URNS HPF: NORMAL /HPF
BACTERIA UR CULT: NORMAL
BASOPHILS # BLD AUTO: 30 CELLS/UL (ref 0–200)
BASOPHILS NFR BLD AUTO: 0.9 %
BILIRUB SERPL-MCNC: 0.8 MG/DL (ref 0.2–1.2)
BILIRUB UR QL STRIP: NEGATIVE
BUN SERPL-MCNC: 21 MG/DL (ref 7–25)
BUN/CREAT SERPL: ABNORMAL (CALC) (ref 6–22)
CALCIUM SERPL-MCNC: 9.1 MG/DL (ref 8.6–10.4)
CHLORIDE SERPL-SCNC: 102 MMOL/L (ref 98–110)
CHOLEST SERPL-MCNC: 196 MG/DL
CHOLEST/HDLC SERPL: 2.5 (CALC)
CO2 SERPL-SCNC: 32 MMOL/L (ref 20–32)
COLOR UR: YELLOW
CREAT SERPL-MCNC: 0.65 MG/DL (ref 0.6–0.93)
EOSINOPHIL # BLD AUTO: 69 CELLS/UL (ref 15–500)
EOSINOPHIL NFR BLD AUTO: 2.1 %
ERYTHROCYTE [DISTWIDTH] IN BLOOD BY AUTOMATED COUNT: 13 % (ref 11–15)
GLOBULIN SER CALC-MCNC: 2.6 G/DL (CALC) (ref 1.9–3.7)
GLUCOSE SERPL-MCNC: 80 MG/DL (ref 65–99)
GLUCOSE UR QL STRIP: NEGATIVE
HCT VFR BLD AUTO: 40.9 % (ref 35–45)
HDLC SERPL-MCNC: 80 MG/DL
HGB BLD-MCNC: 13.5 G/DL (ref 11.7–15.5)
HGB UR QL STRIP: NEGATIVE
HYALINE CASTS #/AREA URNS LPF: NORMAL /LPF
KETONES UR QL STRIP: NEGATIVE
LDLC SERPL CALC-MCNC: 100 MG/DL (CALC)
LEUKOCYTE ESTERASE UR QL STRIP: NEGATIVE
LYMPHOCYTES # BLD AUTO: 1746 CELLS/UL (ref 850–3900)
LYMPHOCYTES NFR BLD AUTO: 52.9 %
MCH RBC QN AUTO: 30.1 PG (ref 27–33)
MCHC RBC AUTO-ENTMCNC: 33 G/DL (ref 32–36)
MCV RBC AUTO: 91.1 FL (ref 80–100)
MONOCYTES # BLD AUTO: 277 CELLS/UL (ref 200–950)
MONOCYTES NFR BLD AUTO: 8.4 %
NEUTROPHILS # BLD AUTO: 1178 CELLS/UL (ref 1500–7800)
NEUTROPHILS NFR BLD AUTO: 35.7 %
NITRITE UR QL STRIP: NEGATIVE
NONHDLC SERPL-MCNC: 116 MG/DL (CALC)
PH UR STRIP: 6 [PH] (ref 5–8)
PLATELET # BLD AUTO: 152 THOUSAND/UL (ref 140–400)
PMV BLD REES-ECKER: 12 FL (ref 7.5–12.5)
POTASSIUM SERPL-SCNC: 4.5 MMOL/L (ref 3.5–5.3)
PROT SERPL-MCNC: 6.7 G/DL (ref 6.1–8.1)
PROT UR QL STRIP: NEGATIVE
RBC # BLD AUTO: 4.49 MILLION/UL (ref 3.8–5.1)
RBC #/AREA URNS HPF: NORMAL /HPF
SODIUM SERPL-SCNC: 141 MMOL/L (ref 135–146)
SP GR UR STRIP: 1.01 (ref 1–1.03)
SQUAMOUS #/AREA URNS HPF: NORMAL /HPF
TRIGL SERPL-MCNC: 75 MG/DL
TSH SERPL-ACNC: 2.23 MIU/L (ref 0.4–4.5)
WBC # BLD AUTO: 3.3 THOUSAND/UL (ref 3.8–10.8)
WBC #/AREA URNS HPF: NORMAL /HPF

## 2021-07-14 ENCOUNTER — OFFICE VISIT (OUTPATIENT)
Dept: FAMILY MEDICINE | Facility: CLINIC | Age: 71
End: 2021-07-14
Payer: MEDICARE

## 2021-07-14 VITALS
HEIGHT: 63 IN | BODY MASS INDEX: 28.35 KG/M2 | SYSTOLIC BLOOD PRESSURE: 138 MMHG | WEIGHT: 160 LBS | HEART RATE: 64 BPM | DIASTOLIC BLOOD PRESSURE: 80 MMHG

## 2021-07-14 DIAGNOSIS — R92.8 ABNORMAL MAMMOGRAM: Primary | ICD-10-CM

## 2021-07-14 DIAGNOSIS — M81.0 AGE-RELATED OSTEOPOROSIS WITHOUT CURRENT PATHOLOGICAL FRACTURE: ICD-10-CM

## 2021-07-14 DIAGNOSIS — Z12.31 SCREENING MAMMOGRAM, ENCOUNTER FOR: ICD-10-CM

## 2021-07-14 DIAGNOSIS — I10 ESSENTIAL HYPERTENSION: Primary | ICD-10-CM

## 2021-07-14 DIAGNOSIS — R92.8 ABNORMAL MAMMOGRAM OF LEFT BREAST: ICD-10-CM

## 2021-07-14 DIAGNOSIS — E78.5 DYSLIPIDEMIA: ICD-10-CM

## 2021-07-14 DIAGNOSIS — Z98.84 HISTORY OF BARIATRIC SURGERY: ICD-10-CM

## 2021-07-14 DIAGNOSIS — D70.9 NEUTROPENIA, UNSPECIFIED TYPE: ICD-10-CM

## 2021-07-14 PROCEDURE — 1101F PT FALLS ASSESS-DOCD LE1/YR: CPT | Mod: S$GLB,,, | Performed by: NURSE PRACTITIONER

## 2021-07-14 PROCEDURE — 3079F DIAST BP 80-89 MM HG: CPT | Mod: S$GLB,,, | Performed by: NURSE PRACTITIONER

## 2021-07-14 PROCEDURE — 3075F SYST BP GE 130 - 139MM HG: CPT | Mod: S$GLB,,, | Performed by: NURSE PRACTITIONER

## 2021-07-14 PROCEDURE — 1159F MED LIST DOCD IN RCRD: CPT | Mod: S$GLB,,, | Performed by: NURSE PRACTITIONER

## 2021-07-14 PROCEDURE — 3079F PR MOST RECENT DIASTOLIC BLOOD PRESSURE 80-89 MM HG: ICD-10-PCS | Mod: S$GLB,,, | Performed by: NURSE PRACTITIONER

## 2021-07-14 PROCEDURE — 1159F PR MEDICATION LIST DOCUMENTED IN MEDICAL RECORD: ICD-10-PCS | Mod: S$GLB,,, | Performed by: NURSE PRACTITIONER

## 2021-07-14 PROCEDURE — 3288F FALL RISK ASSESSMENT DOCD: CPT | Mod: S$GLB,,, | Performed by: NURSE PRACTITIONER

## 2021-07-14 PROCEDURE — 3008F BODY MASS INDEX DOCD: CPT | Mod: S$GLB,,, | Performed by: NURSE PRACTITIONER

## 2021-07-14 PROCEDURE — 3288F PR FALLS RISK ASSESSMENT DOCUMENTED: ICD-10-PCS | Mod: S$GLB,,, | Performed by: NURSE PRACTITIONER

## 2021-07-14 PROCEDURE — 1101F PR PT FALLS ASSESS DOC 0-1 FALLS W/OUT INJ PAST YR: ICD-10-PCS | Mod: S$GLB,,, | Performed by: NURSE PRACTITIONER

## 2021-07-14 PROCEDURE — 99213 OFFICE O/P EST LOW 20 MIN: CPT | Mod: S$GLB,,, | Performed by: NURSE PRACTITIONER

## 2021-07-14 PROCEDURE — 3008F PR BODY MASS INDEX (BMI) DOCUMENTED: ICD-10-PCS | Mod: S$GLB,,, | Performed by: NURSE PRACTITIONER

## 2021-07-14 PROCEDURE — 99213 PR OFFICE/OUTPT VISIT, EST, LEVL III, 20-29 MIN: ICD-10-PCS | Mod: S$GLB,,, | Performed by: NURSE PRACTITIONER

## 2021-07-14 PROCEDURE — 3075F PR MOST RECENT SYSTOLIC BLOOD PRESS GE 130-139MM HG: ICD-10-PCS | Mod: S$GLB,,, | Performed by: NURSE PRACTITIONER

## 2021-08-04 ENCOUNTER — HOSPITAL ENCOUNTER (OUTPATIENT)
Dept: RADIOLOGY | Facility: HOSPITAL | Age: 71
Discharge: HOME | End: 2021-08-04
Attending: NURSE PRACTITIONER
Payer: MEDICARE

## 2021-08-04 VITALS — WEIGHT: 160.06 LBS | HEIGHT: 63 IN | BODY MASS INDEX: 28.36 KG/M2

## 2021-08-04 DIAGNOSIS — R92.8 ABNORMAL MAMMOGRAM OF LEFT BREAST: ICD-10-CM

## 2021-08-04 DIAGNOSIS — R92.8 ABNORMAL MAMMOGRAM: ICD-10-CM

## 2021-08-04 PROCEDURE — 76642 ULTRASOUND BREAST LIMITED: CPT | Mod: TC,PO,LT

## 2021-08-04 PROCEDURE — 77062 BREAST TOMOSYNTHESIS BI: CPT | Mod: TC,PO

## 2021-08-11 ENCOUNTER — TELEPHONE (OUTPATIENT)
Dept: FAMILY MEDICINE | Facility: CLINIC | Age: 71
End: 2021-08-11

## 2021-10-05 ENCOUNTER — IMMUNIZATION (OUTPATIENT)
Dept: PRIMARY CARE CLINIC | Facility: CLINIC | Age: 71
End: 2021-10-05
Payer: MEDICARE

## 2021-10-05 DIAGNOSIS — Z23 NEED FOR VACCINATION: Primary | ICD-10-CM

## 2021-10-05 PROCEDURE — 0003A COVID-19, MRNA, LNP-S, PF, 30 MCG/0.3 ML DOSE VACCINE: ICD-10-PCS | Mod: S$GLB,,, | Performed by: FAMILY MEDICINE

## 2021-10-05 PROCEDURE — 91300 COVID-19, MRNA, LNP-S, PF, 30 MCG/0.3 ML DOSE VACCINE: ICD-10-PCS | Mod: S$GLB,,, | Performed by: FAMILY MEDICINE

## 2021-10-05 PROCEDURE — 91300 COVID-19, MRNA, LNP-S, PF, 30 MCG/0.3 ML DOSE VACCINE: CPT | Mod: S$GLB,,, | Performed by: FAMILY MEDICINE

## 2021-10-05 PROCEDURE — 0003A COVID-19, MRNA, LNP-S, PF, 30 MCG/0.3 ML DOSE VACCINE: CPT | Mod: S$GLB,,, | Performed by: FAMILY MEDICINE

## 2022-01-04 DIAGNOSIS — I10 ESSENTIAL HYPERTENSION: ICD-10-CM

## 2022-01-04 DIAGNOSIS — M81.0 AGE-RELATED OSTEOPOROSIS WITHOUT CURRENT PATHOLOGICAL FRACTURE: ICD-10-CM

## 2022-01-04 RX ORDER — ALENDRONATE SODIUM 70 MG/1
70 TABLET ORAL
Qty: 12 TABLET | Refills: 3 | Status: SHIPPED | OUTPATIENT
Start: 2022-01-04 | End: 2022-07-13 | Stop reason: SDUPTHER

## 2022-01-04 RX ORDER — LOSARTAN POTASSIUM 50 MG/1
50 TABLET ORAL DAILY
Qty: 90 TABLET | Refills: 1 | Status: SHIPPED | OUTPATIENT
Start: 2022-01-04 | End: 2022-06-30 | Stop reason: SDUPTHER

## 2022-01-05 ENCOUNTER — TELEPHONE (OUTPATIENT)
Dept: FAMILY MEDICINE | Facility: CLINIC | Age: 72
End: 2022-01-05
Payer: MEDICARE

## 2022-01-05 DIAGNOSIS — E78.5 DYSLIPIDEMIA: ICD-10-CM

## 2022-01-05 DIAGNOSIS — Z79.899 ENCOUNTER FOR LONG-TERM (CURRENT) USE OF OTHER MEDICATIONS: Primary | ICD-10-CM

## 2022-01-05 DIAGNOSIS — I10 ESSENTIAL HYPERTENSION: ICD-10-CM

## 2022-01-09 LAB
ALBUMIN SERPL-MCNC: 4.6 G/DL (ref 3.6–5.1)
ALBUMIN/CREAT UR: 7 MCG/MG CREAT
ALBUMIN/GLOB SERPL: 1.8 (CALC) (ref 1–2.5)
ALP SERPL-CCNC: 11 U/L (ref 37–153)
ALT SERPL-CCNC: 18 U/L (ref 6–29)
APPEARANCE UR: CLEAR
AST SERPL-CCNC: 25 U/L (ref 10–35)
BACTERIA #/AREA URNS HPF: ABNORMAL /HPF
BACTERIA UR CULT: ABNORMAL
BACTERIA UR CULT: ABNORMAL
BASOPHILS # BLD AUTO: 31 CELLS/UL (ref 0–200)
BASOPHILS NFR BLD AUTO: 0.8 %
BILIRUB SERPL-MCNC: 0.8 MG/DL (ref 0.2–1.2)
BILIRUB UR QL STRIP: NEGATIVE
BUN SERPL-MCNC: 21 MG/DL (ref 7–25)
BUN/CREAT SERPL: ABNORMAL (CALC) (ref 6–22)
CALCIUM SERPL-MCNC: 9.7 MG/DL (ref 8.6–10.4)
CHLORIDE SERPL-SCNC: 101 MMOL/L (ref 98–110)
CHOLEST SERPL-MCNC: 235 MG/DL
CHOLEST/HDLC SERPL: 2.4 (CALC)
CO2 SERPL-SCNC: 33 MMOL/L (ref 20–32)
COLOR UR: YELLOW
CREAT SERPL-MCNC: 0.79 MG/DL (ref 0.6–0.93)
CREAT UR-MCNC: 42 MG/DL (ref 20–275)
EOSINOPHIL # BLD AUTO: 82 CELLS/UL (ref 15–500)
EOSINOPHIL NFR BLD AUTO: 2.1 %
ERYTHROCYTE [DISTWIDTH] IN BLOOD BY AUTOMATED COUNT: 12.9 % (ref 11–15)
GLOBULIN SER CALC-MCNC: 2.5 G/DL (CALC) (ref 1.9–3.7)
GLUCOSE SERPL-MCNC: 83 MG/DL (ref 65–99)
GLUCOSE UR QL STRIP: NEGATIVE
HCT VFR BLD AUTO: 48.7 % (ref 35–45)
HDLC SERPL-MCNC: 98 MG/DL
HGB BLD-MCNC: 16.2 G/DL (ref 11.7–15.5)
HGB UR QL STRIP: NEGATIVE
HYALINE CASTS #/AREA URNS LPF: ABNORMAL /LPF
KETONES UR QL STRIP: NEGATIVE
LDLC SERPL CALC-MCNC: 120 MG/DL (CALC)
LEUKOCYTE ESTERASE UR QL STRIP: ABNORMAL
LYMPHOCYTES # BLD AUTO: 1884 CELLS/UL (ref 850–3900)
LYMPHOCYTES NFR BLD AUTO: 48.3 %
MCH RBC QN AUTO: 29.9 PG (ref 27–33)
MCHC RBC AUTO-ENTMCNC: 33.3 G/DL (ref 32–36)
MCV RBC AUTO: 90 FL (ref 80–100)
MICROALBUMIN UR-MCNC: 0.3 MG/DL
MONOCYTES # BLD AUTO: 312 CELLS/UL (ref 200–950)
MONOCYTES NFR BLD AUTO: 8 %
NEUTROPHILS # BLD AUTO: 1591 CELLS/UL (ref 1500–7800)
NEUTROPHILS NFR BLD AUTO: 40.8 %
NITRITE UR QL STRIP: NEGATIVE
NONHDLC SERPL-MCNC: 137 MG/DL (CALC)
PH UR STRIP: 7 [PH] (ref 5–8)
PLATELET # BLD AUTO: 196 THOUSAND/UL (ref 140–400)
PMV BLD REES-ECKER: 12.3 FL (ref 7.5–12.5)
POTASSIUM SERPL-SCNC: 4.1 MMOL/L (ref 3.5–5.3)
PROT SERPL-MCNC: 7.1 G/DL (ref 6.1–8.1)
PROT UR QL STRIP: NEGATIVE
RBC # BLD AUTO: 5.41 MILLION/UL (ref 3.8–5.1)
RBC #/AREA URNS HPF: ABNORMAL /HPF
SODIUM SERPL-SCNC: 142 MMOL/L (ref 135–146)
SP GR UR STRIP: 1.01 (ref 1–1.03)
SQUAMOUS #/AREA URNS HPF: ABNORMAL /HPF
TRIGL SERPL-MCNC: 73 MG/DL
TSH SERPL-ACNC: 2.82 MIU/L (ref 0.4–4.5)
WBC # BLD AUTO: 3.9 THOUSAND/UL (ref 3.8–10.8)
WBC #/AREA URNS HPF: ABNORMAL /HPF

## 2022-01-10 ENCOUNTER — TELEPHONE (OUTPATIENT)
Dept: FAMILY MEDICINE | Facility: CLINIC | Age: 72
End: 2022-01-10
Payer: MEDICARE

## 2022-01-12 ENCOUNTER — OFFICE VISIT (OUTPATIENT)
Dept: FAMILY MEDICINE | Facility: CLINIC | Age: 72
End: 2022-01-12
Payer: MEDICARE

## 2022-01-12 VITALS
HEART RATE: 62 BPM | SYSTOLIC BLOOD PRESSURE: 154 MMHG | HEIGHT: 63 IN | DIASTOLIC BLOOD PRESSURE: 68 MMHG | BODY MASS INDEX: 27.46 KG/M2 | WEIGHT: 155 LBS

## 2022-01-12 DIAGNOSIS — Z78.0 MENOPAUSE: ICD-10-CM

## 2022-01-12 DIAGNOSIS — E78.5 DYSLIPIDEMIA: ICD-10-CM

## 2022-01-12 DIAGNOSIS — D75.1 POLYCYTHEMIA: ICD-10-CM

## 2022-01-12 DIAGNOSIS — M19.042 PRIMARY OSTEOARTHRITIS OF BOTH HANDS: ICD-10-CM

## 2022-01-12 DIAGNOSIS — M19.041 PRIMARY OSTEOARTHRITIS OF BOTH HANDS: ICD-10-CM

## 2022-01-12 DIAGNOSIS — G47.33 OSA (OBSTRUCTIVE SLEEP APNEA): ICD-10-CM

## 2022-01-12 DIAGNOSIS — Z98.84 HISTORY OF BARIATRIC SURGERY: ICD-10-CM

## 2022-01-12 DIAGNOSIS — I10 ESSENTIAL HYPERTENSION: Primary | ICD-10-CM

## 2022-01-12 DIAGNOSIS — Z90.49 S/P LAPAROSCOPIC APPENDECTOMY: ICD-10-CM

## 2022-01-12 DIAGNOSIS — M17.10 PRIMARY LOCALIZED OSTEOARTHRITIS OF KNEE: ICD-10-CM

## 2022-01-12 PROCEDURE — 3288F FALL RISK ASSESSMENT DOCD: CPT | Mod: S$GLB,,, | Performed by: FAMILY MEDICINE

## 2022-01-12 PROCEDURE — 3078F PR MOST RECENT DIASTOLIC BLOOD PRESSURE < 80 MM HG: ICD-10-PCS | Mod: S$GLB,,, | Performed by: FAMILY MEDICINE

## 2022-01-12 PROCEDURE — 1101F PR PT FALLS ASSESS DOC 0-1 FALLS W/OUT INJ PAST YR: ICD-10-PCS | Mod: S$GLB,,, | Performed by: FAMILY MEDICINE

## 2022-01-12 PROCEDURE — 3066F NEPHROPATHY DOC TX: CPT | Mod: S$GLB,,, | Performed by: FAMILY MEDICINE

## 2022-01-12 PROCEDURE — 3288F PR FALLS RISK ASSESSMENT DOCUMENTED: ICD-10-PCS | Mod: S$GLB,,, | Performed by: FAMILY MEDICINE

## 2022-01-12 PROCEDURE — 99214 OFFICE O/P EST MOD 30 MIN: CPT | Mod: S$GLB,,, | Performed by: FAMILY MEDICINE

## 2022-01-12 PROCEDURE — 1101F PT FALLS ASSESS-DOCD LE1/YR: CPT | Mod: S$GLB,,, | Performed by: FAMILY MEDICINE

## 2022-01-12 PROCEDURE — 4010F ACE/ARB THERAPY RXD/TAKEN: CPT | Mod: S$GLB,,, | Performed by: FAMILY MEDICINE

## 2022-01-12 PROCEDURE — 3078F DIAST BP <80 MM HG: CPT | Mod: S$GLB,,, | Performed by: FAMILY MEDICINE

## 2022-01-12 PROCEDURE — 3077F PR MOST RECENT SYSTOLIC BLOOD PRESSURE >= 140 MM HG: ICD-10-PCS | Mod: S$GLB,,, | Performed by: FAMILY MEDICINE

## 2022-01-12 PROCEDURE — 3066F PR DOCUMENTATION OF TREATMENT FOR NEPHROPATHY: ICD-10-PCS | Mod: S$GLB,,, | Performed by: FAMILY MEDICINE

## 2022-01-12 PROCEDURE — 3008F BODY MASS INDEX DOCD: CPT | Mod: S$GLB,,, | Performed by: FAMILY MEDICINE

## 2022-01-12 PROCEDURE — 1159F MED LIST DOCD IN RCRD: CPT | Mod: S$GLB,,, | Performed by: FAMILY MEDICINE

## 2022-01-12 PROCEDURE — 1159F PR MEDICATION LIST DOCUMENTED IN MEDICAL RECORD: ICD-10-PCS | Mod: S$GLB,,, | Performed by: FAMILY MEDICINE

## 2022-01-12 PROCEDURE — 3077F SYST BP >= 140 MM HG: CPT | Mod: S$GLB,,, | Performed by: FAMILY MEDICINE

## 2022-01-12 PROCEDURE — 99214 PR OFFICE/OUTPT VISIT, EST, LEVL IV, 30-39 MIN: ICD-10-PCS | Mod: S$GLB,,, | Performed by: FAMILY MEDICINE

## 2022-01-12 PROCEDURE — 3061F PR NEG MICROALBUMINURIA RESULT DOCUMENTED/REVIEW: ICD-10-PCS | Mod: S$GLB,,, | Performed by: FAMILY MEDICINE

## 2022-01-12 PROCEDURE — 3008F PR BODY MASS INDEX (BMI) DOCUMENTED: ICD-10-PCS | Mod: S$GLB,,, | Performed by: FAMILY MEDICINE

## 2022-01-12 PROCEDURE — 3061F NEG MICROALBUMINURIA REV: CPT | Mod: S$GLB,,, | Performed by: FAMILY MEDICINE

## 2022-01-12 PROCEDURE — 4010F PR ACE/ARB THEARPY RXD/TAKEN: ICD-10-PCS | Mod: S$GLB,,, | Performed by: FAMILY MEDICINE

## 2022-01-12 NOTE — PROGRESS NOTES
SUBJECTIVE:    Patient ID: Batool Mensah is a 71 y.o. female.    Chief Complaint: Follow-up (Brought bottles //Lab -results //abc)    71-year-old woman with HTN, dyslipidemia, and sleep apnea comes to the office for a follow-up. Her cholesterol is elevated and she is trying to eat healthier. She remains active and exercises. Her right knee aches sometimes. She isn't sleeping well, likely secondary to anxiety. She stopped using her CPAP years ago.    Her recent UA shows a UTI on culture- trace leukocyte esterase and Enterococcus faecalis on culture. She is asymptomatic however.    She received the COVID Vaccine and Booster. She is up to date on her pneumococcal vaccines- P13 2016, P23 2019    Ruptured appendicitis, status post appendectomy Oct 2020, Dr. Almonte.    Status post gastric sleeve 2018.    Colonoscopy 2016, Dr. Parker. RTC in 10 years.    Mammogram Aug 2021. q 12 months.    Dexa Jan 2020. On alendronate. Calcium and Vitamin D.    Cholesterol 235 TG 73 .      Telephone on 01/05/2022   Component Date Value Ref Range Status    Glucose 01/05/2022 83  65 - 99 mg/dL Final    BUN 01/05/2022 21  7 - 25 mg/dL Final    Creatinine 01/05/2022 0.79  0.60 - 0.93 mg/dL Final    eGFR if non African American 01/05/2022 75  > OR = 60 mL/min/1.73m2 Final    eGFR if  01/05/2022 87  > OR = 60 mL/min/1.73m2 Final    BUN/Creatinine Ratio 01/05/2022 NOT APPLICABLE  6 - 22 (calc) Final    Sodium 01/05/2022 142  135 - 146 mmol/L Final    Potassium 01/05/2022 4.1  3.5 - 5.3 mmol/L Final    Chloride 01/05/2022 101  98 - 110 mmol/L Final    CO2 01/05/2022 33* 20 - 32 mmol/L Final    Calcium 01/05/2022 9.7  8.6 - 10.4 mg/dL Final    Total Protein 01/05/2022 7.1  6.1 - 8.1 g/dL Final    Albumin 01/05/2022 4.6  3.6 - 5.1 g/dL Final    Globulin, Total 01/05/2022 2.5  1.9 - 3.7 g/dL (calc) Final    Albumin/Globulin Ratio 01/05/2022 1.8  1.0 - 2.5 (calc) Final    Total Bilirubin 01/05/2022 0.8   0.2 - 1.2 mg/dL Final    Alkaline Phosphatase 01/05/2022 11* 37 - 153 U/L Final    AST 01/05/2022 25  10 - 35 U/L Final    ALT 01/05/2022 18  6 - 29 U/L Final    WBC 01/05/2022 3.9  3.8 - 10.8 Thousand/uL Final    RBC 01/05/2022 5.41* 3.80 - 5.10 Million/uL Final    Hemoglobin 01/05/2022 16.2* 11.7 - 15.5 g/dL Final    Hematocrit 01/05/2022 48.7* 35.0 - 45.0 % Final    MCV 01/05/2022 90.0  80.0 - 100.0 fL Final    MCH 01/05/2022 29.9  27.0 - 33.0 pg Final    MCHC 01/05/2022 33.3  32.0 - 36.0 g/dL Final    RDW 01/05/2022 12.9  11.0 - 15.0 % Final    Platelets 01/05/2022 196  140 - 400 Thousand/uL Final    MPV 01/05/2022 12.3  7.5 - 12.5 fL Final    Neutrophils, Abs 01/05/2022 1,591  1,500 - 7,800 cells/uL Final    Lymph # 01/05/2022 1,884  850 - 3,900 cells/uL Final    Mono # 01/05/2022 312  200 - 950 cells/uL Final    Eos # 01/05/2022 82  15 - 500 cells/uL Final    Baso # 01/05/2022 31  0 - 200 cells/uL Final    Neutrophils Relative 01/05/2022 40.8  % Final    Lymph % 01/05/2022 48.3  % Final    Mono % 01/05/2022 8.0  % Final    Eosinophil % 01/05/2022 2.1  % Final    Basophil % 01/05/2022 0.8  % Final    Cholesterol 01/05/2022 235* <200 mg/dL Final    HDL 01/05/2022 98  > OR = 50 mg/dL Final    Triglycerides 01/05/2022 73  <150 mg/dL Final    LDL Cholesterol 01/05/2022 120* mg/dL (calc) Final    HDL/Cholesterol Ratio 01/05/2022 2.4  <5.0 (calc) Final    Non HDL Chol. (LDL+VLDL) 01/05/2022 137* <130 mg/dL (calc) Final    TSH w/reflex to FT4 01/05/2022 2.82  0.40 - 4.50 mIU/L Final    Color, UA 01/05/2022 YELLOW  YELLOW Final    Appearance, UA 01/05/2022 CLEAR  CLEAR Final    Specific Gravity, UA 01/05/2022 1.008  1.001 - 1.035 Final    pH, UA 01/05/2022 7.0  5.0 - 8.0 Final    Glucose, UA 01/05/2022 NEGATIVE  NEGATIVE Final    Bilirubin, UA 01/05/2022 NEGATIVE  NEGATIVE Final    Ketones, UA 01/05/2022 NEGATIVE  NEGATIVE Final    Occult Blood UA 01/05/2022 NEGATIVE  NEGATIVE  Final    Protein, UA 2022 NEGATIVE  NEGATIVE Final    Nitrite, UA 2022 NEGATIVE  NEGATIVE Final    Leukocytes, UA 2022 TRACE* NEGATIVE Final    WBC Casts, UA 2022 NONE SEEN  < OR = 5 /HPF Final    RBC Casts, UA 2022 NONE SEEN  < OR = 2 /HPF Final    Squam Epithel, UA 2022 NONE SEEN  < OR = 5 /HPF Final    Bacteria, UA 2022 NONE SEEN  NONE SEEN /HPF Final    Hyaline Casts, UA 2022 NONE SEEN  NONE SEEN /LPF Final    Reflexive Urine Culture 2022    Final    Urine Culture, Routine 2022 *  Final    Creatinine, Urine 2022 42  20 - 275 mg/dL Final    Microalb, Ur 2022 0.3  See Note: mg/dL Final    Microalb/Creat Ratio 2022 7  <30 mcg/mg creat Final       Past Medical History:   Diagnosis Date    Hypertension      Social History     Socioeconomic History    Marital status:    Tobacco Use    Smoking status: Never Smoker    Smokeless tobacco: Never Used     Past Surgical History:   Procedure Laterality Date    BREAST BIOPSY Left     BREAST LUMPECTOMY       SECTION      X2    COLONOSCOPY  2016    Dr. Parker -RTC 10 years    Gastric sleeve  2018    Dr Hernandez- hiatal hernia repair    LAPAROSCOPIC APPENDECTOMY N/A 10/19/2020    Procedure: APPENDECTOMY, LAPAROSCOPIC;  Surgeon: Konrad Almonte III, MD;  Location: [facility];  Service: General;  Laterality: N/A;     No family history on file.    Review of patient's allergies indicates:   Allergen Reactions    Penicillins Hives       Current Outpatient Medications:     alendronate (FOSAMAX) 70 MG tablet, Take 1 tablet (70 mg total) by mouth every 7 days. Take on empty stomach with full glass of water-do not eat or lie down for 1 hour For osteoporosis, Disp: 12 tablet, Rfl: 3    calcium-vitamin D3 (OS-KRYS 500 + D3) 500 mg-5 mcg (200 unit) per tablet, Take 1 tablet by mouth 2 (two) times daily with meals., Disp: , Rfl:     losartan (COZAAR) 50 MG tablet, Take 1 tablet  "(50 mg total) by mouth once daily., Disp: 90 tablet, Rfl: 1    Review of Systems   Constitutional: Negative for appetite change, chills, fatigue, fever and unexpected weight change.   HENT: Negative for congestion, ear pain, sinus pain, sore throat and trouble swallowing.    Eyes: Negative for pain, discharge and visual disturbance.   Respiratory: Negative for apnea, cough, shortness of breath and wheezing.    Cardiovascular: Negative for chest pain, palpitations and leg swelling.   Gastrointestinal: Negative for abdominal pain, blood in stool, constipation, diarrhea, nausea and vomiting.   Endocrine: Negative for heat intolerance, polydipsia and polyuria.   Genitourinary: Negative for difficulty urinating, dyspareunia, dysuria, frequency, hematuria and menstrual problem.   Musculoskeletal: Positive for arthralgias (Right knee has mild OA). Negative for back pain, gait problem, joint swelling and myalgias.   Allergic/Immunologic: Negative for environmental allergies, food allergies and immunocompromised state.   Neurological: Negative for dizziness, tremors, seizures, numbness and headaches.   Psychiatric/Behavioral: Positive for sleep disturbance ( increased anxiety as  has lung cancer in chemo). Negative for behavioral problems, confusion, hallucinations and suicidal ideas. The patient is not nervous/anxious.           Objective:      Vitals:    01/12/22 1510 01/12/22 1513   BP: (!) 150/70 (!) 154/68   Pulse: 62    Weight: 70.3 kg (155 lb)    Height: 5' 3" (1.6 m)      Physical Exam  Vitals and nursing note reviewed.   Constitutional:       General: She is not in acute distress.     Appearance: Normal appearance. She is well-developed and well-nourished. She is not toxic-appearing.   HENT:      Head: Normocephalic and atraumatic.      Right Ear: Tympanic membrane and external ear normal.      Left Ear: Tympanic membrane and external ear normal.      Nose: Nose normal.      Mouth/Throat:      Mouth: " Oropharynx is clear and moist.   Eyes:      Extraocular Movements: EOM normal.      Pupils: Pupils are equal, round, and reactive to light.   Neck:      Thyroid: No thyromegaly.      Vascular: No carotid bruit.   Cardiovascular:      Rate and Rhythm: Normal rate and regular rhythm.      Pulses: Intact distal pulses.      Heart sounds: Normal heart sounds. No murmur heard.      Pulmonary:      Effort: Pulmonary effort is normal.      Breath sounds: Normal breath sounds. No wheezing or rales.   Abdominal:      General: Bowel sounds are normal. There is no distension.      Palpations: Abdomen is soft. There is no hepatosplenomegaly.      Tenderness: There is no abdominal tenderness.   Musculoskeletal:         General: No tenderness or deformity. Normal range of motion.      Cervical back: Normal range of motion and neck supple.      Lumbar back: Normal. No pain or spasms.      Comments: Bends 90 degrees at  waist shoulders good range of motion, right knee somewhat crepitant.  No pitting edema to lower extremities   Lymphadenopathy:      Cervical: No cervical adenopathy.   Skin:     General: Skin is warm and dry.      Findings: No rash.   Neurological:      Mental Status: She is alert and oriented to person, place, and time.      Cranial Nerves: No cranial nerve deficit.      Coordination: Coordination normal.      Gait: Gait normal.   Psychiatric:         Mood and Affect: Mood and affect normal.         Behavior: Behavior normal.         Thought Content: Thought content normal.         Judgment: Judgment normal.           Assessment:       1. Essential hypertension    2. Dyslipidemia    3. Menopause    4. History of bariatric surgery    5. S/P laparoscopic appendectomy    6. Primary osteoarthritis of both hands    7. Primary localized osteoarthritis of knee    8. HUI (obstructive sleep apnea)    9. Polycythemia         Plan:       Essential hypertension  Blood pressure mildly elevated, may be due to sleep apnea.  Will  re-evaluate in 6 months  Dyslipidemia  Cholesterol 235 triglycerides 73 . She states she will eat a healthier diet and lay off the cheesecake.  Recheck lipids 6 months    Menopause    History of bariatric surgery  Gastric sleeve in 2018  S/P laparoscopic appendectomy  Healed nicely  Primary osteoarthritis of both hands    Primary localized osteoarthritis of knee  She does not require surgery at this time  HUI (obstructive sleep apnea)  Encourage patient to restart using her CPAP machine at night.  Hematocrit is up to 48.   Polycythemia  Hematocrit 48, possibly related to HUI  Cranberry pills to prevent UTIs.  No follow-ups on file.        1/12/2022 Sage Dickson

## 2022-01-31 DIAGNOSIS — R06.83 SNORING: ICD-10-CM

## 2022-01-31 DIAGNOSIS — R53.83 LOSS OF ENERGY: ICD-10-CM

## 2022-01-31 DIAGNOSIS — G47.33 OSA (OBSTRUCTIVE SLEEP APNEA): Primary | ICD-10-CM

## 2022-01-31 DIAGNOSIS — G47.19 EXCESSIVE DAYTIME SLEEPINESS: ICD-10-CM

## 2022-01-31 DIAGNOSIS — I10 HYPERTENSION: ICD-10-CM

## 2022-01-31 DIAGNOSIS — D75.1 POLYCYTHEMIA: ICD-10-CM

## 2022-02-09 ENCOUNTER — PROCEDURE VISIT (OUTPATIENT)
Dept: SLEEP MEDICINE | Facility: HOSPITAL | Age: 72
End: 2022-02-09
Attending: INTERNAL MEDICINE
Payer: MEDICARE

## 2022-02-09 DIAGNOSIS — R06.83 SNORING: ICD-10-CM

## 2022-02-09 DIAGNOSIS — G47.19 EXCESSIVE DAYTIME SLEEPINESS: ICD-10-CM

## 2022-02-09 DIAGNOSIS — G47.33 OSA (OBSTRUCTIVE SLEEP APNEA): ICD-10-CM

## 2022-02-09 DIAGNOSIS — R53.83 LOSS OF ENERGY: ICD-10-CM

## 2022-02-09 DIAGNOSIS — I10 HYPERTENSION: ICD-10-CM

## 2022-02-09 DIAGNOSIS — D75.1 POLYCYTHEMIA: ICD-10-CM

## 2022-02-09 PROCEDURE — 95806 SLEEP STUDY UNATT&RESP EFFT: CPT

## 2022-06-30 DIAGNOSIS — I10 ESSENTIAL HYPERTENSION: ICD-10-CM

## 2022-06-30 RX ORDER — LOSARTAN POTASSIUM 50 MG/1
50 TABLET ORAL DAILY
Qty: 90 TABLET | Refills: 1 | Status: SHIPPED | OUTPATIENT
Start: 2022-06-30 | End: 2022-07-13 | Stop reason: SDUPTHER

## 2022-07-13 ENCOUNTER — OFFICE VISIT (OUTPATIENT)
Dept: FAMILY MEDICINE | Facility: CLINIC | Age: 72
End: 2022-07-13
Payer: MEDICARE

## 2022-07-13 VITALS
BODY MASS INDEX: 25.52 KG/M2 | HEIGHT: 63 IN | DIASTOLIC BLOOD PRESSURE: 66 MMHG | WEIGHT: 144 LBS | HEART RATE: 77 BPM | SYSTOLIC BLOOD PRESSURE: 136 MMHG

## 2022-07-13 DIAGNOSIS — G47.33 OSA (OBSTRUCTIVE SLEEP APNEA): ICD-10-CM

## 2022-07-13 DIAGNOSIS — M19.041 PRIMARY OSTEOARTHRITIS OF BOTH HANDS: ICD-10-CM

## 2022-07-13 DIAGNOSIS — Z98.84 HISTORY OF BARIATRIC SURGERY: ICD-10-CM

## 2022-07-13 DIAGNOSIS — F43.21 GRIEF: Primary | ICD-10-CM

## 2022-07-13 DIAGNOSIS — M17.10 PRIMARY LOCALIZED OSTEOARTHRITIS OF KNEE: ICD-10-CM

## 2022-07-13 DIAGNOSIS — I10 ESSENTIAL HYPERTENSION: ICD-10-CM

## 2022-07-13 DIAGNOSIS — M81.0 AGE-RELATED OSTEOPOROSIS WITHOUT CURRENT PATHOLOGICAL FRACTURE: ICD-10-CM

## 2022-07-13 DIAGNOSIS — E78.5 DYSLIPIDEMIA: ICD-10-CM

## 2022-07-13 DIAGNOSIS — S51.012A LACERATION OF LEFT ELBOW, INITIAL ENCOUNTER: ICD-10-CM

## 2022-07-13 DIAGNOSIS — Z13.820 ENCOUNTER FOR IMAGING TO ASSESS OSTEOPOROSIS: ICD-10-CM

## 2022-07-13 DIAGNOSIS — Z78.0 MENOPAUSE: ICD-10-CM

## 2022-07-13 DIAGNOSIS — M19.042 PRIMARY OSTEOARTHRITIS OF BOTH HANDS: ICD-10-CM

## 2022-07-13 DIAGNOSIS — Z12.31 OTHER SCREENING MAMMOGRAM: ICD-10-CM

## 2022-07-13 PROCEDURE — 90714 TD VACC NO PRESV 7 YRS+ IM: CPT | Mod: S$GLB,,, | Performed by: FAMILY MEDICINE

## 2022-07-13 PROCEDURE — 4010F ACE/ARB THERAPY RXD/TAKEN: CPT | Mod: CPTII,S$GLB,, | Performed by: FAMILY MEDICINE

## 2022-07-13 PROCEDURE — 90471 IMMUNIZATION ADMIN: CPT | Mod: S$GLB,,, | Performed by: FAMILY MEDICINE

## 2022-07-13 PROCEDURE — 99214 PR OFFICE/OUTPT VISIT, EST, LEVL IV, 30-39 MIN: ICD-10-PCS | Mod: 25,S$GLB,, | Performed by: FAMILY MEDICINE

## 2022-07-13 PROCEDURE — 3066F NEPHROPATHY DOC TX: CPT | Mod: CPTII,S$GLB,, | Performed by: FAMILY MEDICINE

## 2022-07-13 PROCEDURE — 90714 TD VACCINE GREATER THAN OR EQUAL TO 7YO PRESERVATIVE FREE IM: ICD-10-PCS | Mod: S$GLB,,, | Performed by: FAMILY MEDICINE

## 2022-07-13 PROCEDURE — 3078F PR MOST RECENT DIASTOLIC BLOOD PRESSURE < 80 MM HG: ICD-10-PCS | Mod: CPTII,S$GLB,, | Performed by: FAMILY MEDICINE

## 2022-07-13 PROCEDURE — 3066F PR DOCUMENTATION OF TREATMENT FOR NEPHROPATHY: ICD-10-PCS | Mod: CPTII,S$GLB,, | Performed by: FAMILY MEDICINE

## 2022-07-13 PROCEDURE — 3008F PR BODY MASS INDEX (BMI) DOCUMENTED: ICD-10-PCS | Mod: CPTII,S$GLB,, | Performed by: FAMILY MEDICINE

## 2022-07-13 PROCEDURE — 3075F SYST BP GE 130 - 139MM HG: CPT | Mod: CPTII,S$GLB,, | Performed by: FAMILY MEDICINE

## 2022-07-13 PROCEDURE — 3061F NEG MICROALBUMINURIA REV: CPT | Mod: CPTII,S$GLB,, | Performed by: FAMILY MEDICINE

## 2022-07-13 PROCEDURE — 3061F PR NEG MICROALBUMINURIA RESULT DOCUMENTED/REVIEW: ICD-10-PCS | Mod: CPTII,S$GLB,, | Performed by: FAMILY MEDICINE

## 2022-07-13 PROCEDURE — 3078F DIAST BP <80 MM HG: CPT | Mod: CPTII,S$GLB,, | Performed by: FAMILY MEDICINE

## 2022-07-13 PROCEDURE — 4010F PR ACE/ARB THEARPY RXD/TAKEN: ICD-10-PCS | Mod: CPTII,S$GLB,, | Performed by: FAMILY MEDICINE

## 2022-07-13 PROCEDURE — 99214 OFFICE O/P EST MOD 30 MIN: CPT | Mod: 25,S$GLB,, | Performed by: FAMILY MEDICINE

## 2022-07-13 PROCEDURE — 90471 TD VACCINE GREATER THAN OR EQUAL TO 7YO PRESERVATIVE FREE IM: ICD-10-PCS | Mod: S$GLB,,, | Performed by: FAMILY MEDICINE

## 2022-07-13 PROCEDURE — 3008F BODY MASS INDEX DOCD: CPT | Mod: CPTII,S$GLB,, | Performed by: FAMILY MEDICINE

## 2022-07-13 PROCEDURE — 3075F PR MOST RECENT SYSTOLIC BLOOD PRESS GE 130-139MM HG: ICD-10-PCS | Mod: CPTII,S$GLB,, | Performed by: FAMILY MEDICINE

## 2022-07-13 RX ORDER — LOSARTAN POTASSIUM 50 MG/1
50 TABLET ORAL DAILY
Qty: 90 TABLET | Refills: 1 | Status: SHIPPED | OUTPATIENT
Start: 2022-07-13 | End: 2022-12-16 | Stop reason: SDUPTHER

## 2022-07-13 RX ORDER — ALENDRONATE SODIUM 70 MG/1
70 TABLET ORAL
Qty: 12 TABLET | Refills: 3 | Status: SHIPPED | OUTPATIENT
Start: 2022-07-13 | End: 2023-07-07 | Stop reason: SDUPTHER

## 2022-07-13 NOTE — PROGRESS NOTES
SUBJECTIVE:    Patient ID: Batool Mensah is a 72 y.o. female.    Chief Complaint: Follow-up and Hypertension (No bottles // Mammogram ordered // need refill //abc )     72-year-old female comes in for six-month checkup.  She has lost 11 lb.  Unfortunately, her  recently passed away from lung cancer.  She has been under quite a bit of stress lately.  She is grieving appropriately and now trying to return to normal life.    Water aerobics Monday through Friday is helping.  She is going next month to [facility] with her family.    HUI-now has a new CPAP machine.  He denies any daytime fatigue.    2016-colonoscopy with Dr. Parker-[facility] 10 years    Small laceration to left elbow noted      No visits with results within 6 Month(s) from this visit.   Latest known visit with results is:   Telephone on 01/05/2022   Component Date Value Ref Range Status    Glucose 01/05/2022 83  65 - 99 mg/dL Final    BUN 01/05/2022 21  7 - 25 mg/dL Final    Creatinine 01/05/2022 0.79  0.60 - 0.93 mg/dL Final    eGFR if non African American 01/05/2022 75  > OR = 60 mL/min/1.73m2 Final    eGFR if  01/05/2022 87  > OR = 60 mL/min/1.73m2 Final    BUN/Creatinine Ratio 01/05/2022 NOT APPLICABLE  6 - 22 (calc) Final    Sodium 01/05/2022 142  135 - 146 mmol/L Final    Potassium 01/05/2022 4.1  3.5 - 5.3 mmol/L Final    Chloride 01/05/2022 101  98 - 110 mmol/L Final    CO2 01/05/2022 33 (A) 20 - 32 mmol/L Final    Calcium 01/05/2022 9.7  8.6 - 10.4 mg/dL Final    Total Protein 01/05/2022 7.1  6.1 - 8.1 g/dL Final    Albumin 01/05/2022 4.6  3.6 - 5.1 g/dL Final    Globulin, Total 01/05/2022 2.5  1.9 - 3.7 g/dL (calc) Final    Albumin/Globulin Ratio 01/05/2022 1.8  1.0 - 2.5 (calc) Final    Total Bilirubin 01/05/2022 0.8  0.2 - 1.2 mg/dL Final    Alkaline Phosphatase 01/05/2022 11 (A) 37 - 153 U/L Final    AST 01/05/2022 25  10 - 35 U/L Final    ALT 01/05/2022 18  6 - 29 U/L Final    WBC 01/05/2022  3.9  3.8 - 10.8 Thousand/uL Final    RBC 01/05/2022 5.41 (A) 3.80 - 5.10 Million/uL Final    Hemoglobin 01/05/2022 16.2 (A) 11.7 - 15.5 g/dL Final    Hematocrit 01/05/2022 48.7 (A) 35.0 - 45.0 % Final    MCV 01/05/2022 90.0  80.0 - 100.0 fL Final    MCH 01/05/2022 29.9  27.0 - 33.0 pg Final    MCHC 01/05/2022 33.3  32.0 - 36.0 g/dL Final    RDW 01/05/2022 12.9  11.0 - 15.0 % Final    Platelets 01/05/2022 196  140 - 400 Thousand/uL Final    MPV 01/05/2022 12.3  7.5 - 12.5 fL Final    Neutrophils, Abs 01/05/2022 1,591  1,500 - 7,800 cells/uL Final    Lymph # 01/05/2022 1,884  850 - 3,900 cells/uL Final    Mono # 01/05/2022 312  200 - 950 cells/uL Final    Eos # 01/05/2022 82  15 - 500 cells/uL Final    Baso # 01/05/2022 31  0 - 200 cells/uL Final    Neutrophils Relative 01/05/2022 40.8  % Final    Lymph % 01/05/2022 48.3  % Final    Mono % 01/05/2022 8.0  % Final    Eosinophil % 01/05/2022 2.1  % Final    Basophil % 01/05/2022 0.8  % Final    Cholesterol 01/05/2022 235 (A) <200 mg/dL Final    HDL 01/05/2022 98  > OR = 50 mg/dL Final    Triglycerides 01/05/2022 73  <150 mg/dL Final    LDL Cholesterol 01/05/2022 120 (A) mg/dL (calc) Final    HDL/Cholesterol Ratio 01/05/2022 2.4  <5.0 (calc) Final    Non HDL Chol. (LDL+VLDL) 01/05/2022 137 (A) <130 mg/dL (calc) Final    TSH w/reflex to FT4 01/05/2022 2.82  0.40 - 4.50 mIU/L Final    Color, UA 01/05/2022 YELLOW  YELLOW Final    Appearance, UA 01/05/2022 CLEAR  CLEAR Final    Specific Gravity, UA 01/05/2022 1.008  1.001 - 1.035 Final    pH, UA 01/05/2022 7.0  5.0 - 8.0 Final    Glucose, UA 01/05/2022 NEGATIVE  NEGATIVE Final    Bilirubin, UA 01/05/2022 NEGATIVE  NEGATIVE Final    Ketones, UA 01/05/2022 NEGATIVE  NEGATIVE Final    Occult Blood UA 01/05/2022 NEGATIVE  NEGATIVE Final    Protein, UA 01/05/2022 NEGATIVE  NEGATIVE Final    Nitrite, UA 01/05/2022 NEGATIVE  NEGATIVE Final    Leukocytes, UA 01/05/2022 TRACE (A) NEGATIVE Final     WBC Casts, UA 01/05/2022 NONE SEEN  < OR = 5 /HPF Final    RBC Casts, UA 01/05/2022 NONE SEEN  < OR = 2 /HPF Final    Squam Epithel, UA 01/05/2022 NONE SEEN  < OR = 5 /HPF Final    Bacteria, UA 01/05/2022 NONE SEEN  NONE SEEN /HPF Final    Hyaline Casts, UA 01/05/2022 NONE SEEN  NONE SEEN /LPF Final    Reflexive Urine Culture 01/05/2022    Final    Urine Culture, Routine 01/05/2022  (A)  Final    Creatinine, Urine 01/05/2022 42  20 - 275 mg/dL Final    Microalb, Ur 01/05/2022 0.3  See Note: mg/dL Final    Microalb/Creat Ratio 01/05/2022 7  <30 mcg/mg creat Final       Past Medical History:   Diagnosis Date    Hypertension      Social History     Socioeconomic History    Marital status:    Tobacco Use    Smoking status: Never Smoker    Smokeless tobacco: Never Used     Social Determinants of Health     Financial Resource Strain: Low Risk     Difficulty of Paying Living Expenses: Not hard at all   Food Insecurity: No Food Insecurity    Worried About Running Out of Food in the Last Year: Never true    Ran Out of Food in the Last Year: Never true   Transportation Needs: No Transportation Needs    Lack of Transportation (Medical): No    Lack of Transportation (Non-Medical): No   Physical Activity: Sufficiently Active    Days of Exercise per Week: 5 days    Minutes of Exercise per Session: 60 min   Stress: No Stress Concern Present    Feeling of Stress : Only a little   Social Connections: Unknown    Frequency of Communication with Friends and Family: More than three times a week    Frequency of Social Gatherings with Friends and Family: Three times a week    Active Member of Clubs or Organizations: Yes    Attends Club or Organization Meetings: More than 4 times per year    Marital Status:    Housing Stability: Low Risk     Unable to Pay for Housing in the Last Year: No    Number of Places Lived in the Last Year: 1    Unstable Housing in the Last Year: No     Past Surgical  History:   Procedure Laterality Date    BREAST BIOPSY Left     BREAST LUMPECTOMY       SECTION      X2    COLONOSCOPY  2016    Dr. Parker -RTC 10 years    Gastric sleeve  2018    Dr Hernandez- hiatal hernia repair    LAPAROSCOPIC APPENDECTOMY N/A 10/19/2020    Procedure: APPENDECTOMY, LAPAROSCOPIC;  Surgeon: Konrad Almonte III, MD;  Location: [facility];  Service: General;  Laterality: N/A;     No family history on file.    Review of patient's allergies indicates:   Allergen Reactions    Penicillins Hives       Current Outpatient Medications:     calcium-vitamin D3 (OS-KRYS 500 + D3) 500 mg-5 mcg (200 unit) per tablet, Take 1 tablet by mouth 2 (two) times daily with meals., Disp: , Rfl:     alendronate (FOSAMAX) 70 MG tablet, Take 1 tablet (70 mg total) by mouth every 7 days. Take on empty stomach with full glass of water-do not eat or lie down for 1 hour For osteoporosis, Disp: 12 tablet, Rfl: 3    losartan (COZAAR) 50 MG tablet, Take 1 tablet (50 mg total) by mouth once daily., Disp: 90 tablet, Rfl: 1    Review of Systems   Constitutional: Negative for appetite change, chills, fatigue, fever and unexpected weight change.   HENT: Negative for congestion, ear pain, sinus pain, sore throat and trouble swallowing.    Eyes: Negative for pain, discharge and visual disturbance.   Respiratory: Negative for apnea, cough, shortness of breath and wheezing.    Cardiovascular: Negative for chest pain, palpitations and leg swelling.   Gastrointestinal: Negative for abdominal pain, blood in stool, constipation, diarrhea, nausea and vomiting.   Endocrine: Negative for heat intolerance, polydipsia and polyuria.   Genitourinary: Negative for difficulty urinating, dyspareunia, dysuria, frequency, hematuria and menstrual problem.   Musculoskeletal: Positive for arthralgias (rt knee aches daily, ). Negative for back pain, gait problem, joint swelling and myalgias.   Allergic/Immunologic: Negative for environmental  "allergies, food allergies and immunocompromised state.   Neurological: Negative for dizziness, tremors, seizures, numbness and headaches.   Psychiatric/Behavioral: Negative for behavioral problems, confusion, hallucinations and suicidal ideas. The patient is not nervous/anxious.           Objective:      Vitals:    07/13/22 1446   BP: 136/66   Pulse: 77   Weight: 65.3 kg (144 lb)   Height: 5' 3" (1.6 m)     Physical Exam  Vitals and nursing note reviewed.   Constitutional:       General: She is not in acute distress.     Appearance: Normal appearance. She is well-developed. She is not toxic-appearing.   HENT:      Head: Normocephalic and atraumatic.      Right Ear: Tympanic membrane and external ear normal.      Left Ear: Tympanic membrane and external ear normal.      Nose: Nose normal.      Mouth/Throat:      Pharynx: Oropharynx is clear.   Eyes:      Pupils: Pupils are equal, round, and reactive to light.   Neck:      Thyroid: No thyromegaly.      Vascular: No carotid bruit.   Cardiovascular:      Rate and Rhythm: Normal rate and regular rhythm.      Heart sounds: Normal heart sounds. No murmur heard.  Pulmonary:      Effort: Pulmonary effort is normal.      Breath sounds: Normal breath sounds. No wheezing or rales.   Abdominal:      General: Bowel sounds are normal. There is no distension.      Palpations: Abdomen is soft.      Tenderness: There is no abdominal tenderness.   Musculoskeletal:         General: No tenderness or deformity. Normal range of motion.      Cervical back: Normal range of motion and neck supple.      Lumbar back: Normal. No spasms.      Comments: Bends 90 degrees at  waist, shoulders and knees have good range of motion no pitting edema to lower extremities   Lymphadenopathy:      Cervical: No cervical adenopathy.   Skin:     General: Skin is warm and dry.      Findings: No rash.      Comments: Small left elbow laceration noted healing well   Neurological:      Mental Status: She is alert " and oriented to person, place, and time.      Cranial Nerves: No cranial nerve deficit.      Coordination: Coordination normal.   Psychiatric:         Mood and Affect: Mood normal.         Behavior: Behavior normal.         Thought Content: Thought content normal.         Judgment: Judgment normal.           Assessment:       1. Grief    2. Essential hypertension    3. Dyslipidemia    4. Menopause    5. History of bariatric surgery    6. Primary osteoarthritis of both hands    7. Primary localized osteoarthritis of knee    8. HUI (obstructive sleep apnea)    9. Laceration of left elbow, initial encounter    10. Other screening mammogram    11. Encounter for imaging to assess osteoporosis    12. Age-related osteoporosis without current pathological fracture         Plan:       Grief  Patient seems to be coping well after the loss of her  recently.  Essential hypertension  Comments:  BP stable  Orders:  -     losartan (COZAAR) 50 MG tablet; Take 1 tablet (50 mg total) by mouth once daily.  Dispense: 90 tablet; Refill: 1  -     CBC Auto Differential; Future; Expected date: 07/13/2022  -     Comprehensive Metabolic Panel; Future; Expected date: 07/13/2022  -     Lipid Panel; Future; Expected date: 07/13/2022  -     TSH w/reflex to FT4; Future; Expected date: 07/13/2022  -     Urinalysis, Reflex to Urine Culture Urine, Clean Catch; Future; Expected date: 07/13/2022    routine labs are ordered  Dyslipidemia    Menopause  -     DXA Bone Density Spine And Hip; Future; Expected date: 07/13/2022  DEXA scan ordered  History of bariatric surgery  Lost 11 lb recently  Primary osteoarthritis of both hands    Primary localized osteoarthritis of knee    HUI (obstructive sleep apnea)    Laceration of left elbow, initial encounter  -     (In Office Administered) Td Vaccine - Preservative Free  TD vaccine offered today  Other screening mammogram  -     Mammo Digital Screening Bilat; Future; Expected date: 07/13/2022  Mammogram  ordered    Encounter for imaging to assess osteoporosis  -     DXA Bone Density Spine And Hip; Future; Expected date: 07/13/2022    Age-related osteoporosis without current pathological fracture  Comments:  reviewed last DEXA with new dx of osteoporosis- now on biphosphanate and stressed Calcium/vitamin D supplement and weight bearing exercise melissa given bariatric s  Orders:  -     alendronate (FOSAMAX) 70 MG tablet; Take 1 tablet (70 mg total) by mouth every 7 days. Take on empty stomach with full glass of water-do not eat or lie down for 1 hour For osteoporosis  Dispense: 12 tablet; Refill: 3      Follow up in about 6 months (around 1/13/2023), or htn.        7/16/2022 Sage Dickson

## 2022-08-08 ENCOUNTER — HOSPITAL ENCOUNTER (OUTPATIENT)
Dept: RADIOLOGY | Facility: HOSPITAL | Age: 72
Discharge: HOME | End: 2022-08-08
Attending: FAMILY MEDICINE
Payer: MEDICARE

## 2022-08-08 VITALS — WEIGHT: 143.94 LBS | BODY MASS INDEX: 25.5 KG/M2 | HEIGHT: 63 IN

## 2022-08-08 DIAGNOSIS — Z12.31 OTHER SCREENING MAMMOGRAM: ICD-10-CM

## 2022-08-08 DIAGNOSIS — Z13.820 ENCOUNTER FOR IMAGING TO ASSESS OSTEOPOROSIS: ICD-10-CM

## 2022-08-08 DIAGNOSIS — Z78.0 MENOPAUSE: ICD-10-CM

## 2022-08-08 PROCEDURE — 77063 BREAST TOMOSYNTHESIS BI: CPT | Mod: TC,PO

## 2022-08-08 PROCEDURE — 77067 SCR MAMMO BI INCL CAD: CPT | Mod: TC,PO

## 2022-08-08 PROCEDURE — 77080 DXA BONE DENSITY AXIAL: CPT | Mod: TC,PO

## 2022-08-14 NOTE — PROGRESS NOTES
Call patient.  Her DEXA scan shows lumbar spine is normal.  The hip has osteopenia but has shown an 8% increase in bone density of the last 2 years.  Continue alendronate once a week for bone density along with calcium plus D vitamins twice a day, repeat DEXA scan in 2 years

## 2022-08-14 NOTE — PROGRESS NOTES
Call patient.  Mammogram showed a left breast 6 mm density.  Radiologist recommends an ultrasound of the left breast to evaluate this further.  We could set this up for you.

## 2022-08-15 ENCOUNTER — TELEPHONE (OUTPATIENT)
Dept: FAMILY MEDICINE | Facility: CLINIC | Age: 72
End: 2022-08-15

## 2022-08-15 NOTE — TELEPHONE ENCOUNTER
Spoke to patient and she voiced understanding. Her ultrasound is already scheduled and she was informed that we will call once we get the results back.

## 2022-08-15 NOTE — TELEPHONE ENCOUNTER
----- Message from Sage Dickson MD sent at 8/14/2022  1:10 PM CDT -----  Call patient.  Mammogram showed a left breast 6 mm density.  Radiologist recommends an ultrasound of the left breast to evaluate this further.  We could set this up for you.    Call patient.  Her DEXA scan shows lumbar spine is normal.  The hip has osteopenia but has shown an 8% increase in bone density of the last 2 years.  Continue alendronate once a week for bone density along with calcium plus D vitamins twice a day, repeat DEXA scan in 2 years   Written by Sage Dickson MD on 8/14/2022

## 2022-09-12 ENCOUNTER — HOSPITAL ENCOUNTER (OUTPATIENT)
Dept: RADIOLOGY | Facility: HOSPITAL | Age: 72
Discharge: HOME | End: 2022-09-12
Attending: FAMILY MEDICINE
Payer: MEDICARE

## 2022-09-12 DIAGNOSIS — R92.2 INCONCLUSIVE MAMMOGRAM: ICD-10-CM

## 2022-09-12 PROCEDURE — 77065 DX MAMMO INCL CAD UNI: CPT | Mod: TC,PO,LT

## 2022-09-12 PROCEDURE — 76642 ULTRASOUND BREAST LIMITED: CPT | Mod: TC,PO,LT

## 2022-09-15 ENCOUNTER — TELEPHONE (OUTPATIENT)
Dept: FAMILY MEDICINE | Facility: CLINIC | Age: 72
End: 2022-09-15

## 2022-09-15 NOTE — TELEPHONE ENCOUNTER
----- Message from Zara Zheng MA sent at 9/15/2022  2:26 PM CDT -----  Regarding: needs surgical clearance  Patient needs surgical clearance appt for cataracts. Surg. [place]. 10/13/2022.  [phone]

## 2022-09-19 ENCOUNTER — TELEPHONE (OUTPATIENT)
Dept: FAMILY MEDICINE | Facility: CLINIC | Age: 72
End: 2022-09-19

## 2022-09-19 NOTE — PROGRESS NOTES
Call patient.  Mammogram with yin and breast ultrasound failed to show any cancer of the breast.  No solid mass was seen.  Fibroglandular tissue is suspected.  Repeat mammogram in 1 year

## 2022-09-19 NOTE — TELEPHONE ENCOUNTER
----- Message from Sage Dickson MD sent at 9/18/2022  9:22 PM CDT -----  Call patient.  Mammogram with yin and breast ultrasound failed to show any cancer of the breast.  No solid mass was seen.  Fibroglandular tissue is suspected.  Repeat mammogram in 1 year

## 2022-09-26 ENCOUNTER — TELEPHONE (OUTPATIENT)
Dept: FAMILY MEDICINE | Facility: CLINIC | Age: 72
End: 2022-09-26

## 2022-09-26 NOTE — TELEPHONE ENCOUNTER
----- Message from Ida Jimenez sent at 9/26/2022  3:01 PM CDT -----  Patient called and stated that she need to come in and be seen she need to have a clearance for surgery she is having eye surgery on 10/13 please give her a call at [phone]

## 2022-09-27 ENCOUNTER — OFFICE VISIT (OUTPATIENT)
Dept: FAMILY MEDICINE | Facility: CLINIC | Age: 72
End: 2022-09-27
Payer: MEDICARE

## 2022-09-27 VITALS
HEIGHT: 63 IN | WEIGHT: 137 LBS | SYSTOLIC BLOOD PRESSURE: 110 MMHG | DIASTOLIC BLOOD PRESSURE: 60 MMHG | BODY MASS INDEX: 24.27 KG/M2 | HEART RATE: 66 BPM

## 2022-09-27 DIAGNOSIS — I48.91 ATRIAL FIBRILLATION, UNSPECIFIED TYPE: Primary | ICD-10-CM

## 2022-09-27 DIAGNOSIS — Z01.818 PRE-OPERATIVE CLEARANCE: ICD-10-CM

## 2022-09-27 LAB
EKG 12-LEAD: NORMAL
PR INTERVAL: NORMAL
PRT AXES: NORMAL
QRS DURATION: NORMAL
QT/QTC: NORMAL
VENTRICULAR RATE: NORMAL

## 2022-09-27 PROCEDURE — 1159F MED LIST DOCD IN RCRD: CPT | Mod: CPTII,S$GLB,, | Performed by: PHYSICIAN ASSISTANT

## 2022-09-27 PROCEDURE — 3008F PR BODY MASS INDEX (BMI) DOCUMENTED: ICD-10-PCS | Mod: CPTII,S$GLB,, | Performed by: PHYSICIAN ASSISTANT

## 2022-09-27 PROCEDURE — 3061F NEG MICROALBUMINURIA REV: CPT | Mod: CPTII,S$GLB,, | Performed by: PHYSICIAN ASSISTANT

## 2022-09-27 PROCEDURE — 1159F PR MEDICATION LIST DOCUMENTED IN MEDICAL RECORD: ICD-10-PCS | Mod: CPTII,S$GLB,, | Performed by: PHYSICIAN ASSISTANT

## 2022-09-27 PROCEDURE — 3074F PR MOST RECENT SYSTOLIC BLOOD PRESSURE < 130 MM HG: ICD-10-PCS | Mod: CPTII,S$GLB,, | Performed by: PHYSICIAN ASSISTANT

## 2022-09-27 PROCEDURE — 93000 ELECTROCARDIOGRAM COMPLETE: CPT | Mod: S$GLB,,, | Performed by: PHYSICIAN ASSISTANT

## 2022-09-27 PROCEDURE — 4010F PR ACE/ARB THEARPY RXD/TAKEN: ICD-10-PCS | Mod: CPTII,S$GLB,, | Performed by: PHYSICIAN ASSISTANT

## 2022-09-27 PROCEDURE — 3061F PR NEG MICROALBUMINURIA RESULT DOCUMENTED/REVIEW: ICD-10-PCS | Mod: CPTII,S$GLB,, | Performed by: PHYSICIAN ASSISTANT

## 2022-09-27 PROCEDURE — 3288F FALL RISK ASSESSMENT DOCD: CPT | Mod: CPTII,S$GLB,, | Performed by: PHYSICIAN ASSISTANT

## 2022-09-27 PROCEDURE — 99214 PR OFFICE/OUTPT VISIT, EST, LEVL IV, 30-39 MIN: ICD-10-PCS | Mod: 25,S$GLB,, | Performed by: PHYSICIAN ASSISTANT

## 2022-09-27 PROCEDURE — 1101F PR PT FALLS ASSESS DOC 0-1 FALLS W/OUT INJ PAST YR: ICD-10-PCS | Mod: CPTII,S$GLB,, | Performed by: PHYSICIAN ASSISTANT

## 2022-09-27 PROCEDURE — 1101F PT FALLS ASSESS-DOCD LE1/YR: CPT | Mod: CPTII,S$GLB,, | Performed by: PHYSICIAN ASSISTANT

## 2022-09-27 PROCEDURE — 3078F DIAST BP <80 MM HG: CPT | Mod: CPTII,S$GLB,, | Performed by: PHYSICIAN ASSISTANT

## 2022-09-27 PROCEDURE — 3066F NEPHROPATHY DOC TX: CPT | Mod: CPTII,S$GLB,, | Performed by: PHYSICIAN ASSISTANT

## 2022-09-27 PROCEDURE — 4010F ACE/ARB THERAPY RXD/TAKEN: CPT | Mod: CPTII,S$GLB,, | Performed by: PHYSICIAN ASSISTANT

## 2022-09-27 PROCEDURE — 3008F BODY MASS INDEX DOCD: CPT | Mod: CPTII,S$GLB,, | Performed by: PHYSICIAN ASSISTANT

## 2022-09-27 PROCEDURE — 3074F SYST BP LT 130 MM HG: CPT | Mod: CPTII,S$GLB,, | Performed by: PHYSICIAN ASSISTANT

## 2022-09-27 PROCEDURE — 3288F PR FALLS RISK ASSESSMENT DOCUMENTED: ICD-10-PCS | Mod: CPTII,S$GLB,, | Performed by: PHYSICIAN ASSISTANT

## 2022-09-27 PROCEDURE — 93000 POCT EKG 12-LEAD: ICD-10-PCS | Mod: S$GLB,,, | Performed by: PHYSICIAN ASSISTANT

## 2022-09-27 PROCEDURE — 3078F PR MOST RECENT DIASTOLIC BLOOD PRESSURE < 80 MM HG: ICD-10-PCS | Mod: CPTII,S$GLB,, | Performed by: PHYSICIAN ASSISTANT

## 2022-09-27 PROCEDURE — 99214 OFFICE O/P EST MOD 30 MIN: CPT | Mod: 25,S$GLB,, | Performed by: PHYSICIAN ASSISTANT

## 2022-09-27 PROCEDURE — 3066F PR DOCUMENTATION OF TREATMENT FOR NEPHROPATHY: ICD-10-PCS | Mod: CPTII,S$GLB,, | Performed by: PHYSICIAN ASSISTANT

## 2022-09-27 RX ORDER — METOPROLOL SUCCINATE 25 MG/1
25 TABLET, EXTENDED RELEASE ORAL DAILY
Qty: 30 TABLET | Refills: 2 | Status: SHIPPED | OUTPATIENT
Start: 2022-09-27 | End: 2022-11-07 | Stop reason: DRUGHIGH

## 2022-09-27 RX ORDER — PREDNISOLONE ACETATE 10 MG/ML
1 SUSPENSION/ DROPS OPHTHALMIC 3 TIMES DAILY
COMMUNITY
Start: 2022-09-13 | End: 2023-04-27 | Stop reason: ALTCHOICE

## 2022-09-27 RX ORDER — OFLOXACIN 3 MG/ML
1 SOLUTION/ DROPS OPHTHALMIC 4 TIMES DAILY
COMMUNITY
Start: 2022-09-13 | End: 2023-04-27 | Stop reason: ALTCHOICE

## 2022-09-27 NOTE — PROGRESS NOTES
SUBJECTIVE:    Patient ID: Batool Mensah is a 72 y.o. female.    Chief Complaint: Pre-op Exam (Did not bring bottle//Surgery scheduled 10/13/22//bs)    This is a 72-year-old female who presents today for preop clearance and exam.  She is planning to undergo cataract surgery.  She is treated for hypertension, osteoporosis. In normal state of health. No active concerns. Slightly anxious regarding surgery.  Denies any palpitations, chest pain or shortness of breath.      No visits with results within 6 Month(s) from this visit.   Latest known visit with results is:   Telephone on 01/05/2022   Component Date Value Ref Range Status    Glucose 01/05/2022 83  65 - 99 mg/dL Final    BUN 01/05/2022 21  7 - 25 mg/dL Final    Creatinine 01/05/2022 0.79  0.60 - 0.93 mg/dL Final    eGFR if non African American 01/05/2022 75  > OR = 60 mL/min/1.73m2 Final    eGFR if African American 01/05/2022 87  > OR = 60 mL/min/1.73m2 Final    BUN/Creatinine Ratio 01/05/2022 NOT APPLICABLE  6 - 22 (calc) Final    Sodium 01/05/2022 142  135 - 146 mmol/L Final    Potassium 01/05/2022 4.1  3.5 - 5.3 mmol/L Final    Chloride 01/05/2022 101  98 - 110 mmol/L Final    CO2 01/05/2022 33 (H)  20 - 32 mmol/L Final    Calcium 01/05/2022 9.7  8.6 - 10.4 mg/dL Final    Total Protein 01/05/2022 7.1  6.1 - 8.1 g/dL Final    Albumin 01/05/2022 4.6  3.6 - 5.1 g/dL Final    Globulin, Total 01/05/2022 2.5  1.9 - 3.7 g/dL (calc) Final    Albumin/Globulin Ratio 01/05/2022 1.8  1.0 - 2.5 (calc) Final    Total Bilirubin 01/05/2022 0.8  0.2 - 1.2 mg/dL Final    Alkaline Phosphatase 01/05/2022 11 (L)  37 - 153 U/L Final    AST 01/05/2022 25  10 - 35 U/L Final    ALT 01/05/2022 18  6 - 29 U/L Final    WBC 01/05/2022 3.9  3.8 - 10.8 Thousand/uL Final    RBC 01/05/2022 5.41 (H)  3.80 - 5.10 Million/uL Final    Hemoglobin 01/05/2022 16.2 (H)  11.7 - 15.5 g/dL Final    Hematocrit 01/05/2022 48.7 (H)  35.0 - 45.0 % Final    MCV 01/05/2022 90.0  80.0 - 100.0 fL Final     MCH 01/05/2022 29.9  27.0 - 33.0 pg Final    MCHC 01/05/2022 33.3  32.0 - 36.0 g/dL Final    RDW 01/05/2022 12.9  11.0 - 15.0 % Final    Platelets 01/05/2022 196  140 - 400 Thousand/uL Final    MPV 01/05/2022 12.3  7.5 - 12.5 fL Final    Neutrophils, Abs 01/05/2022 1,591  1,500 - 7,800 cells/uL Final    Lymph # 01/05/2022 1,884  850 - 3,900 cells/uL Final    Mono # 01/05/2022 312  200 - 950 cells/uL Final    Eos # 01/05/2022 82  15 - 500 cells/uL Final    Baso # 01/05/2022 31  0 - 200 cells/uL Final    Neutrophils Relative 01/05/2022 40.8  % Final    Lymph % 01/05/2022 48.3  % Final    Mono % 01/05/2022 8.0  % Final    Eosinophil % 01/05/2022 2.1  % Final    Basophil % 01/05/2022 0.8  % Final    Cholesterol 01/05/2022 235 (H)  <200 mg/dL Final    HDL 01/05/2022 98  > OR = 50 mg/dL Final    Triglycerides 01/05/2022 73  <150 mg/dL Final    LDL Cholesterol 01/05/2022 120 (H)  mg/dL (calc) Final    HDL/Cholesterol Ratio 01/05/2022 2.4  <5.0 (calc) Final    Non HDL Chol. (LDL+VLDL) 01/05/2022 137 (H)  <130 mg/dL (calc) Final    TSH w/reflex to FT4 01/05/2022 2.82  0.40 - 4.50 mIU/L Final    Color, UA 01/05/2022 YELLOW  YELLOW Final    Appearance, UA 01/05/2022 CLEAR  CLEAR Final    Specific Gravity, UA 01/05/2022 1.008  1.001 - 1.035 Final    pH, UA 01/05/2022 7.0  5.0 - 8.0 Final    Glucose, UA 01/05/2022 NEGATIVE  NEGATIVE Final    Bilirubin, UA 01/05/2022 NEGATIVE  NEGATIVE Final    Ketones, UA 01/05/2022 NEGATIVE  NEGATIVE Final    Occult Blood UA 01/05/2022 NEGATIVE  NEGATIVE Final    Protein, UA 01/05/2022 NEGATIVE  NEGATIVE Final    Nitrite, UA 01/05/2022 NEGATIVE  NEGATIVE Final    Leukocytes, UA 01/05/2022 TRACE (A)  NEGATIVE Final    WBC Casts, UA 01/05/2022 NONE SEEN  < OR = 5 /HPF Final    RBC Casts, UA 01/05/2022 NONE SEEN  < OR = 2 /HPF Final    Squam Epithel, UA 01/05/2022 NONE SEEN  < OR = 5 /HPF Final    Bacteria, UA 01/05/2022 NONE SEEN  NONE SEEN /HPF Final    Hyaline Casts, UA 01/05/2022 NONE SEEN   NONE SEEN /LPF Final    Reflexive Urine Culture 2022    Final    Urine Culture, Routine 2022  (A)   Final    Creatinine, Urine 2022 42  20 - 275 mg/dL Final    Microalb, Ur 2022 0.3  See Note: mg/dL Final    Microalb/Creat Ratio 2022 7  <30 mcg/mg creat Final       Past Medical History:   Diagnosis Date    Hypertension      Past Surgical History:   Procedure Laterality Date    BREAST BIOPSY Left     BREAST LUMPECTOMY       SECTION      X2    COLONOSCOPY      Dr. Parker -RTC 10 years    Gastric sleeve      Dr Hernandez- hiatal hernia repair    LAPAROSCOPIC APPENDECTOMY N/A 10/19/2020    Procedure: APPENDECTOMY, LAPAROSCOPIC;  Surgeon: Konrad Almonte III, MD;  Location: [facility];  Service: General;  Laterality: N/A;     History reviewed. No pertinent family history.    Marital Status:   Alcohol History:  has no history on file for alcohol use.  Tobacco History:  reports that she has never smoked. She has never used smokeless tobacco.  Drug History:  has no history on file for drug use.    Review of patient's allergies indicates:   Allergen Reactions    Penicillins Hives       Current Outpatient Medications:     alendronate (FOSAMAX) 70 MG tablet, Take 1 tablet (70 mg total) by mouth every 7 days. Take on empty stomach with full glass of water-do not eat or lie down for 1 hour For osteoporosis, Disp: 12 tablet, Rfl: 3    calcium-vitamin D3 (OS-KRYS 500 + D3) 500 mg-5 mcg (200 unit) per tablet, Take 1 tablet by mouth 2 (two) times daily with meals., Disp: , Rfl:     losartan (COZAAR) 50 MG tablet, Take 1 tablet (50 mg total) by mouth once daily., Disp: 90 tablet, Rfl: 1    ofloxacin (OCUFLOX) 0.3 % ophthalmic solution, Place 1 drop into the left eye 4 (four) times daily., Disp: , Rfl:     prednisoLONE acetate (PRED FORTE) 1 % DrpS, Place 1 drop into both eyes 3 (three) times daily., Disp: , Rfl:     apixaban (ELIQUIS) 5 mg Tab, Take 1 tablet (5 mg total) by mouth 2 (two)  "times daily., Disp: 60 tablet, Rfl: 2    metoprolol succinate (TOPROL-XL) 25 MG 24 hr tablet, Take 1 tablet (25 mg total) by mouth once daily., Disp: 30 tablet, Rfl: 2    Review of Systems   Constitutional:  Negative for appetite change, chills, fatigue, fever and unexpected weight change.   HENT:  Negative for congestion.    Respiratory:  Negative for cough, chest tightness and shortness of breath.    Cardiovascular:  Negative for chest pain and palpitations.   Gastrointestinal:  Negative for abdominal distention and abdominal pain.   Endocrine: Negative for cold intolerance and heat intolerance.   Genitourinary:  Negative for difficulty urinating and dysuria.   Musculoskeletal:  Negative for arthralgias and back pain.   Neurological:  Negative for dizziness, weakness and headaches.   Psychiatric/Behavioral:  The patient is nervous/anxious.         Objective:      Vitals:    09/27/22 0821   BP: 110/60   Pulse: 66   Weight: 62.1 kg (137 lb)   Height: 5' 3" (1.6 m)     Physical Exam  Constitutional:       General: She is not in acute distress.     Appearance: She is well-developed.   HENT:      Head: Normocephalic and atraumatic.   Eyes:      Conjunctiva/sclera: Conjunctivae normal.      Pupils: Pupils are equal, round, and reactive to light.   Neck:      Thyroid: No thyromegaly.   Cardiovascular:      Rate and Rhythm: Normal rate. Rhythm irregular.      Heart sounds: Normal heart sounds.   Pulmonary:      Effort: Pulmonary effort is normal.      Breath sounds: Normal breath sounds.   Abdominal:      General: Bowel sounds are normal. There is no distension.      Palpations: Abdomen is soft.      Tenderness: There is no abdominal tenderness.   Musculoskeletal:         General: Normal range of motion.      Cervical back: Normal range of motion and neck supple.   Skin:     General: Skin is warm and dry.      Findings: No erythema.   Neurological:      Mental Status: She is alert and oriented to person, place, and time. "      Cranial Nerves: No cranial nerve deficit.         Assessment:       1. Atrial fibrillation, unspecified type    2. Pre-operative clearance         Plan:       Atrial fibrillation, unspecified type  Comments:  EKG reviwed. new onset in past few mos. rate at 119. will start metoprolol succ now. start eliquis twice daily. will get her in with cards asap.   Orders:  -     metoprolol succinate (TOPROL-XL) 25 MG 24 hr tablet; Take 1 tablet (25 mg total) by mouth once daily.  Dispense: 30 tablet; Refill: 2  -     apixaban (ELIQUIS) 5 mg Tab; Take 1 tablet (5 mg total) by mouth 2 (two) times daily.  Dispense: 60 tablet; Refill: 2  -     Ambulatory referral/consult to Cardiology; Future; Expected date: 09/27/2022    Pre-operative clearance  Comments:  will see her back on Monday for BP check and touch base if we can clear for surgery.     Follow up in 6 days (on 10/3/2022).        9/27/2022 Geoff Ritchie PA-C

## 2022-09-28 ENCOUNTER — TELEPHONE (OUTPATIENT)
Dept: OPHTHALMOLOGY | Facility: CLINIC | Age: 72
End: 2022-09-28
Payer: MEDICARE

## 2022-09-28 NOTE — TELEPHONE ENCOUNTER
Spoke to pt and scheduled eval for pt    -TD        11:49 AM CDT -----  Eric red,     This patient is a friend of Dr. Garcia that he contacted me about.  She is interested in cataract surgery with us.  Can we give her a call to schedule an evaluation?  We can set dates and a preop, probably later.       CC

## 2022-10-03 ENCOUNTER — OFFICE VISIT (OUTPATIENT)
Dept: FAMILY MEDICINE | Facility: CLINIC | Age: 72
End: 2022-10-03
Payer: MEDICARE

## 2022-10-03 VITALS
HEART RATE: 56 BPM | BODY MASS INDEX: 26.58 KG/M2 | SYSTOLIC BLOOD PRESSURE: 136 MMHG | WEIGHT: 150 LBS | HEIGHT: 63 IN | DIASTOLIC BLOOD PRESSURE: 76 MMHG

## 2022-10-03 DIAGNOSIS — I48.91 ATRIAL FIBRILLATION, UNSPECIFIED TYPE: Primary | ICD-10-CM

## 2022-10-03 PROCEDURE — 4010F ACE/ARB THERAPY RXD/TAKEN: CPT | Mod: CPTII,S$GLB,, | Performed by: PHYSICIAN ASSISTANT

## 2022-10-03 PROCEDURE — 90662 FLU VACCINE - QUADRIVALENT - HIGH DOSE (65+) PRESERVATIVE FREE IM: ICD-10-PCS | Mod: S$GLB,,, | Performed by: PHYSICIAN ASSISTANT

## 2022-10-03 PROCEDURE — 3078F DIAST BP <80 MM HG: CPT | Mod: CPTII,S$GLB,, | Performed by: PHYSICIAN ASSISTANT

## 2022-10-03 PROCEDURE — 93000 POCT EKG 12-LEAD: ICD-10-PCS | Mod: S$GLB,,, | Performed by: PHYSICIAN ASSISTANT

## 2022-10-03 PROCEDURE — 3078F PR MOST RECENT DIASTOLIC BLOOD PRESSURE < 80 MM HG: ICD-10-PCS | Mod: CPTII,S$GLB,, | Performed by: PHYSICIAN ASSISTANT

## 2022-10-03 PROCEDURE — G0008 FLU VACCINE - QUADRIVALENT - HIGH DOSE (65+) PRESERVATIVE FREE IM: ICD-10-PCS | Mod: S$GLB,,, | Performed by: PHYSICIAN ASSISTANT

## 2022-10-03 PROCEDURE — 3008F PR BODY MASS INDEX (BMI) DOCUMENTED: ICD-10-PCS | Mod: CPTII,S$GLB,, | Performed by: PHYSICIAN ASSISTANT

## 2022-10-03 PROCEDURE — G0008 ADMIN INFLUENZA VIRUS VAC: HCPCS | Mod: S$GLB,,, | Performed by: PHYSICIAN ASSISTANT

## 2022-10-03 PROCEDURE — 3061F PR NEG MICROALBUMINURIA RESULT DOCUMENTED/REVIEW: ICD-10-PCS | Mod: CPTII,S$GLB,, | Performed by: PHYSICIAN ASSISTANT

## 2022-10-03 PROCEDURE — 3075F PR MOST RECENT SYSTOLIC BLOOD PRESS GE 130-139MM HG: ICD-10-PCS | Mod: CPTII,S$GLB,, | Performed by: PHYSICIAN ASSISTANT

## 2022-10-03 PROCEDURE — 99214 OFFICE O/P EST MOD 30 MIN: CPT | Mod: 25,S$GLB,, | Performed by: PHYSICIAN ASSISTANT

## 2022-10-03 PROCEDURE — 99214 PR OFFICE/OUTPT VISIT, EST, LEVL IV, 30-39 MIN: ICD-10-PCS | Mod: 25,S$GLB,, | Performed by: PHYSICIAN ASSISTANT

## 2022-10-03 PROCEDURE — 3066F PR DOCUMENTATION OF TREATMENT FOR NEPHROPATHY: ICD-10-PCS | Mod: CPTII,S$GLB,, | Performed by: PHYSICIAN ASSISTANT

## 2022-10-03 PROCEDURE — 90662 IIV NO PRSV INCREASED AG IM: CPT | Mod: S$GLB,,, | Performed by: PHYSICIAN ASSISTANT

## 2022-10-03 PROCEDURE — 3008F BODY MASS INDEX DOCD: CPT | Mod: CPTII,S$GLB,, | Performed by: PHYSICIAN ASSISTANT

## 2022-10-03 PROCEDURE — 3075F SYST BP GE 130 - 139MM HG: CPT | Mod: CPTII,S$GLB,, | Performed by: PHYSICIAN ASSISTANT

## 2022-10-03 PROCEDURE — 93000 ELECTROCARDIOGRAM COMPLETE: CPT | Mod: S$GLB,,, | Performed by: PHYSICIAN ASSISTANT

## 2022-10-03 PROCEDURE — 4010F PR ACE/ARB THEARPY RXD/TAKEN: ICD-10-PCS | Mod: CPTII,S$GLB,, | Performed by: PHYSICIAN ASSISTANT

## 2022-10-03 PROCEDURE — 3061F NEG MICROALBUMINURIA REV: CPT | Mod: CPTII,S$GLB,, | Performed by: PHYSICIAN ASSISTANT

## 2022-10-03 PROCEDURE — 3066F NEPHROPATHY DOC TX: CPT | Mod: CPTII,S$GLB,, | Performed by: PHYSICIAN ASSISTANT

## 2022-10-03 NOTE — PROGRESS NOTES
SUBJECTIVE:    Patient ID: Batool Mensah is a 72 y.o. female.    Chief Complaint: Follow-up (A-fib)    This is a 72-year-old female who presents today for short follow-up. Seen last week and diagnosed with Afib RVR. Rate around 120. Started on low dose BB and added eliquis. She was referred for evaluation with cardiology.  She had originally planned for cataract surgery in a couple weeks but we may be planning on holding off on this until I can get her evaluated with cardiology.      No visits with results within 6 Month(s) from this visit.   Latest known visit with results is:   Telephone on 01/05/2022   Component Date Value Ref Range Status    Glucose 01/05/2022 83  65 - 99 mg/dL Final    BUN 01/05/2022 21  7 - 25 mg/dL Final    Creatinine 01/05/2022 0.79  0.60 - 0.93 mg/dL Final    eGFR if non African American 01/05/2022 75  > OR = 60 mL/min/1.73m2 Final    eGFR if African American 01/05/2022 87  > OR = 60 mL/min/1.73m2 Final    BUN/Creatinine Ratio 01/05/2022 NOT APPLICABLE  6 - 22 (calc) Final    Sodium 01/05/2022 142  135 - 146 mmol/L Final    Potassium 01/05/2022 4.1  3.5 - 5.3 mmol/L Final    Chloride 01/05/2022 101  98 - 110 mmol/L Final    CO2 01/05/2022 33 (H)  20 - 32 mmol/L Final    Calcium 01/05/2022 9.7  8.6 - 10.4 mg/dL Final    Total Protein 01/05/2022 7.1  6.1 - 8.1 g/dL Final    Albumin 01/05/2022 4.6  3.6 - 5.1 g/dL Final    Globulin, Total 01/05/2022 2.5  1.9 - 3.7 g/dL (calc) Final    Albumin/Globulin Ratio 01/05/2022 1.8  1.0 - 2.5 (calc) Final    Total Bilirubin 01/05/2022 0.8  0.2 - 1.2 mg/dL Final    Alkaline Phosphatase 01/05/2022 11 (L)  37 - 153 U/L Final    AST 01/05/2022 25  10 - 35 U/L Final    ALT 01/05/2022 18  6 - 29 U/L Final    WBC 01/05/2022 3.9  3.8 - 10.8 Thousand/uL Final    RBC 01/05/2022 5.41 (H)  3.80 - 5.10 Million/uL Final    Hemoglobin 01/05/2022 16.2 (H)  11.7 - 15.5 g/dL Final    Hematocrit 01/05/2022 48.7 (H)  35.0 - 45.0 % Final    MCV 01/05/2022 90.0  80.0  - 100.0 fL Final    MCH 01/05/2022 29.9  27.0 - 33.0 pg Final    MCHC 01/05/2022 33.3  32.0 - 36.0 g/dL Final    RDW 01/05/2022 12.9  11.0 - 15.0 % Final    Platelets 01/05/2022 196  140 - 400 Thousand/uL Final    MPV 01/05/2022 12.3  7.5 - 12.5 fL Final    Neutrophils, Abs 01/05/2022 1,591  1,500 - 7,800 cells/uL Final    Lymph # 01/05/2022 1,884  850 - 3,900 cells/uL Final    Mono # 01/05/2022 312  200 - 950 cells/uL Final    Eos # 01/05/2022 82  15 - 500 cells/uL Final    Baso # 01/05/2022 31  0 - 200 cells/uL Final    Neutrophils Relative 01/05/2022 40.8  % Final    Lymph % 01/05/2022 48.3  % Final    Mono % 01/05/2022 8.0  % Final    Eosinophil % 01/05/2022 2.1  % Final    Basophil % 01/05/2022 0.8  % Final    Cholesterol 01/05/2022 235 (H)  <200 mg/dL Final    HDL 01/05/2022 98  > OR = 50 mg/dL Final    Triglycerides 01/05/2022 73  <150 mg/dL Final    LDL Cholesterol 01/05/2022 120 (H)  mg/dL (calc) Final    HDL/Cholesterol Ratio 01/05/2022 2.4  <5.0 (calc) Final    Non HDL Chol. (LDL+VLDL) 01/05/2022 137 (H)  <130 mg/dL (calc) Final    TSH w/reflex to FT4 01/05/2022 2.82  0.40 - 4.50 mIU/L Final    Color, UA 01/05/2022 YELLOW  YELLOW Final    Appearance, UA 01/05/2022 CLEAR  CLEAR Final    Specific Gravity, UA 01/05/2022 1.008  1.001 - 1.035 Final    pH, UA 01/05/2022 7.0  5.0 - 8.0 Final    Glucose, UA 01/05/2022 NEGATIVE  NEGATIVE Final    Bilirubin, UA 01/05/2022 NEGATIVE  NEGATIVE Final    Ketones, UA 01/05/2022 NEGATIVE  NEGATIVE Final    Occult Blood UA 01/05/2022 NEGATIVE  NEGATIVE Final    Protein, UA 01/05/2022 NEGATIVE  NEGATIVE Final    Nitrite, UA 01/05/2022 NEGATIVE  NEGATIVE Final    Leukocytes, UA 01/05/2022 TRACE (A)  NEGATIVE Final    WBC Casts, UA 01/05/2022 NONE SEEN  < OR = 5 /HPF Final    RBC Casts, UA 01/05/2022 NONE SEEN  < OR = 2 /HPF Final    Squam Epithel, UA 01/05/2022 NONE SEEN  < OR = 5 /HPF Final    Bacteria, UA 01/05/2022 NONE SEEN  NONE SEEN /HPF Final    Hyaline Casts, UA  2022 NONE SEEN  NONE SEEN /LPF Final    Reflexive Urine Culture 2022    Final    Urine Culture, Routine 2022  (A)   Final    Creatinine, Urine 2022 42  20 - 275 mg/dL Final    Microalb, Ur 2022 0.3  See Note: mg/dL Final    Microalb/Creat Ratio 2022 7  <30 mcg/mg creat Final       Past Medical History:   Diagnosis Date    Hypertension      Past Surgical History:   Procedure Laterality Date    BREAST BIOPSY Left     BREAST LUMPECTOMY       SECTION      X2    COLONOSCOPY      Dr. Parker -RTC 10 years    Gastric sleeve      Dr Hernandez- hiatal hernia repair    LAPAROSCOPIC APPENDECTOMY N/A 10/19/2020    Procedure: APPENDECTOMY, LAPAROSCOPIC;  Surgeon: Konrad Almonte III, MD;  Location: [facility];  Service: General;  Laterality: N/A;     History reviewed. No pertinent family history.    Marital Status:   Alcohol History:  has no history on file for alcohol use.  Tobacco History:  reports that she has never smoked. She has never used smokeless tobacco.  Drug History:  has no history on file for drug use.    Review of patient's allergies indicates:   Allergen Reactions    Penicillins Hives       Current Outpatient Medications:     alendronate (FOSAMAX) 70 MG tablet, Take 1 tablet (70 mg total) by mouth every 7 days. Take on empty stomach with full glass of water-do not eat or lie down for 1 hour For osteoporosis, Disp: 12 tablet, Rfl: 3    apixaban (ELIQUIS) 5 mg Tab, Take 1 tablet (5 mg total) by mouth 2 (two) times daily., Disp: 60 tablet, Rfl: 2    calcium-vitamin D3 (OS-KRYS 500 + D3) 500 mg-5 mcg (200 unit) per tablet, Take 1 tablet by mouth 2 (two) times daily with meals., Disp: , Rfl:     losartan (COZAAR) 50 MG tablet, Take 1 tablet (50 mg total) by mouth once daily., Disp: 90 tablet, Rfl: 1    metoprolol succinate (TOPROL-XL) 25 MG 24 hr tablet, Take 1 tablet (25 mg total) by mouth once daily., Disp: 30 tablet, Rfl: 2    ofloxacin (OCUFLOX) 0.3 % ophthalmic  "solution, Place 1 drop into the left eye 4 (four) times daily., Disp: , Rfl:     prednisoLONE acetate (PRED FORTE) 1 % DrpS, Place 1 drop into both eyes 3 (three) times daily., Disp: , Rfl:     Review of Systems   Constitutional:  Negative for appetite change, chills, fatigue, fever and unexpected weight change.   HENT:  Negative for congestion.    Respiratory:  Negative for cough, chest tightness and shortness of breath.    Cardiovascular:  Negative for chest pain and palpitations.   Gastrointestinal:  Negative for abdominal distention and abdominal pain.   Endocrine: Negative for cold intolerance and heat intolerance.   Genitourinary:  Negative for difficulty urinating and dysuria.   Musculoskeletal:  Negative for arthralgias and back pain.   Neurological:  Negative for dizziness, weakness and headaches.        Objective:      Vitals:    10/03/22 1319   BP: 136/76   Pulse: (!) 56   Weight: 68 kg (150 lb)   Height: 5' 3" (1.6 m)     Physical Exam  Constitutional:       General: She is not in acute distress.     Appearance: She is well-developed.   HENT:      Head: Normocephalic and atraumatic.   Eyes:      Conjunctiva/sclera: Conjunctivae normal.      Pupils: Pupils are equal, round, and reactive to light.   Neck:      Thyroid: No thyromegaly.   Cardiovascular:      Rate and Rhythm: Regular rhythm. Bradycardia present.      Heart sounds: Normal heart sounds.   Pulmonary:      Effort: Pulmonary effort is normal.      Breath sounds: Normal breath sounds.   Abdominal:      General: Bowel sounds are normal. There is no distension.      Palpations: Abdomen is soft.      Tenderness: There is no abdominal tenderness.   Musculoskeletal:         General: Normal range of motion.      Cervical back: Normal range of motion and neck supple.   Skin:     General: Skin is warm and dry.      Findings: No erythema.   Neurological:      Mental Status: She is alert and oriented to person, place, and time.      Cranial Nerves: No " cranial nerve deficit.         Assessment:       1. Atrial fibrillation, unspecified type         Plan:       Atrial fibrillation, unspecified type  Comments:  on BB and eliquis now. completely asx now. EKG now with marked sinus lissy (40bpm) and converted from afib RVR. will still have her see cardiology and discuss next steps. Lower metoprolol 10 12.5mg now. Discussed case with Dr. Dickson. Will reschedule cataract surgery until after clearance from cardiology.  Orders:  -     POCT EKG 12-LEAD (NOT FOR [facility] USE)    Follow up if symptoms worsen or fail to improve, for as scheduled with Dr. Dickson and cardiology.        10/3/2022 Geoff Ritchie PA-C

## 2022-10-04 ENCOUNTER — TELEPHONE (OUTPATIENT)
Dept: CARDIOLOGY | Facility: CLINIC | Age: 72
End: 2022-10-04
Payer: MEDICARE

## 2022-10-04 NOTE — TELEPHONE ENCOUNTER
Spoke to Mrs. Renae told her we are waiting for schedules to be released for November. I printed out the message and will give it to Jillian to reach out when the schedules are released.

## 2022-10-04 NOTE — TELEPHONE ENCOUNTER
----- Message from Mercedes Morataya sent at 10/4/2022 12:12 PM CDT -----  Contact: Pt  Type:  New Appointment Request    Name of Caller:  Pt    When is the first available appointment?  N/A - nothing comes up    Symptoms:  Afib, referred by Geoff Ritchie PA-C    Best Call Back Number:  [phone]    Additional Information:  Please call back.  Thanks.

## 2022-10-11 ENCOUNTER — IMMUNIZATION (OUTPATIENT)
Dept: PRIMARY CARE CLINIC | Facility: CLINIC | Age: 72
End: 2022-10-11
Payer: MEDICARE

## 2022-10-11 DIAGNOSIS — Z23 NEED FOR VACCINATION: Primary | ICD-10-CM

## 2022-10-11 PROCEDURE — 0124A COVID-19, MRNA, LNP-S, BIVALENT BOOSTER, PF, 30 MCG/0.3 ML DOSE: CPT | Mod: S$GLB,,, | Performed by: FAMILY MEDICINE

## 2022-10-11 PROCEDURE — 0124A COVID-19, MRNA, LNP-S, BIVALENT BOOSTER, PF, 30 MCG/0.3 ML DOSE: ICD-10-PCS | Mod: S$GLB,,, | Performed by: FAMILY MEDICINE

## 2022-10-11 PROCEDURE — 91312 COVID-19, MRNA, LNP-S, BIVALENT BOOSTER, PF, 30 MCG/0.3 ML DOSE: ICD-10-PCS | Mod: S$GLB,,, | Performed by: FAMILY MEDICINE

## 2022-10-11 PROCEDURE — 91312 COVID-19, MRNA, LNP-S, BIVALENT BOOSTER, PF, 30 MCG/0.3 ML DOSE: CPT | Mod: S$GLB,,, | Performed by: FAMILY MEDICINE

## 2022-11-01 ENCOUNTER — OFFICE VISIT (OUTPATIENT)
Dept: CARDIOLOGY | Facility: CLINIC | Age: 72
End: 2022-11-01
Payer: MEDICARE

## 2022-11-01 VITALS
SYSTOLIC BLOOD PRESSURE: 159 MMHG | DIASTOLIC BLOOD PRESSURE: 73 MMHG | HEART RATE: 43 BPM | OXYGEN SATURATION: 98 % | BODY MASS INDEX: 26.69 KG/M2 | WEIGHT: 150.69 LBS

## 2022-11-01 DIAGNOSIS — R00.1 SINUS BRADYCARDIA: Primary | ICD-10-CM

## 2022-11-01 DIAGNOSIS — Z82.49 FAMILY HISTORY OF PREMATURE CORONARY ARTERY DISEASE: ICD-10-CM

## 2022-11-01 DIAGNOSIS — E78.2 MIXED HYPERLIPIDEMIA: ICD-10-CM

## 2022-11-01 DIAGNOSIS — I48.91 ATRIAL FIBRILLATION, UNSPECIFIED TYPE: ICD-10-CM

## 2022-11-01 DIAGNOSIS — I10 ESSENTIAL HYPERTENSION: ICD-10-CM

## 2022-11-01 DIAGNOSIS — I48.0 PAROXYSMAL ATRIAL FIBRILLATION: ICD-10-CM

## 2022-11-01 PROCEDURE — 1101F PR PT FALLS ASSESS DOC 0-1 FALLS W/OUT INJ PAST YR: ICD-10-PCS | Mod: CPTII,S$GLB,, | Performed by: INTERNAL MEDICINE

## 2022-11-01 PROCEDURE — 99204 PR OFFICE/OUTPT VISIT, NEW, LEVL IV, 45-59 MIN: ICD-10-PCS | Mod: S$GLB,,, | Performed by: INTERNAL MEDICINE

## 2022-11-01 PROCEDURE — 3008F PR BODY MASS INDEX (BMI) DOCUMENTED: ICD-10-PCS | Mod: CPTII,S$GLB,, | Performed by: INTERNAL MEDICINE

## 2022-11-01 PROCEDURE — 3288F PR FALLS RISK ASSESSMENT DOCUMENTED: ICD-10-PCS | Mod: CPTII,S$GLB,, | Performed by: INTERNAL MEDICINE

## 2022-11-01 PROCEDURE — 99999 PR PBB SHADOW E&M-EST. PATIENT-LVL III: CPT | Mod: PBBFAC,,, | Performed by: INTERNAL MEDICINE

## 2022-11-01 PROCEDURE — 3077F PR MOST RECENT SYSTOLIC BLOOD PRESSURE >= 140 MM HG: ICD-10-PCS | Mod: CPTII,S$GLB,, | Performed by: INTERNAL MEDICINE

## 2022-11-01 PROCEDURE — 3078F PR MOST RECENT DIASTOLIC BLOOD PRESSURE < 80 MM HG: ICD-10-PCS | Mod: CPTII,S$GLB,, | Performed by: INTERNAL MEDICINE

## 2022-11-01 PROCEDURE — 1159F PR MEDICATION LIST DOCUMENTED IN MEDICAL RECORD: ICD-10-PCS | Mod: CPTII,S$GLB,, | Performed by: INTERNAL MEDICINE

## 2022-11-01 PROCEDURE — 1101F PT FALLS ASSESS-DOCD LE1/YR: CPT | Mod: CPTII,S$GLB,, | Performed by: INTERNAL MEDICINE

## 2022-11-01 PROCEDURE — 99999 PR PBB SHADOW E&M-EST. PATIENT-LVL III: ICD-10-PCS | Mod: PBBFAC,,, | Performed by: INTERNAL MEDICINE

## 2022-11-01 PROCEDURE — 3066F NEPHROPATHY DOC TX: CPT | Mod: CPTII,S$GLB,, | Performed by: INTERNAL MEDICINE

## 2022-11-01 PROCEDURE — 3288F FALL RISK ASSESSMENT DOCD: CPT | Mod: CPTII,S$GLB,, | Performed by: INTERNAL MEDICINE

## 2022-11-01 PROCEDURE — 3061F PR NEG MICROALBUMINURIA RESULT DOCUMENTED/REVIEW: ICD-10-PCS | Mod: CPTII,S$GLB,, | Performed by: INTERNAL MEDICINE

## 2022-11-01 PROCEDURE — 93000 ELECTROCARDIOGRAM COMPLETE: CPT | Mod: S$GLB,,, | Performed by: GENERAL PRACTICE

## 2022-11-01 PROCEDURE — 3078F DIAST BP <80 MM HG: CPT | Mod: CPTII,S$GLB,, | Performed by: INTERNAL MEDICINE

## 2022-11-01 PROCEDURE — 93000 EKG 12-LEAD: ICD-10-PCS | Mod: S$GLB,,, | Performed by: GENERAL PRACTICE

## 2022-11-01 PROCEDURE — 3077F SYST BP >= 140 MM HG: CPT | Mod: CPTII,S$GLB,, | Performed by: INTERNAL MEDICINE

## 2022-11-01 PROCEDURE — 3008F BODY MASS INDEX DOCD: CPT | Mod: CPTII,S$GLB,, | Performed by: INTERNAL MEDICINE

## 2022-11-01 PROCEDURE — 4010F PR ACE/ARB THEARPY RXD/TAKEN: ICD-10-PCS | Mod: CPTII,S$GLB,, | Performed by: INTERNAL MEDICINE

## 2022-11-01 PROCEDURE — 99204 OFFICE O/P NEW MOD 45 MIN: CPT | Mod: S$GLB,,, | Performed by: INTERNAL MEDICINE

## 2022-11-01 PROCEDURE — 4010F ACE/ARB THERAPY RXD/TAKEN: CPT | Mod: CPTII,S$GLB,, | Performed by: INTERNAL MEDICINE

## 2022-11-01 PROCEDURE — 3061F NEG MICROALBUMINURIA REV: CPT | Mod: CPTII,S$GLB,, | Performed by: INTERNAL MEDICINE

## 2022-11-01 PROCEDURE — 3066F PR DOCUMENTATION OF TREATMENT FOR NEPHROPATHY: ICD-10-PCS | Mod: CPTII,S$GLB,, | Performed by: INTERNAL MEDICINE

## 2022-11-01 PROCEDURE — 1159F MED LIST DOCD IN RCRD: CPT | Mod: CPTII,S$GLB,, | Performed by: INTERNAL MEDICINE

## 2022-11-01 RX ORDER — ATORVASTATIN CALCIUM 10 MG/1
10 TABLET, FILM COATED ORAL NIGHTLY
Qty: 90 TABLET | Refills: 3 | Status: SHIPPED | OUTPATIENT
Start: 2022-11-01 | End: 2022-12-16 | Stop reason: SDUPTHER

## 2022-11-01 NOTE — PROGRESS NOTES
[facility] - Cardiology    Subjective:     Patient ID:  Batool Mensah is a 72 y.o. female patient here for evaluation Establish Care and Atrial Fibrillation      HPI:  72-year-old female referred for evaluation of atrial fibrillation and preop evaluation.  Patient reports being active at home without any chest pain or shortness of breath.  She was in a primary care's office to get the clearance but developed atrial fibrillation for which she was started on metoprolol and Eliquis.  Her heart rate was reduced on metoprolol for which her dose was cut down to 12.5.  She continues to have bradycardia but does not have any dizziness or lightheadedness.  Her blood pressure is high today but she reports normal blood pressure readings at home.    Review of Systems   All other systems reviewed and are negative.     Past Medical History:   Diagnosis Date    Hypertension        Past Surgical History:   Procedure Laterality Date    BREAST BIOPSY Left     BREAST LUMPECTOMY       SECTION      X2    COLONOSCOPY      Dr. Parker -RTC 10 years    Gastric sleeve  2018    Dr Hernandez- hiatal hernia repair    LAPAROSCOPIC APPENDECTOMY N/A 10/19/2020    Procedure: APPENDECTOMY, LAPAROSCOPIC;  Surgeon: Konrad Almonte III, MD;  Location: [facility];  Service: General;  Laterality: N/A;       No family history on file.    Social History     Socioeconomic History    Marital status:    Tobacco Use    Smoking status: Never    Smokeless tobacco: Never     Social Determinants of Health     Financial Resource Strain: Low Risk     Difficulty of Paying Living Expenses: Not hard at all   Food Insecurity: No Food Insecurity    Worried About Running Out of Food in the Last Year: Never true    Ran Out of Food in the Last Year: Never true   Transportation Needs: No Transportation Needs    Lack of Transportation (Medical): No    Lack of Transportation (Non-Medical): No   Physical Activity: Sufficiently Active    Days of Exercise per Week: 5  days    Minutes of Exercise per Session: 60 min   Stress: No Stress Concern Present    Feeling of Stress : Only a little   Social Connections: Unknown    Frequency of Communication with Friends and Family: More than three times a week    Frequency of Social Gatherings with Friends and Family: More than three times a week    Active Member of Clubs or Organizations: Yes    Attends Club or Organization Meetings: More than 4 times per year    Marital Status:    Housing Stability: Low Risk     Unable to Pay for Housing in the Last Year: No    Number of Places Lived in the Last Year: 1    Unstable Housing in the Last Year: No       Current Outpatient Medications   Medication Sig Dispense Refill    alendronate (FOSAMAX) 70 MG tablet Take 1 tablet (70 mg total) by mouth every 7 days. Take on empty stomach with full glass of water-do not eat or lie down for 1 hour For osteoporosis 12 tablet 3    calcium-vitamin D3 (OS-KRYS 500 + D3) 500 mg-5 mcg (200 unit) per tablet Take 1 tablet by mouth 2 (two) times daily with meals.      losartan (COZAAR) 50 MG tablet Take 1 tablet (50 mg total) by mouth once daily. 90 tablet 1    metoprolol succinate (TOPROL-XL) 25 MG 24 hr tablet Take 1 tablet (25 mg total) by mouth once daily. (Patient taking differently: Take 25 mg by mouth once daily. Pt is taking half dosage 12.5 daily) 30 tablet 2    apixaban (ELIQUIS) 5 mg Tab Take 1 tablet (5 mg total) by mouth 2 (two) times daily. 180 tablet 3    atorvastatin (LIPITOR) 10 MG tablet Take 1 tablet (10 mg total) by mouth every evening. 90 tablet 3    ofloxacin (OCUFLOX) 0.3 % ophthalmic solution Place 1 drop into the left eye 4 (four) times daily.      prednisoLONE acetate (PRED FORTE) 1 % DrpS Place 1 drop into both eyes 3 (three) times daily.       No current facility-administered medications for this visit.       Review of patient's allergies indicates:   Allergen Reactions    Penicillins Hives         Objective:        Vitals:     11/01/22 1407   BP: (!) 159/73   Pulse: (!) 43       Physical Exam  Vitals reviewed.   Constitutional:       Appearance: Normal appearance.   HENT:      Mouth/Throat:      Mouth: Mucous membranes are moist.   Eyes:      Extraocular Movements: Extraocular movements intact.      Pupils: Pupils are equal, round, and reactive to light.   Cardiovascular:      Rate and Rhythm: Regular rhythm. Bradycardia present.      Pulses: Normal pulses.      Heart sounds: Normal heart sounds. No murmur heard.    No gallop.   Pulmonary:      Effort: Pulmonary effort is normal.      Breath sounds: Normal breath sounds.   Abdominal:      General: Bowel sounds are normal.      Palpations: Abdomen is soft.   Musculoskeletal:         General: Normal range of motion.   Skin:     General: Skin is warm and dry.   Neurological:      General: No focal deficit present.      Mental Status: She is alert and oriented to person, place, and time.   Psychiatric:         Mood and Affect: Mood normal.       LIPIDS - LAST 2   Lab Results   Component Value Date    CHOL 235 (H) 01/05/2022    CHOL 196 07/09/2021    HDL 98 01/05/2022    HDL 80 07/09/2021    LDLCALC 120 (H) 01/05/2022    LDLCALC 100 (H) 07/09/2021    TRIG 73 01/05/2022    TRIG 75 07/09/2021    CHOLHDL 2.4 01/05/2022    CHOLHDL 2.5 07/09/2021       CBC - LAST 2  Lab Results   Component Value Date    WBC 3.9 01/05/2022    WBC 3.3 (L) 07/09/2021    RBC 5.41 (H) 01/05/2022    RBC 4.49 07/09/2021    HGB 16.2 (H) 01/05/2022    HGB 13.5 07/09/2021    HCT 48.7 (H) 01/05/2022    HCT 40.9 07/09/2021    MCV 90.0 01/05/2022    MCV 91.1 07/09/2021    MCH 29.9 01/05/2022    MCH 30.1 07/09/2021    MCHC 33.3 01/05/2022    MCHC 33.0 07/09/2021    RDW 12.9 01/05/2022    RDW 13.0 07/09/2021     01/05/2022     07/09/2021    MPV 12.3 01/05/2022    MPV 12.0 07/09/2021    GRAN 2.6 10/21/2020    GRAN 64.1 10/21/2020    LYMPH 1,884 01/05/2022    LYMPH 48.3 01/05/2022    MONO 312 01/05/2022    MONO 8.0  01/05/2022    BASO 31 01/05/2022    BASO 30 07/09/2021    NRBC 0 10/21/2020    NRBC 0 10/20/2020       CHEMISTRY & LIVER FUNCTION - LAST 2  Lab Results   Component Value Date     01/05/2022     07/09/2021    K 4.1 01/05/2022    K 4.5 07/09/2021     01/05/2022     07/09/2021    CO2 33 (H) 01/05/2022    CO2 32 07/09/2021    ANIONGAP 11 10/21/2020    ANIONGAP 10 10/20/2020    BUN 21 01/05/2022    BUN 21 07/09/2021    CREATININE 0.79 01/05/2022    CREATININE 0.65 07/09/2021    GLU 83 01/05/2022    GLU 80 07/09/2021    CALCIUM 9.7 01/05/2022    CALCIUM 9.1 07/09/2021    MG 2.2 10/21/2020    MG 1.9 10/20/2020    ALBUMIN 4.6 01/05/2022    ALBUMIN 4.1 07/09/2021    PROT 7.1 01/05/2022    PROT 6.7 07/09/2021    ALKPHOS 14 (L) 10/21/2020    ALKPHOS 13 (L) 10/20/2020    ALT 18 01/05/2022    ALT 15 07/09/2021    AST 25 01/05/2022    AST 18 07/09/2021    BILITOT 0.8 01/05/2022    BILITOT 0.8 07/09/2021        CARDIAC PROFILE - LAST 2  No results found for: BNP, CPK, CPKMB, LDH, TROPONINI     COAGULATION - LAST 2  No results found for: LABPT, INR, APTT    ENDOCRINE & PSA - LAST 2  Lab Results   Component Value Date    MICROALBUR 0.3 01/05/2022        ECHOCARDIOGRAM RESULTS  No results found for this or any previous visit.      CURRENT/PREVIOUS VISIT EKG  No results found for this or any previous visit.  No valid procedures specified.   No results found for this or any previous visit.    No valid procedures specified.        Assessment:       1. Sinus bradycardia    2. Paroxysmal atrial fibrillation    3. Atrial fibrillation, unspecified type    4. Mixed hyperlipidemia    5. Essential hypertension    6. Family history of premature coronary artery disease             Plan:       Sinus bradycardia  -     IN OFFICE EKG 12-LEAD (to [place])  -     Ambulatory referral/consult to Electrophysiology; Future; Expected date: 11/08/2022  -     Holter monitor - 48 hour; Future    Paroxysmal atrial fibrillation  -      Ambulatory referral/consult to Electrophysiology; Future; Expected date: 11/08/2022  -     Holter monitor - 48 hour; Future  -     Nuclear Stress - Cardiology Interpreted; Future  -     Echo; Future    Atrial fibrillation, unspecified type  Comments:  EKG reviwed. new onset in past few mos. rate at 119. will start metoprolol succ now. start eliquis twice daily. will get her in with cards asap.   Orders:  -     apixaban (ELIQUIS) 5 mg Tab; Take 1 tablet (5 mg total) by mouth 2 (two) times daily.  Dispense: 180 tablet; Refill: 3    Mixed hyperlipidemia    Essential hypertension    Family history of premature coronary artery disease    Other orders  -     atorvastatin (LIPITOR) 10 MG tablet; Take 1 tablet (10 mg total) by mouth every evening.  Dispense: 90 tablet; Refill: 3  Patient is bradycardia but is asymptomatic, will continue with low-dose metoprolol.  Continue with Eliquis.  Will refer the patient to Dr. Airza for further evaluation given bradycardia and AFib.  Given multiple risk factors, will evaluate further with a stress test.  Will get a treadmill stress test to evaluate for chronotropic incompetence as well.  Will start the patient on low-dose statin given elevated LDL levels and risk factors for coronary artery disease.    Follow up in about 8 weeks (around 12/27/2022).          Mark Rincon MD  [facility] - Cardiology

## 2022-11-04 ENCOUNTER — TELEPHONE (OUTPATIENT)
Dept: CARDIOLOGY | Facility: HOSPITAL | Age: 72
End: 2022-11-04

## 2022-11-04 NOTE — TELEPHONE ENCOUNTER
Patient advised, [facility]   Will need to register on the first floor at the main entrance.   Patient advised that arrival time is 7:00am.  Patient advised that she may be here about 3.5-4 hours, and may want to bring something to occupy their time, as there will be periods of waiting.    Patient advised, may take her medications prior to testing if you need to.  Patient should HOLD Metoprolol. Advised if she needs to eat to take her medications, please keep it light, like toast and juice.    Patient advised to avoid all caffeine 12 hours prior to testing.  This includes decaf tea and coffee.    Will provide peanut butter crackers for a snack after stress test.  If patient would prefer something else, please bring a snack from home.    Wear comfortable clothing.   No lotions, oils, or powders to the upper chest area. May wear deodorant.    No metal jewelry, buttons, or zippers to the upper body.  Patient verbalizes understanding of instructions.

## 2022-11-07 ENCOUNTER — HOSPITAL ENCOUNTER (OUTPATIENT)
Dept: RADIOLOGY | Facility: HOSPITAL | Age: 72
Discharge: HOME | End: 2022-11-07
Attending: INTERNAL MEDICINE
Payer: MEDICARE

## 2022-11-07 ENCOUNTER — HOSPITAL ENCOUNTER (OUTPATIENT)
Dept: CARDIOLOGY | Facility: HOSPITAL | Age: 72
Discharge: HOME | End: 2022-11-07
Attending: INTERNAL MEDICINE
Payer: MEDICARE

## 2022-11-07 VITALS — WEIGHT: 150 LBS | HEIGHT: 63 IN | BODY MASS INDEX: 26.58 KG/M2

## 2022-11-07 DIAGNOSIS — E78.2 MIXED HYPERLIPIDEMIA: Primary | ICD-10-CM

## 2022-11-07 DIAGNOSIS — I48.0 PAROXYSMAL ATRIAL FIBRILLATION: ICD-10-CM

## 2022-11-07 DIAGNOSIS — I48.91 ATRIAL FIBRILLATION, UNSPECIFIED TYPE: ICD-10-CM

## 2022-11-07 PROCEDURE — 93016 CV STRESS TEST SUPVJ ONLY: CPT | Mod: ,,, | Performed by: NURSE PRACTITIONER

## 2022-11-07 PROCEDURE — 93018 NUCLEAR STRESS - CARDIOLOGY INTERPRETED (CUPID ONLY): ICD-10-PCS | Mod: ,,, | Performed by: INTERNAL MEDICINE

## 2022-11-07 PROCEDURE — 93306 TTE W/DOPPLER COMPLETE: CPT | Mod: 26,,, | Performed by: INTERNAL MEDICINE

## 2022-11-07 PROCEDURE — 78452 HT MUSCLE IMAGE SPECT MULT: CPT | Mod: 26,,, | Performed by: INTERNAL MEDICINE

## 2022-11-07 PROCEDURE — 93306 ECHO (CUPID ONLY): ICD-10-PCS | Mod: 26,,, | Performed by: INTERNAL MEDICINE

## 2022-11-07 PROCEDURE — 78452 NUCLEAR STRESS - CARDIOLOGY INTERPRETED (CUPID ONLY): ICD-10-PCS | Mod: 26,,, | Performed by: INTERNAL MEDICINE

## 2022-11-07 PROCEDURE — 93018 CV STRESS TEST I&R ONLY: CPT | Mod: ,,, | Performed by: INTERNAL MEDICINE

## 2022-11-07 PROCEDURE — A9502 TC99M TETROFOSMIN: HCPCS

## 2022-11-07 PROCEDURE — 93306 TTE W/DOPPLER COMPLETE: CPT

## 2022-11-07 PROCEDURE — 93016 NUCLEAR STRESS - CARDIOLOGY INTERPRETED (CUPID ONLY): ICD-10-PCS | Mod: ,,, | Performed by: NURSE PRACTITIONER

## 2022-11-07 RX ORDER — METOPROLOL SUCCINATE 25 MG/1
25 TABLET, EXTENDED RELEASE ORAL DAILY
COMMUNITY
End: 2022-11-07 | Stop reason: SDUPTHER

## 2022-11-08 ENCOUNTER — LAB VISIT (OUTPATIENT)
Dept: LAB | Facility: HOSPITAL | Age: 72
End: 2022-11-08
Attending: INTERNAL MEDICINE
Payer: MEDICARE

## 2022-11-08 DIAGNOSIS — E78.2 MIXED HYPERLIPIDEMIA: ICD-10-CM

## 2022-11-08 LAB
CHOLEST SERPL-MCNC: 196 MG/DL (ref 120–199)
CHOLEST/HDLC SERPL: 2.3 {RATIO} (ref 2–5)
HDLC SERPL-MCNC: 85 MG/DL (ref 40–75)
HDLC SERPL: 43.4 % (ref 20–50)
LDLC SERPL CALC-MCNC: 101.6 MG/DL (ref 63–159)
NONHDLC SERPL-MCNC: 111 MG/DL
TRIGL SERPL-MCNC: 47 MG/DL (ref 30–150)

## 2022-11-08 PROCEDURE — 80061 LIPID PANEL: CPT | Performed by: INTERNAL MEDICINE

## 2022-11-08 PROCEDURE — 36415 COLL VENOUS BLD VENIPUNCTURE: CPT | Performed by: INTERNAL MEDICINE

## 2022-11-10 RX ORDER — METOPROLOL SUCCINATE 25 MG/1
25 TABLET, EXTENDED RELEASE ORAL DAILY
Qty: 90 TABLET | Refills: 3 | Status: SHIPPED | OUTPATIENT
Start: 2022-11-10 | End: 2022-11-30

## 2022-11-15 LAB
CV STRESS BASE HR: 47 BPM
DIASTOLIC BLOOD PRESSURE: 72 MMHG
EJECTION FRACTION- HIGH: 65 %
END DIASTOLIC INDEX-HIGH: 153 ML/M2
END DIASTOLIC INDEX-LOW: 93 ML/M2
END SYSTOLIC INDEX-HIGH: 71 ML/M2
END SYSTOLIC INDEX-LOW: 31 ML/M2
NUC STRESS DIASTOLIC VOLUME INDEX: 91
NUC STRESS EJECTION FRACTION: 78 %
NUC STRESS SYSTOLIC VOLUME INDEX: 20
OHS CV CPX 1 MINUTE RECOVERY HEART RATE: 120 BPM
OHS CV CPX 85 PERCENT MAX PREDICTED HEART RATE MALE: 121
OHS CV CPX ESTIMATED METS: 7
OHS CV CPX MAX PREDICTED HEART RATE: 143
OHS CV CPX PATIENT IS FEMALE: 1
OHS CV CPX PATIENT IS MALE: 0
OHS CV CPX PEAK DIASTOLIC BLOOD PRESSURE: 80 MMHG
OHS CV CPX PEAK HEAR RATE: 132 BPM
OHS CV CPX PEAK RATE PRESSURE PRODUCT: NORMAL
OHS CV CPX PEAK SYSTOLIC BLOOD PRESSURE: 152 MMHG
OHS CV CPX PERCENT MAX PREDICTED HEART RATE ACHIEVED: 93
OHS CV CPX RATE PRESSURE PRODUCT PRESENTING: 6298
RETIRED EF AND QEF - SEE NOTES: 53 %
STRESS ECHO POST EXERCISE DUR MIN: 4 MINUTES
STRESS ECHO POST EXERCISE DUR SEC: 56 SECONDS
SYSTOLIC BLOOD PRESSURE: 134 MMHG

## 2022-11-16 LAB
AORTIC ROOT ANNULUS: 3.1 CM
AORTIC VALVE CUSP SEPERATION: 1.9 CM
AV INDEX (PROSTH): 0.81
AV MEAN GRADIENT: 3 MMHG
AV PEAK GRADIENT: 7 MMHG
AV VALVE AREA: 2.81 CM2
AV VELOCITY RATIO: 0.7
BSA FOR ECHO PROCEDURE: 1.74 M2
CV ECHO LV RWT: 0.33 CM
DOP CALC AO PEAK VEL: 1.31 M/S
DOP CALC AO VTI: 33.1 CM
DOP CALC LVOT AREA: 3.5 CM2
DOP CALC LVOT DIAMETER: 2.1 CM
DOP CALC LVOT PEAK VEL: 0.92 M/S
DOP CALC LVOT STROKE VOLUME: 93.12 CM3
DOP CALCLVOT PEAK VEL VTI: 26.9 CM
E WAVE DECELERATION TIME: 243 MSEC
E/A RATIO: 1.25
E/E' RATIO: 9.43 M/S
ECHO LV POSTERIOR WALL: 0.8 CM (ref 0.6–1.1)
EJECTION FRACTION: 65 %
FRACTIONAL SHORTENING: 35 % (ref 28–44)
INTERVENTRICULAR SEPTUM: 0.92 CM (ref 0.6–1.1)
IVRT: 74 MSEC
LA MAJOR: 3.5 CM
LEFT ATRIUM SIZE: 3.5 CM
LEFT INTERNAL DIMENSION IN SYSTOLE: 3.12 CM (ref 2.1–4)
LEFT VENTRICLE DIASTOLIC VOLUME INDEX: 63.18 ML/M2
LEFT VENTRICLE DIASTOLIC VOLUME: 108.04 ML
LEFT VENTRICLE MASS INDEX: 82 G/M2
LEFT VENTRICLE SYSTOLIC VOLUME INDEX: 22.5 ML/M2
LEFT VENTRICLE SYSTOLIC VOLUME: 38.51 ML
LEFT VENTRICULAR INTERNAL DIMENSION IN DIASTOLE: 4.81 CM (ref 3.5–6)
LEFT VENTRICULAR MASS: 139.68 G
LV LATERAL E/E' RATIO: 9 M/S
LV SEPTAL E/E' RATIO: 9.9 M/S
LVOT MG: 2 MMHG
LVOT MV: 0.58 CM/S
MV PEAK A VEL: 0.79 M/S
MV PEAK E VEL: 0.99 M/S
MV STENOSIS PRESSURE HALF TIME: 60 MS
MV VALVE AREA P 1/2 METHOD: 3.67 CM2
PISA TR MAX VEL: 2.21 M/S
RA PRESSURE: 3 MMHG
RIGHT VENTRICULAR END-DIASTOLIC DIMENSION: 2.31 CM
TDI LATERAL: 0.11 M/S
TDI SEPTAL: 0.1 M/S
TDI: 0.11 M/S
TR MAX PG: 20 MMHG
TV REST PULMONARY ARTERY PRESSURE: 23 MMHG

## 2022-11-30 ENCOUNTER — OFFICE VISIT (OUTPATIENT)
Dept: CARDIOLOGY | Facility: CLINIC | Age: 72
End: 2022-11-30
Payer: MEDICARE

## 2022-11-30 VITALS
DIASTOLIC BLOOD PRESSURE: 70 MMHG | HEIGHT: 63 IN | OXYGEN SATURATION: 99 % | HEART RATE: 53 BPM | WEIGHT: 150 LBS | BODY MASS INDEX: 26.58 KG/M2 | SYSTOLIC BLOOD PRESSURE: 120 MMHG | RESPIRATION RATE: 16 BRPM

## 2022-11-30 DIAGNOSIS — E78.2 MIXED HYPERLIPIDEMIA: ICD-10-CM

## 2022-11-30 DIAGNOSIS — R00.1 SINUS BRADYCARDIA: ICD-10-CM

## 2022-11-30 DIAGNOSIS — I10 ESSENTIAL HYPERTENSION: ICD-10-CM

## 2022-11-30 DIAGNOSIS — I48.0 PAROXYSMAL ATRIAL FIBRILLATION: Primary | ICD-10-CM

## 2022-11-30 PROCEDURE — 3008F PR BODY MASS INDEX (BMI) DOCUMENTED: ICD-10-PCS | Mod: CPTII,S$GLB,, | Performed by: INTERNAL MEDICINE

## 2022-11-30 PROCEDURE — 1159F PR MEDICATION LIST DOCUMENTED IN MEDICAL RECORD: ICD-10-PCS | Mod: CPTII,S$GLB,, | Performed by: INTERNAL MEDICINE

## 2022-11-30 PROCEDURE — 3066F PR DOCUMENTATION OF TREATMENT FOR NEPHROPATHY: ICD-10-PCS | Mod: CPTII,S$GLB,, | Performed by: INTERNAL MEDICINE

## 2022-11-30 PROCEDURE — 3078F PR MOST RECENT DIASTOLIC BLOOD PRESSURE < 80 MM HG: ICD-10-PCS | Mod: CPTII,S$GLB,, | Performed by: INTERNAL MEDICINE

## 2022-11-30 PROCEDURE — 1101F PR PT FALLS ASSESS DOC 0-1 FALLS W/OUT INJ PAST YR: ICD-10-PCS | Mod: CPTII,S$GLB,, | Performed by: INTERNAL MEDICINE

## 2022-11-30 PROCEDURE — 3008F BODY MASS INDEX DOCD: CPT | Mod: CPTII,S$GLB,, | Performed by: INTERNAL MEDICINE

## 2022-11-30 PROCEDURE — 4010F ACE/ARB THERAPY RXD/TAKEN: CPT | Mod: CPTII,S$GLB,, | Performed by: INTERNAL MEDICINE

## 2022-11-30 PROCEDURE — 3074F SYST BP LT 130 MM HG: CPT | Mod: CPTII,S$GLB,, | Performed by: INTERNAL MEDICINE

## 2022-11-30 PROCEDURE — 99205 PR OFFICE/OUTPT VISIT, NEW, LEVL V, 60-74 MIN: ICD-10-PCS | Mod: S$GLB,,, | Performed by: INTERNAL MEDICINE

## 2022-11-30 PROCEDURE — 3066F NEPHROPATHY DOC TX: CPT | Mod: CPTII,S$GLB,, | Performed by: INTERNAL MEDICINE

## 2022-11-30 PROCEDURE — 3074F PR MOST RECENT SYSTOLIC BLOOD PRESSURE < 130 MM HG: ICD-10-PCS | Mod: CPTII,S$GLB,, | Performed by: INTERNAL MEDICINE

## 2022-11-30 PROCEDURE — 3061F PR NEG MICROALBUMINURIA RESULT DOCUMENTED/REVIEW: ICD-10-PCS | Mod: CPTII,S$GLB,, | Performed by: INTERNAL MEDICINE

## 2022-11-30 PROCEDURE — 1101F PT FALLS ASSESS-DOCD LE1/YR: CPT | Mod: CPTII,S$GLB,, | Performed by: INTERNAL MEDICINE

## 2022-11-30 PROCEDURE — 1126F AMNT PAIN NOTED NONE PRSNT: CPT | Mod: CPTII,S$GLB,, | Performed by: INTERNAL MEDICINE

## 2022-11-30 PROCEDURE — 3061F NEG MICROALBUMINURIA REV: CPT | Mod: CPTII,S$GLB,, | Performed by: INTERNAL MEDICINE

## 2022-11-30 PROCEDURE — 4010F PR ACE/ARB THEARPY RXD/TAKEN: ICD-10-PCS | Mod: CPTII,S$GLB,, | Performed by: INTERNAL MEDICINE

## 2022-11-30 PROCEDURE — 3288F FALL RISK ASSESSMENT DOCD: CPT | Mod: CPTII,S$GLB,, | Performed by: INTERNAL MEDICINE

## 2022-11-30 PROCEDURE — 3288F PR FALLS RISK ASSESSMENT DOCUMENTED: ICD-10-PCS | Mod: CPTII,S$GLB,, | Performed by: INTERNAL MEDICINE

## 2022-11-30 PROCEDURE — 99205 OFFICE O/P NEW HI 60 MIN: CPT | Mod: S$GLB,,, | Performed by: INTERNAL MEDICINE

## 2022-11-30 PROCEDURE — 1126F PR PAIN SEVERITY QUANTIFIED, NO PAIN PRESENT: ICD-10-PCS | Mod: CPTII,S$GLB,, | Performed by: INTERNAL MEDICINE

## 2022-11-30 PROCEDURE — 1159F MED LIST DOCD IN RCRD: CPT | Mod: CPTII,S$GLB,, | Performed by: INTERNAL MEDICINE

## 2022-11-30 PROCEDURE — 3078F DIAST BP <80 MM HG: CPT | Mod: CPTII,S$GLB,, | Performed by: INTERNAL MEDICINE

## 2022-11-30 RX ORDER — METOPROLOL SUCCINATE 25 MG/1
12.5 TABLET, EXTENDED RELEASE ORAL DAILY
Qty: 90 TABLET | Refills: 3 | Status: SHIPPED | OUTPATIENT
Start: 2022-11-30 | End: 2023-12-19

## 2022-11-30 NOTE — PROGRESS NOTES
Subjective:     HPI    I had the pleasure of seeing Batool Mensah in consultation at your request for the evaluation of AF. She is a 72F with HTN and HLD, who was well until 9/2022 when she was incidentally noted to be in AF at 119 bpm (ECG scanned in EMR). She was started on Toprol and eliquis at the time. At a follow-up visit she was found to be bradycardic, so her toprol dose was reduced to 12.5 mg once daily. She was then seen by cardiology, where her resting HR was 43 bpm. She was asymptomatic. She presents to discuss management options.    Echo in 11/2022 showed EF 65%. Exercise NST in 11/2022 showed no obvious perfusion defects. A 48 hour Holter done in 11/2022 showed sinus rhythm with rates of 32 bpm to 141 bpm (mean rate 58 bpm), nonsustained AT, no sustained arrhythmias.    Pulse is 52 bpm in the office today.    Review of Systems   Constitutional: Negative for decreased appetite, malaise/fatigue, weight gain and weight loss.   HENT:  Negative for sore throat.    Eyes:  Negative for blurred vision.   Cardiovascular:  Negative for chest pain, dyspnea on exertion, irregular heartbeat, leg swelling, near-syncope, orthopnea, palpitations, paroxysmal nocturnal dyspnea and syncope.   Respiratory:  Negative for shortness of breath.    Skin:  Negative for rash.   Musculoskeletal:  Negative for arthritis.   Gastrointestinal:  Negative for abdominal pain.   Neurological:  Negative for focal weakness.   Psychiatric/Behavioral:  Negative for altered mental status.       Objective:    Physical Exam  Constitutional:       General: She is not in acute distress.     Appearance: She is well-developed.   HENT:      Head: Normocephalic and atraumatic.   Eyes:      General: No scleral icterus.     Conjunctiva/sclera: Conjunctivae normal.      Pupils: Pupils are equal, round, and reactive to light.   Neck:      Thyroid: No thyromegaly.      Vascular: No JVD.   Cardiovascular:      Rate and Rhythm: Regular rhythm.  Bradycardia present.      Pulses: Intact distal pulses.      Heart sounds: Normal heart sounds. No murmur heard.    No friction rub. No gallop.   Pulmonary:      Effort: Pulmonary effort is normal. No respiratory distress.      Breath sounds: Normal breath sounds.   Abdominal:      General: Bowel sounds are normal. There is no distension.      Palpations: Abdomen is soft.   Musculoskeletal:      Cervical back: Normal range of motion and neck supple.   Skin:     General: Skin is warm and dry.   Neurological:      Mental Status: She is alert and oriented to person, place, and time.   Psychiatric:         Behavior: Behavior normal.         Assessment:       1. Paroxysmal atrial fibrillation    2. Essential hypertension    3. Mixed hyperlipidemia         Plan:       In summary, Batool Mensah is a 72F with asymptomatic AF. Her [id number] Score is 3 (HTN, female sex, age) which corresponds to a yearly risk of stroke without warfarin treatment estimated at 3.2%.    At this point it is unclear what Ms. Mensah's AF burden is. Plan is for a 2 week Zio monitor. I will call her with the results. Will possibly start flecainide 50 mg bid if AF burden is high.    She will see me in 3 months.

## 2022-12-12 ENCOUNTER — TELEPHONE (OUTPATIENT)
Dept: FAMILY MEDICINE | Facility: CLINIC | Age: 72
End: 2022-12-12

## 2022-12-16 ENCOUNTER — OFFICE VISIT (OUTPATIENT)
Dept: CARDIOLOGY | Facility: CLINIC | Age: 72
End: 2022-12-16
Payer: MEDICARE

## 2022-12-16 VITALS
SYSTOLIC BLOOD PRESSURE: 124 MMHG | BODY MASS INDEX: 27.42 KG/M2 | WEIGHT: 154.75 LBS | DIASTOLIC BLOOD PRESSURE: 68 MMHG | HEART RATE: 69 BPM | HEIGHT: 63 IN | OXYGEN SATURATION: 98 %

## 2022-12-16 DIAGNOSIS — Z91.89 10 YEAR RISK OF MI OR STROKE 7.5% OR GREATER: Primary | ICD-10-CM

## 2022-12-16 DIAGNOSIS — I49.1 PREMATURE ATRIAL CONTRACTIONS: ICD-10-CM

## 2022-12-16 DIAGNOSIS — E78.2 MIXED HYPERLIPIDEMIA: ICD-10-CM

## 2022-12-16 DIAGNOSIS — I10 ESSENTIAL HYPERTENSION: ICD-10-CM

## 2022-12-16 DIAGNOSIS — I48.0 PAROXYSMAL ATRIAL FIBRILLATION: ICD-10-CM

## 2022-12-16 PROCEDURE — 99999 PR PBB SHADOW E&M-EST. PATIENT-LVL III: CPT | Mod: PBBFAC,,, | Performed by: INTERNAL MEDICINE

## 2022-12-16 PROCEDURE — 1101F PR PT FALLS ASSESS DOC 0-1 FALLS W/OUT INJ PAST YR: ICD-10-PCS | Mod: CPTII,S$GLB,, | Performed by: INTERNAL MEDICINE

## 2022-12-16 PROCEDURE — 3078F DIAST BP <80 MM HG: CPT | Mod: CPTII,S$GLB,, | Performed by: INTERNAL MEDICINE

## 2022-12-16 PROCEDURE — 3288F FALL RISK ASSESSMENT DOCD: CPT | Mod: CPTII,S$GLB,, | Performed by: INTERNAL MEDICINE

## 2022-12-16 PROCEDURE — 3066F PR DOCUMENTATION OF TREATMENT FOR NEPHROPATHY: ICD-10-PCS | Mod: CPTII,S$GLB,, | Performed by: INTERNAL MEDICINE

## 2022-12-16 PROCEDURE — 3288F PR FALLS RISK ASSESSMENT DOCUMENTED: ICD-10-PCS | Mod: CPTII,S$GLB,, | Performed by: INTERNAL MEDICINE

## 2022-12-16 PROCEDURE — 4010F PR ACE/ARB THEARPY RXD/TAKEN: ICD-10-PCS | Mod: CPTII,S$GLB,, | Performed by: INTERNAL MEDICINE

## 2022-12-16 PROCEDURE — 3061F PR NEG MICROALBUMINURIA RESULT DOCUMENTED/REVIEW: ICD-10-PCS | Mod: CPTII,S$GLB,, | Performed by: INTERNAL MEDICINE

## 2022-12-16 PROCEDURE — 3008F PR BODY MASS INDEX (BMI) DOCUMENTED: ICD-10-PCS | Mod: CPTII,S$GLB,, | Performed by: INTERNAL MEDICINE

## 2022-12-16 PROCEDURE — 3061F NEG MICROALBUMINURIA REV: CPT | Mod: CPTII,S$GLB,, | Performed by: INTERNAL MEDICINE

## 2022-12-16 PROCEDURE — 1126F PR PAIN SEVERITY QUANTIFIED, NO PAIN PRESENT: ICD-10-PCS | Mod: CPTII,S$GLB,, | Performed by: INTERNAL MEDICINE

## 2022-12-16 PROCEDURE — 4010F ACE/ARB THERAPY RXD/TAKEN: CPT | Mod: CPTII,S$GLB,, | Performed by: INTERNAL MEDICINE

## 2022-12-16 PROCEDURE — 3074F PR MOST RECENT SYSTOLIC BLOOD PRESSURE < 130 MM HG: ICD-10-PCS | Mod: CPTII,S$GLB,, | Performed by: INTERNAL MEDICINE

## 2022-12-16 PROCEDURE — 99999 PR PBB SHADOW E&M-EST. PATIENT-LVL III: ICD-10-PCS | Mod: PBBFAC,,, | Performed by: INTERNAL MEDICINE

## 2022-12-16 PROCEDURE — 1101F PT FALLS ASSESS-DOCD LE1/YR: CPT | Mod: CPTII,S$GLB,, | Performed by: INTERNAL MEDICINE

## 2022-12-16 PROCEDURE — 3078F PR MOST RECENT DIASTOLIC BLOOD PRESSURE < 80 MM HG: ICD-10-PCS | Mod: CPTII,S$GLB,, | Performed by: INTERNAL MEDICINE

## 2022-12-16 PROCEDURE — 3008F BODY MASS INDEX DOCD: CPT | Mod: CPTII,S$GLB,, | Performed by: INTERNAL MEDICINE

## 2022-12-16 PROCEDURE — 3066F NEPHROPATHY DOC TX: CPT | Mod: CPTII,S$GLB,, | Performed by: INTERNAL MEDICINE

## 2022-12-16 PROCEDURE — 3074F SYST BP LT 130 MM HG: CPT | Mod: CPTII,S$GLB,, | Performed by: INTERNAL MEDICINE

## 2022-12-16 PROCEDURE — 99214 OFFICE O/P EST MOD 30 MIN: CPT | Mod: S$GLB,,, | Performed by: INTERNAL MEDICINE

## 2022-12-16 PROCEDURE — 1126F AMNT PAIN NOTED NONE PRSNT: CPT | Mod: CPTII,S$GLB,, | Performed by: INTERNAL MEDICINE

## 2022-12-16 PROCEDURE — 99214 PR OFFICE/OUTPT VISIT, EST, LEVL IV, 30-39 MIN: ICD-10-PCS | Mod: S$GLB,,, | Performed by: INTERNAL MEDICINE

## 2022-12-16 PROCEDURE — 1159F MED LIST DOCD IN RCRD: CPT | Mod: CPTII,S$GLB,, | Performed by: INTERNAL MEDICINE

## 2022-12-16 PROCEDURE — 1159F PR MEDICATION LIST DOCUMENTED IN MEDICAL RECORD: ICD-10-PCS | Mod: CPTII,S$GLB,, | Performed by: INTERNAL MEDICINE

## 2022-12-16 RX ORDER — ATORVASTATIN CALCIUM 20 MG/1
20 TABLET, FILM COATED ORAL NIGHTLY
Qty: 90 TABLET | Refills: 3 | Status: SHIPPED | OUTPATIENT
Start: 2022-12-16 | End: 2023-12-04

## 2022-12-16 RX ORDER — LOSARTAN POTASSIUM 50 MG/1
50 TABLET ORAL DAILY
Qty: 90 TABLET | Refills: 3 | Status: SHIPPED | OUTPATIENT
Start: 2022-12-16 | End: 2023-12-15 | Stop reason: SDUPTHER

## 2022-12-16 NOTE — PROGRESS NOTES
[facility] - Cardiology    Subjective:     Patient ID:  Batool Mensah is a 72 y.o. female patient here for evaluation Results (Stress test , Echo , and 48 hour holter)      HPI:  72-year-old female here for follow-up of her testing.  She recently got diagnosed with atrial fibrillation.  Dr. Ariza is evaluating her same with an event monitor.  She had a stress test done.  She did around 7 Mets on it.  No ischemic changes on the EKG.  Large defect on on rest imaging which mostly improves with stress imaging, likely representative of artifact.  Patient continues to be active with water aerobics without any exertional chest pain or shortness of breath.  Echocardiogram showed normal EF.  Holter monitor showed PACs.    Review of Systems   All other systems reviewed and are negative.     Past Medical History:   Diagnosis Date    Hypertension        Past Surgical History:   Procedure Laterality Date    BREAST BIOPSY Left     BREAST LUMPECTOMY       SECTION      X2    COLONOSCOPY      Dr. Parker -RTC 10 years    Gastric sleeve  2018    Dr Hernandez- hiatal hernia repair    LAPAROSCOPIC APPENDECTOMY N/A 10/19/2020    Procedure: APPENDECTOMY, LAPAROSCOPIC;  Surgeon: Konrad Almonte III, MD;  Location: [facility];  Service: General;  Laterality: N/A;       No family history on file.    Social History     Socioeconomic History    Marital status:    Tobacco Use    Smoking status: Never    Smokeless tobacco: Never     Social Determinants of Health     Financial Resource Strain: Low Risk     Difficulty of Paying Living Expenses: Not hard at all   Food Insecurity: No Food Insecurity    Worried About Running Out of Food in the Last Year: Never true    Ran Out of Food in the Last Year: Never true   Transportation Needs: No Transportation Needs    Lack of Transportation (Medical): No    Lack of Transportation (Non-Medical): No   Physical Activity: Sufficiently Active    Days of  Exercise per Week: 5 days    Minutes of Exercise per Session: 60 min   Stress: Stress Concern Present    Feeling of Stress : To some extent   Social Connections: Unknown    Frequency of Communication with Friends and Family: More than three times a week    Frequency of Social Gatherings with Friends and Family: More than three times a week    Active Member of Clubs or Organizations: Yes    Attends Club or Organization Meetings: More than 4 times per year    Marital Status:    Housing Stability: Low Risk     Unable to Pay for Housing in the Last Year: No    Number of Places Lived in the Last Year: 1    Unstable Housing in the Last Year: No       Current Outpatient Medications   Medication Sig Dispense Refill    alendronate (FOSAMAX) 70 MG tablet Take 1 tablet (70 mg total) by mouth every 7 days. Take on empty stomach with full glass of water-do not eat or lie down for 1 hour For osteoporosis 12 tablet 3    apixaban (ELIQUIS) 5 mg Tab Take 1 tablet (5 mg total) by mouth 2 (two) times daily. 180 tablet 3    calcium-vitamin D3 (OS-KRYS 500 + D3) 500 mg-5 mcg (200 unit) per tablet Take 1 tablet by mouth 2 (two) times daily with meals.      metoprolol succinate (TOPROL-XL) 25 MG 24 hr tablet Take 0.5 tablets (12.5 mg total) by mouth once daily. Take 1/2 tablet by mouth daily. 90 tablet 3    ofloxacin (OCUFLOX) 0.3 % ophthalmic solution Place 1 drop into the left eye 4 (four) times daily.      prednisoLONE acetate (PRED FORTE) 1 % DrpS Place 1 drop into both eyes 3 (three) times daily.      atorvastatin (LIPITOR) 20 MG tablet Take 1 tablet (20 mg total) by mouth every evening. 90 tablet 3    losartan (COZAAR) 50 MG tablet Take 1 tablet (50 mg total) by mouth once daily. 90 tablet 3     No current facility-administered medications for this visit.       Review of patient's allergies indicates:   Allergen Reactions    Penicillins Hives         Objective:        Vitals:    12/16/22 1332   BP: 124/68   Pulse: 69        Physical Exam  Vitals reviewed.   Constitutional:       Appearance: Normal appearance.   HENT:      Mouth/Throat:      Mouth: Mucous membranes are moist.   Eyes:      Extraocular Movements: Extraocular movements intact.      Pupils: Pupils are equal, round, and reactive to light.   Cardiovascular:      Rate and Rhythm: Normal rate and regular rhythm.      Pulses: Normal pulses.      Heart sounds: Normal heart sounds. No murmur heard.    No gallop.   Pulmonary:      Effort: Pulmonary effort is normal.      Breath sounds: Normal breath sounds.   Abdominal:      General: Bowel sounds are normal.      Palpations: Abdomen is soft.   Musculoskeletal:         General: Normal range of motion.   Skin:     General: Skin is warm and dry.   Neurological:      General: No focal deficit present.      Mental Status: She is alert and oriented to person, place, and time.   Psychiatric:         Mood and Affect: Mood normal.       LIPIDS - LAST 2   Lab Results   Component Value Date    CHOL 196 11/08/2022    CHOL 235 (H) 01/05/2022    HDL 85 (H) 11/08/2022    HDL 98 01/05/2022    LDLCALC 101.6 11/08/2022    LDLCALC 120 (H) 01/05/2022    TRIG 47 11/08/2022    TRIG 73 01/05/2022    CHOLHDL 43.4 11/08/2022    CHOLHDL 2.4 01/05/2022       CBC - LAST 2  Lab Results   Component Value Date    WBC 3.9 01/05/2022    WBC 3.3 (L) 07/09/2021    RBC 5.41 (H) 01/05/2022    RBC 4.49 07/09/2021    HGB 16.2 (H) 01/05/2022    HGB 13.5 07/09/2021    HCT 48.7 (H) 01/05/2022    HCT 40.9 07/09/2021    MCV 90.0 01/05/2022    MCV 91.1 07/09/2021    MCH 29.9 01/05/2022    MCH 30.1 07/09/2021    MCHC 33.3 01/05/2022    MCHC 33.0 07/09/2021    RDW 12.9 01/05/2022    RDW 13.0 07/09/2021     01/05/2022     07/09/2021    MPV 12.3 01/05/2022    MPV 12.0 07/09/2021    GRAN 2.6 10/21/2020    GRAN 64.1 10/21/2020    LYMPH 1,884 01/05/2022    LYMPH 48.3 01/05/2022    MONO 312 01/05/2022    MONO 8.0 01/05/2022    BASO 31 01/05/2022    BASO 30  07/09/2021    NRBC 0 10/21/2020    NRBC 0 10/20/2020       CHEMISTRY & LIVER FUNCTION - LAST 2  Lab Results   Component Value Date     01/05/2022     07/09/2021    K 4.1 01/05/2022    K 4.5 07/09/2021     01/05/2022     07/09/2021    CO2 33 (H) 01/05/2022    CO2 32 07/09/2021    ANIONGAP 11 10/21/2020    ANIONGAP 10 10/20/2020    BUN 21 01/05/2022    BUN 21 07/09/2021    CREATININE 0.79 01/05/2022    CREATININE 0.65 07/09/2021    GLU 83 01/05/2022    GLU 80 07/09/2021    CALCIUM 9.7 01/05/2022    CALCIUM 9.1 07/09/2021    MG 2.2 10/21/2020    MG 1.9 10/20/2020    ALBUMIN 4.6 01/05/2022    ALBUMIN 4.1 07/09/2021    PROT 7.1 01/05/2022    PROT 6.7 07/09/2021    ALKPHOS 14 (L) 10/21/2020    ALKPHOS 13 (L) 10/20/2020    ALT 18 01/05/2022    ALT 15 07/09/2021    AST 25 01/05/2022    AST 18 07/09/2021    BILITOT 0.8 01/05/2022    BILITOT 0.8 07/09/2021        CARDIAC PROFILE - LAST 2  No results found for: BNP, CPK, CPKMB, LDH, TROPONINI     COAGULATION - LAST 2  No results found for: LABPT, INR, APTT    ENDOCRINE & PSA - LAST 2  Lab Results   Component Value Date    MICROALBUR 0.3 01/05/2022        ECHOCARDIOGRAM RESULTS  Results for orders placed during the hospital encounter of 11/07/22    Echo    Interpretation Summary  · The left ventricle is normal in size with normal systolic function.  · The estimated ejection fraction is 65%.  · Normal left ventricular diastolic function.  · Normal right ventricular size with normal right ventricular systolic function.  · Normal central venous pressure (3 mmHg).  · The estimated PA systolic pressure is 23 mmHg.      CURRENT/PREVIOUS VISIT EKG  Results for orders placed or performed in visit on 11/01/22   IN OFFICE EKG 12-LEAD (to [place])    Collection Time: 11/01/22  2:19 PM    Narrative    Test Reason : R94.31,    Vent. Rate : 043 BPM     Atrial Rate : 043 BPM     P-R Int : 136 ms          QRS Dur : 090 ms      QT Int : 478 ms       P-R-T Axes : 059 248 389  degrees     QTc Int : 403 ms    Marked sinus bradycardia  Abnormal ECG  No previous ECGs available  Confirmed by Cisco PINEDA, Kemal MARINO ([id number]) on 11/11/2022 4:14:31 PM    Referred By: EDIE CHOI           Confirmed By:Kemal Peck MD     No valid procedures specified.   Results for orders placed during the hospital encounter of 11/07/22    Nuclear Stress - Cardiology Interpreted    Interpretation Summary    Likely normal myocardial perfusion imaging.  There is a moderate-sized defect of moderate intensity which is worse at rest and improves with stress, likely breast attenuation artifact.    There are no other significant perfusion abnormalities.    The gated perfusion images showed an ejection fraction of 78% post stress. Normal ejection fraction is greater than 53%.    The EKG portion of this study is negative for ischemia.    The patient reported no chest pain during the stress test.  No evidence of chronotropic incompetence.    During stress, occasional PVCs are noted.    No valid procedures specified.        Assessment:       1. 10 year risk of MI or stroke 7.5% or greater    2. Essential hypertension    3. Mixed hyperlipidemia    4. Paroxysmal atrial fibrillation    5. Premature atrial contractions           Plan:       10 year risk of MI or stroke 7.5% or greater    Essential hypertension  Comments:  BP stable  Orders:  -     losartan (COZAAR) 50 MG tablet; Take 1 tablet (50 mg total) by mouth once daily.  Dispense: 90 tablet; Refill: 3    Mixed hyperlipidemia    Paroxysmal atrial fibrillation    Premature atrial contractions    Other orders  -     atorvastatin (LIPITOR) 20 MG tablet; Take 1 tablet (20 mg total) by mouth every evening.  Dispense: 90 tablet; Refill: 3    Continue management of atrial fibrillation and premature atrial contractions as per Dr. Ariza.  Patient's ASCVD risk score calculated to be around 12%.  Recommend up titrating statin to 20 mg every night.  Goal LDL is around .   Hypertension appears stable, continue current therapy.  Discussed with patient regarding importance of moderate physical exertion 5 days a week and Mediterranean diet for optimal cardiovascular outcomes.  Patient instructed to call us if she develops chest pain or exertional shortness of breath or come to the emergency room.    Follow up in about 1 year (around 12/16/2023) for p afib, HTN, HLD.          Mark Rincon MD  [facility] [place] - Cardiology

## 2022-12-29 ENCOUNTER — PATIENT MESSAGE (OUTPATIENT)
Dept: FAMILY MEDICINE | Facility: CLINIC | Age: 72
End: 2022-12-29

## 2022-12-29 ENCOUNTER — TELEPHONE (OUTPATIENT)
Dept: FAMILY MEDICINE | Facility: CLINIC | Age: 72
End: 2022-12-29

## 2023-01-03 ENCOUNTER — OFFICE VISIT (OUTPATIENT)
Dept: OPHTHALMOLOGY | Facility: CLINIC | Age: 73
End: 2023-01-03
Payer: MEDICARE

## 2023-01-03 DIAGNOSIS — H35.3131 EARLY DRY STAGE NONEXUDATIVE AGE-RELATED MACULAR DEGENERATION OF BOTH EYES: ICD-10-CM

## 2023-01-03 DIAGNOSIS — H25.13 NUCLEAR SCLEROTIC CATARACT, BILATERAL: Primary | ICD-10-CM

## 2023-01-03 PROCEDURE — 1126F AMNT PAIN NOTED NONE PRSNT: CPT | Mod: CPTII,S$GLB,, | Performed by: OPHTHALMOLOGY

## 2023-01-03 PROCEDURE — 3288F FALL RISK ASSESSMENT DOCD: CPT | Mod: CPTII,S$GLB,, | Performed by: OPHTHALMOLOGY

## 2023-01-03 PROCEDURE — 92134 CPTRZ OPH DX IMG PST SGM RTA: CPT | Mod: S$GLB,,, | Performed by: OPHTHALMOLOGY

## 2023-01-03 PROCEDURE — 99204 PR OFFICE/OUTPT VISIT, NEW, LEVL IV, 45-59 MIN: ICD-10-PCS | Mod: S$GLB,,, | Performed by: OPHTHALMOLOGY

## 2023-01-03 PROCEDURE — 99204 OFFICE O/P NEW MOD 45 MIN: CPT | Mod: S$GLB,,, | Performed by: OPHTHALMOLOGY

## 2023-01-03 PROCEDURE — 1159F MED LIST DOCD IN RCRD: CPT | Mod: CPTII,S$GLB,, | Performed by: OPHTHALMOLOGY

## 2023-01-03 PROCEDURE — 1159F PR MEDICATION LIST DOCUMENTED IN MEDICAL RECORD: ICD-10-PCS | Mod: CPTII,S$GLB,, | Performed by: OPHTHALMOLOGY

## 2023-01-03 PROCEDURE — 1160F PR REVIEW ALL MEDS BY PRESCRIBER/CLIN PHARMACIST DOCUMENTED: ICD-10-PCS | Mod: CPTII,S$GLB,, | Performed by: OPHTHALMOLOGY

## 2023-01-03 PROCEDURE — 92134 POSTERIOR SEGMENT OCT RETINA (OCULAR COHERENCE TOMOGRAPHY)-BOTH EYES: ICD-10-PCS | Mod: S$GLB,,, | Performed by: OPHTHALMOLOGY

## 2023-01-03 PROCEDURE — 1126F PR PAIN SEVERITY QUANTIFIED, NO PAIN PRESENT: ICD-10-PCS | Mod: CPTII,S$GLB,, | Performed by: OPHTHALMOLOGY

## 2023-01-03 PROCEDURE — 1101F PR PT FALLS ASSESS DOC 0-1 FALLS W/OUT INJ PAST YR: ICD-10-PCS | Mod: CPTII,S$GLB,, | Performed by: OPHTHALMOLOGY

## 2023-01-03 PROCEDURE — 1101F PT FALLS ASSESS-DOCD LE1/YR: CPT | Mod: CPTII,S$GLB,, | Performed by: OPHTHALMOLOGY

## 2023-01-03 PROCEDURE — 99999 PR PBB SHADOW E&M-EST. PATIENT-LVL III: CPT | Mod: PBBFAC,,, | Performed by: OPHTHALMOLOGY

## 2023-01-03 PROCEDURE — 3288F PR FALLS RISK ASSESSMENT DOCUMENTED: ICD-10-PCS | Mod: CPTII,S$GLB,, | Performed by: OPHTHALMOLOGY

## 2023-01-03 PROCEDURE — 99999 PR PBB SHADOW E&M-EST. PATIENT-LVL III: ICD-10-PCS | Mod: PBBFAC,,, | Performed by: OPHTHALMOLOGY

## 2023-01-03 PROCEDURE — 1160F RVW MEDS BY RX/DR IN RCRD: CPT | Mod: CPTII,S$GLB,, | Performed by: OPHTHALMOLOGY

## 2023-01-03 NOTE — PROGRESS NOTES
HPI    Pt here for CE. Pt states she was scheduled for surgery with Dr. Muhammad in sept. States could not get cleared due to being told she   had afib. States went to cardio and started medication. Wants to start   process over. Recommended by Dr. Yanez. Pt states vison is blurry. OS   worse. Denies pain/ FOL/ floaters.   Last edited by Sunshine Khoury on 1/3/2023  9:39 AM.            Assessment /Plan     For exam results, see Encounter Report.    Nuclear sclerotic cataract, bilateral    Early dry stage nonexudative age-related macular degeneration of both eyes  -     Posterior Segment OCT Retina-Both eyes      1. Nuclear sclerotic cataract, bilateral  Mild to moderate by exam  +BAT  Explained that vision may not have a large improvement after surgery, but glare should be significantly better  Pt aware, still would like to proceed    Schedule CEIOL OS then OD  Monofocal distance IOL    2. Early dry stage nonexudative age-related macular degeneration of both eyes  Vs intermediate  Continue AREDS2 and AG  BCVA may be limited after cataract sx

## 2023-01-03 NOTE — PATIENT INSTRUCTIONS
"What is age-related macular degeneration?     -- Age-related macular degeneration, often called "AMD," is a disease that causes vision loss. It mostly affects your central vision, so that things in the center look blurry. It is most common among people age 65 and older.    There are 2 types of AMD, wet and dry:    ?Dry AMD is the most common type. It affects 85 to 90 percent of people with the condition. It causes gradual vision loss.    ?Wet AMD is less common. It affects 10 to 15 percent of people with the condition. But it moves more quickly and can cause severe vision loss or even blindness.  Some people can start out with dry macular degeneration and then develop the wet type.    What are the symptoms of AMD? -- The symptoms are different depending on what type of AMD you have:    ?People with dry AMD lose their vision slowly. They might notice a problem with one or both eyes when reading or driving. Or they might realize that they now need bright lights or a magnifying glass to see as well as they used to. People with dry AMD sometimes also notice spots that seem blurry.    ?People with wet AMD can have sudden changes in vision. When they first notice symptoms, they might have problems in only one eye. (Later, both eyes usually develop problems.) When people with wet AMD look at straight lines, the lines actually look bent or wavy. For this reason, doctors often test people's vision by asking them to look at a grid of straight lines. People who say the lines look bent could have wet AMD.    Should I see a doctor or nurse? -- If you notice vision loss or any of the other symptoms listed above, see an eye doctor as soon as possible.    Is there a test for AMD? -- Yes. To check for AMD, doctors can use a few different tests:    ?Dilated eye exam - For this test, the doctor gives you eye drops to make your pupils open up. Then, he or she uses a special tool to look at the back of your eye, called the retina. " "That's the part of the eye that gets damaged by AMD. The doctor also looks for clumps called drusen, which form in the eyes of people who have AMD.    ?Fluorescein angiography - This test helps doctors see the blood vessels in the retina. The test is useful because people with wet AMD sometimes develop abnormal blood vessels in the retina.    ?Optical coherence tomography (OCT) - This test creates detailed pictures of the retina. It can show if the retina is damaged.    How is macular degeneration treated? -- Both wet and dry AMD can be treated with a special combination of vitamins and minerals, called the "AREDS formula." For some people, this formula might help to protect the eye from the damage caused by AMD. It is sold without a prescription. But ask your doctor or nurse before you start taking it. Certain vitamins can be harmful if you take them in the wrong amounts.     There are no treatments for dry AMD besides the AREDS formula. But there are other treatments for wet AMD.    Treatments for wet AMD work by destroying abnormal blood vessels in the retina, or by preventing new blood vessels from forming there. That's important, because much of the damage of wet AMD is caused by abnormal blood vessels in the retina. Treatments include:    ?Medicines called VEGF inhibitors - These medicines come in shots that go right into the eye. They help keep new blood vessels from forming.    ?Photodynamic therapy - For this treatment, you get a shot of a medicine that "sticks to" abnormal blood vessels. This medicine becomes toxic when it is exposed to light. After you get the shot, a doctor or nurse shines a special light into your eye. When the light hits the medicine in the blood vessels in the back the eye, the medicine destroys the blood vessels.    Can AMD be prevented? -- You can reduce your chances of getting AMD by not smoking, or by quitting if you already smoke. If you already have AMD and you smoke, quitting " can actually help slow the disease.

## 2023-01-10 LAB
ALBUMIN SERPL-MCNC: 4.5 G/DL (ref 3.6–5.1)
ALBUMIN/GLOB SERPL: 1.9 (CALC) (ref 1–2.5)
ALP SERPL-CCNC: 13 U/L (ref 37–153)
ALT SERPL-CCNC: 22 U/L (ref 6–29)
APPEARANCE UR: CLEAR
AST SERPL-CCNC: 23 U/L (ref 10–35)
BACTERIA #/AREA URNS HPF: NORMAL /HPF
BACTERIA UR CULT: NORMAL
BASOPHILS # BLD AUTO: 39 CELLS/UL (ref 0–200)
BASOPHILS NFR BLD AUTO: 1.3 %
BILIRUB SERPL-MCNC: 0.7 MG/DL (ref 0.2–1.2)
BILIRUB UR QL STRIP: NEGATIVE
BUN SERPL-MCNC: 21 MG/DL (ref 7–25)
BUN/CREAT SERPL: ABNORMAL (CALC) (ref 6–22)
CALCIUM SERPL-MCNC: 9.4 MG/DL (ref 8.6–10.4)
CHLORIDE SERPL-SCNC: 104 MMOL/L (ref 98–110)
CHOLEST SERPL-MCNC: 153 MG/DL
CHOLEST/HDLC SERPL: 1.6 (CALC)
CO2 SERPL-SCNC: 29 MMOL/L (ref 20–32)
COLOR UR: YELLOW
CREAT SERPL-MCNC: 0.79 MG/DL (ref 0.6–1)
EGFR: 79 ML/MIN/1.73M2
EOSINOPHIL # BLD AUTO: 60 CELLS/UL (ref 15–500)
EOSINOPHIL NFR BLD AUTO: 2 %
ERYTHROCYTE [DISTWIDTH] IN BLOOD BY AUTOMATED COUNT: 12.8 % (ref 11–15)
GLOBULIN SER CALC-MCNC: 2.4 G/DL (CALC) (ref 1.9–3.7)
GLUCOSE SERPL-MCNC: 92 MG/DL (ref 65–99)
GLUCOSE UR QL STRIP: NEGATIVE
HCT VFR BLD AUTO: 43.6 % (ref 35–45)
HDLC SERPL-MCNC: 94 MG/DL
HGB BLD-MCNC: 14.3 G/DL (ref 11.7–15.5)
HGB UR QL STRIP: NEGATIVE
HYALINE CASTS #/AREA URNS LPF: NORMAL /LPF
KETONES UR QL STRIP: NEGATIVE
LDLC SERPL CALC-MCNC: 47 MG/DL (CALC)
LEUKOCYTE ESTERASE UR QL STRIP: NEGATIVE
LYMPHOCYTES # BLD AUTO: 1233 CELLS/UL (ref 850–3900)
LYMPHOCYTES NFR BLD AUTO: 41.1 %
MCH RBC QN AUTO: 29.4 PG (ref 27–33)
MCHC RBC AUTO-ENTMCNC: 32.8 G/DL (ref 32–36)
MCV RBC AUTO: 89.7 FL (ref 80–100)
MONOCYTES # BLD AUTO: 327 CELLS/UL (ref 200–950)
MONOCYTES NFR BLD AUTO: 10.9 %
NEUTROPHILS # BLD AUTO: 1341 CELLS/UL (ref 1500–7800)
NEUTROPHILS NFR BLD AUTO: 44.7 %
NITRITE UR QL STRIP: NEGATIVE
NONHDLC SERPL-MCNC: 59 MG/DL (CALC)
PH UR STRIP: 6.5 [PH] (ref 5–8)
PLATELET # BLD AUTO: 197 THOUSAND/UL (ref 140–400)
PMV BLD REES-ECKER: 11.9 FL (ref 7.5–12.5)
POTASSIUM SERPL-SCNC: 4.7 MMOL/L (ref 3.5–5.3)
PROT SERPL-MCNC: 6.9 G/DL (ref 6.1–8.1)
PROT UR QL STRIP: NEGATIVE
RBC # BLD AUTO: 4.86 MILLION/UL (ref 3.8–5.1)
RBC #/AREA URNS HPF: NORMAL /HPF
SERVICE CMNT-IMP: NORMAL
SODIUM SERPL-SCNC: 144 MMOL/L (ref 135–146)
SP GR UR STRIP: 1.01 (ref 1–1.03)
SQUAMOUS #/AREA URNS HPF: NORMAL /HPF
TRIGL SERPL-MCNC: 43 MG/DL
TSH SERPL-ACNC: 2.15 MIU/L (ref 0.4–4.5)
WBC # BLD AUTO: 3 THOUSAND/UL (ref 3.8–10.8)
WBC #/AREA URNS HPF: NORMAL /HPF

## 2023-01-17 ENCOUNTER — OFFICE VISIT (OUTPATIENT)
Dept: FAMILY MEDICINE | Facility: CLINIC | Age: 73
End: 2023-01-17
Payer: MEDICARE

## 2023-01-17 VITALS
DIASTOLIC BLOOD PRESSURE: 70 MMHG | HEIGHT: 63 IN | SYSTOLIC BLOOD PRESSURE: 138 MMHG | BODY MASS INDEX: 26.75 KG/M2 | HEART RATE: 70 BPM | WEIGHT: 151 LBS

## 2023-01-17 DIAGNOSIS — H25.013 CORTICAL AGE-RELATED CATARACT OF BOTH EYES: ICD-10-CM

## 2023-01-17 DIAGNOSIS — I48.0 PAROXYSMAL ATRIAL FIBRILLATION: Primary | ICD-10-CM

## 2023-01-17 DIAGNOSIS — M17.10 PRIMARY LOCALIZED OSTEOARTHRITIS OF KNEE: ICD-10-CM

## 2023-01-17 DIAGNOSIS — D75.1 POLYCYTHEMIA: ICD-10-CM

## 2023-01-17 DIAGNOSIS — R00.1 SINUS BRADYCARDIA: ICD-10-CM

## 2023-01-17 DIAGNOSIS — G47.33 OSA (OBSTRUCTIVE SLEEP APNEA): ICD-10-CM

## 2023-01-17 DIAGNOSIS — I10 ESSENTIAL HYPERTENSION: ICD-10-CM

## 2023-01-17 DIAGNOSIS — M19.042 PRIMARY OSTEOARTHRITIS OF BOTH HANDS: ICD-10-CM

## 2023-01-17 DIAGNOSIS — Z78.0 MENOPAUSE: ICD-10-CM

## 2023-01-17 DIAGNOSIS — M19.041 PRIMARY OSTEOARTHRITIS OF BOTH HANDS: ICD-10-CM

## 2023-01-17 DIAGNOSIS — E78.2 MIXED HYPERLIPIDEMIA: ICD-10-CM

## 2023-01-17 DIAGNOSIS — E78.5 DYSLIPIDEMIA: ICD-10-CM

## 2023-01-17 DIAGNOSIS — Z82.49 FAMILY HISTORY OF PREMATURE CORONARY ARTERY DISEASE: ICD-10-CM

## 2023-01-17 DIAGNOSIS — Z98.84 HISTORY OF BARIATRIC SURGERY: ICD-10-CM

## 2023-01-17 PROCEDURE — 3075F PR MOST RECENT SYSTOLIC BLOOD PRESS GE 130-139MM HG: ICD-10-PCS | Mod: CPTII,S$GLB,, | Performed by: FAMILY MEDICINE

## 2023-01-17 PROCEDURE — 99214 PR OFFICE/OUTPT VISIT, EST, LEVL IV, 30-39 MIN: ICD-10-PCS | Mod: S$GLB,,, | Performed by: FAMILY MEDICINE

## 2023-01-17 PROCEDURE — 3078F DIAST BP <80 MM HG: CPT | Mod: CPTII,S$GLB,, | Performed by: FAMILY MEDICINE

## 2023-01-17 PROCEDURE — 1159F MED LIST DOCD IN RCRD: CPT | Mod: CPTII,S$GLB,, | Performed by: FAMILY MEDICINE

## 2023-01-17 PROCEDURE — 99214 OFFICE O/P EST MOD 30 MIN: CPT | Mod: S$GLB,,, | Performed by: FAMILY MEDICINE

## 2023-01-17 PROCEDURE — 3078F PR MOST RECENT DIASTOLIC BLOOD PRESSURE < 80 MM HG: ICD-10-PCS | Mod: CPTII,S$GLB,, | Performed by: FAMILY MEDICINE

## 2023-01-17 PROCEDURE — 1159F PR MEDICATION LIST DOCUMENTED IN MEDICAL RECORD: ICD-10-PCS | Mod: CPTII,S$GLB,, | Performed by: FAMILY MEDICINE

## 2023-01-17 PROCEDURE — 3008F PR BODY MASS INDEX (BMI) DOCUMENTED: ICD-10-PCS | Mod: CPTII,S$GLB,, | Performed by: FAMILY MEDICINE

## 2023-01-17 PROCEDURE — 3008F BODY MASS INDEX DOCD: CPT | Mod: CPTII,S$GLB,, | Performed by: FAMILY MEDICINE

## 2023-01-17 PROCEDURE — 3075F SYST BP GE 130 - 139MM HG: CPT | Mod: CPTII,S$GLB,, | Performed by: FAMILY MEDICINE

## 2023-01-17 NOTE — PROGRESS NOTES
SUBJECTIVE:    Patient ID: Batool Mensah is a 72 y.o. female.    Chief Complaint: Osteoporosis (No bottles, Lab-results, abc )    72-year-old female here for cataract surgery medical clearance.  Dr. Randolph gutierrez planning bilateral cataract surgery in March of 2023.  She has night vision problems and some drusen.    She is status post gastric sleeve surgery 2018 and has maintained her weight loss well.    Paroxysmal atrial fibrillation, followed by Dr. Marcello Ariza and Dr. Rincon recent cardiac event monitor x2 weeks did show intermittent episodes of atrial fibrillation with tachycardia.  Also some bradycardia present too.  She is currently maintained on Eliquis 5 mg b.i.d. metoprolol b.i.d. she is bruising easily on the Eliquis.    She does water aerobics 5 times a week, walking is not a problem.  She does have some right knee arthritis aches and pains.  Not on NSAIDs at this time.    HUI-not quite compliant with CPAP nasal pillows.      Lab Visit on 11/08/2022   Component Date Value Ref Range Status    Cholesterol 11/08/2022 196  120 - 199 mg/dL Final    Triglycerides 11/08/2022 47  30 - 150 mg/dL Final    HDL 11/08/2022 85 (H)  40 - 75 mg/dL Final    LDL Cholesterol 11/08/2022 101.6  63.0 - 159.0 mg/dL Final    HDL/Cholesterol Ratio 11/08/2022 43.4  20.0 - 50.0 % Final    Total Cholesterol/HDL Ratio 11/08/2022 2.3  2.0 - 5.0 Final    Non-HDL Cholesterol 11/08/2022 111  mg/dL Final   Hospital Outpatient Visit on 11/07/2022   Component Date Value Ref Range Status    Holter length hours 11/07/2022 48   Final    holter length minutes 11/07/2022 0   Final    holter length dec hours 11/07/2022 48.00   Final    Sinus min HR 11/07/2022 32   Final    Sinus max hr 11/07/2022 141   Final    Sinus avg hr 11/07/2022 58   Final    Event Monitor Day 11/07/2022 0   Final   Hospital Outpatient Visit on 11/07/2022   Component Date Value Ref Range Status    AORTIC VALVE CUSP SEPERATION 11/07/2022 1.90  cm Final    AV mean  gradient 11/07/2022 3  mmHg Final    Ao VTI 11/07/2022 33.1  cm Final    Ao peak anuj 11/07/2022 1.31  m/s Final    AV peak gradient 11/07/2022 7  mmHg Final    Ao root annulus 11/07/2022 3.10  cm Final    IVRT 11/07/2022 74.00  msec Final    IVS 11/07/2022 0.92  0.6 - 1.1 cm Final    LVIDd 11/07/2022 4.81  3.5 - 6.0 cm Final    LVIDs 11/07/2022 3.12  2.1 - 4.0 cm Final    LVOT diameter 11/07/2022 2.10  cm Final    LVOT peak VTI 11/07/2022 26.90  cm Final    LVOT peak anuj 11/07/2022 0.92  m/s Final    Posterior Wall 11/07/2022 0.80  0.6 - 1.1 cm Final    Left Atrium Major Axis 11/07/2022 3.50  cm Final    LA size 11/07/2022 3.50  cm Final    E wave deceleration time 11/07/2022 243.00  msec Final    MV Peak A Anuj 11/07/2022 0.79  m/s Final    MV Peak E Anuj 11/07/2022 0.99  m/s Final    RVDD 11/07/2022 2.31  cm Final    Triscuspid Valve Regurgitation Pea* 11/07/2022 20  mmHg Final    BSA 11/07/2022 1.74  m2 Final    TDI SEPTAL 11/07/2022 0.10  m/s Final    LV LATERAL E/E' RATIO 11/07/2022 9.00  m/s Final    LV SEPTAL E/E' RATIO 11/07/2022 9.90  m/s Final    Left Ventricular Outflow Tract Celina* 11/07/2022 0.58  cm/s Final    Left Ventricular Outflow Tract Celina* 11/07/2022 2.00  mmHg Final    TDI LATERAL 11/07/2022 0.11  m/s Final    FS 11/07/2022 35  28 - 44 % Final    LV mass 11/07/2022 139.68  g Final    Left Ventricle Relative Wall Thick* 11/07/2022 0.33  cm Final    AV valve area 11/07/2022 2.81  cm2 Final    AV Velocity Ratio 11/07/2022 0.70   Final    AV index (prosthetic) 11/07/2022 0.81   Final    MV valve area p 1/2 method 11/07/2022 3.67  cm2 Final    E/A ratio 11/07/2022 1.25   Final    Mean e' 11/07/2022 0.11  m/s Final    LVOT area 11/07/2022 3.5  cm2 Final    LVOT stroke volume 11/07/2022 93.12  cm3 Final    E/E' ratio 11/07/2022 9.43  m/s Final    TR Max Anuj 11/07/2022 2.21  m/s Final    MV stenosis pressure 1/2 time 11/07/2022 60.00  ms Final    LV Systolic Volume 11/07/2022 38.51  mL Final    LV Systolic  Volume Index 11/07/2022 22.5  mL/m2 Final    LV Diastolic Volume 11/07/2022 108.04  mL Final    LV Diastolic Volume Index 11/07/2022 63.18  mL/m2 Final    LV Mass Index 11/07/2022 82  g/m2 Final    Right Atrial Pressure (from IVC) 11/07/2022 3  mmHg Final    EF 11/07/2022 65  % Final    TV rest pulmonary artery pressure 11/07/2022 23  mmHg Final   Hospital Outpatient Visit on 11/07/2022   Component Date Value Ref Range Status    85% Max Predicted HR 11/07/2022 121   Final    Max Predicted HR 11/07/2022 143   Final    [facility] CV CPX PATIENT IS MALE 11/07/2022 0.0   Final    [facility] CV CPX PATIENT IS FEMALE 11/07/2022 1.0   Final    EF + QEF 11/07/2022 53  % Final    Ejection Fraction- High Stress 11/07/2022 65  % Final    End diastolic index (mL/m2) 11/07/2022 93  mL/m2 Final    End diastolic index (mL/m2) 11/07/2022 153  mL/m2 Final    End systolic index (mL/m2) 11/07/2022 31  mL/m2 Final    End systolic index (mL/m2) 11/07/2022 71  mL/m2 Final    Nuc Stress EF 11/07/2022 78  % Final    Nuc Rest Diastolic Volume Index 11/07/2022 91   Final    Nuc Rest Systolic Volume Index 11/07/2022 20   Final    Systolic blood pressure 11/07/2022 134  mmHg Final    Diastolic blood pressure 11/07/2022 72  mmHg Final    HR at rest 11/07/2022 47  bpm Final    Exercise duration (min) 11/07/2022 4  minutes Final    Exercise duration (sec) 11/07/2022 56  seconds Final    Peak Systolic BP 11/07/2022 152  mmHg Final    Peak Diatolic BP 11/07/2022 80  mmHg Final    Peak HR 11/07/2022 132  bpm Final    Estimated METs 11/07/2022 7   Final    % Max HR Achieved 11/07/2022 93   Final    1 Minute Recovery HR 11/07/2022 120  bpm Final    RPP 11/07/2022 6,298   Final    Peak RPP 11/07/2022 20,064   Final       Past Medical History:   Diagnosis Date    Hypertension      Social History     Socioeconomic History    Marital status:    Tobacco Use    Smoking status: Never    Smokeless tobacco: Never     Social Determinants of Health     Financial  Resource Strain: Low Risk     Difficulty of Paying Living Expenses: Not hard at all   Food Insecurity: No Food Insecurity    Worried About Running Out of Food in the Last Year: Never true    Ran Out of Food in the Last Year: Never true   Transportation Needs: No Transportation Needs    Lack of Transportation (Medical): No    Lack of Transportation (Non-Medical): No   Physical Activity: Sufficiently Active    Days of Exercise per Week: 5 days    Minutes of Exercise per Session: 60 min   Stress: No Stress Concern Present    Feeling of Stress : Only a little   Social Connections: Unknown    Frequency of Communication with Friends and Family: More than three times a week    Frequency of Social Gatherings with Friends and Family: More than three times a week    Active Member of Clubs or Organizations: Yes    Attends Club or Organization Meetings: More than 4 times per year    Marital Status:    Housing Stability: Low Risk     Unable to Pay for Housing in the Last Year: No    Number of Places Lived in the Last Year: 1    Unstable Housing in the Last Year: No     Past Surgical History:   Procedure Laterality Date    BREAST BIOPSY Left     BREAST LUMPECTOMY       SECTION      X2    COLONOSCOPY  2016    Dr. Parker -RTC 10 years    Gastric sleeve  2018    Dr Heranndez- hiatal hernia repair    LAPAROSCOPIC APPENDECTOMY N/A 10/19/2020    Procedure: APPENDECTOMY, LAPAROSCOPIC;  Surgeon: Konrad Almonte III, MD;  Location: [facility];  Service: General;  Laterality: N/A;     No family history on file.    Review of patient's allergies indicates:   Allergen Reactions    Penicillins Hives       Current Outpatient Medications:     alendronate (FOSAMAX) 70 MG tablet, Take 1 tablet (70 mg total) by mouth every 7 days. Take on empty stomach with full glass of water-do not eat or lie down for 1 hour For osteoporosis, Disp: 12 tablet, Rfl: 3    apixaban (ELIQUIS) 5 mg Tab, Take 1 tablet (5 mg total) by mouth 2 (two) times  daily., Disp: 180 tablet, Rfl: 3    atorvastatin (LIPITOR) 20 MG tablet, Take 1 tablet (20 mg total) by mouth every evening., Disp: 90 tablet, Rfl: 3    calcium-vitamin D3 (OS-KRYS 500 + D3) 500 mg-5 mcg (200 unit) per tablet, Take 1 tablet by mouth 2 (two) times daily with meals., Disp: , Rfl:     losartan (COZAAR) 50 MG tablet, Take 1 tablet (50 mg total) by mouth once daily., Disp: 90 tablet, Rfl: 3    metoprolol succinate (TOPROL-XL) 25 MG 24 hr tablet, Take 0.5 tablets (12.5 mg total) by mouth once daily. Take 1/2 tablet by mouth daily., Disp: 90 tablet, Rfl: 3    ofloxacin (OCUFLOX) 0.3 % ophthalmic solution, Place 1 drop into the left eye 4 (four) times daily., Disp: , Rfl:     prednisoLONE acetate (PRED FORTE) 1 % DrpS, Place 1 drop into both eyes 3 (three) times daily., Disp: , Rfl:     Review of Systems   Constitutional:  Negative for appetite change, chills, fatigue, fever and unexpected weight change.   HENT:  Negative for congestion, ear pain, sinus pain, sore throat and trouble swallowing.    Eyes:  Negative for pain, discharge and visual disturbance.   Respiratory:  Negative for apnea, cough, shortness of breath and wheezing.    Cardiovascular:  Negative for chest pain, palpitations and leg swelling.        Paroxysmal  a fib   Gastrointestinal:  Negative for abdominal pain, blood in stool, constipation, diarrhea, nausea and vomiting.   Endocrine: Negative for heat intolerance, polydipsia and polyuria.   Genitourinary:  Negative for difficulty urinating, dyspareunia, dysuria, frequency, hematuria and menstrual problem.   Musculoskeletal:  Positive for arthralgias (rt knee aches a  bit). Negative for back pain, gait problem, joint swelling and myalgias.   Allergic/Immunologic: Negative for environmental allergies, food allergies and immunocompromised state.   Neurological:  Negative for dizziness, tremors, seizures, numbness and headaches.   Psychiatric/Behavioral:  Negative for behavioral problems,  "confusion, hallucinations and suicidal ideas. The patient is not nervous/anxious.         Objective:      Vitals:    01/17/23 1357   BP: 138/70   Pulse: 70   Weight: 68.5 kg (151 lb)   Height: 5' 3" (1.6 m)     Physical Exam  Vitals and nursing note reviewed.   Constitutional:       General: She is not in acute distress.     Appearance: Normal appearance. She is well-developed. She is not toxic-appearing.   HENT:      Head: Normocephalic and atraumatic.      Right Ear: Tympanic membrane and external ear normal.      Left Ear: Tympanic membrane and external ear normal.      Nose: Nose normal.      Mouth/Throat:      Pharynx: Oropharynx is clear.   Eyes:      Pupils: Pupils are equal, round, and reactive to light.   Neck:      Thyroid: No thyromegaly.      Vascular: No carotid bruit.   Cardiovascular:      Rate and Rhythm: Regular rhythm. Bradycardia present.      Heart sounds: Normal heart sounds. No murmur heard.  Pulmonary:      Effort: Pulmonary effort is normal.      Breath sounds: Normal breath sounds. No wheezing or rales.   Abdominal:      General: Bowel sounds are normal. There is no distension.      Palpations: Abdomen is soft.      Tenderness: There is no abdominal tenderness.   Musculoskeletal:         General: No tenderness or deformity. Normal range of motion.      Cervical back: Normal range of motion and neck supple.      Lumbar back: Normal. No spasms.      Comments: Bends 90 degrees at  waist, shoulders good range of motion, right knee somewhat crepitant.  No pitting edema to lower extremities noted.   Lymphadenopathy:      Cervical: No cervical adenopathy.   Skin:     General: Skin is warm and dry.      Findings: No rash.   Neurological:      Mental Status: She is alert and oriented to person, place, and time.      Cranial Nerves: No cranial nerve deficit.      Motor: No weakness.      Coordination: Coordination normal.      Gait: Gait normal.   Psychiatric:         Mood and Affect: Mood normal.    "      Behavior: Behavior normal.         Thought Content: Thought content normal.         Judgment: Judgment normal.         Assessment:       1. Paroxysmal atrial fibrillation    2. HUI (obstructive sleep apnea)    3. Primary localized osteoarthritis of knee    4. Primary osteoarthritis of both hands    5. History of bariatric surgery    6. Polycythemia    7. Menopause    8. Sinus bradycardia    9. Mixed hyperlipidemia    10. Family history of premature coronary artery disease    11. Essential hypertension    12. Dyslipidemia    13. Cortical age-related cataract of both eyes         Plan:       Paroxysmal atrial fibrillation  Sinus rhythm today, continue Eliquis and metoprolol per Dr. Ariza  HUI (obstructive sleep apnea)  Patient encouraged to use her CPAP every night.  This will help reduce the amount of AFib  Primary localized osteoarthritis of knee  Right knee arthritis managing conservatively  Primary osteoarthritis of both hands    History of bariatric surgery  Gastric sleeve working well  Polycythemia    Menopause    Sinus bradycardia    Mixed hyperlipidemia  Cholesterol 153 HDL 94 TG 43 LDL 47 excellent lipid panel  Family history of premature coronary artery disease    Essential hypertension  Blood pressure well controlled  Dyslipidemia    Cataracts-she is medically cleared for cataract surgery.  Cardiac clearance should come from Dr. Rincon or Annita.  Follow up in about 6 months (around 7/17/2023), or htn   PAF.        1/17/2023 Sage Dickson

## 2023-01-17 NOTE — LETTER
1/17/2023        Name: Batool Mensah  YOB: 1950        To Whom It May Concern:       I saw your patient today for a surgical clearance consult.  It is my medical opinion based on her exam today that she is medically stable and cleared for cataract surgery.     If you have any questions or concerns, please don't hesitate to contact my office.       Sincerely,            Sage Dickson MD

## 2023-03-06 ENCOUNTER — TELEPHONE (OUTPATIENT)
Dept: CARDIOLOGY | Facility: CLINIC | Age: 73
End: 2023-03-06
Payer: MEDICARE

## 2023-03-06 ENCOUNTER — TELEPHONE (OUTPATIENT)
Dept: CARDIOLOGY | Facility: CLINIC | Age: 73
End: 2023-03-06

## 2023-03-06 NOTE — TELEPHONE ENCOUNTER
----- Message from Junie Chow sent at 3/6/2023  1:29 PM CST -----  Contact: patient  Type:  Patient Returning Call    Who Called:  patient  Who Left Message for Patient:  Sujey  Does the patient know what this is regarding?:    Best Call Back Number:  [phone] (home)   Additional Information:

## 2023-03-06 NOTE — TELEPHONE ENCOUNTER
Patient wanted cataract surgery for 3/29 but told her would send message to cardiologist Dr. Rincon. Pt understood. See other encounter for clearance request.

## 2023-03-09 ENCOUNTER — OFFICE VISIT (OUTPATIENT)
Dept: OPHTHALMOLOGY | Facility: CLINIC | Age: 73
End: 2023-03-09
Payer: MEDICARE

## 2023-03-09 DIAGNOSIS — H25.13 NUCLEAR SCLEROTIC CATARACT, BILATERAL: Primary | ICD-10-CM

## 2023-03-09 PROCEDURE — 1159F MED LIST DOCD IN RCRD: CPT | Mod: CPTII,S$GLB,, | Performed by: OPHTHALMOLOGY

## 2023-03-09 PROCEDURE — 92136 IOL MASTER - OS - LEFT EYE: ICD-10-PCS | Mod: LT,S$GLB,, | Performed by: OPHTHALMOLOGY

## 2023-03-09 PROCEDURE — 1101F PR PT FALLS ASSESS DOC 0-1 FALLS W/OUT INJ PAST YR: ICD-10-PCS | Mod: CPTII,S$GLB,, | Performed by: OPHTHALMOLOGY

## 2023-03-09 PROCEDURE — 1160F RVW MEDS BY RX/DR IN RCRD: CPT | Mod: CPTII,S$GLB,, | Performed by: OPHTHALMOLOGY

## 2023-03-09 PROCEDURE — 3288F PR FALLS RISK ASSESSMENT DOCUMENTED: ICD-10-PCS | Mod: CPTII,S$GLB,, | Performed by: OPHTHALMOLOGY

## 2023-03-09 PROCEDURE — 99214 PR OFFICE/OUTPT VISIT, EST, LEVL IV, 30-39 MIN: ICD-10-PCS | Mod: S$GLB,,, | Performed by: OPHTHALMOLOGY

## 2023-03-09 PROCEDURE — 99214 OFFICE O/P EST MOD 30 MIN: CPT | Mod: S$GLB,,, | Performed by: OPHTHALMOLOGY

## 2023-03-09 PROCEDURE — 3288F FALL RISK ASSESSMENT DOCD: CPT | Mod: CPTII,S$GLB,, | Performed by: OPHTHALMOLOGY

## 2023-03-09 PROCEDURE — 1159F PR MEDICATION LIST DOCUMENTED IN MEDICAL RECORD: ICD-10-PCS | Mod: CPTII,S$GLB,, | Performed by: OPHTHALMOLOGY

## 2023-03-09 PROCEDURE — 1101F PT FALLS ASSESS-DOCD LE1/YR: CPT | Mod: CPTII,S$GLB,, | Performed by: OPHTHALMOLOGY

## 2023-03-09 PROCEDURE — 92136 OPHTHALMIC BIOMETRY: CPT | Mod: LT,S$GLB,, | Performed by: OPHTHALMOLOGY

## 2023-03-09 PROCEDURE — 1160F PR REVIEW ALL MEDS BY PRESCRIBER/CLIN PHARMACIST DOCUMENTED: ICD-10-PCS | Mod: CPTII,S$GLB,, | Performed by: OPHTHALMOLOGY

## 2023-03-09 PROCEDURE — 99999 PR PBB SHADOW E&M-EST. PATIENT-LVL III: CPT | Mod: PBBFAC,,, | Performed by: OPHTHALMOLOGY

## 2023-03-09 PROCEDURE — 99999 PR PBB SHADOW E&M-EST. PATIENT-LVL III: ICD-10-PCS | Mod: PBBFAC,,, | Performed by: OPHTHALMOLOGY

## 2023-03-09 RX ORDER — TROPICAMIDE 10 MG/ML
1 SOLUTION/ DROPS OPHTHALMIC
Status: CANCELLED | OUTPATIENT
Start: 2023-03-09

## 2023-03-09 RX ORDER — PHENYLEPHRINE HYDROCHLORIDE 25 MG/ML
1 SOLUTION/ DROPS OPHTHALMIC
Status: CANCELLED | OUTPATIENT
Start: 2023-03-09

## 2023-03-09 RX ORDER — PROPARACAINE HYDROCHLORIDE 5 MG/ML
1 SOLUTION/ DROPS OPHTHALMIC
Status: CANCELLED | OUTPATIENT
Start: 2023-03-09

## 2023-03-09 RX ORDER — SODIUM CHLORIDE 9 MG/ML
INJECTION, SOLUTION INTRAVENOUS CONTINUOUS
Status: CANCELLED | OUTPATIENT
Start: 2023-03-09

## 2023-03-09 RX ORDER — KETOROLAC TROMETHAMINE 5 MG/ML
1 SOLUTION OPHTHALMIC 4 TIMES DAILY
Qty: 5 ML | Refills: 2 | Status: SHIPPED | OUTPATIENT
Start: 2023-03-09 | End: 2023-05-25 | Stop reason: ALTCHOICE

## 2023-03-09 NOTE — PROGRESS NOTES
HPI    Pt presents for pre op PCIOL OU     Complains of blurred vision OU and trouble driving due to bright lights     Last edited by Renetta Hurd on 3/9/2023  2:48 PM.            Assessment /Plan     For exam results, see Encounter Report.    Nuclear sclerotic cataract, bilateral      Patient with visually significant cataract impacting abiliity ADLs (reading, driving, PM driving, glare).  Discussed options, R & B, expectations, patient voices good understanding and wishes to proceed with procedure. Patient will likely benefit from sx and signed consent.    Proceed with CEIOL OS  Monofocal distance IOL  Pt aware that Wet AMD may limit BCVA

## 2023-03-20 NOTE — PROGRESS NOTES
Subjective:     HPI    I had the pleasure of seeing Batool Mensah in follow-up for her history of AF. Last seen in 11/2022. She is a 72F with HTN and HLD, who was well until 9/2022 when she was incidentally noted to be in AF at 119 bpm (ECG scanned in EMR). She was started on Toprol and eliquis at the time. At a follow-up visit she was found to be bradycardic, so her toprol dose was reduced to 12.5 mg once daily. She was then seen by cardiology, where her resting HR was 43 bpm. She was asymptomatic.     Echo in 11/2022 showed EF 65%. Exercise NST in 11/2022 showed no obvious perfusion defects. A 48 hour Holter done in 11/2022 showed sinus rhythm with rates of 32 bpm to 141 bpm (mean rate 58 bpm), nonsustained AT, no sustained arrhythmias.    A Zio monitor in 11/2022 showed sinus rhythm with an average HR of 68 bpm, and a 14% AF burden (average HR in  bpm).    My interpretation of today's ECG is sinus bradycardia at 42 bpm.    Review of Systems   Constitutional: Negative for decreased appetite, malaise/fatigue, weight gain and weight loss.   HENT:  Negative for sore throat.    Eyes:  Negative for blurred vision.   Cardiovascular:  Negative for chest pain, dyspnea on exertion, irregular heartbeat, leg swelling, near-syncope, orthopnea, palpitations, paroxysmal nocturnal dyspnea and syncope.   Respiratory:  Negative for shortness of breath.    Skin:  Negative for rash.   Musculoskeletal:  Negative for arthritis.   Gastrointestinal:  Negative for abdominal pain.   Neurological:  Negative for focal weakness.   Psychiatric/Behavioral:  Negative for altered mental status.       Objective:    Physical Exam  Constitutional:       General: She is not in acute distress.     Appearance: She is well-developed.   HENT:      Head: Normocephalic and atraumatic.   Eyes:      General: No scleral icterus.     Conjunctiva/sclera: Conjunctivae normal.      Pupils: Pupils are equal, round, and reactive to light.   Neck:       Thyroid: No thyromegaly.      Vascular: No JVD.   Cardiovascular:      Rate and Rhythm: Regular rhythm. Bradycardia present.      Pulses: Intact distal pulses.      Heart sounds: Normal heart sounds. No murmur heard.    No friction rub. No gallop.   Pulmonary:      Effort: Pulmonary effort is normal. No respiratory distress.      Breath sounds: Normal breath sounds.   Abdominal:      General: Bowel sounds are normal. There is no distension.      Palpations: Abdomen is soft.   Musculoskeletal:      Cervical back: Normal range of motion and neck supple.   Skin:     General: Skin is warm and dry.   Neurological:      Mental Status: She is alert and oriented to person, place, and time.   Psychiatric:         Behavior: Behavior normal.         Assessment:       1. Paroxysmal atrial fibrillation    2. Essential hypertension    3. Mixed hyperlipidemia         Plan:       In summary, Batool Mensah is a 72F with asymptomatic AF. Her [id number] Score is 3 (HTN, female sex, age) which corresponds to a yearly risk of stroke without warfarin treatment estimated at 3.2%.    Ms. Yang has a 14% AF burden. Unable to start an antiarrhythmic given profound bradycardia. Given evidence of SSS, plan is to implant dual chamber pacemaker. Risks/benefits discussed and she has agreed to proceed. Post-pacemaker plan is to start flecainide 50 mg bid.     Ms. Mensah will think about whether she wants implant locally or by me in [facility]. Will contact her early next week to see what she decides.    Thank you for allowing me to participate in the care of this patient. Please do not hesitate to call me with any questions or concerns.

## 2023-03-22 ENCOUNTER — OFFICE VISIT (OUTPATIENT)
Dept: CARDIOLOGY | Facility: CLINIC | Age: 73
End: 2023-03-22
Payer: MEDICARE

## 2023-03-22 VITALS
HEIGHT: 63 IN | SYSTOLIC BLOOD PRESSURE: 110 MMHG | HEART RATE: 50 BPM | OXYGEN SATURATION: 99 % | RESPIRATION RATE: 16 BRPM | WEIGHT: 160.06 LBS | DIASTOLIC BLOOD PRESSURE: 70 MMHG | BODY MASS INDEX: 28.36 KG/M2

## 2023-03-22 DIAGNOSIS — I10 ESSENTIAL HYPERTENSION: ICD-10-CM

## 2023-03-22 DIAGNOSIS — I48.0 PAROXYSMAL ATRIAL FIBRILLATION: Primary | ICD-10-CM

## 2023-03-22 DIAGNOSIS — E78.2 MIXED HYPERLIPIDEMIA: ICD-10-CM

## 2023-03-22 PROCEDURE — 3078F PR MOST RECENT DIASTOLIC BLOOD PRESSURE < 80 MM HG: ICD-10-PCS | Mod: CPTII,S$GLB,, | Performed by: INTERNAL MEDICINE

## 2023-03-22 PROCEDURE — 1159F PR MEDICATION LIST DOCUMENTED IN MEDICAL RECORD: ICD-10-PCS | Mod: CPTII,S$GLB,, | Performed by: INTERNAL MEDICINE

## 2023-03-22 PROCEDURE — 1159F MED LIST DOCD IN RCRD: CPT | Mod: CPTII,S$GLB,, | Performed by: INTERNAL MEDICINE

## 2023-03-22 PROCEDURE — 99215 PR OFFICE/OUTPT VISIT, EST, LEVL V, 40-54 MIN: ICD-10-PCS | Mod: S$GLB,,, | Performed by: INTERNAL MEDICINE

## 2023-03-22 PROCEDURE — 3008F BODY MASS INDEX DOCD: CPT | Mod: CPTII,S$GLB,, | Performed by: INTERNAL MEDICINE

## 2023-03-22 PROCEDURE — 93010 EKG 12-LEAD: ICD-10-PCS | Mod: S$GLB,,, | Performed by: INTERNAL MEDICINE

## 2023-03-22 PROCEDURE — 1160F RVW MEDS BY RX/DR IN RCRD: CPT | Mod: CPTII,S$GLB,, | Performed by: INTERNAL MEDICINE

## 2023-03-22 PROCEDURE — 99215 OFFICE O/P EST HI 40 MIN: CPT | Mod: S$GLB,,, | Performed by: INTERNAL MEDICINE

## 2023-03-22 PROCEDURE — 93005 EKG 12-LEAD: ICD-10-PCS | Mod: S$GLB,,, | Performed by: INTERNAL MEDICINE

## 2023-03-22 PROCEDURE — 3078F DIAST BP <80 MM HG: CPT | Mod: CPTII,S$GLB,, | Performed by: INTERNAL MEDICINE

## 2023-03-22 PROCEDURE — 1126F PR PAIN SEVERITY QUANTIFIED, NO PAIN PRESENT: ICD-10-PCS | Mod: CPTII,S$GLB,, | Performed by: INTERNAL MEDICINE

## 2023-03-22 PROCEDURE — 93010 ELECTROCARDIOGRAM REPORT: CPT | Mod: S$GLB,,, | Performed by: INTERNAL MEDICINE

## 2023-03-22 PROCEDURE — 3008F PR BODY MASS INDEX (BMI) DOCUMENTED: ICD-10-PCS | Mod: CPTII,S$GLB,, | Performed by: INTERNAL MEDICINE

## 2023-03-22 PROCEDURE — 1160F PR REVIEW ALL MEDS BY PRESCRIBER/CLIN PHARMACIST DOCUMENTED: ICD-10-PCS | Mod: CPTII,S$GLB,, | Performed by: INTERNAL MEDICINE

## 2023-03-22 PROCEDURE — 3074F PR MOST RECENT SYSTOLIC BLOOD PRESSURE < 130 MM HG: ICD-10-PCS | Mod: CPTII,S$GLB,, | Performed by: INTERNAL MEDICINE

## 2023-03-22 PROCEDURE — 1126F AMNT PAIN NOTED NONE PRSNT: CPT | Mod: CPTII,S$GLB,, | Performed by: INTERNAL MEDICINE

## 2023-03-22 PROCEDURE — 3074F SYST BP LT 130 MM HG: CPT | Mod: CPTII,S$GLB,, | Performed by: INTERNAL MEDICINE

## 2023-03-22 PROCEDURE — 93005 ELECTROCARDIOGRAM TRACING: CPT | Mod: S$GLB,,, | Performed by: INTERNAL MEDICINE

## 2023-03-28 ENCOUNTER — ANESTHESIA EVENT (OUTPATIENT)
Dept: SURGERY | Facility: AMBULARY SURGERY CENTER | Age: 73
End: 2023-03-28
Payer: MEDICARE

## 2023-03-29 ENCOUNTER — ANESTHESIA (OUTPATIENT)
Dept: SURGERY | Facility: AMBULARY SURGERY CENTER | Age: 73
End: 2023-03-29
Payer: MEDICARE

## 2023-03-29 ENCOUNTER — HOSPITAL ENCOUNTER (OUTPATIENT)
Facility: AMBULARY SURGERY CENTER | Age: 73
Discharge: HOME | End: 2023-03-29
Attending: OPHTHALMOLOGY | Admitting: OPHTHALMOLOGY
Payer: MEDICARE

## 2023-03-29 DIAGNOSIS — H25.13 NUCLEAR SCLEROTIC CATARACT, BILATERAL: ICD-10-CM

## 2023-03-29 PROCEDURE — D9220A PRA ANESTHESIA: ICD-10-PCS | Mod: CRNA,,, | Performed by: STUDENT IN AN ORGANIZED HEALTH CARE EDUCATION/TRAINING PROGRAM

## 2023-03-29 PROCEDURE — 66984 XCAPSL CTRC RMVL W/O ECP: CPT | Mod: LT | Performed by: OPHTHALMOLOGY

## 2023-03-29 PROCEDURE — D9220A PRA ANESTHESIA: ICD-10-PCS | Mod: ANES,,, | Performed by: ANESTHESIOLOGY

## 2023-03-29 PROCEDURE — 66984 PR REMOVAL, CATARACT, W/INSRT INTRAOC LENS, W/O ENDO CYCLO: ICD-10-PCS | Mod: LT,,, | Performed by: OPHTHALMOLOGY

## 2023-03-29 PROCEDURE — D9220A PRA ANESTHESIA: Mod: CRNA,,, | Performed by: STUDENT IN AN ORGANIZED HEALTH CARE EDUCATION/TRAINING PROGRAM

## 2023-03-29 PROCEDURE — D9220A PRA ANESTHESIA: Mod: ANES,,, | Performed by: ANESTHESIOLOGY

## 2023-03-29 PROCEDURE — 66984 XCAPSL CTRC RMVL W/O ECP: CPT | Mod: LT,,, | Performed by: OPHTHALMOLOGY

## 2023-03-29 DEVICE — TECNIS SMPLCTY TECNIS 1PC CLR MONO 21.5D
Type: IMPLANTABLE DEVICE | Site: EYE | Status: FUNCTIONAL
Brand: TECNIS SIMPLICITY

## 2023-03-29 RX ORDER — SODIUM CHLORIDE 9 MG/ML
INJECTION, SOLUTION INTRAVENOUS CONTINUOUS
Status: DISCONTINUED | OUTPATIENT
Start: 2023-03-29 | End: 2023-03-29 | Stop reason: HOSPADM

## 2023-03-29 RX ORDER — EPINEPHRINE 1 MG/ML
INJECTION, SOLUTION, CONCENTRATE INTRAVENOUS
Status: DISCONTINUED | OUTPATIENT
Start: 2023-03-29 | End: 2023-03-29 | Stop reason: HOSPADM

## 2023-03-29 RX ORDER — PROPARACAINE HYDROCHLORIDE 5 MG/ML
1 SOLUTION/ DROPS OPHTHALMIC
Status: DISCONTINUED | OUTPATIENT
Start: 2023-03-29 | End: 2023-03-29 | Stop reason: HOSPADM

## 2023-03-29 RX ORDER — MIDAZOLAM HYDROCHLORIDE 1 MG/ML
INJECTION INTRAMUSCULAR; INTRAVENOUS
Status: DISCONTINUED | OUTPATIENT
Start: 2023-03-29 | End: 2023-03-29

## 2023-03-29 RX ORDER — TROPICAMIDE 10 MG/ML
1 SOLUTION/ DROPS OPHTHALMIC
Status: DISCONTINUED | OUTPATIENT
Start: 2023-03-29 | End: 2023-03-29 | Stop reason: HOSPADM

## 2023-03-29 RX ORDER — LIDOCAINE HYDROCHLORIDE 40 MG/ML
INJECTION, SOLUTION RETROBULBAR
Status: DISCONTINUED | OUTPATIENT
Start: 2023-03-29 | End: 2023-03-29 | Stop reason: HOSPADM

## 2023-03-29 RX ORDER — ONDANSETRON 2 MG/ML
INJECTION INTRAMUSCULAR; INTRAVENOUS
Status: DISCONTINUED | OUTPATIENT
Start: 2023-03-29 | End: 2023-03-29

## 2023-03-29 RX ORDER — PHENYLEPHRINE HYDROCHLORIDE 25 MG/ML
1 SOLUTION/ DROPS OPHTHALMIC
Status: DISCONTINUED | OUTPATIENT
Start: 2023-03-29 | End: 2023-03-29 | Stop reason: HOSPADM

## 2023-03-29 RX ORDER — EPINEPHRINE 1 MG/ML
INJECTION, SOLUTION, CONCENTRATE INTRAVENOUS
Status: DISPENSED
Start: 2023-03-29 | End: 2023-03-30

## 2023-03-29 RX ORDER — SODIUM CHLORIDE, SODIUM LACTATE, POTASSIUM CHLORIDE, CALCIUM CHLORIDE 600; 310; 30; 20 MG/100ML; MG/100ML; MG/100ML; MG/100ML
INJECTION, SOLUTION INTRAVENOUS CONTINUOUS
Status: ACTIVE | OUTPATIENT
Start: 2023-03-29

## 2023-03-29 RX ORDER — LIDOCAINE HYDROCHLORIDE 10 MG/ML
1 INJECTION, SOLUTION EPIDURAL; INFILTRATION; INTRACAUDAL; PERINEURAL ONCE
Status: DISPENSED | OUTPATIENT
Start: 2023-03-29

## 2023-03-29 RX ORDER — MOXIFLOXACIN 5 MG/ML
SOLUTION/ DROPS OPHTHALMIC
Status: DISCONTINUED | OUTPATIENT
Start: 2023-03-29 | End: 2023-03-29 | Stop reason: HOSPADM

## 2023-03-29 RX ADMIN — PROPARACAINE HYDROCHLORIDE 1 DROP: 5 SOLUTION/ DROPS OPHTHALMIC at 08:03

## 2023-03-29 RX ADMIN — PHENYLEPHRINE HYDROCHLORIDE 1 DROP: 25 SOLUTION/ DROPS OPHTHALMIC at 08:03

## 2023-03-29 RX ADMIN — ONDANSETRON 4 MG: 2 INJECTION INTRAMUSCULAR; INTRAVENOUS at 08:03

## 2023-03-29 RX ADMIN — TROPICAMIDE 1 DROP: 10 SOLUTION/ DROPS OPHTHALMIC at 08:03

## 2023-03-29 RX ADMIN — MIDAZOLAM HYDROCHLORIDE 2 MG: 1 INJECTION INTRAMUSCULAR; INTRAVENOUS at 08:03

## 2023-03-29 RX ADMIN — SODIUM CHLORIDE, SODIUM LACTATE, POTASSIUM CHLORIDE, CALCIUM CHLORIDE: 600; 310; 30; 20 INJECTION, SOLUTION INTRAVENOUS at 08:03

## 2023-03-29 NOTE — TRANSFER OF CARE
"Anesthesia Transfer of Care Note    Patient: Batool Mensah    Procedure(s) Performed: Procedure(s) (LRB):  CEIOL OS (Left)    Patient location: PACU    Anesthesia Type: MAC    Transport from OR: Transported from OR on room air with adequate spontaneous ventilation    Post pain: adequate analgesia    Post assessment: no apparent anesthetic complications and tolerated procedure well    Post vital signs: stable    Level of consciousness: awake, alert and oriented    Nausea/Vomiting: no nausea/vomiting    Complications: none    Transfer of care protocol was followed      Last vitals:   Visit Vitals  BP (!) 182/76 (BP Location: Right arm, Patient Position: Sitting)   Pulse (!) 47   Temp 36.2 °C (97.1 °F) (Skin)   Resp 18   Ht 5' 3" (1.6 m)   Wt 72.6 kg (160 lb)   SpO2 99%   BMI 28.34 kg/m²     "

## 2023-03-29 NOTE — H&P
History    Chief complaint:  Painless progressive vision loss left Eye    Present Ilness/Diagnosis: Visually significant cataract, left Eye    ROS: +Eyes, otherwise no significant changes    Past Medical History: refer to chart    Family History/Social History: refer to chart    Allergies:   Review of patient's allergies indicates:   Allergen Reactions    Penicillins Hives       Current Medications: see medcard      Physical Exam    BP: Vital signs stable  General: No apparent distress  HEENT: cataract  Lungs: adequate respirations  Heart: + pulses  Abdomen: soft  Rectal/pelvic: deferred    Labs: Labs Reviewed    Lab Results   Component Value Date    WBC 3.0 (L) 01/09/2023    HGB 14.3 01/09/2023    HCT 43.6 01/09/2023    MCV 89.7 01/09/2023     01/09/2023           CMP  Sodium   Date Value Ref Range Status   01/09/2023 144 135 - 146 mmol/L Final     Potassium   Date Value Ref Range Status   01/09/2023 4.7 3.5 - 5.3 mmol/L Final     Chloride   Date Value Ref Range Status   01/09/2023 104 98 - 110 mmol/L Final     CO2   Date Value Ref Range Status   01/09/2023 29 20 - 32 mmol/L Final     Glucose   Date Value Ref Range Status   01/09/2023 92 65 - 99 mg/dL Final     Comment:                   Fasting reference interval          BUN   Date Value Ref Range Status   01/09/2023 21 7 - 25 mg/dL Final     Creatinine   Date Value Ref Range Status   01/09/2023 0.79 0.60 - 1.00 mg/dL Final     Calcium   Date Value Ref Range Status   01/09/2023 9.4 8.6 - 10.4 mg/dL Final     Total Protein   Date Value Ref Range Status   01/09/2023 6.9 6.1 - 8.1 g/dL Final     Albumin   Date Value Ref Range Status   01/09/2023 4.5 3.6 - 5.1 g/dL Final     Total Bilirubin   Date Value Ref Range Status   01/09/2023 0.7 0.2 - 1.2 mg/dL Final     Alkaline Phosphatase   Date Value Ref Range Status   10/21/2020 14 (L) 55 - 135 U/L Final     AST   Date Value Ref Range Status   01/09/2023 23 10 - 35 U/L Final     ALT   Date Value Ref Range Status    01/09/2023 22 6 - 29 U/L Final     Anion Gap   Date Value Ref Range Status   10/21/2020 11 8 - 16 mmol/L Final     eGFR   Date Value Ref Range Status   01/09/2023 79 > OR = 60 mL/min/1.73m2 Final     Comment:     The eGFR is based on the CKD-EPI 2021 equation. To calculate   the new eGFR from a previous Creatinine or Cystatin C  result, go to https://www.kidney.org/professionals/  kdoqi/gfr%5Fcalculator         The patient has been cleared for surgery in an ambulatory surgery facility.     Impression: Visually significant Cataract left Eye    Plan: Phacoemulsification with implantation of Intraocular lens left Eye

## 2023-03-29 NOTE — DISCHARGE SUMMARY
[facility]  Discharge Note  Short Stay    Procedure(s) (LRB):  CEIOL OS (Left)      OUTCOME: Patient tolerated treatment/procedure well without complication and is now ready for discharge.    DISPOSITION: Home or Self Care    FINAL DIAGNOSIS:  cataract    FOLLOWUP: In clinic    DISCHARGE INSTRUCTIONS:  No discharge procedures on file.     TIME SPENT ON DISCHARGE: 5 minutes

## 2023-03-29 NOTE — OP NOTE
Operative Date:  03/29/2023    Discharge Date:  03/29/2023    Report Title: Operative Note    SURGEON: Randolph Marie MD    ASSISTANT: None    PREOPERATIVE DIAGNOSIS: Age-related nuclear cataract,  Left Eye    POSTOPERATIVE DIAGNOSIS: same    PROCEDURE PERFORMED: Phacoemulsification of the cataract with posterior chamber intraocular lens Left Eye    IMPLANTS: DCBOO 21.5    ANESTHESIA:  Topical with MAC    COMPLICATIONS: None    ESTIMATED BLOOD LOSS: Minimal    PROCEDURE: The patient was brought to the operating room, time out was performed and implant checked.  The patient was given light sedation, and topical anesthesia was instilled in the left eye.  The left eye was prepped and draped in the usual fashion for eye surgery and lid speculum used to retract the eyelid. The eyelashes were secluded within the drape.  A paracentesis was made inferiorly with a sideport blade. Epishugarcaine was injected in the anterior chamber and dispersive viscoelastic was injected into the anterior chamber. A temporal corneal incision was made with a steel keratome. A cystitome was used to initiate a continuous curvilinear capsulorrhexis and completed using the capsulorrhexis forceps. Hydrodissection of the lens nucleus was performed using balanced salt solution (BSS) on hydrodissection cannula. The lens nucleus was removed using phacoemulsification in the modified stop and chop technique. The lens cortex was removed using the irrigation/aspiration handpiece. The capsular bag was filled with viscoelastic, and the intraocular lens was injected into the capsular bag under direct visualization. Viscoelastic was removed using the irrigation/aspiration handpiece. The wounds were hydrated until watertight.    The wounds were rechecked and no leakage was noted.  The speculum was removed. Topical antibiotic was applied to the eye and shield was placed over the eye. The patient tolerated the procedure well and left the operating room in good  condition.

## 2023-03-29 NOTE — ANESTHESIA PREPROCEDURE EVALUATION
03/29/2023  Batool Mensah is a 72 y.o., female.      Pre-op Assessment    I have reviewed the Patient Summary Reports.     I have reviewed the Nursing Notes. I have reviewed the NPO Status.   I have reviewed the Medications.     Review of Systems  Anesthesia Hx:  No problems with previous Anesthesia    Social:  Non-Smoker    Hematology/Oncology:        Hematology Comments: polycythemia   Cardiovascular:   Hypertension, well controlled Dysrhythmias atrial fibrillation hyperlipidemia    Pulmonary:   Sleep Apnea    Education provided regarding risk of obstructive sleep apnea     Renal/:  Renal/ Normal     Musculoskeletal:   Arthritis     Neurological:  Neurology Normal    Endocrine:  Endocrine Normal        Physical Exam  General: Well nourished, Cooperative, Alert and Oriented    Airway:  Mallampati: II   Mouth Opening: Normal  TM Distance: Normal  Neck ROM: Normal ROM        Anesthesia Plan  Type of Anesthesia, risks & benefits discussed:    Anesthesia Type: Gen ETT, Gen Supraglottic Airway, Gen Natural Airway, MAC  Intra-op Monitoring Plan: Standard ASA Monitors  Post Op Pain Control Plan: multimodal analgesia  Induction:  IV  Airway Plan: Direct, Video and Fiberoptic, Post-Induction  Informed Consent: Informed consent signed with the Patient and all parties understand the risks and agree with anesthesia plan.  All questions answered.   ASA Score: 3    Ready For Surgery From Anesthesia Perspective.     .

## 2023-03-30 ENCOUNTER — OFFICE VISIT (OUTPATIENT)
Dept: OPHTHALMOLOGY | Facility: CLINIC | Age: 73
End: 2023-03-30
Payer: MEDICARE

## 2023-03-30 DIAGNOSIS — Z98.42 STATUS POST CATARACT SURGERY, LEFT: Primary | ICD-10-CM

## 2023-03-30 PROCEDURE — 1101F PT FALLS ASSESS-DOCD LE1/YR: CPT | Mod: CPTII,S$GLB,, | Performed by: OPHTHALMOLOGY

## 2023-03-30 PROCEDURE — 1101F PR PT FALLS ASSESS DOC 0-1 FALLS W/OUT INJ PAST YR: ICD-10-PCS | Mod: CPTII,S$GLB,, | Performed by: OPHTHALMOLOGY

## 2023-03-30 PROCEDURE — 1159F PR MEDICATION LIST DOCUMENTED IN MEDICAL RECORD: ICD-10-PCS | Mod: CPTII,S$GLB,, | Performed by: OPHTHALMOLOGY

## 2023-03-30 PROCEDURE — 3288F FALL RISK ASSESSMENT DOCD: CPT | Mod: CPTII,S$GLB,, | Performed by: OPHTHALMOLOGY

## 2023-03-30 PROCEDURE — 1159F MED LIST DOCD IN RCRD: CPT | Mod: CPTII,S$GLB,, | Performed by: OPHTHALMOLOGY

## 2023-03-30 PROCEDURE — 1126F PR PAIN SEVERITY QUANTIFIED, NO PAIN PRESENT: ICD-10-PCS | Mod: CPTII,S$GLB,, | Performed by: OPHTHALMOLOGY

## 2023-03-30 PROCEDURE — 1126F AMNT PAIN NOTED NONE PRSNT: CPT | Mod: CPTII,S$GLB,, | Performed by: OPHTHALMOLOGY

## 2023-03-30 PROCEDURE — 4010F ACE/ARB THERAPY RXD/TAKEN: CPT | Mod: CPTII,S$GLB,, | Performed by: OPHTHALMOLOGY

## 2023-03-30 PROCEDURE — 99024 POSTOP FOLLOW-UP VISIT: CPT | Mod: S$GLB,,, | Performed by: OPHTHALMOLOGY

## 2023-03-30 PROCEDURE — 3288F PR FALLS RISK ASSESSMENT DOCUMENTED: ICD-10-PCS | Mod: CPTII,S$GLB,, | Performed by: OPHTHALMOLOGY

## 2023-03-30 PROCEDURE — 99999 PR PBB SHADOW E&M-EST. PATIENT-LVL III: CPT | Mod: PBBFAC,,, | Performed by: OPHTHALMOLOGY

## 2023-03-30 PROCEDURE — 99999 PR PBB SHADOW E&M-EST. PATIENT-LVL III: ICD-10-PCS | Mod: PBBFAC,,, | Performed by: OPHTHALMOLOGY

## 2023-03-30 PROCEDURE — 4010F PR ACE/ARB THEARPY RXD/TAKEN: ICD-10-PCS | Mod: CPTII,S$GLB,, | Performed by: OPHTHALMOLOGY

## 2023-03-30 PROCEDURE — 99024 PR POST-OP FOLLOW-UP VISIT: ICD-10-PCS | Mod: S$GLB,,, | Performed by: OPHTHALMOLOGY

## 2023-03-30 NOTE — PROGRESS NOTES
HPI    1 day s/p phaco IOL OS done on 3/29/2023  Pt states OS feels well. Denies pain/ FOL/ floaters.   Compliant with post op gtts. Using pred, moxi, and keto as directed.       Last edited by Sunshine Khoury on 3/30/2023  8:52 AM.            Assessment /Plan     For exam results, see Encounter Report.    Status post cataract surgery, left      Doing well  Post op precautions and instructions reviewed, sheet given  oxi QID  PF QID  Keto qdaily    F/u 1 week, brief refract, PW for OD

## 2023-03-30 NOTE — ANESTHESIA POSTPROCEDURE EVALUATION
Anesthesia Post Evaluation    Patient: Batool Mensah    Procedure(s) Performed: Procedure(s) (LRB):  CEIOL OS (Left)    Final Anesthesia Type: MAC      Patient location during evaluation: PACU  Patient participation: Yes- Able to Participate  Level of consciousness: awake and alert and oriented  Post-procedure vital signs: reviewed and stable  Pain management: adequate  Airway patency: patent    PONV status at discharge: No PONV  Anesthetic complications: no      Cardiovascular status: blood pressure returned to baseline and stable  Respiratory status: unassisted and spontaneous ventilation  Hydration status: euvolemic  Follow-up not needed.          Vitals Value Taken Time   /74 03/29/23 0945   Temp 36.4 °C (97.5 °F) 03/29/23 0907   Pulse 48 03/29/23 0945   Resp 18 03/29/23 0945   SpO2 99 % 03/29/23 0945         Event Time   Out of Recovery 09:47:32         Pain/Fartun Score: Fartun Score: 10 (3/29/2023  9:47 AM)

## 2023-03-31 VITALS
BODY MASS INDEX: 28.35 KG/M2 | HEART RATE: 48 BPM | OXYGEN SATURATION: 99 % | SYSTOLIC BLOOD PRESSURE: 159 MMHG | DIASTOLIC BLOOD PRESSURE: 74 MMHG | HEIGHT: 63 IN | TEMPERATURE: 98 F | WEIGHT: 160 LBS | RESPIRATION RATE: 18 BRPM

## 2023-04-03 ENCOUNTER — TELEPHONE (OUTPATIENT)
Dept: ELECTROPHYSIOLOGY | Facility: CLINIC | Age: 73
End: 2023-04-03
Payer: MEDICARE

## 2023-04-03 NOTE — TELEPHONE ENCOUNTER
Contacted pt to see if she made a decision on where she would like to have her device implanted. Pt stated she saw Dr Dial and she convinced her that Dr Ariza should be the physician to implant a pacemaker. Pt scheduled for 5/16/2023.      ----- Message from Curt Ariza MD sent at 3/22/2023  9:56 AM CDT -----  Please call pt next week to see whether she wants pacemaker done by me at [facility] or in [place].

## 2023-04-03 NOTE — TELEPHONE ENCOUNTER
----- Message from Curt Ariza MD sent at 4/3/2023  3:59 PM CDT -----  We can do [facility]    ----- Message -----  From: Kate Wilson RN  Sent: 4/3/2023  11:01 AM CDT  To: Curt Ariza MD    Pt stated that she Dr Yojana and she convinced her that the pacemaker should be implanted by you. She has all the confidence in you.  Pt scheduled for 5/16/2023. What complant are you using??  ----- Message -----  From: Curt Ariza MD  Sent: 3/22/2023   9:57 AM CDT  To: Kate Wilson RN    Please call pt next week to see whether she wants pacemaker done by me at [facility] or in [place].

## 2023-04-06 ENCOUNTER — OFFICE VISIT (OUTPATIENT)
Dept: OPHTHALMOLOGY | Facility: CLINIC | Age: 73
End: 2023-04-06
Payer: MEDICARE

## 2023-04-06 DIAGNOSIS — Z98.42 STATUS POST CATARACT SURGERY, LEFT: Primary | ICD-10-CM

## 2023-04-06 DIAGNOSIS — H25.11 AGE-RELATED NUCLEAR CATARACT, RIGHT: ICD-10-CM

## 2023-04-06 PROCEDURE — 3288F PR FALLS RISK ASSESSMENT DOCUMENTED: ICD-10-PCS | Mod: CPTII,S$GLB,, | Performed by: OPHTHALMOLOGY

## 2023-04-06 PROCEDURE — 99999 PR PBB SHADOW E&M-EST. PATIENT-LVL III: ICD-10-PCS | Mod: PBBFAC,,, | Performed by: OPHTHALMOLOGY

## 2023-04-06 PROCEDURE — 1159F PR MEDICATION LIST DOCUMENTED IN MEDICAL RECORD: ICD-10-PCS | Mod: CPTII,S$GLB,, | Performed by: OPHTHALMOLOGY

## 2023-04-06 PROCEDURE — 92136 IOL MASTER - OD - RIGHT EYE: ICD-10-PCS | Mod: 26,RT,S$GLB, | Performed by: OPHTHALMOLOGY

## 2023-04-06 PROCEDURE — 4010F PR ACE/ARB THEARPY RXD/TAKEN: ICD-10-PCS | Mod: CPTII,S$GLB,, | Performed by: OPHTHALMOLOGY

## 2023-04-06 PROCEDURE — 99024 PR POST-OP FOLLOW-UP VISIT: ICD-10-PCS | Mod: S$GLB,,, | Performed by: OPHTHALMOLOGY

## 2023-04-06 PROCEDURE — 99999 PR PBB SHADOW E&M-EST. PATIENT-LVL III: CPT | Mod: PBBFAC,,, | Performed by: OPHTHALMOLOGY

## 2023-04-06 PROCEDURE — 1126F PR PAIN SEVERITY QUANTIFIED, NO PAIN PRESENT: ICD-10-PCS | Mod: CPTII,S$GLB,, | Performed by: OPHTHALMOLOGY

## 2023-04-06 PROCEDURE — 3288F FALL RISK ASSESSMENT DOCD: CPT | Mod: CPTII,S$GLB,, | Performed by: OPHTHALMOLOGY

## 2023-04-06 PROCEDURE — 1126F AMNT PAIN NOTED NONE PRSNT: CPT | Mod: CPTII,S$GLB,, | Performed by: OPHTHALMOLOGY

## 2023-04-06 PROCEDURE — 1160F RVW MEDS BY RX/DR IN RCRD: CPT | Mod: CPTII,S$GLB,, | Performed by: OPHTHALMOLOGY

## 2023-04-06 PROCEDURE — 1101F PR PT FALLS ASSESS DOC 0-1 FALLS W/OUT INJ PAST YR: ICD-10-PCS | Mod: CPTII,S$GLB,, | Performed by: OPHTHALMOLOGY

## 2023-04-06 PROCEDURE — 1160F PR REVIEW ALL MEDS BY PRESCRIBER/CLIN PHARMACIST DOCUMENTED: ICD-10-PCS | Mod: CPTII,S$GLB,, | Performed by: OPHTHALMOLOGY

## 2023-04-06 PROCEDURE — 1101F PT FALLS ASSESS-DOCD LE1/YR: CPT | Mod: CPTII,S$GLB,, | Performed by: OPHTHALMOLOGY

## 2023-04-06 PROCEDURE — 92136 OPHTHALMIC BIOMETRY: CPT | Mod: 26,RT,S$GLB, | Performed by: OPHTHALMOLOGY

## 2023-04-06 PROCEDURE — 4010F ACE/ARB THERAPY RXD/TAKEN: CPT | Mod: CPTII,S$GLB,, | Performed by: OPHTHALMOLOGY

## 2023-04-06 PROCEDURE — 1159F MED LIST DOCD IN RCRD: CPT | Mod: CPTII,S$GLB,, | Performed by: OPHTHALMOLOGY

## 2023-04-06 PROCEDURE — 99024 POSTOP FOLLOW-UP VISIT: CPT | Mod: S$GLB,,, | Performed by: OPHTHALMOLOGY

## 2023-04-06 RX ORDER — PROPARACAINE HYDROCHLORIDE 5 MG/ML
1 SOLUTION/ DROPS OPHTHALMIC
Status: CANCELLED | OUTPATIENT
Start: 2023-04-06

## 2023-04-06 RX ORDER — PREDNISOLONE ACETATE 10 MG/ML
1 SUSPENSION/ DROPS OPHTHALMIC 4 TIMES DAILY
Qty: 5 ML | Refills: 2 | Status: SHIPPED | OUTPATIENT
Start: 2023-04-06 | End: 2023-05-25 | Stop reason: ALTCHOICE

## 2023-04-06 RX ORDER — TROPICAMIDE 10 MG/ML
1 SOLUTION/ DROPS OPHTHALMIC
Status: CANCELLED | OUTPATIENT
Start: 2023-04-06

## 2023-04-06 RX ORDER — SODIUM CHLORIDE 9 MG/ML
INJECTION, SOLUTION INTRAVENOUS CONTINUOUS
Status: CANCELLED | OUTPATIENT
Start: 2023-04-06

## 2023-04-06 RX ORDER — KETOROLAC TROMETHAMINE 5 MG/ML
1 SOLUTION OPHTHALMIC 4 TIMES DAILY
Qty: 5 ML | Refills: 2 | Status: SHIPPED | OUTPATIENT
Start: 2023-04-06 | End: 2023-05-25 | Stop reason: ALTCHOICE

## 2023-04-06 RX ORDER — PHENYLEPHRINE HYDROCHLORIDE 25 MG/ML
1 SOLUTION/ DROPS OPHTHALMIC
Status: CANCELLED | OUTPATIENT
Start: 2023-04-06

## 2023-04-06 RX ORDER — MOXIFLOXACIN 5 MG/ML
1 SOLUTION/ DROPS OPHTHALMIC 4 TIMES DAILY
Qty: 3 ML | Refills: 0 | Status: SHIPPED | OUTPATIENT
Start: 2023-04-06 | End: 2023-04-27 | Stop reason: ALTCHOICE

## 2023-04-06 RX ORDER — PHENYLEPHRINE HYDROCHLORIDE 100 MG/ML
1 SOLUTION/ DROPS OPHTHALMIC
Status: CANCELLED | OUTPATIENT
Start: 2023-04-06

## 2023-04-06 NOTE — PROGRESS NOTES
Subjective:       Patient ID: Batool Mensah is a 73 y.o. female.    Chief Complaint: Post-op Evaluation      Past Medical History:   Diagnosis Date    A-fib     Hypertension     Mixed hyperlipidemia     Sleep apnea     cpap     Past Surgical History:   Procedure Laterality Date    BREAST BIOPSY Left     BREAST LUMPECTOMY      CATARACT EXTRACTION W/  INTRAOCULAR LENS IMPLANT Left 3/29/2023    Procedure: CEIOL OS;  Surgeon: Randolph Marie MD;  Location: [facility] OR;  Service: Ophthalmology;  Laterality: Left;     SECTION      X2    COLONOSCOPY      Dr. Parker -RTC 10 years    Gastric sleeve  2018    Dr Hernandez- hiatal hernia repair    LAPAROSCOPIC APPENDECTOMY N/A 10/19/2020    Procedure: APPENDECTOMY, LAPAROSCOPIC;  Surgeon: Konrad Almonte III, MD;  Location: [facility] OR;  Service: General;  Laterality: N/A;     History reviewed. No pertinent family history.  Social History     Socioeconomic History    Marital status:    Tobacco Use    Smoking status: Never    Smokeless tobacco: Never     Social Determinants of Health     Financial Resource Strain: Low Risk     Difficulty of Paying Living Expenses: Not hard at all   Food Insecurity: No Food Insecurity    Worried About Running Out of Food in the Last Year: Never true    Ran Out of Food in the Last Year: Never true   Transportation Needs: No Transportation Needs    Lack of Transportation (Medical): No    Lack of Transportation (Non-Medical): No   Physical Activity: Sufficiently Active    Days of Exercise per Week: 5 days    Minutes of Exercise per Session: 60 min   Stress: No Stress Concern Present    Feeling of Stress : Only a little   Social Connections: Unknown    Frequency of Communication with Friends and Family: More than three times a week    Frequency of Social Gatherings with Friends and Family: More than three times a week    Active Member of Clubs or Organizations: Yes    Attends Club or Organization Meetings: More than 4 times per year     Marital Status:    Housing Stability: Low Risk     Unable to Pay for Housing in the Last Year: No    Number of Places Lived in the Last Year: 1    Unstable Housing in the Last Year: No       Current Outpatient Medications   Medication Sig Dispense Refill    alendronate (FOSAMAX) 70 MG tablet Take 1 tablet (70 mg total) by mouth every 7 days. Take on empty stomach with full glass of water-do not eat or lie down for 1 hour For osteoporosis 12 tablet 3    apixaban (ELIQUIS) 5 mg Tab Take 1 tablet (5 mg total) by mouth 2 (two) times daily. 180 tablet 3    atorvastatin (LIPITOR) 20 MG tablet Take 1 tablet (20 mg total) by mouth every evening. 90 tablet 3    calcium-vitamin D3 (OS-KRYS 500 + D3) 500 mg-5 mcg (200 unit) per tablet Take 1 tablet by mouth 2 (two) times daily with meals.      ketorolac 0.5% (ACULAR) 0.5 % Drop Place 1 drop into the left eye 4 (four) times daily. Start 3 days before surgery. 5 mL 2    losartan (COZAAR) 50 MG tablet Take 1 tablet (50 mg total) by mouth once daily. 90 tablet 3    metoprolol succinate (TOPROL-XL) 25 MG 24 hr tablet Take 0.5 tablets (12.5 mg total) by mouth once daily. Take 1/2 tablet by mouth daily. 90 tablet 3    ofloxacin (OCUFLOX) 0.3 % ophthalmic solution Place 1 drop into the left eye 4 (four) times daily.      prednisoLONE acetate (PRED FORTE) 1 % DrpS Place 1 drop into both eyes 3 (three) times daily.      ketorolac 0.5% (ACULAR) 0.5 % Drop Place 1 drop into the right eye 4 (four) times daily. Start 3 days before surgery. 5 mL 2    moxifloxacin (VIGAMOX) 0.5 % ophthalmic solution Place 1 drop into the right eye 4 (four) times daily. Start 1 day before cataract surgery. for 8 days 3 mL 0    prednisoLONE acetate (PRED FORTE) 1 % DrpS Place 1 drop into the right eye 4 (four) times daily. Start after surgery 5 mL 2     No current facility-administered medications for this visit.     Facility-Administered Medications Ordered in Other Visits   Medication Dose Route  Frequency Provider Last Rate Last Admin    lactated ringers infusion   Intravenous Continuous Ammon Mcmanus MD   Stopped at 03/29/23 0918    LIDOcaine (PF) 10 mg/ml (1%) injection 10 mg  1 mL Intradermal Once Ammon Mcmanus MD         Review of patient's allergies indicates:   Allergen Reactions    Penicillins Hives       Review of Systems   All other systems reviewed and are negative.    Objective:      There were no vitals filed for this visit.  Physical Exam  Constitutional:       Appearance: She is normal weight.   Cardiovascular:      Rate and Rhythm: Normal rate.      Pulses: Normal pulses.   Pulmonary:      Effort: Pulmonary effort is normal.   Abdominal:      Palpations: Abdomen is soft.   Neurological:      Mental Status: She is alert and oriented to person, place, and time.       Lab Review: CBC:   Lab Results   Component Value Date    WBC 3.0 (L) 01/09/2023    RBC 4.86 01/09/2023    HGB 14.3 01/09/2023    HCT 43.6 01/09/2023     01/09/2023     BMP:   Lab Results   Component Value Date    GLU 92 01/09/2023     01/09/2023    K 4.7 01/09/2023     01/09/2023    CO2 29 01/09/2023    BUN 21 01/09/2023    CREATININE 0.79 01/09/2023    CALCIUM 9.4 01/09/2023          Assessment:       1. Status post cataract surgery, left    2. Age-related nuclear cataract, right        Plan:       Low risk for low risk surgery.  Okay to proceed with cataract surgery right eye.

## 2023-04-14 ENCOUNTER — PATIENT MESSAGE (OUTPATIENT)
Dept: ELECTROPHYSIOLOGY | Facility: CLINIC | Age: 73
End: 2023-04-14
Payer: MEDICARE

## 2023-04-14 DIAGNOSIS — I48.0 PAROXYSMAL ATRIAL FIBRILLATION: Primary | ICD-10-CM

## 2023-04-14 DIAGNOSIS — R00.1 SINUS BRADYCARDIA: ICD-10-CM

## 2023-04-14 DIAGNOSIS — I49.5 SSS (SICK SINUS SYNDROME): ICD-10-CM

## 2023-04-18 ENCOUNTER — ANESTHESIA EVENT (OUTPATIENT)
Dept: SURGERY | Facility: AMBULARY SURGERY CENTER | Age: 73
End: 2023-04-18
Payer: MEDICARE

## 2023-04-18 RX ORDER — SODIUM CHLORIDE 0.9 % (FLUSH) 0.9 %
3 SYRINGE (ML) INJECTION
Status: CANCELLED | OUTPATIENT
Start: 2023-04-18

## 2023-04-19 ENCOUNTER — ANESTHESIA (OUTPATIENT)
Dept: SURGERY | Facility: AMBULARY SURGERY CENTER | Age: 73
End: 2023-04-19
Payer: MEDICARE

## 2023-04-19 ENCOUNTER — HOSPITAL ENCOUNTER (OUTPATIENT)
Facility: AMBULARY SURGERY CENTER | Age: 73
Discharge: HOME | End: 2023-04-19
Attending: OPHTHALMOLOGY | Admitting: OPHTHALMOLOGY
Payer: MEDICARE

## 2023-04-19 DIAGNOSIS — H25.11 AGE-RELATED NUCLEAR CATARACT, RIGHT: ICD-10-CM

## 2023-04-19 DIAGNOSIS — Z98.42 STATUS POST CATARACT SURGERY, LEFT: ICD-10-CM

## 2023-04-19 PROCEDURE — D9220A PRA ANESTHESIA: Mod: CRNA,,, | Performed by: NURSE ANESTHETIST, CERTIFIED REGISTERED

## 2023-04-19 PROCEDURE — 66984 XCAPSL CTRC RMVL W/O ECP: CPT | Mod: RT | Performed by: OPHTHALMOLOGY

## 2023-04-19 PROCEDURE — D9220A PRA ANESTHESIA: ICD-10-PCS | Mod: CRNA,,, | Performed by: NURSE ANESTHETIST, CERTIFIED REGISTERED

## 2023-04-19 PROCEDURE — 66984 XCAPSL CTRC RMVL W/O ECP: CPT | Mod: 79,RT,, | Performed by: OPHTHALMOLOGY

## 2023-04-19 PROCEDURE — D9220A PRA ANESTHESIA: ICD-10-PCS | Mod: ANES,,, | Performed by: ANESTHESIOLOGY

## 2023-04-19 PROCEDURE — D9220A PRA ANESTHESIA: Mod: ANES,,, | Performed by: ANESTHESIOLOGY

## 2023-04-19 PROCEDURE — 66984 PR REMOVAL, CATARACT, W/INSRT INTRAOC LENS, W/O ENDO CYCLO: ICD-10-PCS | Mod: 79,RT,, | Performed by: OPHTHALMOLOGY

## 2023-04-19 DEVICE — LENS SMPLCTY TECNIS MONO 22.0D: Type: IMPLANTABLE DEVICE | Site: EYE | Status: FUNCTIONAL

## 2023-04-19 RX ORDER — PHENYLEPHRINE HYDROCHLORIDE 100 MG/ML
1 SOLUTION/ DROPS OPHTHALMIC
Status: DISCONTINUED | OUTPATIENT
Start: 2023-04-19 | End: 2023-04-19 | Stop reason: HOSPADM

## 2023-04-19 RX ORDER — MIDAZOLAM HYDROCHLORIDE 1 MG/ML
INJECTION INTRAMUSCULAR; INTRAVENOUS
Status: DISCONTINUED | OUTPATIENT
Start: 2023-04-19 | End: 2023-04-19

## 2023-04-19 RX ORDER — PROPARACAINE HYDROCHLORIDE 5 MG/ML
1 SOLUTION/ DROPS OPHTHALMIC
Status: DISCONTINUED | OUTPATIENT
Start: 2023-04-19 | End: 2023-04-19 | Stop reason: HOSPADM

## 2023-04-19 RX ORDER — TROPICAMIDE 10 MG/ML
1 SOLUTION/ DROPS OPHTHALMIC
Status: DISCONTINUED | OUTPATIENT
Start: 2023-04-19 | End: 2023-04-19 | Stop reason: HOSPADM

## 2023-04-19 RX ORDER — LIDOCAINE HYDROCHLORIDE 40 MG/ML
INJECTION, SOLUTION RETROBULBAR
Status: DISCONTINUED
Start: 2023-04-19 | End: 2023-04-19 | Stop reason: HOSPADM

## 2023-04-19 RX ORDER — LIDOCAINE HYDROCHLORIDE 40 MG/ML
INJECTION, SOLUTION RETROBULBAR
Status: DISCONTINUED | OUTPATIENT
Start: 2023-04-19 | End: 2023-04-19 | Stop reason: HOSPADM

## 2023-04-19 RX ORDER — SODIUM CHLORIDE 0.9 % (FLUSH) 0.9 %
3 SYRINGE (ML) INJECTION EVERY 8 HOURS
Status: DISCONTINUED | OUTPATIENT
Start: 2023-04-19 | End: 2023-04-19 | Stop reason: HOSPADM

## 2023-04-19 RX ORDER — PHENYLEPHRINE HYDROCHLORIDE 25 MG/ML
1 SOLUTION/ DROPS OPHTHALMIC
Status: DISCONTINUED | OUTPATIENT
Start: 2023-04-19 | End: 2023-04-19 | Stop reason: HOSPADM

## 2023-04-19 RX ORDER — EPINEPHRINE 1 MG/ML
INJECTION, SOLUTION, CONCENTRATE INTRAVENOUS
Status: DISCONTINUED
Start: 2023-04-19 | End: 2023-04-19 | Stop reason: HOSPADM

## 2023-04-19 RX ORDER — EPINEPHRINE 1 MG/ML
INJECTION, SOLUTION, CONCENTRATE INTRAVENOUS
Status: DISCONTINUED | OUTPATIENT
Start: 2023-04-19 | End: 2023-04-19 | Stop reason: HOSPADM

## 2023-04-19 RX ORDER — SODIUM CHLORIDE 9 MG/ML
INJECTION, SOLUTION INTRAVENOUS CONTINUOUS
Status: DISCONTINUED | OUTPATIENT
Start: 2023-04-19 | End: 2023-04-19 | Stop reason: HOSPADM

## 2023-04-19 RX ORDER — SODIUM CHLORIDE, SODIUM LACTATE, POTASSIUM CHLORIDE, CALCIUM CHLORIDE 600; 310; 30; 20 MG/100ML; MG/100ML; MG/100ML; MG/100ML
500 INJECTION, SOLUTION INTRAVENOUS ONCE
Status: DISCONTINUED | OUTPATIENT
Start: 2023-04-19 | End: 2023-04-19 | Stop reason: HOSPADM

## 2023-04-19 RX ORDER — LIDOCAINE HYDROCHLORIDE 10 MG/ML
1 INJECTION, SOLUTION EPIDURAL; INFILTRATION; INTRACAUDAL; PERINEURAL ONCE
Status: DISCONTINUED | OUTPATIENT
Start: 2023-04-19 | End: 2023-04-19 | Stop reason: HOSPADM

## 2023-04-19 RX ORDER — MOXIFLOXACIN 5 MG/ML
SOLUTION/ DROPS OPHTHALMIC
Status: DISCONTINUED
Start: 2023-04-19 | End: 2023-04-19 | Stop reason: HOSPADM

## 2023-04-19 RX ORDER — SODIUM CHLORIDE, SODIUM LACTATE, POTASSIUM CHLORIDE, CALCIUM CHLORIDE 600; 310; 30; 20 MG/100ML; MG/100ML; MG/100ML; MG/100ML
10 INJECTION, SOLUTION INTRAVENOUS CONTINUOUS
Status: DISCONTINUED | OUTPATIENT
Start: 2023-04-19 | End: 2023-04-19 | Stop reason: HOSPADM

## 2023-04-19 RX ORDER — ONDANSETRON 2 MG/ML
INJECTION INTRAMUSCULAR; INTRAVENOUS
Status: DISCONTINUED | OUTPATIENT
Start: 2023-04-19 | End: 2023-04-19

## 2023-04-19 RX ORDER — MOXIFLOXACIN 5 MG/ML
SOLUTION/ DROPS OPHTHALMIC
Status: DISCONTINUED | OUTPATIENT
Start: 2023-04-19 | End: 2023-04-19 | Stop reason: HOSPADM

## 2023-04-19 RX ADMIN — TROPICAMIDE 1 DROP: 10 SOLUTION/ DROPS OPHTHALMIC at 06:04

## 2023-04-19 RX ADMIN — MIDAZOLAM HYDROCHLORIDE 1 MG: 1 INJECTION INTRAMUSCULAR; INTRAVENOUS at 07:04

## 2023-04-19 RX ADMIN — PROPARACAINE HYDROCHLORIDE 1 DROP: 5 SOLUTION/ DROPS OPHTHALMIC at 06:04

## 2023-04-19 RX ADMIN — PHENYLEPHRINE HYDROCHLORIDE 1 DROP: 25 SOLUTION/ DROPS OPHTHALMIC at 06:04

## 2023-04-19 RX ADMIN — SODIUM CHLORIDE, SODIUM LACTATE, POTASSIUM CHLORIDE, CALCIUM CHLORIDE 10 ML/HR: 600; 310; 30; 20 INJECTION, SOLUTION INTRAVENOUS at 06:04

## 2023-04-19 RX ADMIN — ONDANSETRON 4 MG: 2 INJECTION INTRAMUSCULAR; INTRAVENOUS at 07:04

## 2023-04-19 NOTE — ANESTHESIA PREPROCEDURE EVALUATION
04/19/2023  Batool Mensah is a 73 y.o., female.      Pre-op Assessment    I have reviewed the Patient Summary Reports.     I have reviewed the Nursing Notes. I have reviewed the NPO Status.   I have reviewed the Medications.     Review of Systems  Anesthesia Hx:  No problems with previous Anesthesia    Social:  Non-Smoker    Hematology/Oncology:        Hematology Comments: polycythemia   EENT/Dental:   Cataract   Cardiovascular:   Hypertension, well controlled Dysrhythmias atrial fibrillation hyperlipidemia    Pulmonary:   Sleep Apnea    Education provided regarding risk of obstructive sleep apnea     Renal/:  Renal/ Normal     Musculoskeletal:   Arthritis     Neurological:  Neurology Normal    Endocrine:  Endocrine Normal        Physical Exam  General: Well nourished, Cooperative, Alert and Oriented    Airway:  Mallampati: II   Mouth Opening: Normal  TM Distance: Normal  Neck ROM: Normal ROM    Dental:  Dentures        Anesthesia Plan  Type of Anesthesia, risks & benefits discussed:    Anesthesia Type: MAC  Intra-op Monitoring Plan: Standard ASA Monitors  Post Op Pain Control Plan: multimodal analgesia  Informed Consent: Informed consent signed with the Patient and all parties understand the risks and agree with anesthesia plan.  All questions answered.   ASA Score: 3    Ready For Surgery From Anesthesia Perspective.     .

## 2023-04-19 NOTE — OP NOTE
Operative Date:  04/19/2023    Discharge Date:  04/19/2023    Report Title: Operative Note    SURGEON: Randolph Marie MD    ASSISTANT: None    PREOPERATIVE DIAGNOSIS: Age-related nuclear cataract, Right Eye    POSTOPERATIVE DIAGNOSIS: same    PROCEDURE PERFORMED: Phacoemulsification of the cataract with posterior chamber intraocular lens Right Eye    IMPLANTS: DCBOO 22.0    ANESTHESIA:  Topical with MAC    COMPLICATIONS: None    ESTIMATED BLOOD LOSS: Minimal    PROCEDURE: The patient was brought to the operating room, time out was performed and implant checked.  The patient was given light sedation, and topical anesthesia was instilled in the right eye.  The right eye was prepped and draped in the usual fashion for eye surgery and lid speculum used to retract the eyelid. The eyelashes were secluded within the drape.  A paracentesis was made superiorly with a sideport blade. Epishugarcaine was injected in the anterior chamber and dispersive viscoelastic was injected into the anterior chamber. A temporal corneal incision was made with a steel keratome. A cystitome was used to initiate a continuous curvilinear capsulorrhexis and completed using the capsulorrhexis forceps. Hydrodissection of the lens nucleus was performed using balanced salt solution (BSS) on hydrodissection cannula. The lens nucleus was removed using phacoemulsification in the modified stop and chop technique. The lens cortex was removed using the irrigation/aspiration handpiece. The capsular bag was filled with viscoelastic, and the intraocular lens was injected into the capsular bag under direct visualization. Viscoelastic was removed using the irrigation/aspiration handpiece. The wounds were hydrated until watertight.    The wounds were rechecked and no leakage was noted.  The speculum was removed. Topical antibiotic was applied to the eye and shield was placed over the eye. The patient tolerated the procedure well and left the operating room in  good condition.

## 2023-04-19 NOTE — ANESTHESIA POSTPROCEDURE EVALUATION
Anesthesia Post Evaluation    Patient: Batool Mensah    Procedure(s) Performed: Procedure(s) (LRB):  CEIOL OD (Right)    Final Anesthesia Type: MAC      Patient location during evaluation: PACU  Patient participation: Yes- Able to Participate  Level of consciousness: awake and alert  Post-procedure vital signs: reviewed and stable  Pain management: adequate  Airway patency: patent    PONV status at discharge: No PONV  Anesthetic complications: no      Cardiovascular status: hemodynamically stable  Respiratory status: unassisted and room air  Hydration status: euvolemic  Follow-up not needed.          Vitals Value Taken Time   /57 04/19/23 0825   Temp 36.7 °C (98.1 °F) 04/19/23 0802   Pulse 56 04/19/23 0827   Resp 17 04/19/23 0825   SpO2 100 % 04/19/23 0825   Vitals shown include unvalidated device data.      Event Time   Out of Recovery 08:30:00         Pain/Fartun Score: Fartun Score: 10 (4/19/2023  8:30 AM)

## 2023-04-19 NOTE — PLAN OF CARE
Discharge instructions given to pt, verbalized understanding.  Tolerating PO fluids.  IV removed.  No c/o pain.  Sunglasses on.  Leaving with eye drops.  Ambulating out to friend  per RN in no distress.

## 2023-04-19 NOTE — DISCHARGE SUMMARY
[facility]  Discharge Note  Short Stay    Procedure(s) (LRB):  CEIOL OD (Right)      OUTCOME: Patient tolerated treatment/procedure well without complication and is now ready for discharge.    DISPOSITION: Home or Self Care    FINAL DIAGNOSIS:  cataract    FOLLOWUP: In clinic    DISCHARGE INSTRUCTIONS:  No discharge procedures on file.     TIME SPENT ON DISCHARGE: 5 minutes

## 2023-04-19 NOTE — H&P
History    Chief complaint:  Painless progressive vision loss right Eye    Present Ilness/Diagnosis: Visually significant cataract, right Eye    ROS: +Eyes, otherwise no significant changes    Past Medical History: refer to chart    Family History/Social History: refer to chart    Allergies:   Review of patient's allergies indicates:   Allergen Reactions    Penicillins Hives       Current Medications: see medcard      Physical Exam    BP: Vital signs stable  General: No apparent distress  HEENT: cataract  Lungs: adequate respirations  Heart: + pulses  Abdomen: soft  Rectal/pelvic: deferred    Labs: Labs Reviewed    Lab Results   Component Value Date    WBC 3.0 (L) 01/09/2023    HGB 14.3 01/09/2023    HCT 43.6 01/09/2023    MCV 89.7 01/09/2023     01/09/2023           CMP  Sodium   Date Value Ref Range Status   01/09/2023 144 135 - 146 mmol/L Final     Potassium   Date Value Ref Range Status   01/09/2023 4.7 3.5 - 5.3 mmol/L Final     Chloride   Date Value Ref Range Status   01/09/2023 104 98 - 110 mmol/L Final     CO2   Date Value Ref Range Status   01/09/2023 29 20 - 32 mmol/L Final     Glucose   Date Value Ref Range Status   01/09/2023 92 65 - 99 mg/dL Final     Comment:                   Fasting reference interval          BUN   Date Value Ref Range Status   01/09/2023 21 7 - 25 mg/dL Final     Creatinine   Date Value Ref Range Status   01/09/2023 0.79 0.60 - 1.00 mg/dL Final     Calcium   Date Value Ref Range Status   01/09/2023 9.4 8.6 - 10.4 mg/dL Final     Total Protein   Date Value Ref Range Status   01/09/2023 6.9 6.1 - 8.1 g/dL Final     Albumin   Date Value Ref Range Status   01/09/2023 4.5 3.6 - 5.1 g/dL Final     Total Bilirubin   Date Value Ref Range Status   01/09/2023 0.7 0.2 - 1.2 mg/dL Final     Alkaline Phosphatase   Date Value Ref Range Status   10/21/2020 14 (L) 55 - 135 U/L Final     AST   Date Value Ref Range Status   01/09/2023 23 10 - 35 U/L Final     ALT   Date Value Ref Range Status    01/09/2023 22 6 - 29 U/L Final     Anion Gap   Date Value Ref Range Status   10/21/2020 11 8 - 16 mmol/L Final     eGFR   Date Value Ref Range Status   01/09/2023 79 > OR = 60 mL/min/1.73m2 Final     Comment:     The eGFR is based on the CKD-EPI 2021 equation. To calculate   the new eGFR from a previous Creatinine or Cystatin C  result, go to https://www.kidney.org/professionals/  kdoqi/gfr%5Fcalculator         The patient has been cleared for surgery in an ambulatory surgery facility.     Impression: Visually significant Cataract right Eye    Plan: Phacoemulsification with implantation of Intraocular lens right Eye

## 2023-04-19 NOTE — TRANSFER OF CARE
"Anesthesia Transfer of Care Note    Patient: Batool Mensah    Procedure(s) Performed: Procedure(s) (LRB):  CEIOL OD (Right)    Patient location: PACU    Anesthesia Type: MAC    Transport from OR: Transported from OR on room air with adequate spontaneous ventilation    Post pain: adequate analgesia    Post assessment: no apparent anesthetic complications    Post vital signs: stable    Level of consciousness: awake    Nausea/Vomiting: no nausea/vomiting    Complications: none    Transfer of care protocol was followed      Last vitals:   Visit Vitals  BP (!) 118/56   Pulse (!) 58   Temp 36.5 °C (97.7 °F) (Skin)   Resp 20   Ht 5' 3" (1.6 m)   Wt 72.6 kg (160 lb 0.9 oz)   SpO2 97%   Breastfeeding No   BMI 28.35 kg/m²     "

## 2023-04-20 ENCOUNTER — OFFICE VISIT (OUTPATIENT)
Dept: OPHTHALMOLOGY | Facility: CLINIC | Age: 73
End: 2023-04-20
Payer: MEDICARE

## 2023-04-20 DIAGNOSIS — Z98.41 STATUS POST CATARACT SURGERY, RIGHT: Primary | ICD-10-CM

## 2023-04-20 PROCEDURE — 99999 PR PBB SHADOW E&M-EST. PATIENT-LVL II: CPT | Mod: PBBFAC,,, | Performed by: OPHTHALMOLOGY

## 2023-04-20 PROCEDURE — 1159F PR MEDICATION LIST DOCUMENTED IN MEDICAL RECORD: ICD-10-PCS | Mod: CPTII,S$GLB,, | Performed by: OPHTHALMOLOGY

## 2023-04-20 PROCEDURE — 3288F FALL RISK ASSESSMENT DOCD: CPT | Mod: CPTII,S$GLB,, | Performed by: OPHTHALMOLOGY

## 2023-04-20 PROCEDURE — 4010F ACE/ARB THERAPY RXD/TAKEN: CPT | Mod: CPTII,S$GLB,, | Performed by: OPHTHALMOLOGY

## 2023-04-20 PROCEDURE — 99024 PR POST-OP FOLLOW-UP VISIT: ICD-10-PCS | Mod: S$GLB,,, | Performed by: OPHTHALMOLOGY

## 2023-04-20 PROCEDURE — 1101F PT FALLS ASSESS-DOCD LE1/YR: CPT | Mod: CPTII,S$GLB,, | Performed by: OPHTHALMOLOGY

## 2023-04-20 PROCEDURE — 4010F PR ACE/ARB THEARPY RXD/TAKEN: ICD-10-PCS | Mod: CPTII,S$GLB,, | Performed by: OPHTHALMOLOGY

## 2023-04-20 PROCEDURE — 1126F PR PAIN SEVERITY QUANTIFIED, NO PAIN PRESENT: ICD-10-PCS | Mod: CPTII,S$GLB,, | Performed by: OPHTHALMOLOGY

## 2023-04-20 PROCEDURE — 1101F PR PT FALLS ASSESS DOC 0-1 FALLS W/OUT INJ PAST YR: ICD-10-PCS | Mod: CPTII,S$GLB,, | Performed by: OPHTHALMOLOGY

## 2023-04-20 PROCEDURE — 1159F MED LIST DOCD IN RCRD: CPT | Mod: CPTII,S$GLB,, | Performed by: OPHTHALMOLOGY

## 2023-04-20 PROCEDURE — 99024 POSTOP FOLLOW-UP VISIT: CPT | Mod: S$GLB,,, | Performed by: OPHTHALMOLOGY

## 2023-04-20 PROCEDURE — 1126F AMNT PAIN NOTED NONE PRSNT: CPT | Mod: CPTII,S$GLB,, | Performed by: OPHTHALMOLOGY

## 2023-04-20 PROCEDURE — 99999 PR PBB SHADOW E&M-EST. PATIENT-LVL II: ICD-10-PCS | Mod: PBBFAC,,, | Performed by: OPHTHALMOLOGY

## 2023-04-20 PROCEDURE — 3288F PR FALLS RISK ASSESSMENT DOCUMENTED: ICD-10-PCS | Mod: CPTII,S$GLB,, | Performed by: OPHTHALMOLOGY

## 2023-04-20 NOTE — PROGRESS NOTES
HPI    Pt presents for one day post op PCIOL OD     Pt states OD is doing well, no complaints.     Moxi QID OD  PF QID OD  Keto QID OD    Last edited by Renetta Hurd on 4/20/2023  8:37 AM.            Assessment /Plan     For exam results, see Encounter Report.    Status post cataract surgery, right      Doing well  Post op precautions and instructions reviewed, sheet given  moxi QID  PF QID  Keto qdaily    F/u 1 week

## 2023-04-21 VITALS
HEART RATE: 56 BPM | SYSTOLIC BLOOD PRESSURE: 118 MMHG | DIASTOLIC BLOOD PRESSURE: 57 MMHG | WEIGHT: 160.06 LBS | RESPIRATION RATE: 17 BRPM | OXYGEN SATURATION: 100 % | TEMPERATURE: 98 F | HEIGHT: 63 IN | BODY MASS INDEX: 28.36 KG/M2

## 2023-04-27 ENCOUNTER — OFFICE VISIT (OUTPATIENT)
Dept: OPHTHALMOLOGY | Facility: CLINIC | Age: 73
End: 2023-04-27
Payer: MEDICARE

## 2023-04-27 DIAGNOSIS — Z98.41 STATUS POST CATARACT SURGERY, RIGHT: Primary | ICD-10-CM

## 2023-04-27 PROCEDURE — 3288F PR FALLS RISK ASSESSMENT DOCUMENTED: ICD-10-PCS | Mod: CPTII,S$GLB,, | Performed by: OPHTHALMOLOGY

## 2023-04-27 PROCEDURE — 1101F PT FALLS ASSESS-DOCD LE1/YR: CPT | Mod: CPTII,S$GLB,, | Performed by: OPHTHALMOLOGY

## 2023-04-27 PROCEDURE — 99024 PR POST-OP FOLLOW-UP VISIT: ICD-10-PCS | Mod: S$GLB,,, | Performed by: OPHTHALMOLOGY

## 2023-04-27 PROCEDURE — 1159F PR MEDICATION LIST DOCUMENTED IN MEDICAL RECORD: ICD-10-PCS | Mod: CPTII,S$GLB,, | Performed by: OPHTHALMOLOGY

## 2023-04-27 PROCEDURE — 4010F PR ACE/ARB THEARPY RXD/TAKEN: ICD-10-PCS | Mod: CPTII,S$GLB,, | Performed by: OPHTHALMOLOGY

## 2023-04-27 PROCEDURE — 4010F ACE/ARB THERAPY RXD/TAKEN: CPT | Mod: CPTII,S$GLB,, | Performed by: OPHTHALMOLOGY

## 2023-04-27 PROCEDURE — 99999 PR PBB SHADOW E&M-EST. PATIENT-LVL II: ICD-10-PCS | Mod: PBBFAC,,, | Performed by: OPHTHALMOLOGY

## 2023-04-27 PROCEDURE — 1101F PR PT FALLS ASSESS DOC 0-1 FALLS W/OUT INJ PAST YR: ICD-10-PCS | Mod: CPTII,S$GLB,, | Performed by: OPHTHALMOLOGY

## 2023-04-27 PROCEDURE — 1126F PR PAIN SEVERITY QUANTIFIED, NO PAIN PRESENT: ICD-10-PCS | Mod: CPTII,S$GLB,, | Performed by: OPHTHALMOLOGY

## 2023-04-27 PROCEDURE — 1160F PR REVIEW ALL MEDS BY PRESCRIBER/CLIN PHARMACIST DOCUMENTED: ICD-10-PCS | Mod: CPTII,S$GLB,, | Performed by: OPHTHALMOLOGY

## 2023-04-27 PROCEDURE — 99999 PR PBB SHADOW E&M-EST. PATIENT-LVL II: CPT | Mod: PBBFAC,,, | Performed by: OPHTHALMOLOGY

## 2023-04-27 PROCEDURE — 99024 POSTOP FOLLOW-UP VISIT: CPT | Mod: S$GLB,,, | Performed by: OPHTHALMOLOGY

## 2023-04-27 PROCEDURE — 1159F MED LIST DOCD IN RCRD: CPT | Mod: CPTII,S$GLB,, | Performed by: OPHTHALMOLOGY

## 2023-04-27 PROCEDURE — 1160F RVW MEDS BY RX/DR IN RCRD: CPT | Mod: CPTII,S$GLB,, | Performed by: OPHTHALMOLOGY

## 2023-04-27 PROCEDURE — 1126F AMNT PAIN NOTED NONE PRSNT: CPT | Mod: CPTII,S$GLB,, | Performed by: OPHTHALMOLOGY

## 2023-04-27 PROCEDURE — 3288F FALL RISK ASSESSMENT DOCD: CPT | Mod: CPTII,S$GLB,, | Performed by: OPHTHALMOLOGY

## 2023-04-27 NOTE — PROGRESS NOTES
HPI     Post-op Evaluation     Additional comments: 1 week post PHaco IOL Right eye. Denies eye pain   happy with vision. States started with few black spots od no flashes.   Using Pred QID and Keto daily.           Last edited by Aniyah An on 4/27/2023  9:14 AM.            Assessment /Plan     For exam results, see Encounter Report.    Status post cataract surgery, right      POW1 CEIOL  Doing well  D/c moxi  Continue PF QID with taper  Continue keto qdaily  Post op instructions reviewed    F/u 1 month, dilate OU, refract PRN

## 2023-05-12 ENCOUNTER — LAB VISIT (OUTPATIENT)
Dept: LAB | Facility: HOSPITAL | Age: 73
End: 2023-05-12
Attending: INTERNAL MEDICINE
Payer: MEDICARE

## 2023-05-12 DIAGNOSIS — I49.5 SSS (SICK SINUS SYNDROME): ICD-10-CM

## 2023-05-12 DIAGNOSIS — I48.0 PAROXYSMAL ATRIAL FIBRILLATION: ICD-10-CM

## 2023-05-12 DIAGNOSIS — R00.1 SINUS BRADYCARDIA: ICD-10-CM

## 2023-05-12 LAB
ANION GAP SERPL CALC-SCNC: 3 MMOL/L (ref 8–16)
APTT PPP: 27.5 SEC (ref 21–32)
BASOPHILS # BLD AUTO: 0.02 K/UL (ref 0–0.2)
BASOPHILS NFR BLD: 0.6 % (ref 0–1.9)
BUN SERPL-MCNC: 25 MG/DL (ref 8–23)
CALCIUM SERPL-MCNC: 9.5 MG/DL (ref 8.7–10.5)
CHLORIDE SERPL-SCNC: 108 MMOL/L (ref 95–110)
CO2 SERPL-SCNC: 31 MMOL/L (ref 23–29)
CREAT SERPL-MCNC: 0.8 MG/DL (ref 0.5–1.4)
DIFFERENTIAL METHOD: ABNORMAL
EOSINOPHIL # BLD AUTO: 0.1 K/UL (ref 0–0.5)
EOSINOPHIL NFR BLD: 2.2 % (ref 0–8)
ERYTHROCYTE [DISTWIDTH] IN BLOOD BY AUTOMATED COUNT: 13.9 % (ref 11.5–14.5)
EST. GFR  (NO RACE VARIABLE): >60 ML/MIN/1.73 M^2
GLUCOSE SERPL-MCNC: 93 MG/DL (ref 70–110)
HCT VFR BLD AUTO: 40.6 % (ref 37–48.5)
HGB BLD-MCNC: 13.2 G/DL (ref 12–16)
IMM GRANULOCYTES # BLD AUTO: 0 K/UL (ref 0–0.04)
IMM GRANULOCYTES NFR BLD AUTO: 0 % (ref 0–0.5)
INR PPP: 1 (ref 0.8–1.2)
LYMPHOCYTES # BLD AUTO: 1.5 K/UL (ref 1–4.8)
LYMPHOCYTES NFR BLD: 46.9 % (ref 18–48)
MCH RBC QN AUTO: 29.9 PG (ref 27–31)
MCHC RBC AUTO-ENTMCNC: 32.5 G/DL (ref 32–36)
MCV RBC AUTO: 92 FL (ref 82–98)
MONOCYTES # BLD AUTO: 0.3 K/UL (ref 0.3–1)
MONOCYTES NFR BLD: 10.6 % (ref 4–15)
NEUTROPHILS # BLD AUTO: 1.3 K/UL (ref 1.8–7.7)
NEUTROPHILS NFR BLD: 39.7 % (ref 38–73)
NRBC BLD-RTO: 0 /100 WBC
PLATELET # BLD AUTO: 171 K/UL (ref 150–450)
PMV BLD AUTO: 11.6 FL (ref 9.2–12.9)
POTASSIUM SERPL-SCNC: 4.4 MMOL/L (ref 3.5–5.1)
PROTHROMBIN TIME: 11.1 SEC (ref 9–12.5)
RBC # BLD AUTO: 4.41 M/UL (ref 4–5.4)
SODIUM SERPL-SCNC: 142 MMOL/L (ref 136–145)
WBC # BLD AUTO: 3.22 K/UL (ref 3.9–12.7)

## 2023-05-12 PROCEDURE — 80048 BASIC METABOLIC PNL TOTAL CA: CPT | Performed by: INTERNAL MEDICINE

## 2023-05-12 PROCEDURE — 36415 COLL VENOUS BLD VENIPUNCTURE: CPT | Performed by: INTERNAL MEDICINE

## 2023-05-12 PROCEDURE — 85025 COMPLETE CBC W/AUTO DIFF WBC: CPT | Performed by: INTERNAL MEDICINE

## 2023-05-12 PROCEDURE — 85730 THROMBOPLASTIN TIME PARTIAL: CPT | Performed by: INTERNAL MEDICINE

## 2023-05-12 PROCEDURE — 85610 PROTHROMBIN TIME: CPT | Performed by: INTERNAL MEDICINE

## 2023-05-15 ENCOUNTER — TELEPHONE (OUTPATIENT)
Dept: ELECTROPHYSIOLOGY | Facility: CLINIC | Age: 73
End: 2023-05-15
Payer: MEDICARE

## 2023-05-15 NOTE — TELEPHONE ENCOUNTER
Spoke to Ms Mensah    CONFIRMED procedure arrival time on 5/16/2023 at 12 noon    Reiterated instructions including:  -Directions to check in desk  -NPO after midnight night prior to procedure  -Confirmed last dose of Eliquis on 5/13/2023  -Pre-procedure LABS completed and reviewed.  -Confirmed no fever, cough, or shortness of breath in the past 30 days  -Do not wear mascara day of procedure  -Bathe night prior and morning prior to procedure with Hibiclens solution or an antibacterial soap      Patient verbalized understanding of above and appreciated the call.

## 2023-05-16 ENCOUNTER — HOSPITAL ENCOUNTER (OUTPATIENT)
Facility: HOSPITAL | Age: 73
Discharge: HOME | End: 2023-05-16
Attending: INTERNAL MEDICINE | Admitting: INTERNAL MEDICINE
Payer: MEDICARE

## 2023-05-16 ENCOUNTER — ANESTHESIA EVENT (OUTPATIENT)
Dept: MEDSURG UNIT | Facility: HOSPITAL | Age: 73
End: 2023-05-16
Payer: MEDICARE

## 2023-05-16 ENCOUNTER — ANESTHESIA (OUTPATIENT)
Dept: MEDSURG UNIT | Facility: HOSPITAL | Age: 73
End: 2023-05-16
Payer: MEDICARE

## 2023-05-16 VITALS
RESPIRATION RATE: 19 BRPM | TEMPERATURE: 98 F | HEIGHT: 64 IN | HEART RATE: 60 BPM | OXYGEN SATURATION: 97 % | DIASTOLIC BLOOD PRESSURE: 78 MMHG | SYSTOLIC BLOOD PRESSURE: 153 MMHG | WEIGHT: 160 LBS | BODY MASS INDEX: 27.31 KG/M2

## 2023-05-16 DIAGNOSIS — I48.11 LONGSTANDING PERSISTENT ATRIAL FIBRILLATION: ICD-10-CM

## 2023-05-16 DIAGNOSIS — Z95.9 CARDIAC DEVICE IN SITU: ICD-10-CM

## 2023-05-16 DIAGNOSIS — R00.1 BRADYCARDIA: ICD-10-CM

## 2023-05-16 DIAGNOSIS — I49.9 ARRHYTHMIA: ICD-10-CM

## 2023-05-16 DIAGNOSIS — I49.5 SSS (SICK SINUS SYNDROME): ICD-10-CM

## 2023-05-16 DIAGNOSIS — I48.91 ATRIAL FIBRILLATION, UNSPECIFIED TYPE: ICD-10-CM

## 2023-05-16 PROCEDURE — 93010 ELECTROCARDIOGRAM REPORT: CPT | Mod: ,,, | Performed by: INTERNAL MEDICINE

## 2023-05-16 PROCEDURE — C1785 PMKR, DUAL, RATE-RESP: HCPCS | Performed by: INTERNAL MEDICINE

## 2023-05-16 PROCEDURE — 93005 ELECTROCARDIOGRAM TRACING: CPT | Mod: 59

## 2023-05-16 PROCEDURE — D9220A PRA ANESTHESIA: Mod: ANES,,, | Performed by: ANESTHESIOLOGY

## 2023-05-16 PROCEDURE — 93005 ELECTROCARDIOGRAM TRACING: CPT

## 2023-05-16 PROCEDURE — 37000009 HC ANESTHESIA EA ADD 15 MINS: Performed by: INTERNAL MEDICINE

## 2023-05-16 PROCEDURE — D9220A PRA ANESTHESIA: ICD-10-PCS | Mod: CRNA,,, | Performed by: NURSE ANESTHETIST, CERTIFIED REGISTERED

## 2023-05-16 PROCEDURE — 63600175 PHARM REV CODE 636 W HCPCS: Performed by: ANESTHESIOLOGY

## 2023-05-16 PROCEDURE — 37000008 HC ANESTHESIA 1ST 15 MINUTES: Performed by: INTERNAL MEDICINE

## 2023-05-16 PROCEDURE — 33208 INSRT HEART PM ATRIAL & VENT: CPT | Mod: KX,,, | Performed by: INTERNAL MEDICINE

## 2023-05-16 PROCEDURE — 93010 EKG 12-LEAD: ICD-10-PCS | Mod: ,,, | Performed by: INTERNAL MEDICINE

## 2023-05-16 PROCEDURE — 25000003 PHARM REV CODE 250: Performed by: INTERNAL MEDICINE

## 2023-05-16 PROCEDURE — 33208 PR INSER HART PACER XVENOUS ATR/VENTR: ICD-10-PCS | Mod: KX,,, | Performed by: INTERNAL MEDICINE

## 2023-05-16 PROCEDURE — C1898 LEAD, PMKR, OTHER THAN TRANS: HCPCS | Performed by: INTERNAL MEDICINE

## 2023-05-16 PROCEDURE — 25500020 PHARM REV CODE 255: Performed by: NURSE ANESTHETIST, CERTIFIED REGISTERED

## 2023-05-16 PROCEDURE — 63600175 PHARM REV CODE 636 W HCPCS: Performed by: NURSE ANESTHETIST, CERTIFIED REGISTERED

## 2023-05-16 PROCEDURE — 63600175 PHARM REV CODE 636 W HCPCS: Performed by: INTERNAL MEDICINE

## 2023-05-16 PROCEDURE — D9220A PRA ANESTHESIA: Mod: CRNA,,, | Performed by: NURSE ANESTHETIST, CERTIFIED REGISTERED

## 2023-05-16 PROCEDURE — C1894 INTRO/SHEATH, NON-LASER: HCPCS | Performed by: INTERNAL MEDICINE

## 2023-05-16 PROCEDURE — D9220A PRA ANESTHESIA: ICD-10-PCS | Mod: ANES,,, | Performed by: ANESTHESIOLOGY

## 2023-05-16 PROCEDURE — 25000003 PHARM REV CODE 250: Performed by: NURSE ANESTHETIST, CERTIFIED REGISTERED

## 2023-05-16 PROCEDURE — 27000191 HC C-V MONITORING

## 2023-05-16 PROCEDURE — 33208 INSRT HEART PM ATRIAL & VENT: CPT | Performed by: INTERNAL MEDICINE

## 2023-05-16 DEVICE — PACING LEAD
Type: IMPLANTABLE DEVICE | Site: HEART | Status: FUNCTIONAL
Brand: TENDRIL MRI™

## 2023-05-16 DEVICE — PULSE GENERATOR
Type: IMPLANTABLE DEVICE | Site: CHEST | Status: FUNCTIONAL
Brand: ASSURITY MRI™

## 2023-05-16 RX ORDER — PROPOFOL 10 MG/ML
VIAL (ML) INTRAVENOUS CONTINUOUS PRN
Status: DISCONTINUED | OUTPATIENT
Start: 2023-05-16 | End: 2023-05-16

## 2023-05-16 RX ORDER — FENTANYL CITRATE 50 UG/ML
INJECTION, SOLUTION INTRAMUSCULAR; INTRAVENOUS
Status: DISCONTINUED | OUTPATIENT
Start: 2023-05-16 | End: 2023-05-16

## 2023-05-16 RX ORDER — FLECAINIDE ACETATE 100 MG/1
100 TABLET ORAL EVERY 12 HOURS
Qty: 60 TABLET | Refills: 11 | Status: SHIPPED | OUTPATIENT
Start: 2023-05-16 | End: 2023-05-16 | Stop reason: SDUPTHER

## 2023-05-16 RX ORDER — VANCOMYCIN HYDROCHLORIDE 1 G/20ML
INJECTION, POWDER, LYOPHILIZED, FOR SOLUTION INTRAVENOUS
Status: DISCONTINUED | OUTPATIENT
Start: 2023-05-16 | End: 2023-05-16 | Stop reason: HOSPADM

## 2023-05-16 RX ORDER — LIDOCAINE HYDROCHLORIDE 20 MG/ML
INJECTION, SOLUTION INFILTRATION; PERINEURAL
Status: DISCONTINUED | OUTPATIENT
Start: 2023-05-16 | End: 2023-05-16 | Stop reason: HOSPADM

## 2023-05-16 RX ORDER — HYDROMORPHONE HYDROCHLORIDE 1 MG/ML
0.2 INJECTION, SOLUTION INTRAMUSCULAR; INTRAVENOUS; SUBCUTANEOUS EVERY 5 MIN PRN
Status: DISCONTINUED | OUTPATIENT
Start: 2023-05-16 | End: 2023-05-16 | Stop reason: HOSPADM

## 2023-05-16 RX ORDER — SODIUM CHLORIDE 0.9 % (FLUSH) 0.9 %
3 SYRINGE (ML) INJECTION
Status: DISCONTINUED | OUTPATIENT
Start: 2023-05-16 | End: 2023-05-16 | Stop reason: HOSPADM

## 2023-05-16 RX ORDER — PROPOFOL 10 MG/ML
VIAL (ML) INTRAVENOUS
Status: DISCONTINUED | OUTPATIENT
Start: 2023-05-16 | End: 2023-05-16

## 2023-05-16 RX ORDER — SODIUM CHLORIDE 0.9 G/100ML
IRRIGANT IRRIGATION
Status: DISCONTINUED | OUTPATIENT
Start: 2023-05-16 | End: 2023-05-16 | Stop reason: HOSPADM

## 2023-05-16 RX ORDER — DOXYCYCLINE 100 MG/1
100 CAPSULE ORAL EVERY 12 HOURS
Qty: 10 CAPSULE | Refills: 0 | Status: SHIPPED | OUTPATIENT
Start: 2023-05-16 | End: 2023-05-21

## 2023-05-16 RX ORDER — MIDAZOLAM HYDROCHLORIDE 1 MG/ML
INJECTION, SOLUTION INTRAMUSCULAR; INTRAVENOUS
Status: DISCONTINUED | OUTPATIENT
Start: 2023-05-16 | End: 2023-05-16

## 2023-05-16 RX ORDER — ACETAMINOPHEN 325 MG/1
650 TABLET ORAL EVERY 4 HOURS PRN
Status: DISCONTINUED | OUTPATIENT
Start: 2023-05-16 | End: 2023-05-16 | Stop reason: HOSPADM

## 2023-05-16 RX ORDER — EPHEDRINE SULFATE 50 MG/ML
INJECTION, SOLUTION INTRAVENOUS
Status: DISCONTINUED | OUTPATIENT
Start: 2023-05-16 | End: 2023-05-16

## 2023-05-16 RX ORDER — FLECAINIDE ACETATE 50 MG/1
50 TABLET ORAL EVERY 12 HOURS
Qty: 60 TABLET | Refills: 11 | Status: SHIPPED | OUTPATIENT
Start: 2023-05-16 | End: 2024-02-28 | Stop reason: SDUPTHER

## 2023-05-16 RX ORDER — BUPIVACAINE HYDROCHLORIDE 2.5 MG/ML
INJECTION, SOLUTION EPIDURAL; INFILTRATION; INTRACAUDAL
Status: DISCONTINUED | OUTPATIENT
Start: 2023-05-16 | End: 2023-05-16 | Stop reason: HOSPADM

## 2023-05-16 RX ORDER — IODIXANOL 320 MG/ML
INJECTION, SOLUTION INTRAVASCULAR
Status: DISCONTINUED | OUTPATIENT
Start: 2023-05-16 | End: 2023-05-16

## 2023-05-16 RX ADMIN — SODIUM CHLORIDE 1000 MG: 9 INJECTION, SOLUTION INTRAVENOUS at 02:05

## 2023-05-16 RX ADMIN — IODIXANOL 10 ML: 320 INJECTION, SOLUTION INTRAVASCULAR at 02:05

## 2023-05-16 RX ADMIN — MIDAZOLAM HYDROCHLORIDE 2 MG: 1 INJECTION, SOLUTION INTRAMUSCULAR; INTRAVENOUS at 01:05

## 2023-05-16 RX ADMIN — FENTANYL CITRATE 25 MCG: 50 INJECTION, SOLUTION INTRAMUSCULAR; INTRAVENOUS at 02:05

## 2023-05-16 RX ADMIN — Medication 100 MCG/KG/MIN: at 02:05

## 2023-05-16 RX ADMIN — HYDROMORPHONE HYDROCHLORIDE 0.2 MG: 1 INJECTION, SOLUTION INTRAMUSCULAR; INTRAVENOUS; SUBCUTANEOUS at 05:05

## 2023-05-16 RX ADMIN — FENTANYL CITRATE 25 MCG: 50 INJECTION, SOLUTION INTRAMUSCULAR; INTRAVENOUS at 03:05

## 2023-05-16 RX ADMIN — PROPOFOL 30 MG: 10 INJECTION, EMULSION INTRAVENOUS at 02:05

## 2023-05-16 RX ADMIN — FENTANYL CITRATE 50 MCG: 50 INJECTION, SOLUTION INTRAMUSCULAR; INTRAVENOUS at 02:05

## 2023-05-16 RX ADMIN — SODIUM CHLORIDE: 0.9 INJECTION, SOLUTION INTRAVENOUS at 01:05

## 2023-05-16 RX ADMIN — PROPOFOL 20 MG: 10 INJECTION, EMULSION INTRAVENOUS at 02:05

## 2023-05-16 RX ADMIN — EPHEDRINE SULFATE 10 MG: 50 INJECTION INTRAMUSCULAR; INTRAVENOUS; SUBCUTANEOUS at 02:05

## 2023-05-16 RX ADMIN — ACETAMINOPHEN 650 MG: 325 TABLET ORAL at 04:05

## 2023-05-16 NOTE — PLAN OF CARE
Patient arrived to room. PIV placed, no labs ordered. Admit assessment completed. Plan of care discussed with patient. VSS. A&Ox4. Pt denies any complaints or pain at this time. Nurse call bell within reach. Will continue to monitor.      EKG done and in chart.

## 2023-05-16 NOTE — PLAN OF CARE
Assumed care of pt from [facility] PACU. Pt arrived via stretcher, currently on bedrest. Pt aao. VSS. Respirations e/u on RA. Aqualcel adhesive dressing primary and covered with pressure dressing, to be removed tomorrow per Jose PINEDA order. Dressings dry, clean, and intact to left chest wall. Good ROM x 4 extremities. Afebrile. Denies pain. Pt given jello and ice water. Left arm immobilizer with sling and swath. Unit protocol reviewed with patient and family member, both parties verbalized understanding. Call light within reach.

## 2023-05-16 NOTE — BRIEF OP NOTE
: Curt Ariza MD  Date of procedure: 5/16/2023    Post-operative Diagnosis: symptomatic bradycardia    Procedure Performed: dual chamber permanent pacemaker implant    Description of Procedure: The patient was brought to the EP lab in the fasting state. Prepped and draped in sterile fashion. Safety timeout was performed. Sedation administered by anesthesia staff. Selective venogram of the left axillary and cephalic veins performed via left ac IV. Lidocaine used for local anesthetic. Fluoroscopic guided axillary access utilized. Guide/J Wire advanced and confirmed in IVC. Incision made. Blunt and electrocautery dissection performed. Pocket made. Second fluoroscopic guided axillary access obtained. Guide/J wire advanced and confirmed in IVC. Peel away sheath advanced over J wire. RV lead advanced into RV. R waves and injury pattern adequate. Lead fixed into place and sutured in place. Second peel away sheath advanced over J wire. RA lead advanced into RA via peel away sheath. After adequate p waves and current of injury detected, the RA lead was fixed into place in the RA appendage. Peel away sheath removed and RA lead sutured into place. Pocket washed using antibiotic solution. Leads connected to generator. Generator placed into pocket, sutured in place, and washed with antibiotic solution. Deep layer closed with interrupted 3-0 suture. Intermediate layer closed with running 3-0 suture. Superficial layer closed with running 4-0 suture. Skin closed with Dermabond. Meplex dressing to be placed after dermabond has dried.     EBL: <10 mL    Specimens Removed: None  Complications: no immediate      1. Sling to left arm - wear for 48 hours, then only at night for 6 weeks.  2. NO HEPARIN PRODUCTS NO ELIQUIS 5 days  3. Doxycycline 100mg BID for 5 days at discharge (or after the 2 doses of IV antibiotics if still in the hospital)  4. No lifting left elbow above shoulder height  5. No lifting over 5 pounds  6.  No driving for 1 week and for 4 weeks if patient uses left arm to make turns  7. Dressing will be removed in AM by patient  8. Chest Xray (ordered)  9. Follow up in device clinic in 1 week for device and wound checks.     Dr Ariza, the attending electrophysiologist, was present for the entirety of this procedure.    Shyam Garcia MD PGY8

## 2023-05-16 NOTE — NURSING TRANSFER
Nursing Transfer Note      5/16/2023     Reason patient is being transferred: ep pacu 2 to [facility] 02/home    Transfer To: ep pacu 2 to [facility] 02/home    Transfer via stretcher    Transfer with cardiac monitoring, tele box on confirmed by tele tech    Transported by emilio herndon     Telemetry: Box Number [id number], Rate 60, and Rhythm sr    Medicines sent: none    Any special needs or follow-up needed: bedrest x3hrs. Done at 1915    Chart send with patient: Yes    Notified: friend    Patient reassessed at: 5/16/23 1800  1  Upon arrival to floor: cardiac monitor applied, patient oriented to room, call bell in reach, and bed in lowest position

## 2023-05-16 NOTE — DISCHARGE SUMMARY
[facility]  Cardiology  Discharge Summary      Patient Name: Batool Mensah  MRN: [id number]  Admission Date: 5/16/2023  Hospital Length of Stay: 0 days  Discharge Date and Time:  05/16/2023 4:57 PM  Attending Physician: Curt Ariza MD  Discharging Provider: Shyam Garcia MD  Primary Care Physician: Sage Dickson MD    HPI: I had the pleasure of seeing Batool Mensah in follow-up for her history of AF. Last seen in 11/2022. She is a 72F with HTN and HLD, who was well until 9/2022 when she was incidentally noted to be in AF at 119 bpm (ECG scanned in EMR). She was started on Toprol and eliquis at the time. At a follow-up visit she was found to be bradycardic, so her toprol dose was reduced to 12.5 mg once daily. She was then seen by cardiology, where her resting HR was 43 bpm. She was asymptomatic.      Echo in 11/2022 showed EF 65%. Exercise NST in 11/2022 showed no obvious perfusion defects. A 48 hour Holter done in 11/2022 showed sinus rhythm with rates of 32 bpm to 141 bpm (mean rate 58 bpm), nonsustained AT, no sustained arrhythmias.     A Zio monitor in 11/2022 showed sinus rhythm with an average HR of 68 bpm, and a 14% AF burden (average HR in  bpm).     My interpretation of today's ECG is sinus bradycardia at 42 bpm.        Interval History:  Patient denies chest pain fevers chills SOB Last dose eliquis 2 days prior    Procedure(s) (LRB):  INSERTION, CARDIAC PACEMAKER, DUAL CHAMBER (Left)     Indwelling Lines/Drains at time of discharge:  Lines/Drains/Airways       Drain  Duration                  Closed/Suction Drain 10/19/20 1232 Left LLQ Bulb 7 Fr. 939 days                    Hospital Course (synopsis of major diagnoses, care, treatment, and services provided during the course of the hospital stay): Patient arrived fasting for PPM. Patient tolerated procedure well. Patient monitored post op without issues and discharged. Instructions given regarding arm restrictions,  pressure bandage removal, holding eliquis and taking abx.         Pending Diagnostic Studies:       Procedure Component Value Units Date/Time    EKG 12-lead [[id number]]     Order Status: Sent Lab Status: No result     X-Ray Chest AP Portable [[id number]] Resulted: 05/16/23 1652    Order Status: Sent Lab Status: In process Updated: 05/16/23 1654            There are no hospital problems to display for this patient.      Discharged Condition: good    Follow Up:   Follow-up Information       DEVICE CHECK CLINIC Follow up in 1 week(s).               Curt Ariza MD Follow up in 3 month(s).    Specialties: Electrophysiology, Cardiovascular Disease, Cardiology  Contact information:  [facility] [id number] [phone]                           Patient Instructions:   Please remove white pressure bandage tomorrow morning 5/17/23  Sling to left arm - wear for 24 hours, then only at night for 6 weeks.  NO HEPARIN PRODUCTS NO ELIQUIS FOR 5 days  Doxycycline 100 mg PO BID for 5 days at discharge  No lifting left elbow above shoulder height  No lifting over 5 pounds  No driving for 1 week and for 4 weeks if patient uses left arm to make turns  Patient may shower in 24 hours, do not let beam of shower hit site directly and no scrubbing in area  Follow up in device clinic in 1 week and with Dr. Ariza in 4 months.   Medications:  Reconciled Home Medications:      Medication List        START taking these medications      doxycycline 100 MG Cap  Commonly known as: VIBRAMYCIN  Take 1 capsule (100 mg total) by mouth every 12 (twelve) hours. for 5 days     flecainide 50 MG Tab  Commonly known as: TAMBOCOR  Take 1 tablet (50 mg total) by mouth every 12 (twelve) hours.            CHANGE how you take these medications      apixaban 5 mg Tab  Commonly known as: ELIQUIS  Take 1 tablet (5 mg total) by mouth 2 (two) times daily.  Start taking on: May 22, 2023  What changed: These instructions start on May 22, 2023. If you  are unsure what to do until then, ask your doctor or other care provider.            CONTINUE taking these medications      alendronate 70 MG tablet  Commonly known as: FOSAMAX  Take 1 tablet (70 mg total) by mouth every 7 days. Take on empty stomach with full glass of water-do not eat or lie down for 1 hour For osteoporosis     atorvastatin 20 MG tablet  Commonly known as: LIPITOR  Take 1 tablet (20 mg total) by mouth every evening.     calcium-vitamin D3 500 mg-5 mcg (200 unit) per tablet  Commonly known as: OS-KRYS 500 + D3  Take 1 tablet by mouth 2 (two) times daily with meals.     ketorolac 0.5% 0.5 % Drop  Commonly known as: ACULAR  Place 1 drop into the right eye 4 (four) times daily. Start 3 days before surgery.     losartan 50 MG tablet  Commonly known as: COZAAR  Take 1 tablet (50 mg total) by mouth once daily.     metoprolol succinate 25 MG 24 hr tablet  Commonly known as: TOPROL-XL  Take 0.5 tablets (12.5 mg total) by mouth once daily. Take 1/2 tablet by mouth daily.     prednisoLONE acetate 1 % Drps  Commonly known as: PRED FORTE  Place 1 drop into the right eye 4 (four) times daily. Start after surgery              Time spent on the discharge of patient: 30 minutes    Shyam Garcia MD  Cardiology  [facility]

## 2023-05-16 NOTE — PLAN OF CARE
"Pt aaox4 follows commands. S/p dual chamber pacemaker. St teodoro ddd low limit 60. Pt's friend updated by rep and md. Has booklet/card and box. Left chest wall incision dermabond, aquacell ag, guaze/pressure drsg, sling on. Dr plaza ep fellow updated pt in ep pacu 2, pressure drsg to be removed tomorrow am by patient. Pt complaints of "soreness to left chest wall incision". Prn po and iv pain meds given per md order. At this time "tolerable"  pt tolerating po intake in ep pacu 2. 12 lead ekg and chest xray done per md order. Pharmacy to deliver bedside prescriptions. [facility]scu rn to call.  See flowsheet for full assessment. Bedrest x3hrs, done at 1910.   "

## 2023-05-16 NOTE — Clinical Note
A venogram was performed in the left axillary vein. The vessel was injected via hand injection  with 10 mL of contrast.

## 2023-05-16 NOTE — ANESTHESIA PREPROCEDURE EVALUATION
05/16/2023  Batool Mensah is a 73 y.o., female.      Pre-op Assessment    I have reviewed the Patient Summary Reports.       I have reviewed the Medications.     Review of Systems  Anesthesia Hx:  No problems with previous Anesthesia  History of prior surgery of interest to airway management or planning: Previous anesthesia: General   Social:  Non-Smoker, No Alcohol Use    Hematology/Oncology:  Hematology Normal   Oncology Normal     EENT/Dental:EENT/Dental Normal   Cardiovascular:   Exercise tolerance: poor Hypertension, well controlled    Pulmonary:  Pulmonary Normal    Renal/:  Renal/ Normal     Hepatic/GI:  Hepatic/GI Normal    Musculoskeletal:   Arthritis     Neurological:  Neurology Normal    Endocrine:  Endocrine Normal    Dermatological:  Skin Normal    Psych:  Psychiatric Normal           Physical Exam  General: Well nourished, Cooperative, Alert and Oriented    Airway:  Mallampati: II   Mouth Opening: Normal  TM Distance: Normal  Tongue: Normal  Neck ROM: Normal ROM    Dental:  Intact        Anesthesia Plan  Type of Anesthesia, risks & benefits discussed:    Anesthesia Type: MAC  Intra-op Monitoring Plan: Standard ASA Monitors  Post Op Pain Control Plan: multimodal analgesia and IV/PO Opioids PRN  Induction:  IV  Airway Plan: , Post-Induction  Informed Consent: Informed consent signed with the Patient and all parties understand the risks and agree with anesthesia plan.  All questions answered.   ASA Score: 3    Ready For Surgery From Anesthesia Perspective.     .

## 2023-05-16 NOTE — H&P
Electrophysiology Pre Procedure H&P    HPI:   I had the pleasure of seeing Batool Mensah in follow-up for her history of AF. Last seen in 11/2022. She is a 72F with HTN and HLD, who was well until 9/2022 when she was incidentally noted to be in AF at 119 bpm (ECG scanned in EMR). She was started on Toprol and eliquis at the time. At a follow-up visit she was found to be bradycardic, so her toprol dose was reduced to 12.5 mg once daily. She was then seen by cardiology, where her resting HR was 43 bpm. She was asymptomatic.      Echo in 11/2022 showed EF 65%. Exercise NST in 11/2022 showed no obvious perfusion defects. A 48 hour Holter done in 11/2022 showed sinus rhythm with rates of 32 bpm to 141 bpm (mean rate 58 bpm), nonsustained AT, no sustained arrhythmias.     A Zio monitor in 11/2022 showed sinus rhythm with an average HR of 68 bpm, and a 14% AF burden (average HR in  bpm).     My interpretation of today's ECG is sinus bradycardia at 42 bpm.       Interval History:  Patient denies chest pain fevers chills SOB Last dose eliquis 2 days prior    Past Medical History:   Diagnosis Date    A-fib     Anticoagulant long-term use     Arthritis     Encounter for blood transfusion     Hypertension     Mixed hyperlipidemia     Sleep apnea     cpap        Social History     Socioeconomic History    Marital status:    Tobacco Use    Smoking status: Never    Smokeless tobacco: Never   Substance and Sexual Activity    Alcohol use: Yes     Comment: socially    Drug use: Never    Sexual activity: Not Currently     Social Determinants of Health     Financial Resource Strain: Low Risk     Difficulty of Paying Living Expenses: Not hard at all   Food Insecurity: No Food Insecurity    Worried About Running Out of Food in the Last Year: Never true    Ran Out of Food in the Last Year: Never true   Transportation Needs: No Transportation Needs    Lack of Transportation (Medical): No    Lack of Transportation  (Non-Medical): No   Physical Activity: Sufficiently Active    Days of Exercise per Week: 5 days    Minutes of Exercise per Session: 60 min   Stress: No Stress Concern Present    Feeling of Stress : Only a little   Social Connections: Unknown    Frequency of Communication with Friends and Family: More than three times a week    Frequency of Social Gatherings with Friends and Family: More than three times a week    Active Member of Clubs or Organizations: Yes    Attends Club or Organization Meetings: More than 4 times per year    Marital Status:    Housing Stability: Low Risk     Unable to Pay for Housing in the Last Year: No    Number of Places Lived in the Last Year: 1    Unstable Housing in the Last Year: No        History reviewed. No pertinent family history.     Current Facility-Administered Medications   Medication Dose Route Frequency Provider Last Rate Last Admin    vancomycin (VANCOCIN) 1,000 mg in dextrose 5 % (D5W) 250 mL IVPB (Vial-Mate)  1,000 mg Intravenous On Call Procedure Elisas Grove NP         Facility-Administered Medications Ordered in Other Encounters   Medication Dose Route Frequency Provider Last Rate Last Admin    lactated ringers infusion   Intravenous Continuous Ammon Mcmanus MD   Stopped at 03/29/23 0918    LIDOcaine (PF) 10 mg/ml (1%) injection 10 mg  1 mL Intradermal Once Ammon Mcmanus MD          ROS:  Constitutional: (-)fevers, (-)chills, (-)night sweats  HEENT: (-) headaches (-) lightheadedness (-) blurry vision  Cardiovascular: (-)chest pain (-)paroxysmal nocturnal dyspnea (-)orthopnea  Respiratory: (-)shortness of breath, (-)dyspnea on exertion (-)hemoptysis  Gastrointestinal: (-)abdominal pain (-)nausea (-)vomiting (-)tarry stools  Musculoskeletal: (-)arthralgias (-)limited range of motion  Neurologic: (-)parasthesias (-)mood disorder (-)anxiety  Endo: (-)polyuria (-)polydipsia (-)heat/cold intolerance  Skin: (-)rash     Vitals:    05/16/23 1231 05/16/23 1232  "  BP: (!) 157/72 (!) 156/78   BP Location: Right arm Left arm   Patient Position: Lying Lying   Pulse: (!) 49    Resp: 14    Temp: 98.8 °F (37.1 °C)    TempSrc: Temporal    SpO2: 100%    Weight: 72.6 kg (160 lb)    Height: 5' 4" (1.626 m)      Physical Exam:   Gen: no acute distress, pleasant patient answering questions appropriately  HEENT: extra-ocular muscles intact, normocephalic-atraumatic  Neck: no jugular venous distension, no lymphadenopathy, no thyromegaly, no carotid bruits  CVS: regular rate and rhythm, S1S2 normal, no murmurs/rubs/gallops  CHEST: clear to auscultation bilaterally, no wheezes/rales/ronchi  ABD: Soft, non-tender, nondistended, bowel sounds present  EXT: No Edema, 2+ pulses bilaterally (radial, femoral, dorsales pedis, posterior tibial)  NEURO: awake, alert, and oriented   Skin: No rash     Review of Labs:   At baseline      Most recent ECG  Sinus bradycardia 48bpm     Assessment/Plan:  Batool Mensah is a 73 y.o. female with AF bradycardia.  Ppm  Flecainide post procedure    "

## 2023-05-16 NOTE — TRANSFER OF CARE
"Anesthesia Transfer of Care Note    Patient: Batool Mensah    Procedure(s) Performed: Procedure(s) (LRB):  INSERTION, CARDIAC PACEMAKER, DUAL CHAMBER (Left)    Patient location: [facility]    Anesthesia Type: general    Transport from OR: Transported from OR on 6-10 L/min O2 by face mask with adequate spontaneous ventilation    Post pain: adequate analgesia    Post assessment: no apparent anesthetic complications    Post vital signs: stable    Level of consciousness: awake, alert and oriented    Nausea/Vomiting: no nausea/vomiting    Complications: none    Transfer of care protocol was followed      Last vitals:   Visit Vitals  /78   Pulse 60   Temp 37.1 °C (98.8 °F) (Temporal)   Resp 14   Ht 5' 4" (1.626 m)   Wt 72.6 kg (160 lb)   SpO2 100%   Breastfeeding No   BMI 27.46 kg/m²     "

## 2023-05-16 NOTE — PATIENT INSTRUCTIONS
Please remove white pressure bandage tomorrow morning 5/17/23  Sling to left arm - wear for 24 hours, then only at night for 6 weeks.  NO HEPARIN PRODUCTS NO ELIQUIS FOR 5 days  Doxycycline 100 mg PO BID for 5 days at discharge  No lifting left elbow above shoulder height  No lifting over 5 pounds  No driving for 1 week and for 4 weeks if patient uses left arm to make turns  Patient may shower in 24 hours, do not let beam of shower hit site directly and no scrubbing in area  Follow up in device clinic in 1 week and with Dr. Ariza in 4 months.

## 2023-05-17 ENCOUNTER — TELEPHONE (OUTPATIENT)
Dept: ELECTROPHYSIOLOGY | Facility: CLINIC | Age: 73
End: 2023-05-17
Payer: MEDICARE

## 2023-05-17 NOTE — TELEPHONE ENCOUNTER
Returned the pt's call on this am and rescheduled PPM wound ck with the pt to 5/24/23 @ 11:40 am.  Pt was agreeable with this date and time. Pt stated she is unable to find transportation from [place] on 5/23/23. Understanding was verbalized.  Pt appreciated the call.

## 2023-05-17 NOTE — ANESTHESIA POSTPROCEDURE EVALUATION
Anesthesia Post Evaluation    Patient: Batool Mensah    Procedure(s) Performed: Procedure(s) (LRB):  INSERTION, CARDIAC PACEMAKER, DUAL CHAMBER (Left)    Final Anesthesia Type: general      Patient location during evaluation: PACU  Patient participation: Yes- Able to Participate  Level of consciousness: awake and alert  Post-procedure vital signs: reviewed and stable  Pain management: adequate  Airway patency: patent    PONV status at discharge: No PONV  Anesthetic complications: no      Cardiovascular status: blood pressure returned to baseline  Respiratory status: unassisted  Hydration status: euvolemic  Follow-up not needed.          Vitals Value Taken Time   /78 05/16/23 1910   Temp 36.6 °C (97.8 °F) 05/16/23 1910   Pulse 60 05/16/23 1910   Resp 19 05/16/23 1910   SpO2 97 % 05/16/23 1810         No case tracking events are documented in the log.      Pain/Fartun Score: Pain Rating Prior to Med Admin: 7 (5/16/2023  5:16 PM)  Pain Rating Post Med Admin: 5 (5/16/2023  5:53 PM)  Fartun Score: 10 (5/16/2023  6:40 PM)

## 2023-05-17 NOTE — PLAN OF CARE
Pt ambulated around unit with minimal assist. Strong and steady gait noted. Voided in restroom independently. VSS. Assisted pt with dressing. Awaiting transport.

## 2023-05-17 NOTE — TELEPHONE ENCOUNTER
----- Message from Maritza Garcia sent at 5/17/2023  8:53 AM CDT -----  Regarding: Rs appt  Pt [phone] says she need to rs her in clinic device check sabas 5/23/23 because she can't make this day due to transportation.    Thanks

## 2023-05-17 NOTE — PLAN OF CARE
Patient Education        Dizziness: Care Instructions  Your Care Instructions  Dizziness is the feeling of unsteadiness or fuzziness in your head. It is different than having vertigo, which is a feeling that the room is spinning or that you are moving or falling. It is also different from lightheadedness, which is the feeling that you are about to faint. It can be hard to know what causes dizziness. Some people feel dizzy when they have migraine headaches. Sometimes bouts of flu can make you feel dizzy. Some medical conditions, such as heart problems or high blood pressure, can make you feel dizzy. Many medicines can cause dizziness, including medicines for high blood pressure, pain, or anxiety. If a medicine causes your symptoms, your doctor may recommend that you stop or change the medicine. If it is a problem with your heart, you may need medicine to help your heart work better. If there is no clear reason for your symptoms, your doctor may suggest watching and waiting for a while to see if the dizziness goes away on its own. Follow-up care is a key part of your treatment and safety. Be sure to make and go to all appointments, and call your doctor if you are having problems. It's also a good idea to know your test results and keep a list of the medicines you take. How can you care for yourself at home? · If your doctor recommends or prescribes medicine, take it exactly as directed. Call your doctor if you think you are having a problem with your medicine. · Do not drive while you feel dizzy. · Try to prevent falls. Steps you can take include:  ? Using nonskid mats, adding grab bars near the tub, and using night-lights. ? Clearing your home so that walkways are free of anything you might trip on.  ? Letting family and friends know that you have been feeling dizzy. This will help them know how to help you. When should you call for help? Call 911 anytime you think you may need emergency care.  For example, Patient discharged per MD orders. Instructions given on medications, wound care, activity, signs of infection, when to call MD, and follow up appointments. Pt verbalized understanding. PIVs removed. Patient and family used wheelchair per nurse off unit to private vehicle. Dressing to left chest wall dry, clean, and intact. Left arm with sling and swath immobilizer.   call if:    · You passed out (lost consciousness).     · You have dizziness along with symptoms of a heart attack. These may include:  ? Chest pain or pressure, or a strange feeling in the chest.  ? Sweating. ? Shortness of breath. ? Nausea or vomiting. ? Pain, pressure, or a strange feeling in the back, neck, jaw, or upper belly or in one or both shoulders or arms. ? Lightheadedness or sudden weakness. ? A fast or irregular heartbeat.     · You have symptoms of a stroke. These may include:  ? Sudden numbness, tingling, weakness, or loss of movement in your face, arm, or leg, especially on only one side of your body. ? Sudden vision changes. ? Sudden trouble speaking. ? Sudden confusion or trouble understanding simple statements. ? Sudden problems with walking or balance. ? A sudden, severe headache that is different from past headaches.    Call your doctor now or seek immediate medical care if:    · You feel dizzy and have a fever, headache, or ringing in your ears.     · You have new or increased nausea and vomiting.     · Your dizziness does not go away or comes back.    Watch closely for changes in your health, and be sure to contact your doctor if:    · You do not get better as expected. Where can you learn more? Go to https://Duxter.Desti. org and sign in to your Heyy account. Enter F537 in the [facility] box to learn more about \"Dizziness: Care Instructions. \"     If you do not have an account, please click on the \"Sign Up Now\" link. Current as of: September 23, 2018  Content Version: 12.0  © 5023-6878 [employer]. Care instructions adapted under license by [facility] ([facility]). If you have questions about a medical condition or this instruction, always ask your healthcare professional. [place] [address] any warranty or liability for your use of this information.

## 2023-05-24 ENCOUNTER — CLINICAL SUPPORT (OUTPATIENT)
Dept: CARDIOLOGY | Facility: HOSPITAL | Age: 73
End: 2023-05-24
Attending: INTERNAL MEDICINE
Payer: MEDICARE

## 2023-05-24 DIAGNOSIS — I49.5 SSS (SICK SINUS SYNDROME): ICD-10-CM

## 2023-05-24 DIAGNOSIS — I48.0 PAROXYSMAL ATRIAL FIBRILLATION: ICD-10-CM

## 2023-05-24 DIAGNOSIS — R00.1 SINUS BRADYCARDIA: ICD-10-CM

## 2023-05-24 PROCEDURE — 93280 PM DEVICE PROGR EVAL DUAL: CPT | Mod: 26,,, | Performed by: INTERNAL MEDICINE

## 2023-05-24 PROCEDURE — 93280 CARDIAC DEVICE CHECK - IN CLINIC & HOSPITAL: ICD-10-PCS | Mod: 26,,, | Performed by: INTERNAL MEDICINE

## 2023-05-24 PROCEDURE — 93280 PM DEVICE PROGR EVAL DUAL: CPT

## 2023-05-25 ENCOUNTER — OFFICE VISIT (OUTPATIENT)
Dept: OPHTHALMOLOGY | Facility: CLINIC | Age: 73
End: 2023-05-25
Payer: MEDICARE

## 2023-05-25 DIAGNOSIS — Z98.41 STATUS POST CATARACT SURGERY, RIGHT: Primary | ICD-10-CM

## 2023-05-25 PROCEDURE — 1101F PR PT FALLS ASSESS DOC 0-1 FALLS W/OUT INJ PAST YR: ICD-10-PCS | Mod: CPTII,S$GLB,, | Performed by: OPHTHALMOLOGY

## 2023-05-25 PROCEDURE — 99024 POSTOP FOLLOW-UP VISIT: CPT | Mod: S$GLB,,, | Performed by: OPHTHALMOLOGY

## 2023-05-25 PROCEDURE — 99024 PR POST-OP FOLLOW-UP VISIT: ICD-10-PCS | Mod: S$GLB,,, | Performed by: OPHTHALMOLOGY

## 2023-05-25 PROCEDURE — 99999 PR PBB SHADOW E&M-EST. PATIENT-LVL II: CPT | Mod: PBBFAC,,, | Performed by: OPHTHALMOLOGY

## 2023-05-25 PROCEDURE — 4010F PR ACE/ARB THEARPY RXD/TAKEN: ICD-10-PCS | Mod: CPTII,S$GLB,, | Performed by: OPHTHALMOLOGY

## 2023-05-25 PROCEDURE — 92015 PR REFRACTION: ICD-10-PCS | Mod: S$GLB,,, | Performed by: OPHTHALMOLOGY

## 2023-05-25 PROCEDURE — 1126F PR PAIN SEVERITY QUANTIFIED, NO PAIN PRESENT: ICD-10-PCS | Mod: CPTII,S$GLB,, | Performed by: OPHTHALMOLOGY

## 2023-05-25 PROCEDURE — 1159F PR MEDICATION LIST DOCUMENTED IN MEDICAL RECORD: ICD-10-PCS | Mod: CPTII,S$GLB,, | Performed by: OPHTHALMOLOGY

## 2023-05-25 PROCEDURE — 1159F MED LIST DOCD IN RCRD: CPT | Mod: CPTII,S$GLB,, | Performed by: OPHTHALMOLOGY

## 2023-05-25 PROCEDURE — 1126F AMNT PAIN NOTED NONE PRSNT: CPT | Mod: CPTII,S$GLB,, | Performed by: OPHTHALMOLOGY

## 2023-05-25 PROCEDURE — 1101F PT FALLS ASSESS-DOCD LE1/YR: CPT | Mod: CPTII,S$GLB,, | Performed by: OPHTHALMOLOGY

## 2023-05-25 PROCEDURE — 1160F PR REVIEW ALL MEDS BY PRESCRIBER/CLIN PHARMACIST DOCUMENTED: ICD-10-PCS | Mod: CPTII,S$GLB,, | Performed by: OPHTHALMOLOGY

## 2023-05-25 PROCEDURE — 4010F ACE/ARB THERAPY RXD/TAKEN: CPT | Mod: CPTII,S$GLB,, | Performed by: OPHTHALMOLOGY

## 2023-05-25 PROCEDURE — 92015 DETERMINE REFRACTIVE STATE: CPT | Mod: S$GLB,,, | Performed by: OPHTHALMOLOGY

## 2023-05-25 PROCEDURE — 3288F FALL RISK ASSESSMENT DOCD: CPT | Mod: CPTII,S$GLB,, | Performed by: OPHTHALMOLOGY

## 2023-05-25 PROCEDURE — 99999 PR PBB SHADOW E&M-EST. PATIENT-LVL II: ICD-10-PCS | Mod: PBBFAC,,, | Performed by: OPHTHALMOLOGY

## 2023-05-25 PROCEDURE — 1160F RVW MEDS BY RX/DR IN RCRD: CPT | Mod: CPTII,S$GLB,, | Performed by: OPHTHALMOLOGY

## 2023-05-25 PROCEDURE — 3288F PR FALLS RISK ASSESSMENT DOCUMENTED: ICD-10-PCS | Mod: CPTII,S$GLB,, | Performed by: OPHTHALMOLOGY

## 2023-05-25 NOTE — PROGRESS NOTES
HPI     Post-op Evaluation     Additional comments: 1 month post Phaco IOL Right eye. Denies eye pain   today. States she is very pleased with surgery outcome. Finished all eye   gtts as directed.           Last edited by Aniyah An on 5/25/2023 10:25 AM.            Assessment /Plan     For exam results, see Encounter Report.    Status post cataract surgery, right      POM1 CEIOL OD, POM2 OS  Doing well  New glasses Rx today    F/u 1 year, routine exam with OCT mac

## 2023-06-06 ENCOUNTER — TELEPHONE (OUTPATIENT)
Dept: OPHTHALMOLOGY | Facility: CLINIC | Age: 73
End: 2023-06-06
Payer: MEDICARE

## 2023-06-06 NOTE — TELEPHONE ENCOUNTER
----- Message from Aruna Kothari sent at 6/6/2023  8:50 AM CDT -----  Contact: self  Type: Needs Medical Advice  Who Called:  pt  Symptoms (please be specific):  R eye central vision oval gray spot  How long has patient had these symptoms:  2 days  Pharmacy name and phone #:    [id number] - [place] - [address]  [address] [address]  Phone: [phone] Fax: [phone]        Best Call Back Number: [phone] or [phone]    Additional Information: Pt would like to speak with staff  Thank you

## 2023-06-07 ENCOUNTER — OFFICE VISIT (OUTPATIENT)
Dept: OPHTHALMOLOGY | Facility: CLINIC | Age: 73
End: 2023-06-07
Payer: MEDICARE

## 2023-06-07 DIAGNOSIS — H35.3122 INTERMEDIATE STAGE NONEXUDATIVE AGE-RELATED MACULAR DEGENERATION OF LEFT EYE: ICD-10-CM

## 2023-06-07 DIAGNOSIS — H43.813 VITREOUS DEGENERATION OF BOTH EYES: ICD-10-CM

## 2023-06-07 DIAGNOSIS — H35.3211 EXUDATIVE AGE-RELATED MACULAR DEGENERATION OF RIGHT EYE WITH ACTIVE CHOROIDAL NEOVASCULARIZATION: Primary | ICD-10-CM

## 2023-06-07 DIAGNOSIS — Z96.1 PSEUDOPHAKIA OF BOTH EYES: ICD-10-CM

## 2023-06-07 PROBLEM — H35.3131 EARLY DRY STAGE NONEXUDATIVE AGE-RELATED MACULAR DEGENERATION OF BOTH EYES: Status: ACTIVE | Noted: 2023-06-07

## 2023-06-07 PROCEDURE — 1159F PR MEDICATION LIST DOCUMENTED IN MEDICAL RECORD: ICD-10-PCS | Mod: CPTII,S$GLB,, | Performed by: OPHTHALMOLOGY

## 2023-06-07 PROCEDURE — 99214 PR OFFICE/OUTPT VISIT, EST, LEVL IV, 30-39 MIN: ICD-10-PCS | Mod: 24,25,S$GLB, | Performed by: OPHTHALMOLOGY

## 2023-06-07 PROCEDURE — 4010F ACE/ARB THERAPY RXD/TAKEN: CPT | Mod: CPTII,S$GLB,, | Performed by: OPHTHALMOLOGY

## 2023-06-07 PROCEDURE — 92134 OCT, RETINA - OU - BOTH EYES: ICD-10-PCS | Mod: S$GLB,,, | Performed by: OPHTHALMOLOGY

## 2023-06-07 PROCEDURE — 1160F PR REVIEW ALL MEDS BY PRESCRIBER/CLIN PHARMACIST DOCUMENTED: ICD-10-PCS | Mod: CPTII,S$GLB,, | Performed by: OPHTHALMOLOGY

## 2023-06-07 PROCEDURE — 92134 CPTRZ OPH DX IMG PST SGM RTA: CPT | Mod: S$GLB,,, | Performed by: OPHTHALMOLOGY

## 2023-06-07 PROCEDURE — 4010F PR ACE/ARB THEARPY RXD/TAKEN: ICD-10-PCS | Mod: CPTII,S$GLB,, | Performed by: OPHTHALMOLOGY

## 2023-06-07 PROCEDURE — 99999 PR PBB SHADOW E&M-EST. PATIENT-LVL III: ICD-10-PCS | Mod: PBBFAC,,, | Performed by: OPHTHALMOLOGY

## 2023-06-07 PROCEDURE — 3288F FALL RISK ASSESSMENT DOCD: CPT | Mod: CPTII,S$GLB,, | Performed by: OPHTHALMOLOGY

## 2023-06-07 PROCEDURE — 1101F PT FALLS ASSESS-DOCD LE1/YR: CPT | Mod: CPTII,S$GLB,, | Performed by: OPHTHALMOLOGY

## 2023-06-07 PROCEDURE — 1126F PR PAIN SEVERITY QUANTIFIED, NO PAIN PRESENT: ICD-10-PCS | Mod: CPTII,S$GLB,, | Performed by: OPHTHALMOLOGY

## 2023-06-07 PROCEDURE — 1101F PR PT FALLS ASSESS DOC 0-1 FALLS W/OUT INJ PAST YR: ICD-10-PCS | Mod: CPTII,S$GLB,, | Performed by: OPHTHALMOLOGY

## 2023-06-07 PROCEDURE — 1126F AMNT PAIN NOTED NONE PRSNT: CPT | Mod: CPTII,S$GLB,, | Performed by: OPHTHALMOLOGY

## 2023-06-07 PROCEDURE — 67028 INJECTION EYE DRUG: CPT | Mod: 79,RT,S$GLB, | Performed by: OPHTHALMOLOGY

## 2023-06-07 PROCEDURE — 99999 PR PBB SHADOW E&M-EST. PATIENT-LVL III: CPT | Mod: PBBFAC,,, | Performed by: OPHTHALMOLOGY

## 2023-06-07 PROCEDURE — 1159F MED LIST DOCD IN RCRD: CPT | Mod: CPTII,S$GLB,, | Performed by: OPHTHALMOLOGY

## 2023-06-07 PROCEDURE — 67028 PR INJECT INTRAVITREAL PHARMCOLOGIC: ICD-10-PCS | Mod: 79,RT,S$GLB, | Performed by: OPHTHALMOLOGY

## 2023-06-07 PROCEDURE — 99214 OFFICE O/P EST MOD 30 MIN: CPT | Mod: 24,25,S$GLB, | Performed by: OPHTHALMOLOGY

## 2023-06-07 PROCEDURE — 1160F RVW MEDS BY RX/DR IN RCRD: CPT | Mod: CPTII,S$GLB,, | Performed by: OPHTHALMOLOGY

## 2023-06-07 PROCEDURE — 3288F PR FALLS RISK ASSESSMENT DOCUMENTED: ICD-10-PCS | Mod: CPTII,S$GLB,, | Performed by: OPHTHALMOLOGY

## 2023-06-07 RX ADMIN — Medication 1.25 MG: at 08:06

## 2023-06-07 NOTE — PROGRESS NOTES
HPI    Pt presents for floater OD for the past three days. States when she closes   the eye she sees colors like a kaleidoscope.     No flashes of light     Systane PRN OU     Last edited by Renetta Hurd on 6/7/2023  8:11 AM.         A/P    ICD-10-CM ICD-9-CM   1. Exudative age-related macular degeneration of right eye with active choroidal neovascularization  H35.3211 362.52     362.16   2. 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 Pseudophakia of both eyes  Z96.1 V43.1   4. Vitreous degeneration of both eyes  H43.813 379.21       1. Exudative age-related macular degeneration of right eye with active choroidal neovascularization  Here for new vision changes  Noted spot in vision about 3 days ago  VA 20/70, exam notable for new CNVM with heme, IRF/SRF, conversion from dry  Plan: given new CNVM, needs Avastin OD. Will work to get auth for biosimilar Cimerli   Based on todays exam, diagnostic studies, and review of records, the determination was made for treatment today.  Schedule Avastin Injection today Right Eye Patient chooses to proceed with injection R/B/A discussed include infection retinal detachment and stroke    Patient identified.  Timeout performed.    Risks, benefits, and alternatives to treatment were discussed in detail with the patient, including bleeding/infection (endophthalmitis)/etc.  The patient voiced understanding and wished to proceed with the procedure.  See separate consent form.    Injection Procedure Note:  Diagnosis: CNVM RightRight Eye    Topical Proparacaine drop placed then topical 5% Betadine and then subconjunctival lidocaine 2% injection  Sterile gloves used, and sterile lid speculum placed.  5% Betadine placed at injection site again prior to injection.  Avastin 1.25mg in 0.05cc Injected inferotemporally 3.5-4mm posterior to the limbus.  Complications: None  Va at least CF at 5 feet post injection.  Retina, ONH, IOP normal  after injection.    Followup as below.  Patient should return immediately PRN.  Retinal Detachment and Endophthalmitis precautions given.     2. Intermediate 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  Dry mod changes OS, no IRF/SRF no heme  Plan: Observation  Amsler precautions  Recommend Antioxidants, lutein, omega 3, brown sunglasses (blue blockers).     3. Pseudophakia of both eyes  Good lens position OU  Plan: Observation     4. Vitreous degeneration of both eyes  No RT/RD  Plan: Observation    RTC 1 month DFE/OCTm OU, [facility]      I saw and examined the patient and reviewed in detail the findings documented. The final examination findings, image interpretations, and plan as documented in the record represent my personal judgment and conclusions.    Christiano Page MD  Vitreoretinal Surgery   [facility]

## 2023-06-08 NOTE — PATIENT INSTRUCTIONS
POST INTRAVITREAL INJECTION PATIENT INSTRUCTIONS   Below are some guidelines for what to expect after your treatment and additional care instructions.   * you may experience mild discomfort in your eye after the treatment. Your eye usually feels better within 24 to 48 hours after the procedure.   * you have been given drops that numb the surface of your eye.   DO NOT rub or touch your eye, DO NOT wear contacts until numbness goes away.   * you may experience small spots that appear in your field of vision, these are usually caused by an air bubble or from the medication. It taked a few hours or days for these to go away.   * use of sunglasses will help reduce light sensitivity and glare.   * DO NOT swim   for at least 3 hours after the injection   * If you have a gritty, burning, irritating or stinging feeling in the injected eye. This may be a result of the antiseptic used. Use artifical tears or eye lubricant ( from over- the- counter from your local pharmacy ). If you have some at home already please check the expiration date, so not to use anything contaminated in your eye. A cool pack over your eye brow above the injected eye may also relieve discomfort.   Call us right away or go to the Emergency Department if you have a dramatic decrease in vision or extreme pain in the eye.   [facility] [phone]

## 2023-06-20 NOTE — PROGRESS NOTES
2023    Floydene Root    Chief Complaint   Patient presents with    6 Month Follow-Up       HPI  History was obtained from pt. Neelam Fuentes is a 72 y.o. male with a PMHx as listed below who presents today for follow up. Pt is still having the pains in his legs when he walks, has to take breaks a lot when push mowing. Also has stinging in his feet. Wants a placard because he has to take a rest to walk into a store. Pt cant afford to see an eye doctor even tho he has a history of cataracts. Pt needs colonoscopy    1. PAD (peripheral artery disease) ([facility] [address]    2. Gout involving toe of right foot, unspecified cause, unspecified chronicity    3. Primary hypertension    4. Mixed hyperlipidemia         REVIEW OF SYMPTOMS    Review of Systems    PAST MEDICAL HISTORY  Past Medical History:   Diagnosis Date    Hyperlipidemia     Hypertension        FAMILY HISTORY  No family history on file. SOCIAL HISTORY  Social History     Socioeconomic History    Marital status:    Tobacco Use    Smoking status: Former     Packs/day: 3.00     Years: 10.00     Pack years: 30.00     Types: Cigarettes     Quit date: 3/13/1984     Years since quittin.2    Smokeless tobacco: Never        SURGICAL HISTORY  No past surgical history on file.               CURRENT MEDICATIONS  Current Outpatient Medications   Medication Sig Dispense Refill    allopurinol (ZYLOPRIM) 100 MG tablet Take 1 tablet by mouth daily 30 tablet 5    cilostazol (PLETAL) 100 MG tablet TAKE 1 TABLET BY MOUTH TWICE DAILY 60 tablet 5    lisinopril (PRINIVIL;ZESTRIL) 40 MG tablet TAKE 1 TABLET BY MOUTH ONCE DAILY 30 tablet 5    lovastatin (MEVACOR) 40 MG tablet Take 1 tablet by mouth daily 30 tablet 5    naproxen (NAPROSYN) 500 MG tablet Take 1 tablet by mouth 2 times daily (with meals) (Patient not taking: Reported on 2023) 30 tablet 0    lisinopril-hydrochlorothiazide (PRINZIDE;ZESTORETIC) 10-12.5 MG per tablet  (Patient not taking: Reported on HPI    Pt presents for one week post op PCIOL OS and pre op OD     Complains of blurred vision OD trouble with glare; but states OS is doing   well         PF QID OS  Keto QID OS    Last edited by Renetta Hurd on 4/6/2023  9:39 AM.            Assessment /Plan     For exam results, see Encounter Report.    Status post cataract surgery, left    Age-related nuclear cataract, right      1. Status post cataract surgery, left  POW1 CEIOL  Doing well  D/c moxi  Continue PF QID with taper  Continue keto qdaily  Post op instructions reviewed    2. Age-related nuclear cataract, right  Patient with visually significant cataract impacting abiliity ADLs (reading, driving, PM driving, glare).  Discussed options, R & B, expectations, patient voices good understanding and wishes to proceed with procedure. Patient will likely benefit from sx and signed consent.    Proceed with CEIOL OD  Monofocal distance IOL

## 2023-07-07 ENCOUNTER — OFFICE VISIT (OUTPATIENT)
Dept: FAMILY MEDICINE | Facility: CLINIC | Age: 73
End: 2023-07-07
Payer: MEDICARE

## 2023-07-07 VITALS
BODY MASS INDEX: 29.59 KG/M2 | DIASTOLIC BLOOD PRESSURE: 78 MMHG | HEART RATE: 62 BPM | HEIGHT: 63 IN | WEIGHT: 167 LBS | SYSTOLIC BLOOD PRESSURE: 138 MMHG

## 2023-07-07 DIAGNOSIS — H35.3211 EXUDATIVE AGE-RELATED MACULAR DEGENERATION OF RIGHT EYE WITH ACTIVE CHOROIDAL NEOVASCULARIZATION: ICD-10-CM

## 2023-07-07 DIAGNOSIS — M81.0 AGE-RELATED OSTEOPOROSIS WITHOUT CURRENT PATHOLOGICAL FRACTURE: ICD-10-CM

## 2023-07-07 DIAGNOSIS — Z12.31 OTHER SCREENING MAMMOGRAM: ICD-10-CM

## 2023-07-07 DIAGNOSIS — G47.33 OSA (OBSTRUCTIVE SLEEP APNEA): ICD-10-CM

## 2023-07-07 DIAGNOSIS — Z95.0 PACEMAKER: Primary | ICD-10-CM

## 2023-07-07 DIAGNOSIS — I10 ESSENTIAL HYPERTENSION: ICD-10-CM

## 2023-07-07 DIAGNOSIS — E78.5 DYSLIPIDEMIA: ICD-10-CM

## 2023-07-07 DIAGNOSIS — M17.10 PRIMARY LOCALIZED OSTEOARTHRITIS OF KNEE: ICD-10-CM

## 2023-07-07 DIAGNOSIS — E78.2 MIXED HYPERLIPIDEMIA: ICD-10-CM

## 2023-07-07 DIAGNOSIS — I48.0 PAROXYSMAL ATRIAL FIBRILLATION: ICD-10-CM

## 2023-07-07 DIAGNOSIS — Z82.49 FAMILY HISTORY OF PREMATURE CORONARY ARTERY DISEASE: ICD-10-CM

## 2023-07-07 PROCEDURE — 99214 OFFICE O/P EST MOD 30 MIN: CPT | Mod: S$GLB,,, | Performed by: FAMILY MEDICINE

## 2023-07-07 PROCEDURE — 1159F PR MEDICATION LIST DOCUMENTED IN MEDICAL RECORD: ICD-10-PCS | Mod: CPTII,S$GLB,, | Performed by: FAMILY MEDICINE

## 2023-07-07 PROCEDURE — 3075F PR MOST RECENT SYSTOLIC BLOOD PRESS GE 130-139MM HG: ICD-10-PCS | Mod: CPTII,S$GLB,, | Performed by: FAMILY MEDICINE

## 2023-07-07 PROCEDURE — 4010F ACE/ARB THERAPY RXD/TAKEN: CPT | Mod: CPTII,S$GLB,, | Performed by: FAMILY MEDICINE

## 2023-07-07 PROCEDURE — 3075F SYST BP GE 130 - 139MM HG: CPT | Mod: CPTII,S$GLB,, | Performed by: FAMILY MEDICINE

## 2023-07-07 PROCEDURE — 1101F PR PT FALLS ASSESS DOC 0-1 FALLS W/OUT INJ PAST YR: ICD-10-PCS | Mod: CPTII,S$GLB,, | Performed by: FAMILY MEDICINE

## 2023-07-07 PROCEDURE — 3078F DIAST BP <80 MM HG: CPT | Mod: CPTII,S$GLB,, | Performed by: FAMILY MEDICINE

## 2023-07-07 PROCEDURE — 3078F PR MOST RECENT DIASTOLIC BLOOD PRESSURE < 80 MM HG: ICD-10-PCS | Mod: CPTII,S$GLB,, | Performed by: FAMILY MEDICINE

## 2023-07-07 PROCEDURE — 99214 PR OFFICE/OUTPT VISIT, EST, LEVL IV, 30-39 MIN: ICD-10-PCS | Mod: S$GLB,,, | Performed by: FAMILY MEDICINE

## 2023-07-07 PROCEDURE — 3288F PR FALLS RISK ASSESSMENT DOCUMENTED: ICD-10-PCS | Mod: CPTII,S$GLB,, | Performed by: FAMILY MEDICINE

## 2023-07-07 PROCEDURE — 3008F PR BODY MASS INDEX (BMI) DOCUMENTED: ICD-10-PCS | Mod: CPTII,S$GLB,, | Performed by: FAMILY MEDICINE

## 2023-07-07 PROCEDURE — 3288F FALL RISK ASSESSMENT DOCD: CPT | Mod: CPTII,S$GLB,, | Performed by: FAMILY MEDICINE

## 2023-07-07 PROCEDURE — 1159F MED LIST DOCD IN RCRD: CPT | Mod: CPTII,S$GLB,, | Performed by: FAMILY MEDICINE

## 2023-07-07 PROCEDURE — 4010F PR ACE/ARB THEARPY RXD/TAKEN: ICD-10-PCS | Mod: CPTII,S$GLB,, | Performed by: FAMILY MEDICINE

## 2023-07-07 PROCEDURE — 1101F PT FALLS ASSESS-DOCD LE1/YR: CPT | Mod: CPTII,S$GLB,, | Performed by: FAMILY MEDICINE

## 2023-07-07 PROCEDURE — 3008F BODY MASS INDEX DOCD: CPT | Mod: CPTII,S$GLB,, | Performed by: FAMILY MEDICINE

## 2023-07-07 RX ORDER — ALENDRONATE SODIUM 70 MG/1
70 TABLET ORAL
Qty: 12 TABLET | Refills: 3 | Status: SHIPPED | OUTPATIENT
Start: 2023-07-07 | End: 2024-07-06

## 2023-07-08 PROBLEM — Z95.0 PACEMAKER: Status: ACTIVE | Noted: 2023-07-08

## 2023-07-08 NOTE — PROGRESS NOTES
SUBJECTIVE:    Patient ID: Batool Mensah is a 73 y.o. female.    Chief Complaint: Follow-up (No bottles, need refill, no complaints, abc )    73-year-old female here for six-month checkup.  She recently had bilateral cataract surgery with Dr. Randolph Marie.    Dr. Ariza placed a pacemaker in her left chest recently due to AFib management.  She is currently maintained on Eliquis 5 mg b.i.d. and flecainide 50 mg b.i.d..    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  She now has to get eye injections with Avastin monthly.    Due to these surgical procedures she is not been able to go to her exercise at water aerobics until recently.  She is now back in the pool restarting her exercise program.  This helps her knee arthritis quite a bit.    She is coping with the loss of her  1 year ago from lung cancer.    Due for mammogram in September 2023    2016-colonoscopy with Dr. Parker-RTC 10 years      Lab Visit on 05/12/2023   Component Date Value Ref Range Status    aPTT 05/12/2023 27.5  21.0 - 32.0 sec Final    Sodium 05/12/2023 142  136 - 145 mmol/L Final    Potassium 05/12/2023 4.4  3.5 - 5.1 mmol/L Final    Chloride 05/12/2023 108  95 - 110 mmol/L Final    CO2 05/12/2023 31 (H)  23 - 29 mmol/L Final    Glucose 05/12/2023 93  70 - 110 mg/dL Final    BUN 05/12/2023 25 (H)  8 - 23 mg/dL Final    Creatinine 05/12/2023 0.8  0.5 - 1.4 mg/dL Final    Calcium 05/12/2023 9.5  8.7 - 10.5 mg/dL Final    Anion Gap 05/12/2023 3 (L)  8 - 16 mmol/L Final    eGFR 05/12/2023 >60.0  >60 mL/min/1.73 m^2 Final    WBC 05/12/2023 3.22 (L)  3.90 - 12.70 K/uL Final    RBC 05/12/2023 4.41  4.00 - 5.40 M/uL Final    Hemoglobin 05/12/2023 13.2  12.0 - 16.0 g/dL Final    Hematocrit 05/12/2023 40.6  37.0 - 48.5 % Final    MCV 05/12/2023 92  82 - 98 fL Final    MCH 05/12/2023 29.9  27.0 - 31.0 pg Final    MCHC 05/12/2023 32.5  32.0 - 36.0 g/dL Final    RDW 05/12/2023 13.9  11.5 - 14.5 % Final     Platelets 05/12/2023 171  150 - 450 K/uL Final    MPV 05/12/2023 11.6  9.2 - 12.9 fL Final    Immature Granulocytes 05/12/2023 0.0  0.0 - 0.5 % Final    Gran # (ANC) 05/12/2023 1.3 (L)  1.8 - 7.7 K/uL Final    Immature Grans (Abs) 05/12/2023 0.00  0.00 - 0.04 K/uL Final    Lymph # 05/12/2023 1.5  1.0 - 4.8 K/uL Final    Mono # 05/12/2023 0.3  0.3 - 1.0 K/uL Final    Eos # 05/12/2023 0.1  0.0 - 0.5 K/uL Final    Baso # 05/12/2023 0.02  0.00 - 0.20 K/uL Final    nRBC 05/12/2023 0  0 /100 WBC Final    Gran % 05/12/2023 39.7  38.0 - 73.0 % Final    Lymph % 05/12/2023 46.9  18.0 - 48.0 % Final    Mono % 05/12/2023 10.6  4.0 - 15.0 % Final    Eosinophil % 05/12/2023 2.2  0.0 - 8.0 % Final    Basophil % 05/12/2023 0.6  0.0 - 1.9 % Final    Differential Method 05/12/2023 Automated   Final    Prothrombin Time 05/12/2023 11.1  9.0 - 12.5 sec Final    INR 05/12/2023 1.0  0.8 - 1.2 Final       Past Medical History:   Diagnosis Date    A-fib     Anticoagulant long-term use     Arthritis     Encounter for blood transfusion     Hypertension     Mixed hyperlipidemia     Sleep apnea     cpap     Social History     Socioeconomic History    Marital status:    Tobacco Use    Smoking status: Never    Smokeless tobacco: Never   Substance and Sexual Activity    Alcohol use: Yes     Comment: socially    Drug use: Never    Sexual activity: Not Currently     Social Determinants of Health     Financial Resource Strain: Low Risk     Difficulty of Paying Living Expenses: Not hard at all   Food Insecurity: No Food Insecurity    Worried About Running Out of Food in the Last Year: Never true    Ran Out of Food in the Last Year: Never true   Transportation Needs: No Transportation Needs    Lack of Transportation (Medical): No    Lack of Transportation (Non-Medical): No   Physical Activity: Sufficiently Active    Days of Exercise per Week: 5 days    Minutes of Exercise per Session: 60 min   Stress: No Stress Concern Present    Feeling of  Stress : Only a little   Social Connections: Unknown    Frequency of Communication with Friends and Family: More than three times a week    Frequency of Social Gatherings with Friends and Family: More than three times a week    Active Member of Clubs or Organizations: Yes    Attends Club or Organization Meetings: More than 4 times per year    Marital Status:    Housing Stability: Low Risk     Unable to Pay for Housing in the Last Year: No    Number of Places Lived in the Last Year: 1    Unstable Housing in the Last Year: No     Past Surgical History:   Procedure Laterality Date    A-V CARDIAC PACEMAKER INSERTION Left 2023    Procedure: INSERTION, CARDIAC PACEMAKER, DUAL CHAMBER;  Surgeon: Curt Ariza MD;  Location: [facility] EP LAB;  Service: Cardiology;  Laterality: Left;  SSS/Bradycardia, dPPM, SJM, MAC, GP, 3 PREP    APPENDECTOMY      BREAST BIOPSY Left     BREAST LUMPECTOMY      CATARACT EXTRACTION W/  INTRAOCULAR LENS IMPLANT Left 2023    Procedure: CEIOL OS;  Surgeon: Randolph Marie MD;  Location: [facility] OR;  Service: Ophthalmology;  Laterality: Left;    CATARACT EXTRACTION W/  INTRAOCULAR LENS IMPLANT Right 2023    Procedure: CEIOL OD;  Surgeon: Randolph Marie MD;  Location: [facility] OR;  Service: Ophthalmology;  Laterality: Right;     SECTION      X2    COLONOSCOPY      Dr. Parker -RT 10 years    EYE SURGERY      Gastric sleeve  2018    Dr Hernandez- hiatal hernia repair    HERNIA REPAIR      LAPAROSCOPIC APPENDECTOMY N/A 10/19/2020    Procedure: APPENDECTOMY, LAPAROSCOPIC;  Surgeon: Konrad Almonte III, MD;  Location: [facility] OR;  Service: General;  Laterality: N/A;    TONSILLECTOMY       No family history on file.    The 10-year CVD risk score (D'Agostino, et al., 2008) is: 8.7%    Values used to calculate the score:      Age: 73 years      Sex: Female      Diabetic: No      Tobacco smoker: No      Systolic Blood Pressure: 138 mmHg      Is BP treated: Yes      HDL Cholesterol: 94  mg/dL      Total Cholesterol: 153 mg/dL    Review of patient's allergies indicates:   Allergen Reactions    Penicillins Hives       Current Outpatient Medications:     apixaban (ELIQUIS) 5 mg Tab, Take 1 tablet (5 mg total) by mouth 2 (two) times daily., Disp: 180 tablet, Rfl: 3    atorvastatin (LIPITOR) 20 MG tablet, Take 1 tablet (20 mg total) by mouth every evening., Disp: 90 tablet, Rfl: 3    calcium-vitamin D3 (OS-KRYS 500 + D3) 500 mg-5 mcg (200 unit) per tablet, Take 1 tablet by mouth 2 (two) times daily with meals., Disp: , Rfl:     flecainide (TAMBOCOR) 50 MG Tab, Take 1 tablet (50 mg total) by mouth every 12 (twelve) hours., Disp: 60 tablet, Rfl: 11    losartan (COZAAR) 50 MG tablet, Take 1 tablet (50 mg total) by mouth once daily., Disp: 90 tablet, Rfl: 3    metoprolol succinate (TOPROL-XL) 25 MG 24 hr tablet, Take 0.5 tablets (12.5 mg total) by mouth once daily. Take 1/2 tablet by mouth daily., Disp: 90 tablet, Rfl: 3    alendronate (FOSAMAX) 70 MG tablet, Take 1 tablet (70 mg total) by mouth every 7 days. Take on empty stomach with full glass of water-do not eat or lie down for 1 hour For osteoporosis, Disp: 12 tablet, Rfl: 3    Current Facility-Administered Medications:     NON FORMULARY MEDICATION 0.5 mg, 0.5 mg, Intravitreal, Q30 Days, Baron Heard, PharmD    Facility-Administered Medications Ordered in Other Visits:     lactated ringers infusion, , Intravenous, Continuous, Ammon Mcmanus MD, Stopped at 03/29/23 0918    LIDOcaine (PF) 10 mg/ml (1%) injection 10 mg, 1 mL, Intradermal, Once, Ammon Mcmanus MD    Review of Systems   Constitutional:  Negative for appetite change, chills, fatigue, fever and unexpected weight change.   HENT:  Negative for ear discharge and ear pain.    Eyes:  Negative for pain, discharge and visual disturbance.   Respiratory:  Negative for apnea, cough, shortness of breath and wheezing.    Cardiovascular:  Negative for chest pain, palpitations and leg swelling.  "  Gastrointestinal:  Negative for abdominal pain, blood in stool, constipation, diarrhea, nausea, vomiting and reflux.   Endocrine: Negative for cold intolerance, heat intolerance and polydipsia.   Genitourinary:  Negative for bladder incontinence, dysuria, hematuria, nocturia and urgency.   Musculoskeletal:  Positive for arthralgias (arthritis of the knees causes some pain). Negative for gait problem, joint swelling and myalgias.   Neurological:  Negative for dizziness, seizures and numbness.   Psychiatric/Behavioral:  Negative for behavioral problems and hallucinations. The patient is not nervous/anxious.          Objective:      Vitals:    07/07/23 0939   BP: 138/78   Pulse: 62   Weight: 75.8 kg (167 lb)   Height: 5' 3" (1.6 m)     Physical Exam  Vitals and nursing note reviewed.   Constitutional:       General: She is not in acute distress.     Appearance: Normal appearance. She is well-developed. She is not toxic-appearing.   HENT:      Head: Normocephalic and atraumatic.      Right Ear: Tympanic membrane and external ear normal.      Left Ear: Tympanic membrane and external ear normal.      Nose: Nose normal.      Mouth/Throat:      Pharynx: Oropharynx is clear.   Eyes:      Pupils: Pupils are equal, round, and reactive to light.   Neck:      Thyroid: No thyromegaly.      Vascular: No carotid bruit.   Cardiovascular:      Rate and Rhythm: Normal rate and regular rhythm.      Heart sounds: Normal heart sounds. No murmur heard.     Comments: Left chest pacemaker incision has healed well.  Pulmonary:      Effort: Pulmonary effort is normal.      Breath sounds: Normal breath sounds. No wheezing or rales.   Abdominal:      General: Bowel sounds are normal. There is no distension.      Palpations: Abdomen is soft.      Tenderness: There is no abdominal tenderness.   Musculoskeletal:         General: No tenderness or deformity. Normal range of motion.      Cervical back: Normal range of motion and neck supple.      " Lumbar back: Normal. No spasms.      Comments: Bends 90 degrees at  waist, shoulders have good range of motion knees are slightly crepitant bilaterally.  No pitting edema to lower extremities   Lymphadenopathy:      Cervical: No cervical adenopathy.   Skin:     General: Skin is warm and dry.      Findings: No rash.   Neurological:      Mental Status: She is alert and oriented to person, place, and time. Mental status is at baseline.      Cranial Nerves: No cranial nerve deficit.      Coordination: Coordination normal.      Gait: Gait abnormal.   Psychiatric:         Mood and Affect: Mood normal.         Behavior: Behavior normal.         Thought Content: Thought content normal.         Judgment: Judgment normal.         Assessment:       1. Pacemaker    2. Age-related osteoporosis without current pathological fracture    3. Exudative age-related macular degeneration of right eye with active choroidal neovascularization    4. Paroxysmal atrial fibrillation    5. Mixed hyperlipidemia    6. Other screening mammogram    7. Dyslipidemia    8. Essential hypertension    9. Family history of premature coronary artery disease    10. Primary localized osteoarthritis of knee    11. HUI (obstructive sleep apnea)         Plan:       Pacemaker  Healing nicely from pacemaker implantation with Dr. Ariza   Age-related osteoporosis without current pathological fracture  Comments:  reviewed last DEXA with new dx of osteoporosis- now on biphosphanate and stressed Calcium/vitamin D supplement and weight bearing exercise melissa given bariatric s  Orders:  -     alendronate (FOSAMAX) 70 MG tablet; Take 1 tablet (70 mg total) by mouth every 7 days. Take on empty stomach with full glass of water-do not eat or lie down for 1 hour For osteoporosis  Dispense: 12 tablet; Refill: 3    Exudative age-related macular degeneration of right eye with active choroidal neovascularization  Continue injections monthly  Paroxysmal atrial fibrillation  -      CBC Auto Differential; Future; Expected date: 07/07/2023  -     Comprehensive Metabolic Panel; Future; Expected date: 07/07/2023  -     Lipid Panel; Future; Expected date: 07/07/2023  -     TSH w/reflex to FT4; Future; Expected date: 07/07/2023  Continue cardiac medications  Mixed hyperlipidemia  -     CBC Auto Differential; Future; Expected date: 07/07/2023  -     Comprehensive Metabolic Panel; Future; Expected date: 07/07/2023  -     Lipid Panel; Future; Expected date: 07/07/2023  -     TSH w/reflex to FT4; Future; Expected date: 07/07/2023  Cholesterol 153 HDL 94 TG 43 LDL 47  Other screening mammogram  -     Mammo Digital Screening Bilat w/ Deuce; Future; Expected date: 09/12/2023  Use mammogram in September  Dyslipidemia    Essential hypertension  Blood pressure well controlled  Family history of premature coronary artery disease    Primary localized osteoarthritis of knee  Restart water aerobics  HUI (obstructive sleep apnea)  Continue CPAP at night    Follow up in about 6 months (around 1/7/2024), or hld.        7/8/2023 Sage Dickson

## 2023-07-12 ENCOUNTER — OFFICE VISIT (OUTPATIENT)
Dept: OPHTHALMOLOGY | Facility: CLINIC | Age: 73
End: 2023-07-12
Payer: MEDICARE

## 2023-07-12 DIAGNOSIS — H35.3211 EXUDATIVE AGE-RELATED MACULAR DEGENERATION OF RIGHT EYE WITH ACTIVE CHOROIDAL NEOVASCULARIZATION: Primary | ICD-10-CM

## 2023-07-12 DIAGNOSIS — Z96.1 PSEUDOPHAKIA OF BOTH EYES: ICD-10-CM

## 2023-07-12 DIAGNOSIS — H35.3122 INTERMEDIATE STAGE NONEXUDATIVE AGE-RELATED MACULAR DEGENERATION OF LEFT EYE: ICD-10-CM

## 2023-07-12 DIAGNOSIS — H43.813 VITREOUS DEGENERATION OF BOTH EYES: ICD-10-CM

## 2023-07-12 PROCEDURE — 1159F PR MEDICATION LIST DOCUMENTED IN MEDICAL RECORD: ICD-10-PCS | Mod: CPTII,S$GLB,, | Performed by: OPHTHALMOLOGY

## 2023-07-12 PROCEDURE — 4010F ACE/ARB THERAPY RXD/TAKEN: CPT | Mod: CPTII,S$GLB,, | Performed by: OPHTHALMOLOGY

## 2023-07-12 PROCEDURE — 1126F PR PAIN SEVERITY QUANTIFIED, NO PAIN PRESENT: ICD-10-PCS | Mod: CPTII,S$GLB,, | Performed by: OPHTHALMOLOGY

## 2023-07-12 PROCEDURE — 99214 PR OFFICE/OUTPT VISIT, EST, LEVL IV, 30-39 MIN: ICD-10-PCS | Mod: 24,25,S$GLB, | Performed by: OPHTHALMOLOGY

## 2023-07-12 PROCEDURE — 1160F PR REVIEW ALL MEDS BY PRESCRIBER/CLIN PHARMACIST DOCUMENTED: ICD-10-PCS | Mod: CPTII,S$GLB,, | Performed by: OPHTHALMOLOGY

## 2023-07-12 PROCEDURE — 99999 PR PBB SHADOW E&M-EST. PATIENT-LVL III: CPT | Mod: PBBFAC,,, | Performed by: OPHTHALMOLOGY

## 2023-07-12 PROCEDURE — 92134 OCT, RETINA - OU - BOTH EYES: ICD-10-PCS | Mod: S$GLB,,, | Performed by: OPHTHALMOLOGY

## 2023-07-12 PROCEDURE — 1101F PR PT FALLS ASSESS DOC 0-1 FALLS W/OUT INJ PAST YR: ICD-10-PCS | Mod: CPTII,S$GLB,, | Performed by: OPHTHALMOLOGY

## 2023-07-12 PROCEDURE — 67028 INJECTION EYE DRUG: CPT | Mod: 79,RT,S$GLB, | Performed by: OPHTHALMOLOGY

## 2023-07-12 PROCEDURE — 99999 PR PBB SHADOW E&M-EST. PATIENT-LVL III: ICD-10-PCS | Mod: PBBFAC,,, | Performed by: OPHTHALMOLOGY

## 2023-07-12 PROCEDURE — 1159F MED LIST DOCD IN RCRD: CPT | Mod: CPTII,S$GLB,, | Performed by: OPHTHALMOLOGY

## 2023-07-12 PROCEDURE — 67028 PR INJECT INTRAVITREAL PHARMCOLOGIC: ICD-10-PCS | Mod: 79,RT,S$GLB, | Performed by: OPHTHALMOLOGY

## 2023-07-12 PROCEDURE — 4010F PR ACE/ARB THEARPY RXD/TAKEN: ICD-10-PCS | Mod: CPTII,S$GLB,, | Performed by: OPHTHALMOLOGY

## 2023-07-12 PROCEDURE — 1101F PT FALLS ASSESS-DOCD LE1/YR: CPT | Mod: CPTII,S$GLB,, | Performed by: OPHTHALMOLOGY

## 2023-07-12 PROCEDURE — 92134 CPTRZ OPH DX IMG PST SGM RTA: CPT | Mod: S$GLB,,, | Performed by: OPHTHALMOLOGY

## 2023-07-12 PROCEDURE — 3288F PR FALLS RISK ASSESSMENT DOCUMENTED: ICD-10-PCS | Mod: CPTII,S$GLB,, | Performed by: OPHTHALMOLOGY

## 2023-07-12 PROCEDURE — 1126F AMNT PAIN NOTED NONE PRSNT: CPT | Mod: CPTII,S$GLB,, | Performed by: OPHTHALMOLOGY

## 2023-07-12 PROCEDURE — 1160F RVW MEDS BY RX/DR IN RCRD: CPT | Mod: CPTII,S$GLB,, | Performed by: OPHTHALMOLOGY

## 2023-07-12 PROCEDURE — 99214 OFFICE O/P EST MOD 30 MIN: CPT | Mod: 24,25,S$GLB, | Performed by: OPHTHALMOLOGY

## 2023-07-12 PROCEDURE — 3288F FALL RISK ASSESSMENT DOCD: CPT | Mod: CPTII,S$GLB,, | Performed by: OPHTHALMOLOGY

## 2023-07-12 RX ADMIN — Medication 1.25 MG: at 08:07

## 2023-07-12 NOTE — PROGRESS NOTES
HPI    DLS: 6/7/2023 AMD w/ DFE OU     Pt states vision has greatly improved since last visit. Denies pain/ FOL/   floaters   Last edited by Sunshine Khoury on 7/12/2023  8:29 AM.         A/P    ICD-10-CM ICD-9-CM   1. Exudative age-related macular degeneration of right eye with active choroidal neovascularization  H35.3211 362.52     362.16   2. 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 Pseudophakia of both eyes  Z96.1 V43.1   4. Vitreous degeneration of both eyes  H43.813 379.21       1. Exudative age-related macular degeneration of right eye with active choroidal neovascularization  Here for new vision changes  Noted spot in vision     S/p RUPERTO 6/7/23    VA 20/25 (was 20/70), exam notable for improving CNVM with heme, better IRF/SRF, conversion from dry    Plan: given new CNVM, needs Avastin OD     Based on todays exam, diagnostic studies, and review of records, the determination was made for treatment today.  Schedule Avastin Injection today Right Eye Patient chooses to proceed with injection R/B/A discussed include infection retinal detachment and stroke    Patient identified.  Timeout performed.    Risks, benefits, and alternatives to treatment were discussed in detail with the patient, including bleeding/infection (endophthalmitis)/etc.  The patient voiced understanding and wished to proceed with the procedure.  See separate consent form.    Injection Procedure Note:  Diagnosis: CNVM RightRight Eye    Topical Proparacaine drop placed then topical 5% Betadine and then subconjunctival lidocaine 2% injection  Sterile gloves used, and sterile lid speculum placed.  5% Betadine placed at injection site again prior to injection.  Avastin 1.25mg in 0.05cc Injected inferotemporally 3.5-4mm posterior to the limbus.  Complications: None  Va at least CF at 5 feet post injection.  Retina, ONH, IOP normal after injection.    Followup as below.  Patient should return  immediately PRN.  Retinal Detachment and Endophthalmitis precautions given.     2. Intermediate 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  Dry mod changes OS, no IRF/SRF   Plan: Observation  Amsler precautions  Recommend Antioxidants, lutein, omega 3, brown sunglasses (blue blockers).     3. Pseudophakia of both eyes  Good lens position OU  Plan: Observation     4. Vitreous degeneration of both eyes  No RT/RD, mild floaters  Plan: Observation  Pathology of PVD, Retinal Tear, Retinal Detachment reviewed in great detail  RD precautions discussed in detail, patient expressed understanding  RTC immediately PRN (especially ANY change flashes, floaters, vision, visual field)       RTC 1 month DFE/OCTm OU, poss Avastin OD       I saw and examined the patient and reviewed in detail the findings documented. The final examination findings, image interpretations, and plan as documented in the record represent my personal judgment and conclusions.    Christiano Page MD  Vitreoretinal Surgery   [facility]

## 2023-07-17 NOTE — PATIENT INSTRUCTIONS

## 2023-08-14 ENCOUNTER — CLINICAL SUPPORT (OUTPATIENT)
Dept: CARDIOLOGY | Facility: HOSPITAL | Age: 73
End: 2023-08-14
Payer: MEDICARE

## 2023-08-14 DIAGNOSIS — Z95.0 PRESENCE OF CARDIAC PACEMAKER: ICD-10-CM

## 2023-08-14 PROCEDURE — 93294 CARDIAC DEVICE CHECK - REMOTE: ICD-10-PCS | Mod: ,,, | Performed by: INTERNAL MEDICINE

## 2023-08-14 PROCEDURE — 93296 REM INTERROG EVL PM/IDS: CPT | Performed by: INTERNAL MEDICINE

## 2023-08-14 PROCEDURE — 93294 REM INTERROG EVL PM/LDLS PM: CPT | Mod: ,,, | Performed by: INTERNAL MEDICINE

## 2023-08-23 ENCOUNTER — OFFICE VISIT (OUTPATIENT)
Dept: OPHTHALMOLOGY | Facility: CLINIC | Age: 73
End: 2023-08-23
Payer: MEDICARE

## 2023-08-23 DIAGNOSIS — H35.3122 INTERMEDIATE STAGE NONEXUDATIVE AGE-RELATED MACULAR DEGENERATION OF LEFT EYE: ICD-10-CM

## 2023-08-23 DIAGNOSIS — H35.3211 EXUDATIVE AGE-RELATED MACULAR DEGENERATION OF RIGHT EYE WITH ACTIVE CHOROIDAL NEOVASCULARIZATION: Primary | ICD-10-CM

## 2023-08-23 DIAGNOSIS — I48.91 ATRIAL FIBRILLATION, UNSPECIFIED TYPE: ICD-10-CM

## 2023-08-23 DIAGNOSIS — Z96.1 PSEUDOPHAKIA OF BOTH EYES: ICD-10-CM

## 2023-08-23 DIAGNOSIS — H43.813 VITREOUS DEGENERATION OF BOTH EYES: ICD-10-CM

## 2023-08-23 PROCEDURE — 1126F PR PAIN SEVERITY QUANTIFIED, NO PAIN PRESENT: ICD-10-PCS | Mod: CPTII,S$GLB,, | Performed by: OPHTHALMOLOGY

## 2023-08-23 PROCEDURE — 67028 PR INJECT INTRAVITREAL PHARMCOLOGIC: ICD-10-PCS | Mod: RT,S$GLB,, | Performed by: OPHTHALMOLOGY

## 2023-08-23 PROCEDURE — 67028 INJECTION EYE DRUG: CPT | Mod: RT,S$GLB,, | Performed by: OPHTHALMOLOGY

## 2023-08-23 PROCEDURE — 4010F ACE/ARB THERAPY RXD/TAKEN: CPT | Mod: CPTII,S$GLB,, | Performed by: OPHTHALMOLOGY

## 2023-08-23 PROCEDURE — 3288F PR FALLS RISK ASSESSMENT DOCUMENTED: ICD-10-PCS | Mod: CPTII,S$GLB,, | Performed by: OPHTHALMOLOGY

## 2023-08-23 PROCEDURE — 99214 OFFICE O/P EST MOD 30 MIN: CPT | Mod: 25,S$GLB,, | Performed by: OPHTHALMOLOGY

## 2023-08-23 PROCEDURE — 1126F AMNT PAIN NOTED NONE PRSNT: CPT | Mod: CPTII,S$GLB,, | Performed by: OPHTHALMOLOGY

## 2023-08-23 PROCEDURE — 92134 OCT, RETINA - OU - BOTH EYES: ICD-10-PCS | Mod: S$GLB,,, | Performed by: OPHTHALMOLOGY

## 2023-08-23 PROCEDURE — 1160F RVW MEDS BY RX/DR IN RCRD: CPT | Mod: CPTII,S$GLB,, | Performed by: OPHTHALMOLOGY

## 2023-08-23 PROCEDURE — 1160F PR REVIEW ALL MEDS BY PRESCRIBER/CLIN PHARMACIST DOCUMENTED: ICD-10-PCS | Mod: CPTII,S$GLB,, | Performed by: OPHTHALMOLOGY

## 2023-08-23 PROCEDURE — 92134 CPTRZ OPH DX IMG PST SGM RTA: CPT | Mod: S$GLB,,, | Performed by: OPHTHALMOLOGY

## 2023-08-23 PROCEDURE — 1159F MED LIST DOCD IN RCRD: CPT | Mod: CPTII,S$GLB,, | Performed by: OPHTHALMOLOGY

## 2023-08-23 PROCEDURE — 4010F PR ACE/ARB THEARPY RXD/TAKEN: ICD-10-PCS | Mod: CPTII,S$GLB,, | Performed by: OPHTHALMOLOGY

## 2023-08-23 PROCEDURE — 99214 PR OFFICE/OUTPT VISIT, EST, LEVL IV, 30-39 MIN: ICD-10-PCS | Mod: 25,S$GLB,, | Performed by: OPHTHALMOLOGY

## 2023-08-23 PROCEDURE — 99999 PR PBB SHADOW E&M-EST. PATIENT-LVL III: CPT | Mod: PBBFAC,,, | Performed by: OPHTHALMOLOGY

## 2023-08-23 PROCEDURE — 1159F PR MEDICATION LIST DOCUMENTED IN MEDICAL RECORD: ICD-10-PCS | Mod: CPTII,S$GLB,, | Performed by: OPHTHALMOLOGY

## 2023-08-23 PROCEDURE — 99999 PR PBB SHADOW E&M-EST. PATIENT-LVL III: ICD-10-PCS | Mod: PBBFAC,,, | Performed by: OPHTHALMOLOGY

## 2023-08-23 PROCEDURE — 1101F PR PT FALLS ASSESS DOC 0-1 FALLS W/OUT INJ PAST YR: ICD-10-PCS | Mod: CPTII,S$GLB,, | Performed by: OPHTHALMOLOGY

## 2023-08-23 PROCEDURE — 1101F PT FALLS ASSESS-DOCD LE1/YR: CPT | Mod: CPTII,S$GLB,, | Performed by: OPHTHALMOLOGY

## 2023-08-23 PROCEDURE — 3288F FALL RISK ASSESSMENT DOCD: CPT | Mod: CPTII,S$GLB,, | Performed by: OPHTHALMOLOGY

## 2023-08-23 RX ADMIN — Medication 1.25 MG: at 09:08

## 2023-08-23 NOTE — TELEPHONE ENCOUNTER
----- Message from Pablo Salazar sent at 8/23/2023  3:23 PM CDT -----  Regarding: Refill/ new order  Contact: patient  Type:  RX Refill Request    Who Called: patient  Refill or New Rx:refill  RX Name and Strength:apixaban (ELIQUIS) 5 mg Tab  How is the patient currently taking it? (ex. 1XDay):  Is this a 30 day or 90 day RX:  Preferred Pharmacy with phone number:  [facility]/pharm[facility] #[id number] - [place] - [address]  [address] [address]  Phone: [phone] Fax: [phone]  Local or Mail Order:local  Ordering Provider:Mark Rincon  Would the patient rather a call back or a response via [facility]? call  Best Call Back Number:[phone]  Additional Information: refill

## 2023-08-23 NOTE — PROGRESS NOTES
HPI    DLS: 7/12/2023- possible RUPERTO OD     Pt states no new complaints. Denies pain/ FOL/ floaters.   Tears OU PRN     Last edited by Sunshine Khoury on 8/23/2023  9:04 AM.         A/P    ICD-10-CM ICD-9-CM   1. Exudative age-related macular degeneration of right eye with active choroidal neovascularization  H35.3211 362.52     362.16   2. 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 Pseudophakia of both eyes  Z96.1 V43.1   4. Vitreous degeneration of both eyes  H43.813 379.21       1. Exudative age-related macular degeneration of right eye with active choroidal neovascularization  Here for new vision changes  Noted spot in vision     S/p RUPERTO x2 7/12/23    VA 20/30 (was 20/25), exam notable for improving CNVM with resolved  heme, better IRF/SRF, conversion from dry    Plan: given active CNVM, needs Avastin OD , will attempt to push to 5 weeks for next visit and see if disease controlled, may need to switch to Eylea if unable  to extend     Based on todays exam, diagnostic studies, and review of records, the determination was made for treatment today.  Schedule Avastin Injection today Right Eye Patient chooses to proceed with injection R/B/A discussed include infection retinal detachment and stroke    Patient identified.  Timeout performed.    Risks, benefits, and alternatives to treatment were discussed in detail with the patient, including bleeding/infection (endophthalmitis)/etc.  The patient voiced understanding and wished to proceed with the procedure.  See separate consent form.    Injection Procedure Note:  Diagnosis: CNVM RightRight Eye    Topical Proparacaine drop placed then topical 5% Betadine and then subconjunctival lidocaine 2% injection  Sterile gloves used, and sterile lid speculum placed.  5% Betadine placed at injection site again prior to injection.  Avastin 1.25mg in 0.05cc Injected inferotemporally 3.5-4mm posterior to the  limbus.  Complications: None  Va at least CF at 5 feet post injection.  Retina, ONH, IOP normal after injection.    Followup as below.  Patient should return immediately PRN.  Retinal Detachment and Endophthalmitis precautions given.     2. Intermediate 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  mod dry changes OS, no IRF/SRF   Plan: Observation  Amsler precautions  Recommend Antioxidants, lutein, omega 3, brown sunglasses (blue blockers).     3. Pseudophakia of both eyes  Good lens position OU  Plan: Observation     4. Vitreous degeneration of both eyes  No RT/RD   Plan: Observation  Pathology of PVD, Retinal Tear, Retinal Detachment reviewed in great detail  RD precautions discussed in detail, patient expressed understanding  RTC immediately PRN (especially ANY change flashes, floaters, vision, visual field)       RTC 5 weeks DFE/OCTm OU, poss Avastin OD       I saw and examined the patient and reviewed in detail the findings documented. The final examination findings, image interpretations, and plan as documented in the record represent my personal judgment and conclusions.    Christiano Page MD  Vitreoretinal Surgery   [facility]   no

## 2023-08-28 NOTE — PATIENT INSTRUCTIONS

## 2023-09-14 ENCOUNTER — HOSPITAL ENCOUNTER (OUTPATIENT)
Dept: RADIOLOGY | Facility: HOSPITAL | Age: 73
Discharge: HOME | End: 2023-09-14
Attending: FAMILY MEDICINE
Payer: MEDICARE

## 2023-09-14 DIAGNOSIS — Z12.31 OTHER SCREENING MAMMOGRAM: ICD-10-CM

## 2023-09-14 PROCEDURE — 77067 SCR MAMMO BI INCL CAD: CPT | Mod: TC,PO

## 2023-09-18 ENCOUNTER — TELEPHONE (OUTPATIENT)
Dept: FAMILY MEDICINE | Facility: CLINIC | Age: 73
End: 2023-09-18

## 2023-09-18 NOTE — TELEPHONE ENCOUNTER
----- Message from Sage Dickson MD sent at 9/17/2023  2:16 PM CDT -----  Call patient.  Mammogram was normal.  Repeat mammogram in 1 year.

## 2023-09-27 ENCOUNTER — OFFICE VISIT (OUTPATIENT)
Dept: OPHTHALMOLOGY | Facility: CLINIC | Age: 73
End: 2023-09-27
Payer: MEDICARE

## 2023-09-27 DIAGNOSIS — Z96.1 PSEUDOPHAKIA OF BOTH EYES: ICD-10-CM

## 2023-09-27 DIAGNOSIS — H35.3122 INTERMEDIATE STAGE NONEXUDATIVE AGE-RELATED MACULAR DEGENERATION OF LEFT EYE: ICD-10-CM

## 2023-09-27 DIAGNOSIS — H43.813 VITREOUS DEGENERATION OF BOTH EYES: ICD-10-CM

## 2023-09-27 DIAGNOSIS — H35.3211 EXUDATIVE AGE-RELATED MACULAR DEGENERATION OF RIGHT EYE WITH ACTIVE CHOROIDAL NEOVASCULARIZATION: Primary | ICD-10-CM

## 2023-09-27 PROCEDURE — 3288F FALL RISK ASSESSMENT DOCD: CPT | Mod: CPTII,S$GLB,, | Performed by: OPHTHALMOLOGY

## 2023-09-27 PROCEDURE — 4010F PR ACE/ARB THEARPY RXD/TAKEN: ICD-10-PCS | Mod: CPTII,S$GLB,, | Performed by: OPHTHALMOLOGY

## 2023-09-27 PROCEDURE — 99999 PR PBB SHADOW E&M-EST. PATIENT-LVL III: CPT | Mod: PBBFAC,,, | Performed by: OPHTHALMOLOGY

## 2023-09-27 PROCEDURE — 1160F RVW MEDS BY RX/DR IN RCRD: CPT | Mod: CPTII,S$GLB,, | Performed by: OPHTHALMOLOGY

## 2023-09-27 PROCEDURE — 99999 PR PBB SHADOW E&M-EST. PATIENT-LVL III: ICD-10-PCS | Mod: PBBFAC,,, | Performed by: OPHTHALMOLOGY

## 2023-09-27 PROCEDURE — 1101F PT FALLS ASSESS-DOCD LE1/YR: CPT | Mod: CPTII,S$GLB,, | Performed by: OPHTHALMOLOGY

## 2023-09-27 PROCEDURE — 1159F MED LIST DOCD IN RCRD: CPT | Mod: CPTII,S$GLB,, | Performed by: OPHTHALMOLOGY

## 2023-09-27 PROCEDURE — 99214 PR OFFICE/OUTPT VISIT, EST, LEVL IV, 30-39 MIN: ICD-10-PCS | Mod: 25,S$GLB,, | Performed by: OPHTHALMOLOGY

## 2023-09-27 PROCEDURE — 92134 CPTRZ OPH DX IMG PST SGM RTA: CPT | Mod: S$GLB,,, | Performed by: OPHTHALMOLOGY

## 2023-09-27 PROCEDURE — 1126F AMNT PAIN NOTED NONE PRSNT: CPT | Mod: CPTII,S$GLB,, | Performed by: OPHTHALMOLOGY

## 2023-09-27 PROCEDURE — 3288F PR FALLS RISK ASSESSMENT DOCUMENTED: ICD-10-PCS | Mod: CPTII,S$GLB,, | Performed by: OPHTHALMOLOGY

## 2023-09-27 PROCEDURE — 92134 OCT, RETINA - OU - BOTH EYES: ICD-10-PCS | Mod: S$GLB,,, | Performed by: OPHTHALMOLOGY

## 2023-09-27 PROCEDURE — 1160F PR REVIEW ALL MEDS BY PRESCRIBER/CLIN PHARMACIST DOCUMENTED: ICD-10-PCS | Mod: CPTII,S$GLB,, | Performed by: OPHTHALMOLOGY

## 2023-09-27 PROCEDURE — 1101F PR PT FALLS ASSESS DOC 0-1 FALLS W/OUT INJ PAST YR: ICD-10-PCS | Mod: CPTII,S$GLB,, | Performed by: OPHTHALMOLOGY

## 2023-09-27 PROCEDURE — 1159F PR MEDICATION LIST DOCUMENTED IN MEDICAL RECORD: ICD-10-PCS | Mod: CPTII,S$GLB,, | Performed by: OPHTHALMOLOGY

## 2023-09-27 PROCEDURE — 1126F PR PAIN SEVERITY QUANTIFIED, NO PAIN PRESENT: ICD-10-PCS | Mod: CPTII,S$GLB,, | Performed by: OPHTHALMOLOGY

## 2023-09-27 PROCEDURE — 4010F ACE/ARB THERAPY RXD/TAKEN: CPT | Mod: CPTII,S$GLB,, | Performed by: OPHTHALMOLOGY

## 2023-09-27 PROCEDURE — 99214 OFFICE O/P EST MOD 30 MIN: CPT | Mod: 25,S$GLB,, | Performed by: OPHTHALMOLOGY

## 2023-09-27 RX ADMIN — Medication 1.25 MG: at 09:09

## 2023-09-27 NOTE — PROGRESS NOTES
HPI    DLS: 8/23/2023- possible RUPERTO OD     States no new complaints. Denies pain/ FOL/ floaters.     systane OU PRN   Last edited by Sunshine Khoury on 9/27/2023  9:05 AM.         A/P    ICD-10-CM ICD-9-CM   1. Exudative age-related macular degeneration of right eye with active choroidal neovascularization  H35.3211 362.52     362.16   2. 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 Pseudophakia of both eyes  Z96.1 V43.1   4. Vitreous degeneration of both eyes  H43.813 379.21       1. Exudative age-related macular degeneration of right eye with active choroidal neovascularization  Here for AMD f/u    S/p RUPERTO x3 8/23/23    VA 20/30 (stable), exam notable for improving CNVM with resolved  heme, resolved IRF, tr SRF still at 5 weeks with Avastin     Plan: given active CNVM, needs Avastin OD but still with SRF, will get auth for Eylea to have better control of CNVM to extend patient further for next visit     Based on todays exam, diagnostic studies, and review of records, the determination was made for treatment today.  Schedule Avastin Injection today Right Eye Patient chooses to proceed with injection R/B/A discussed include infection retinal detachment and stroke    Patient identified.  Timeout performed.    Risks, benefits, and alternatives to treatment were discussed in detail with the patient, including bleeding/infection (endophthalmitis)/etc.  The patient voiced understanding and wished to proceed with the procedure.  See separate consent form.    Injection Procedure Note:  Diagnosis: CNVM RightRight Eye    Topical Proparacaine drop placed then topical 5% Betadine and then subconjunctival lidocaine 2% injection  Sterile gloves used, and sterile lid speculum placed.  5% Betadine placed at injection site again prior to injection.  Avastin 1.25mg in 0.05cc Injected inferotemporally 3.5-4mm posterior to the limbus.  Complications: None  Va at least CF at 5 feet  post injection.  Retina, ONH, IOP normal after injection.    Followup as below.  Patient should return immediately PRN.  Retinal Detachment and Endophthalmitis precautions given.     2. Intermediate 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  Stable dry changes OS, no IRF/SRF   Plan: Observation no CNVM conversion   Amsler precautions  Recommend Antioxidants, lutein, omega 3, brown sunglasses (blue blockers).     3. Pseudophakia of both eyes  Good lens position OU  Plan: Observation     4. Vitreous degeneration of both eyes  No RT/RD   Plan: Observation  Pathology of PVD, Retinal Tear, Retinal Detachment reviewed in great detail  RD precautions discussed in detail, patient expressed understanding  RTC immediately PRN (especially ANY change flashes, floaters, vision, visual field)       RTC 4-6 weeks DFE/OCTm OU, mayito Dennis OD      I saw and examined the patient and reviewed in detail the findings documented. The final examination findings, image interpretations, and plan as documented in the record represent my personal judgment and conclusions.    Christiano Page MD  Vitreoretinal Surgery   [facility]

## 2023-09-28 NOTE — PATIENT INSTRUCTIONS

## 2023-11-08 ENCOUNTER — TELEPHONE (OUTPATIENT)
Dept: OPHTHALMOLOGY | Facility: CLINIC | Age: 73
End: 2023-11-08
Payer: MEDICARE

## 2023-11-12 ENCOUNTER — CLINICAL SUPPORT (OUTPATIENT)
Dept: CARDIOLOGY | Facility: HOSPITAL | Age: 73
End: 2023-11-12
Payer: MEDICARE

## 2023-11-12 DIAGNOSIS — Z95.0 PRESENCE OF CARDIAC PACEMAKER: ICD-10-CM

## 2023-11-12 PROCEDURE — 93296 REM INTERROG EVL PM/IDS: CPT | Performed by: INTERNAL MEDICINE

## 2023-11-15 ENCOUNTER — OFFICE VISIT (OUTPATIENT)
Dept: OPHTHALMOLOGY | Facility: CLINIC | Age: 73
End: 2023-11-15
Payer: MEDICARE

## 2023-11-15 DIAGNOSIS — H43.813 VITREOUS DEGENERATION OF BOTH EYES: ICD-10-CM

## 2023-11-15 DIAGNOSIS — Z96.1 PSEUDOPHAKIA OF BOTH EYES: ICD-10-CM

## 2023-11-15 DIAGNOSIS — H35.3211 EXUDATIVE AGE-RELATED MACULAR DEGENERATION OF RIGHT EYE WITH ACTIVE CHOROIDAL NEOVASCULARIZATION: Primary | ICD-10-CM

## 2023-11-15 DIAGNOSIS — H35.3122 INTERMEDIATE STAGE NONEXUDATIVE AGE-RELATED MACULAR DEGENERATION OF LEFT EYE: ICD-10-CM

## 2023-11-15 PROCEDURE — 99999 PR PBB SHADOW E&M-EST. PATIENT-LVL III: CPT | Mod: PBBFAC,,, | Performed by: OPHTHALMOLOGY

## 2023-11-15 PROCEDURE — 3288F FALL RISK ASSESSMENT DOCD: CPT | Mod: CPTII,S$GLB,, | Performed by: OPHTHALMOLOGY

## 2023-11-15 PROCEDURE — 99214 OFFICE O/P EST MOD 30 MIN: CPT | Mod: 25,S$GLB,, | Performed by: OPHTHALMOLOGY

## 2023-11-15 PROCEDURE — 3288F PR FALLS RISK ASSESSMENT DOCUMENTED: ICD-10-PCS | Mod: CPTII,S$GLB,, | Performed by: OPHTHALMOLOGY

## 2023-11-15 PROCEDURE — 4010F ACE/ARB THERAPY RXD/TAKEN: CPT | Mod: CPTII,S$GLB,, | Performed by: OPHTHALMOLOGY

## 2023-11-15 PROCEDURE — 67028 PR INJECT INTRAVITREAL PHARMCOLOGIC: ICD-10-PCS | Mod: RT,S$GLB,, | Performed by: OPHTHALMOLOGY

## 2023-11-15 PROCEDURE — 1160F RVW MEDS BY RX/DR IN RCRD: CPT | Mod: CPTII,S$GLB,, | Performed by: OPHTHALMOLOGY

## 2023-11-15 PROCEDURE — 92134 CPTRZ OPH DX IMG PST SGM RTA: CPT | Mod: S$GLB,,, | Performed by: OPHTHALMOLOGY

## 2023-11-15 PROCEDURE — 1160F PR REVIEW ALL MEDS BY PRESCRIBER/CLIN PHARMACIST DOCUMENTED: ICD-10-PCS | Mod: CPTII,S$GLB,, | Performed by: OPHTHALMOLOGY

## 2023-11-15 PROCEDURE — 1159F MED LIST DOCD IN RCRD: CPT | Mod: CPTII,S$GLB,, | Performed by: OPHTHALMOLOGY

## 2023-11-15 PROCEDURE — 99999 PR PBB SHADOW E&M-EST. PATIENT-LVL III: ICD-10-PCS | Mod: PBBFAC,,, | Performed by: OPHTHALMOLOGY

## 2023-11-15 PROCEDURE — 1101F PR PT FALLS ASSESS DOC 0-1 FALLS W/OUT INJ PAST YR: ICD-10-PCS | Mod: CPTII,S$GLB,, | Performed by: OPHTHALMOLOGY

## 2023-11-15 PROCEDURE — 1159F PR MEDICATION LIST DOCUMENTED IN MEDICAL RECORD: ICD-10-PCS | Mod: CPTII,S$GLB,, | Performed by: OPHTHALMOLOGY

## 2023-11-15 PROCEDURE — 1126F AMNT PAIN NOTED NONE PRSNT: CPT | Mod: CPTII,S$GLB,, | Performed by: OPHTHALMOLOGY

## 2023-11-15 PROCEDURE — 67028 INJECTION EYE DRUG: CPT | Mod: RT,S$GLB,, | Performed by: OPHTHALMOLOGY

## 2023-11-15 PROCEDURE — 99214 PR OFFICE/OUTPT VISIT, EST, LEVL IV, 30-39 MIN: ICD-10-PCS | Mod: 25,S$GLB,, | Performed by: OPHTHALMOLOGY

## 2023-11-15 PROCEDURE — 1101F PT FALLS ASSESS-DOCD LE1/YR: CPT | Mod: CPTII,S$GLB,, | Performed by: OPHTHALMOLOGY

## 2023-11-15 PROCEDURE — 1126F PR PAIN SEVERITY QUANTIFIED, NO PAIN PRESENT: ICD-10-PCS | Mod: CPTII,S$GLB,, | Performed by: OPHTHALMOLOGY

## 2023-11-15 PROCEDURE — 92134 OCT, RETINA - OU - BOTH EYES: ICD-10-PCS | Mod: S$GLB,,, | Performed by: OPHTHALMOLOGY

## 2023-11-15 PROCEDURE — 4010F PR ACE/ARB THEARPY RXD/TAKEN: ICD-10-PCS | Mod: CPTII,S$GLB,, | Performed by: OPHTHALMOLOGY

## 2023-11-15 NOTE — PROGRESS NOTES
HPI    Pt presents for dfe/oct and CARLO OD     States she has seen a few floaters, but states they go away. Also saw FOL   OD. No changes to vision     Systane PRN OU     Last edited by Renetta Hurd on 11/15/2023  9:27 AM.         A/P    ICD-10-CM ICD-9-CM   1. Exudative age-related macular degeneration of right eye with active choroidal neovascularization  H35.3211 362.52     362.16         1. Exudative age-related macular degeneration of right eye with active choroidal neovascularization  Here for AMD f/u    S/p RUPERTO x4 9/2723    VA 20/30 (stable), worse SRF with CNVM but unable to extend further with Avastin and get better control    Plan:  recommend switch to Eylea given worse SRF  Based on todays exam, diagnostic studies, and review of records, the determination was made for treatment today.  Schedule Eylea Injection Right Eye today Patient chooses to proceed with injection R/B/A discussed include infection retinal detachment and stroke    Patient identified.  Timeout performed.    Risks, benefits, and alternatives to treatment were discussed in detail with the patient, including bleeding/infection (endophthalmitis)/etc.  The patient voiced understanding and wished to proceed with the procedure.  See separate consent form.    Injection Procedure Note:  Diagnosis: CNVM Right Eye    Topical Proparacaine drop placed then topical 5% Betadine, then subcojunctival lidocaine 2% injection  Sterile gloves used, and sterile lid speculum placed.  5% Betadine placed at injection site again prior to injection.  Eylea 2mg in 0.05cc Injected inferotemporally 3.5-4mm posterior to the limbus.  Complications: None  Va at least CF at 5 feet post injection.  Retina, ONH, IOP normal after injection.    Followup as below.  Patient should return immediately PRN.  Retinal Detachment and Endophthalmitis precautions given     2. Intermediate 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  Stable dry changes OS, no  IRF/SRF   Plan: Observation no CNVM conversion   Amsler precautions  Recommend Antioxidants, lutein, omega 3, brown sunglasses (blue blockers).     3. Pseudophakia of both eyes  Good lens position OU  Plan: Observation     4. Vitreous degeneration of both eyes  No RT/RD   Plan: Observation  Pathology of PVD, Retinal Tear, Retinal Detachment reviewed in great detail  RD precautions discussed in detail, patient expressed understanding  RTC immediately PRN (especially ANY change flashes, floaters, vision, visual field)       RTC 6-8 weeks DFE/OCTm OU, possible Eylea OD       I saw and examined the patient and reviewed in detail the findings documented. The final examination findings, image interpretations, and plan as documented in the record represent my personal judgment and conclusions.    Christiano Page MD  Vitreoretinal Surgery   [facility]

## 2023-11-27 NOTE — PROGRESS NOTES
Subjective:     HPI    I had the pleasure of seeing Batool Mensah in follow-up for her history of AF. Last seen in 3/2023. She is a 73F with HTN and HLD, who was well until 9/2022 when she was incidentally noted to be in AF at 119 bpm (ECG scanned in EMR). She was started on Toprol and eliquis at the time. At a follow-up visit she was found to be bradycardic, so her toprol dose was reduced to 12.5 mg once daily. She was then seen by cardiology, where her resting HR was 43 bpm. She was asymptomatic.     Echo in 11/2022 showed EF 65%. Exercise NST in 11/2022 showed no obvious perfusion defects. A 48 hour Holter done in 11/2022 showed sinus rhythm with rates of 32 bpm to 141 bpm (mean rate 58 bpm), nonsustained AT, no sustained arrhythmias.    A Zio monitor in 11/2022 showed sinus rhythm with an average HR of 68 bpm, and a 14% AF burden (average HR in  bpm).    St. Bao dual chamber pacemaker implanted in 5/2023 for SSS. Started on flecainide 50 mg bid post-procedure.     Pacemaker check shows stable device/lead function, AF burden 2.3% (longest 1.6 hrs), 9.1 year battery. Cannot feel AF. No bleeding issues.    Review of Systems   Constitutional: Negative for decreased appetite, malaise/fatigue, weight gain and weight loss.   HENT:  Negative for sore throat.    Eyes:  Negative for blurred vision.   Cardiovascular:  Negative for chest pain, dyspnea on exertion, irregular heartbeat, leg swelling, near-syncope, orthopnea, palpitations, paroxysmal nocturnal dyspnea and syncope.   Respiratory:  Negative for shortness of breath.    Skin:  Negative for rash.   Musculoskeletal:  Negative for arthritis.   Gastrointestinal:  Negative for abdominal pain.   Neurological:  Negative for focal weakness.   Psychiatric/Behavioral:  Negative for altered mental status.         Objective:    Physical Exam  Vitals and nursing note reviewed.   Constitutional:       General: She is not in acute distress.     Appearance: She is  well-developed.   HENT:      Head: Normocephalic and atraumatic.   Eyes:      General: No scleral icterus.     Conjunctiva/sclera: Conjunctivae normal.      Pupils: Pupils are equal, round, and reactive to light.   Neck:      Thyroid: No thyromegaly.      Vascular: No JVD.   Cardiovascular:      Rate and Rhythm: Normal rate and regular rhythm.      Pulses: Intact distal pulses.      Heart sounds: Normal heart sounds. No murmur heard.     No friction rub. No gallop.   Pulmonary:      Effort: Pulmonary effort is normal. No respiratory distress.      Breath sounds: Normal breath sounds.   Abdominal:      General: Bowel sounds are normal. There is no distension.      Palpations: Abdomen is soft.   Musculoskeletal:      Cervical back: Normal range of motion and neck supple.   Skin:     General: Skin is warm and dry.   Neurological:      Mental Status: She is alert and oriented to person, place, and time.   Psychiatric:         Behavior: Behavior normal.           Assessment:       1. Paroxysmal atrial fibrillation    2. Pacemaker    3. Dyslipidemia    4. Essential hypertension    5. Mixed hyperlipidemia         Plan:       In summary, Batool Mensah is a 73F with asymptomatic AF. Her [id number] Score is 3 (HTN, female sex, age) which corresponds to a yearly risk of stroke without AC estimated at 3.2%. She should continue eliquis.    Ms. Yang has a 14% AF burden. Unable to start an antiarrhythmic given profound bradycardia. Given evidence of SSS, St Bao dcPPM implanted in 5/2023. Now on flecainide 50 mg bid and toprol xl 12.5 mg daily, with AF burden down to 2%.    Plan is to hold the course, follow-up 6 months. Should AF burden increase will increase flecainide to 100 mg bid.    Thank you for allowing me to participate in the care of this patient. Please do not hesitate to call me with any questions or concerns.

## 2023-11-29 ENCOUNTER — OFFICE VISIT (OUTPATIENT)
Dept: CARDIOLOGY | Facility: CLINIC | Age: 73
End: 2023-11-29
Payer: MEDICARE

## 2023-11-29 VITALS
RESPIRATION RATE: 16 BRPM | BODY MASS INDEX: 29.59 KG/M2 | DIASTOLIC BLOOD PRESSURE: 80 MMHG | OXYGEN SATURATION: 98 % | WEIGHT: 167 LBS | SYSTOLIC BLOOD PRESSURE: 136 MMHG | HEIGHT: 63 IN | HEART RATE: 67 BPM

## 2023-11-29 DIAGNOSIS — I48.0 PAROXYSMAL ATRIAL FIBRILLATION: Primary | ICD-10-CM

## 2023-11-29 DIAGNOSIS — E78.2 MIXED HYPERLIPIDEMIA: ICD-10-CM

## 2023-11-29 DIAGNOSIS — E78.5 DYSLIPIDEMIA: ICD-10-CM

## 2023-11-29 DIAGNOSIS — I10 ESSENTIAL HYPERTENSION: ICD-10-CM

## 2023-11-29 DIAGNOSIS — Z95.0 PACEMAKER: ICD-10-CM

## 2023-11-29 PROCEDURE — 93010 EKG 12-LEAD: ICD-10-PCS | Mod: S$GLB,,, | Performed by: GENERAL PRACTICE

## 2023-11-29 PROCEDURE — 93005 EKG 12-LEAD: ICD-10-PCS | Mod: S$GLB,,, | Performed by: INTERNAL MEDICINE

## 2023-11-29 PROCEDURE — 93010 ELECTROCARDIOGRAM REPORT: CPT | Mod: S$GLB,,, | Performed by: GENERAL PRACTICE

## 2023-11-29 PROCEDURE — 93005 ELECTROCARDIOGRAM TRACING: CPT | Mod: S$GLB,,, | Performed by: INTERNAL MEDICINE

## 2023-11-29 PROCEDURE — 3288F FALL RISK ASSESSMENT DOCD: CPT | Mod: CPTII,S$GLB,, | Performed by: INTERNAL MEDICINE

## 2023-11-29 PROCEDURE — 4010F ACE/ARB THERAPY RXD/TAKEN: CPT | Mod: CPTII,S$GLB,, | Performed by: INTERNAL MEDICINE

## 2023-11-29 PROCEDURE — 3075F PR MOST RECENT SYSTOLIC BLOOD PRESS GE 130-139MM HG: ICD-10-PCS | Mod: CPTII,S$GLB,, | Performed by: INTERNAL MEDICINE

## 2023-11-29 PROCEDURE — 1101F PT FALLS ASSESS-DOCD LE1/YR: CPT | Mod: CPTII,S$GLB,, | Performed by: INTERNAL MEDICINE

## 2023-11-29 PROCEDURE — 1159F PR MEDICATION LIST DOCUMENTED IN MEDICAL RECORD: ICD-10-PCS | Mod: CPTII,S$GLB,, | Performed by: INTERNAL MEDICINE

## 2023-11-29 PROCEDURE — 1159F MED LIST DOCD IN RCRD: CPT | Mod: CPTII,S$GLB,, | Performed by: INTERNAL MEDICINE

## 2023-11-29 PROCEDURE — 1101F PR PT FALLS ASSESS DOC 0-1 FALLS W/OUT INJ PAST YR: ICD-10-PCS | Mod: CPTII,S$GLB,, | Performed by: INTERNAL MEDICINE

## 2023-11-29 PROCEDURE — 3288F PR FALLS RISK ASSESSMENT DOCUMENTED: ICD-10-PCS | Mod: CPTII,S$GLB,, | Performed by: INTERNAL MEDICINE

## 2023-11-29 PROCEDURE — 3079F DIAST BP 80-89 MM HG: CPT | Mod: CPTII,S$GLB,, | Performed by: INTERNAL MEDICINE

## 2023-11-29 PROCEDURE — 3079F PR MOST RECENT DIASTOLIC BLOOD PRESSURE 80-89 MM HG: ICD-10-PCS | Mod: CPTII,S$GLB,, | Performed by: INTERNAL MEDICINE

## 2023-11-29 PROCEDURE — 4010F PR ACE/ARB THEARPY RXD/TAKEN: ICD-10-PCS | Mod: CPTII,S$GLB,, | Performed by: INTERNAL MEDICINE

## 2023-11-29 PROCEDURE — 3008F BODY MASS INDEX DOCD: CPT | Mod: CPTII,S$GLB,, | Performed by: INTERNAL MEDICINE

## 2023-11-29 PROCEDURE — 3075F SYST BP GE 130 - 139MM HG: CPT | Mod: CPTII,S$GLB,, | Performed by: INTERNAL MEDICINE

## 2023-11-29 PROCEDURE — 99214 PR OFFICE/OUTPT VISIT, EST, LEVL IV, 30-39 MIN: ICD-10-PCS | Mod: S$GLB,,, | Performed by: INTERNAL MEDICINE

## 2023-11-29 PROCEDURE — 99214 OFFICE O/P EST MOD 30 MIN: CPT | Mod: S$GLB,,, | Performed by: INTERNAL MEDICINE

## 2023-11-29 PROCEDURE — 3008F PR BODY MASS INDEX (BMI) DOCUMENTED: ICD-10-PCS | Mod: CPTII,S$GLB,, | Performed by: INTERNAL MEDICINE

## 2023-12-04 RX ORDER — ATORVASTATIN CALCIUM 20 MG/1
20 TABLET, FILM COATED ORAL NIGHTLY
Qty: 90 TABLET | Refills: 3 | Status: SHIPPED | OUTPATIENT
Start: 2023-12-04

## 2023-12-15 DIAGNOSIS — I10 ESSENTIAL HYPERTENSION: ICD-10-CM

## 2023-12-18 RX ORDER — LOSARTAN POTASSIUM 50 MG/1
50 TABLET ORAL DAILY
Qty: 90 TABLET | Refills: 0 | Status: SHIPPED | OUTPATIENT
Start: 2023-12-18 | End: 2024-02-28 | Stop reason: SDUPTHER

## 2023-12-19 RX ORDER — METOPROLOL SUCCINATE 25 MG/1
12.5 TABLET, EXTENDED RELEASE ORAL
Qty: 45 TABLET | Refills: 7 | Status: SHIPPED | OUTPATIENT
Start: 2023-12-19

## 2023-12-21 ENCOUNTER — TELEPHONE (OUTPATIENT)
Dept: FAMILY MEDICINE | Facility: CLINIC | Age: 73
End: 2023-12-21
Payer: MEDICARE

## 2023-12-21 DIAGNOSIS — E78.2 MIXED HYPERLIPIDEMIA: ICD-10-CM

## 2023-12-21 DIAGNOSIS — Z79.899 ENCOUNTER FOR LONG-TERM (CURRENT) USE OF OTHER MEDICATIONS: Primary | ICD-10-CM

## 2023-12-21 DIAGNOSIS — Z78.0 MENOPAUSE: ICD-10-CM

## 2023-12-21 DIAGNOSIS — D75.1 POLYCYTHEMIA: ICD-10-CM

## 2023-12-21 DIAGNOSIS — I10 ESSENTIAL HYPERTENSION: ICD-10-CM

## 2024-01-08 ENCOUNTER — LAB VISIT (OUTPATIENT)
Dept: LAB | Facility: HOSPITAL | Age: 74
End: 2024-01-08
Attending: FAMILY MEDICINE
Payer: MEDICARE

## 2024-01-08 DIAGNOSIS — I10 ESSENTIAL HYPERTENSION, MALIGNANT: ICD-10-CM

## 2024-01-08 DIAGNOSIS — D75.1 POLYCYTHEMIA, SECONDARY: ICD-10-CM

## 2024-01-08 DIAGNOSIS — Z79.899 ENCOUNTER FOR LONG-TERM (CURRENT) USE OF OTHER MEDICATIONS: ICD-10-CM

## 2024-01-08 DIAGNOSIS — E78.2 MIXED HYPERLIPIDEMIA: Primary | ICD-10-CM

## 2024-01-08 DIAGNOSIS — Z78.0 MENOPAUSE: ICD-10-CM

## 2024-01-08 LAB
ALBUMIN SERPL BCP-MCNC: 4.3 G/DL (ref 3.5–5.2)
ALBUMIN/CREAT UR: NORMAL UG/MG (ref 0–30)
ALP SERPL-CCNC: 10 U/L (ref 55–135)
ALT SERPL W/O P-5'-P-CCNC: 18 U/L (ref 10–44)
ANION GAP SERPL CALC-SCNC: 9 MMOL/L (ref 8–16)
AST SERPL-CCNC: 23 U/L (ref 10–40)
BASOPHILS # BLD AUTO: 0.02 K/UL (ref 0–0.2)
BASOPHILS NFR BLD: 0.5 % (ref 0–1.9)
BILIRUB SERPL-MCNC: 0.7 MG/DL (ref 0.1–1)
BILIRUB UR QL STRIP: NEGATIVE
BUN SERPL-MCNC: 14 MG/DL (ref 8–23)
CALCIUM SERPL-MCNC: 10.3 MG/DL (ref 8.7–10.5)
CHLORIDE SERPL-SCNC: 105 MMOL/L (ref 95–110)
CHOLEST SERPL-MCNC: 142 MG/DL (ref 120–199)
CHOLEST/HDLC SERPL: 1.9 {RATIO} (ref 2–5)
CLARITY UR: CLEAR
CO2 SERPL-SCNC: 28 MMOL/L (ref 23–29)
COLOR UR: COLORLESS
CREAT SERPL-MCNC: 0.8 MG/DL (ref 0.5–1.4)
CREAT UR-MCNC: 31.2 MG/DL (ref 15–325)
DIFFERENTIAL METHOD BLD: ABNORMAL
EOSINOPHIL # BLD AUTO: 0 K/UL (ref 0–0.5)
EOSINOPHIL NFR BLD: 1.1 % (ref 0–8)
ERYTHROCYTE [DISTWIDTH] IN BLOOD BY AUTOMATED COUNT: 12.8 % (ref 11.5–14.5)
EST. GFR  (NO RACE VARIABLE): >60 ML/MIN/1.73 M^2
GLUCOSE SERPL-MCNC: 87 MG/DL (ref 70–110)
GLUCOSE UR QL STRIP: NEGATIVE
HCT VFR BLD AUTO: 42.8 % (ref 37–48.5)
HDLC SERPL-MCNC: 74 MG/DL (ref 40–75)
HDLC SERPL: 52.1 % (ref 20–50)
HGB BLD-MCNC: 14.2 G/DL (ref 12–16)
HGB UR QL STRIP: NEGATIVE
IMM GRANULOCYTES # BLD AUTO: 0.01 K/UL (ref 0–0.04)
IMM GRANULOCYTES NFR BLD AUTO: 0.3 % (ref 0–0.5)
KETONES UR QL STRIP: NEGATIVE
LDLC SERPL CALC-MCNC: 56 MG/DL (ref 63–159)
LEUKOCYTE ESTERASE UR QL STRIP: NEGATIVE
LYMPHOCYTES # BLD AUTO: 1.4 K/UL (ref 1–4.8)
LYMPHOCYTES NFR BLD: 38 % (ref 18–48)
MCH RBC QN AUTO: 30.3 PG (ref 27–31)
MCHC RBC AUTO-ENTMCNC: 33.2 G/DL (ref 32–36)
MCV RBC AUTO: 91 FL (ref 82–98)
MICROALBUMIN UR DL<=1MG/L-MCNC: <7 UG/ML
MONOCYTES # BLD AUTO: 0.3 K/UL (ref 0.3–1)
MONOCYTES NFR BLD: 7.2 % (ref 4–15)
NEUTROPHILS # BLD AUTO: 2 K/UL (ref 1.8–7.7)
NEUTROPHILS NFR BLD: 52.9 % (ref 38–73)
NITRITE UR QL STRIP: NEGATIVE
NONHDLC SERPL-MCNC: 68 MG/DL
NRBC BLD-RTO: 0 /100 WBC
PH UR STRIP: 6 [PH] (ref 5–8)
PLATELET # BLD AUTO: 181 K/UL (ref 150–450)
PMV BLD AUTO: 11.5 FL (ref 9.2–12.9)
POTASSIUM SERPL-SCNC: 4.3 MMOL/L (ref 3.5–5.1)
PROT SERPL-MCNC: 6.7 G/DL (ref 6–8.4)
PROT UR QL STRIP: NEGATIVE
RBC # BLD AUTO: 4.69 M/UL (ref 4–5.4)
SODIUM SERPL-SCNC: 142 MMOL/L (ref 136–145)
SP GR UR STRIP: 1.01 (ref 1–1.03)
TRIGL SERPL-MCNC: 60 MG/DL (ref 30–150)
TSH SERPL DL<=0.005 MIU/L-ACNC: 1.87 UIU/ML (ref 0.34–5.6)
URN SPEC COLLECT METH UR: ABNORMAL
UROBILINOGEN UR STRIP-ACNC: NEGATIVE EU/DL
WBC # BLD AUTO: 3.76 K/UL (ref 3.9–12.7)

## 2024-01-08 PROCEDURE — 81003 URINALYSIS AUTO W/O SCOPE: CPT | Performed by: FAMILY MEDICINE

## 2024-01-08 PROCEDURE — 80053 COMPREHEN METABOLIC PANEL: CPT | Performed by: FAMILY MEDICINE

## 2024-01-08 PROCEDURE — 84443 ASSAY THYROID STIM HORMONE: CPT | Performed by: FAMILY MEDICINE

## 2024-01-08 PROCEDURE — 82043 UR ALBUMIN QUANTITATIVE: CPT | Performed by: FAMILY MEDICINE

## 2024-01-08 PROCEDURE — 36415 COLL VENOUS BLD VENIPUNCTURE: CPT | Performed by: FAMILY MEDICINE

## 2024-01-08 PROCEDURE — 80061 LIPID PANEL: CPT | Performed by: FAMILY MEDICINE

## 2024-01-08 PROCEDURE — 85025 COMPLETE CBC W/AUTO DIFF WBC: CPT | Performed by: FAMILY MEDICINE

## 2024-01-10 ENCOUNTER — OFFICE VISIT (OUTPATIENT)
Dept: OPHTHALMOLOGY | Facility: CLINIC | Age: 74
End: 2024-01-10
Payer: MEDICARE

## 2024-01-10 ENCOUNTER — OFFICE VISIT (OUTPATIENT)
Dept: FAMILY MEDICINE | Facility: CLINIC | Age: 74
End: 2024-01-10
Attending: FAMILY MEDICINE
Payer: MEDICARE

## 2024-01-10 ENCOUNTER — TELEPHONE (OUTPATIENT)
Dept: OPHTHALMOLOGY | Facility: CLINIC | Age: 74
End: 2024-01-10

## 2024-01-10 VITALS
HEIGHT: 63 IN | WEIGHT: 160 LBS | HEART RATE: 65 BPM | DIASTOLIC BLOOD PRESSURE: 84 MMHG | SYSTOLIC BLOOD PRESSURE: 138 MMHG | BODY MASS INDEX: 28.35 KG/M2

## 2024-01-10 DIAGNOSIS — Z98.84 HISTORY OF BARIATRIC SURGERY: ICD-10-CM

## 2024-01-10 DIAGNOSIS — Z96.1 PSEUDOPHAKIA OF BOTH EYES: ICD-10-CM

## 2024-01-10 DIAGNOSIS — Z82.49 FAMILY HISTORY OF PREMATURE CORONARY ARTERY DISEASE: ICD-10-CM

## 2024-01-10 DIAGNOSIS — Z95.0 PACEMAKER: ICD-10-CM

## 2024-01-10 DIAGNOSIS — G47.33 OSA (OBSTRUCTIVE SLEEP APNEA): ICD-10-CM

## 2024-01-10 DIAGNOSIS — E78.2 MIXED HYPERLIPIDEMIA: ICD-10-CM

## 2024-01-10 DIAGNOSIS — H43.813 VITREOUS DEGENERATION OF BOTH EYES: ICD-10-CM

## 2024-01-10 DIAGNOSIS — H35.3122 INTERMEDIATE STAGE NONEXUDATIVE AGE-RELATED MACULAR DEGENERATION OF LEFT EYE: ICD-10-CM

## 2024-01-10 DIAGNOSIS — H35.033 HYPERTENSIVE RETINOPATHY OF BOTH EYES: ICD-10-CM

## 2024-01-10 DIAGNOSIS — M17.10 PRIMARY LOCALIZED OSTEOARTHRITIS OF KNEE: ICD-10-CM

## 2024-01-10 DIAGNOSIS — M19.042 PRIMARY OSTEOARTHRITIS OF BOTH HANDS: ICD-10-CM

## 2024-01-10 DIAGNOSIS — I10 ESSENTIAL HYPERTENSION: ICD-10-CM

## 2024-01-10 DIAGNOSIS — H35.3211 EXUDATIVE AGE-RELATED MACULAR DEGENERATION OF RIGHT EYE WITH ACTIVE CHOROIDAL NEOVASCULARIZATION: ICD-10-CM

## 2024-01-10 DIAGNOSIS — M19.041 PRIMARY OSTEOARTHRITIS OF BOTH HANDS: ICD-10-CM

## 2024-01-10 DIAGNOSIS — Z78.0 MENOPAUSE: ICD-10-CM

## 2024-01-10 DIAGNOSIS — I48.0 PAROXYSMAL ATRIAL FIBRILLATION: Primary | ICD-10-CM

## 2024-01-10 DIAGNOSIS — H35.3211 EXUDATIVE AGE-RELATED MACULAR DEGENERATION OF RIGHT EYE WITH ACTIVE CHOROIDAL NEOVASCULARIZATION: Primary | ICD-10-CM

## 2024-01-10 PROCEDURE — 67028 INJECTION EYE DRUG: CPT | Mod: RT,S$GLB,, | Performed by: OPHTHALMOLOGY

## 2024-01-10 PROCEDURE — 99999 PR PBB SHADOW E&M-EST. PATIENT-LVL III: CPT | Mod: PBBFAC,,, | Performed by: OPHTHALMOLOGY

## 2024-01-10 PROCEDURE — 99214 OFFICE O/P EST MOD 30 MIN: CPT | Mod: S$GLB,,, | Performed by: FAMILY MEDICINE

## 2024-01-10 PROCEDURE — 92134 CPTRZ OPH DX IMG PST SGM RTA: CPT | Mod: S$GLB,,, | Performed by: OPHTHALMOLOGY

## 2024-01-10 PROCEDURE — 99214 OFFICE O/P EST MOD 30 MIN: CPT | Mod: 25,S$GLB,, | Performed by: OPHTHALMOLOGY

## 2024-01-10 NOTE — PROGRESS NOTES
HPI    DLS: 11/15/2023- possible I'VE OD     Pt states things feel the same with eyes. Floaters on occasion OU. FOL in   early morning. Denies pain    Systane OU PRN   Last edited by Sunshine Khoury on 1/10/2024  8:57 AM.     HPI    DLS: 11/15/2023- possible I'VE OD     Pt states things feel the same with eyes. Floaters on occasion OU. FOL in   early morning. Denies pain    Systane OU PRN   Last edited by Sunshine Khoury on 1/10/2024  8:57 AM.         A/P    ICD-10-CM ICD-9-CM   1. Exudative age-related macular degeneration of right eye with active choroidal neovascularization  H35.3211 362.52     362.16   2. 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 Pseudophakia of both eyes  Z96.1 V43.1   4. Vitreous degeneration of both eyes  H43.813 379.21   5. Hypertensive retinopathy of both eyes  H35.033 362.11       1. Exudative age-related macular degeneration of right eye with active choroidal neovascularization  Here for AMD f/u    S/p RUPERTO x4 9/2723  S/p RUPERTO 11/15/23    VA 20/30 (stable), incr SRF with CNVM at 6-8 weeks    Plan:  recommend switch to Eylea given worse SRF, will try to decr interval to 5 weeks. Discussed enrollment in regenexbio trial     Based on todays exam, diagnostic studies, and review of records, the determination was made for treatment today.  Schedule Eylea Injection Right Eye today Patient chooses to proceed with injection R/B/A discussed include infection retinal detachment and stroke    Patient identified.  Timeout performed.    Risks, benefits, and alternatives to treatment were discussed in detail with the patient, including bleeding/infection (endophthalmitis)/etc.  The patient voiced understanding and wished to proceed with the procedure.  See separate consent form.    Injection Procedure Note:  Diagnosis: CNVM Right Eye    Topical Proparacaine drop placed then topical 5% Betadine, then subcojunctival lidocaine 2% injection  Sterile gloves  used, and sterile lid speculum placed.  5% Betadine placed at injection site again prior to injection.  Eylea 2mg in 0.05cc Injected inferotemporally 3.5-4mm posterior to the limbus.  Complications: None  Va at least CF at 5 feet post injection.  Retina, ONH, IOP normal after injection.    Followup as below.  Patient should return immediately PRN.  Retinal Detachment and Endophthalmitis precautions given     2. Intermediate 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  Stable dry AMD findings OS, no IRF/SRF   Plan: Observation no CNVM, monitor given OD with Wet AMD conversion   Amsler precautions  Recommend Antioxidants, lutein, omega 3, brown sunglasses (blue blockers).     3. Pseudophakia of both eyes  Good lens position OU  Plan: Observation     4. Vitreous degeneration of both eyes  No RT/RD , few floaters   Plan: Observation  Pathology of PVD, Retinal Tear, Retinal Detachment reviewed in great detail  RD precautions discussed in detail, patient expressed understanding  RTC immediately PRN (especially ANY change flashes, floaters, vision, visual field)     5. Hypertensive retinopathy of both eyes  PCP Sage Dicksno MD - Recent notes reviewed  Mild HTN changes  Plan: Observation for now  Recommend good blood pressure control, tight blood glucose control, and good cholesterol control           RTC 6-8 weeks DFE/OCTm OU, possible Eylea OD       I saw and examined the patient and reviewed in detail the findings documented. The final examination findings, image interpretations, and plan as documented in the record represent my personal judgment and conclusions.    Christiano Page MD  Vitreoretinal Surgery   [facility]

## 2024-01-12 NOTE — PROGRESS NOTES
SUBJECTIVE:    Patient ID: Batool Mensah is a 73 y.o. female.    Chief Complaint: Follow-up (Review Lab-results,  no complaints, abc )    73-year-old female here for six-month checkup.  She is active at the gym 5 days a week.  She mixes a combination of water aerobics, Jesus dancing and Silver sneakers classes.  She denies chest pain or shortness a breath with exertion.    Macular degeneration, eye injections every 6 weeks.  Dr. Chaney retinologist AREDS2 vitamins also may get Eyela injections.  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    Paroxysmal atrial fibrillation, pacemaker pleasant.  Dr. Ariza found that she was in atrial fibrillation only 2% of the time.  Currently on flecainide as antiarrhythmic, Dr. Rincon cardiology [facility]    September 2023-mammogram normal    HUI-no longer on CPAP since she lost weight.    History of bariatric surgery.  2018-gastric sleeve    2016-colonoscopy Dr. Parker-RTC 10 years    Follow-up  Pertinent negatives include no arthralgias, chest pain, headaches, joint swelling, neck pain or vomiting.       Lab Visit on 01/08/2024   Component Date Value Ref Range Status    WBC 01/08/2024 3.76 (L)  3.90 - 12.70 K/uL Final    RBC 01/08/2024 4.69  4.00 - 5.40 M/uL Final    Hemoglobin 01/08/2024 14.2  12.0 - 16.0 g/dL Final    Hematocrit 01/08/2024 42.8  37.0 - 48.5 % Final    MCV 01/08/2024 91  82 - 98 fL Final    MCH 01/08/2024 30.3  27.0 - 31.0 pg Final    MCHC 01/08/2024 33.2  32.0 - 36.0 g/dL Final    RDW 01/08/2024 12.8  11.5 - 14.5 % Final    Platelets 01/08/2024 181  150 - 450 K/uL Final    MPV 01/08/2024 11.5  9.2 - 12.9 fL Final    Immature Granulocytes 01/08/2024 0.3  0.0 - 0.5 % Final    Gran # (ANC) 01/08/2024 2.0  1.8 - 7.7 K/uL Final    Immature Grans (Abs) 01/08/2024 0.01  0.00 - 0.04 K/uL Final    Lymph # 01/08/2024 1.4  1.0 - 4.8 K/uL Final    Mono # 01/08/2024 0.3  0.3 - 1.0 K/uL Final    Eos # 01/08/2024 0.0  0.0 - 0.5  K/uL Final    Baso # 01/08/2024 0.02  0.00 - 0.20 K/uL Final    nRBC 01/08/2024 0  0 /100 WBC Final    Gran % 01/08/2024 52.9  38.0 - 73.0 % Final    Lymph % 01/08/2024 38.0  18.0 - 48.0 % Final    Mono % 01/08/2024 7.2  4.0 - 15.0 % Final    Eosinophil % 01/08/2024 1.1  0.0 - 8.0 % Final    Basophil % 01/08/2024 0.5  0.0 - 1.9 % Final    Differential Method 01/08/2024 Automated   Final    TSH 01/08/2024 1.867  0.340 - 5.600 uIU/mL Final    Specimen UA 01/08/2024 Urine, Clean Catch   Final    Color, UA 01/08/2024 Colorless (A)  Yellow, Straw, Mi Final    Appearance, UA 01/08/2024 Clear  Clear Final    pH, UA 01/08/2024 6.0  5.0 - 8.0 Final    Specific Gravity, UA 01/08/2024 1.010  1.005 - 1.030 Final    Protein, UA 01/08/2024 Negative  Negative Final    Glucose, UA 01/08/2024 Negative  Negative Final    Ketones, UA 01/08/2024 Negative  Negative Final    Bilirubin (UA) 01/08/2024 Negative  Negative Final    Occult Blood UA 01/08/2024 Negative  Negative Final    Nitrite, UA 01/08/2024 Negative  Negative Final    Urobilinogen, UA 01/08/2024 Negative  Negative EU/dL Final    Leukocytes, UA 01/08/2024 Negative  Negative Final    Microalbumin, Urine 01/08/2024 <7.0  <19.9 ug/mL Final    Creatinine, Urine 01/08/2024 31.2  15.0 - 325.0 mg/dL Final    Microalb/Creat Ratio 01/08/2024 Unable to calculate  0.0 - 30.0 ug/mg Final    Cholesterol 01/08/2024 142  120 - 199 mg/dL Final    Triglycerides 01/08/2024 60  30 - 150 mg/dL Final    HDL 01/08/2024 74  40 - 75 mg/dL Final    LDL Cholesterol 01/08/2024 56.0 (L)  63.0 - 159.0 mg/dL Final    HDL/Cholesterol Ratio 01/08/2024 52.1 (H)  20.0 - 50.0 % Final    Total Cholesterol/HDL Ratio 01/08/2024 1.9 (L)  2.0 - 5.0 Final    Non-HDL Cholesterol 01/08/2024 68  mg/dL Final    Sodium 01/08/2024 142  136 - 145 mmol/L Final    Potassium 01/08/2024 4.3  3.5 - 5.1 mmol/L Final    Chloride 01/08/2024 105  95 - 110 mmol/L Final    CO2 01/08/2024 28  23 - 29 mmol/L Final    Glucose  01/08/2024 87  70 - 110 mg/dL Final    BUN 01/08/2024 14  8 - 23 mg/dL Final    Creatinine 01/08/2024 0.8  0.5 - 1.4 mg/dL Final    Calcium 01/08/2024 10.3  8.7 - 10.5 mg/dL Final    Total Protein 01/08/2024 6.7  6.0 - 8.4 g/dL Final    Albumin 01/08/2024 4.3  3.5 - 5.2 g/dL Final    Total Bilirubin 01/08/2024 0.7  0.1 - 1.0 mg/dL Final    Alkaline Phosphatase 01/08/2024 10 (L)  55 - 135 U/L Final    AST 01/08/2024 23  10 - 40 U/L Final    ALT 01/08/2024 18  10 - 44 U/L Final    eGFR 01/08/2024 >60.0  >60 mL/min/1.73 m^2 Final    Anion Gap 01/08/2024 9  8 - 16 mmol/L Final   Hospital Outpatient Visit on 11/29/2023   Component Date Value Ref Range Status    Battery Voltage (V) 11/29/2023 2.99  V Final    P/R-wave RA Lead 11/29/2023 3.7  mV Final    Impedance RA Lead 11/29/2023 340  Ohms Final    P/R-wave RA Lead (native) 11/29/2023 8.1  mV Final    Impedance RA Lead (native) 11/29/2023 560  Ohms Final    Threshold V RA Lead 11/29/2023 0.75  V Final    Theshold ms RA Lead 11/29/2023 0.4  ms Final    Threshold V RA Lead (native) 11/29/2023 0.875  V Final    Threshold ms RA Lead (native) 11/29/2023 0.4  ms Final       Past Medical History:   Diagnosis Date    A-fib     Anticoagulant long-term use     Arthritis     Encounter for blood transfusion     Hypertension     Mixed hyperlipidemia     Sleep apnea     cpap     Social History     Socioeconomic History    Marital status:    Tobacco Use    Smoking status: Never    Smokeless tobacco: Never   Substance and Sexual Activity    Alcohol use: Yes     Comment: socially    Drug use: Never    Sexual activity: Not Currently     Social Determinants of Health     Financial Resource Strain: Low Risk  (11/27/2023)    Overall Financial Resource Strain (CARDIA)     Difficulty of Paying Living Expenses: Not very hard   Food Insecurity: No Food Insecurity (11/27/2023)    Hunger Vital Sign     Worried About Running Out of Food in the Last Year: Never true     Ran Out of Food in  the Last Year: Never true   Transportation Needs: No Transportation Needs (2023)    PRAPARE - Transportation     Lack of Transportation (Medical): No     Lack of Transportation (Non-Medical): No   Physical Activity: Sufficiently Active (2023)    Exercise Vital Sign     Days of Exercise per Week: 5 days     Minutes of Exercise per Session: 60 min   Stress: No Stress Concern Present (2023)    [employer] - Occupational Stress Questionnaire     Feeling of Stress : Only a little   Social Connections: Unknown (2023)    Social Connection and Isolation Panel [NHANES]     Frequency of Communication with Friends and Family: More than three times a week     Frequency of Social Gatherings with Friends and Family: More than three times a week     Active Member of Clubs or Organizations: Yes     Attends Club or Organization Meetings: More than 4 times per year     Marital Status:    Housing Stability: Low Risk  (2023)    Housing Stability Vital Sign     Unable to Pay for Housing in the Last Year: No     Number of Places Lived in the Last Year: 1     Unstable Housing in the Last Year: No     Past Surgical History:   Procedure Laterality Date    A-V CARDIAC PACEMAKER INSERTION Left 2023    Procedure: INSERTION, CARDIAC PACEMAKER, DUAL CHAMBER;  Surgeon: Curt Ariza MD;  Location: [facility] EP LAB;  Service: Cardiology;  Laterality: Left;  SSS/Bradycardia, dPPM, SJM, MAC, GP, 3 PREP    APPENDECTOMY      BREAST BIOPSY Left     BREAST LUMPECTOMY      CATARACT EXTRACTION W/  INTRAOCULAR LENS IMPLANT Left 2023    Procedure: CEIOL OS;  Surgeon: Randolph Marie MD;  Location: [facility] OR;  Service: Ophthalmology;  Laterality: Left;    CATARACT EXTRACTION W/  INTRAOCULAR LENS IMPLANT Right 2023    Procedure: CEIOL OD;  Surgeon: Randolph Marie MD;  Location: [facility] OR;  Service: Ophthalmology;  Laterality: Right;     SECTION      X2    COLONOSCOPY  2016     Dr. Parker -[facility] 10 years    EYE SURGERY      Gastric sleeve  2018    Dr Hernandez- hiatal hernia repair    HERNIA REPAIR      LAPAROSCOPIC APPENDECTOMY N/A 10/19/2020    Procedure: APPENDECTOMY, LAPAROSCOPIC;  Surgeon: Konrad Almonte III, MD;  Location: [facility];  Service: General;  Laterality: N/A;    TONSILLECTOMY       No family history on file.    The 10-year CVD risk score (LUIS'Agostino, et al., 2008) is: 9.3%    Values used to calculate the score:      Age: 73 years      Sex: Female      Diabetic: No      Tobacco smoker: No      Systolic Blood Pressure: 138 mmHg      Is BP treated: Yes      HDL Cholesterol: 74 mg/dL      Total Cholesterol: 142 mg/dL    All of your core healthy metrics are met.      Review of patient's allergies indicates:   Allergen Reactions    Penicillins Hives       Current Outpatient Medications:     alendronate (FOSAMAX) 70 MG tablet, Take 1 tablet (70 mg total) by mouth every 7 days. Take on empty stomach with full glass of water-do not eat or lie down for 1 hour For osteoporosis, Disp: 12 tablet, Rfl: 3    apixaban (ELIQUIS) 5 mg Tab, Take 1 tablet (5 mg total) by mouth 2 (two) times daily., Disp: 180 tablet, Rfl: 3    atorvastatin (LIPITOR) 20 MG tablet, TAKE 1 TABLET BY MOUTH EVERY DAY IN THE EVENING, Disp: 90 tablet, Rfl: 3    calcium-vitamin D3 (OS-KRYS 500 + D3) 500 mg-5 mcg (200 unit) per tablet, Take 1 tablet by mouth 2 (two) times daily with meals., Disp: , Rfl:     flecainide (TAMBOCOR) 50 MG Tab, Take 1 tablet (50 mg total) by mouth every 12 (twelve) hours., Disp: 60 tablet, Rfl: 11    losartan (COZAAR) 50 MG tablet, Take 1 tablet (50 mg total) by mouth once daily., Disp: 90 tablet, Rfl: 0    metoprolol succinate (TOPROL-XL) 25 MG 24 hr tablet, TAKE 1/2 TABLET BY MOUTH DAILY., Disp: 45 tablet, Rfl: 7    Current Facility-Administered Medications:     aflibercept Syrg 2 mg, 2 mg, Intravitreal, Once, Christiano Page MD    NON FORMULARY MEDICATION 0.5 mg, 0.5 mg, Intravitreal, Q30  "Days, Baron Heard, PharmD    Facility-Administered Medications Ordered in Other Visits:     lactated ringers infusion, , Intravenous, Continuous, Ammon Mcmanus MD, Stopped at 03/29/23 0918    LIDOcaine (PF) 10 mg/ml (1%) injection 10 mg, 1 mL, Intradermal, Once, Ammon Mcmanus MD    Review of Systems   Constitutional:  Negative for activity change and unexpected weight change.   HENT:  Negative for hearing loss, rhinorrhea and trouble swallowing.    Eyes:  Positive for visual disturbance. Negative for discharge.   Respiratory:  Negative for chest tightness and wheezing.    Cardiovascular:  Negative for chest pain and palpitations.   Gastrointestinal:  Negative for blood in stool, constipation, diarrhea and vomiting.   Endocrine: Negative for polydipsia and polyuria.   Genitourinary:  Negative for difficulty urinating, dysuria, hematuria and menstrual problem.   Musculoskeletal:  Negative for arthralgias, joint swelling and neck pain.   Neurological:  Negative for headaches.   Psychiatric/Behavioral:  Negative for confusion and dysphoric mood.            Objective:      Vitals:    01/10/24 1543   BP: 138/84   Pulse: 65   Weight: 72.6 kg (160 lb)   Height: 5' 3" (1.6 m)     Physical Exam  Vitals and nursing note reviewed.   Constitutional:       General: She is not in acute distress.     Appearance: Normal appearance. She is well-developed. She is not toxic-appearing.   HENT:      Head: Normocephalic and atraumatic.      Right Ear: Tympanic membrane and external ear normal.      Left Ear: Tympanic membrane and external ear normal.      Nose: Nose normal.      Mouth/Throat:      Pharynx: Oropharynx is clear.   Eyes:      Pupils: Pupils are equal, round, and reactive to light.   Neck:      Thyroid: No thyromegaly.      Vascular: No carotid bruit.   Cardiovascular:      Rate and Rhythm: Normal rate and regular rhythm.      Heart sounds: Normal heart sounds. No murmur heard.  Pulmonary:      Effort: " Pulmonary effort is normal.      Breath sounds: Normal breath sounds. No wheezing or rales.   Abdominal:      General: Bowel sounds are normal. There is no distension.      Palpations: Abdomen is soft.      Tenderness: There is no abdominal tenderness.   Musculoskeletal:         General: No tenderness or deformity. Normal range of motion.      Cervical back: Normal range of motion and neck supple.      Lumbar back: Normal. No spasms.      Comments: Bends 90 degrees at  waist shoulders good range of motion, right knee crepitant, not painful, no pitting edema to lower extremities   Lymphadenopathy:      Cervical: No cervical adenopathy.   Skin:     General: Skin is warm and dry.      Findings: No rash.   Neurological:      Mental Status: She is alert and oriented to person, place, and time.      Cranial Nerves: No cranial nerve deficit.      Coordination: Coordination normal.   Psychiatric:         Behavior: Behavior normal.         Thought Content: Thought content normal.         Judgment: Judgment normal.           Assessment:       1. Paroxysmal atrial fibrillation    2. Exudative age-related macular degeneration of right eye with active choroidal neovascularization    3. 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 Pacemaker    5. Mixed hyperlipidemia    6. Family history of premature coronary artery disease    7. Essential hypertension    8. Menopause    9. History of bariatric surgery    10. Primary localized osteoarthritis of knee    11. Primary osteoarthritis of both hands    12. HUI (obstructive sleep apnea)         Plan:       Paroxysmal atrial fibrillation  Patient in sinus rhythm today, continue Eliquis 5 mg b.i.d.  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  Family history of premature coronary artery disease    Essential hypertension  Blood pressure well controlled  Menopause    History of bariatric surgery  She has lost 7 lb since last visit.  Primary localized osteoarthritis of knee    Primary osteoarthritis of both hands    HUI (obstructive sleep apnea)      Follow up in about 6 months (around 7/10/2024), or PAF OA.        1/12/2024 Sage Dickson

## 2024-02-11 ENCOUNTER — CLINICAL SUPPORT (OUTPATIENT)
Dept: CARDIOLOGY | Facility: HOSPITAL | Age: 74
End: 2024-02-11
Attending: INTERNAL MEDICINE
Payer: MEDICARE

## 2024-02-11 ENCOUNTER — CLINICAL SUPPORT (OUTPATIENT)
Dept: CARDIOLOGY | Facility: HOSPITAL | Age: 74
End: 2024-02-11
Payer: MEDICARE

## 2024-02-11 DIAGNOSIS — I48.0 PAROXYSMAL ATRIAL FIBRILLATION: ICD-10-CM

## 2024-02-11 DIAGNOSIS — Z95.0 PRESENCE OF CARDIAC PACEMAKER: ICD-10-CM

## 2024-02-11 PROCEDURE — 93294 REM INTERROG EVL PM/LDLS PM: CPT | Mod: ,,, | Performed by: INTERNAL MEDICINE

## 2024-02-11 PROCEDURE — 93296 REM INTERROG EVL PM/IDS: CPT | Performed by: INTERNAL MEDICINE

## 2024-02-20 NOTE — PROGRESS NOTES
HPI    DLS: 1/10/2024 possible CARLO OD     Pt states no new complaints      Denies pain/ FOL/ floaters. (Will see shimmers in corners of eyes   sometimes)     Drops: systane OU BID     Last edited by Sunshine Khoury on 2/21/2024 10:02 AM.           A/P    ICD-10-CM ICD-9-CM   1. Exudative age-related macular degeneration of right eye with active choroidal neovascularization  H35.3211 362.52     362.16   2. 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 Pseudophakia of both eyes  Z96.1 V43.1   4. Vitreous degeneration of both eyes  H43.813 379.21   5. Hypertensive retinopathy of both eyes  H35.033 362.11         1. Exudative age-related macular degeneration of right eye with active choroidal neovascularization  Here for AMD f/u    S/p RUPERTO x4 9/2723  S/p I'VE X2 1/10/24    VA 20/40 (was 20/30), better SRF with CNVM at 5 weeks    Plan:  recommend Eylea given improving SRF, will try to keep at 5 weeks. Discussed enrollment [facility] trial     Based on todays exam, diagnostic studies, and review of records, the determination was made for treatment today.  Schedule Eylea Injection Right Eye today Patient chooses to proceed with injection R/B/A discussed include infection retinal detachment and stroke    Patient identified.  Timeout performed.    Risks, benefits, and alternatives to treatment were discussed in detail with the patient, including bleeding/infection (endophthalmitis)/etc.  The patient voiced understanding and wished to proceed with the procedure.  See separate consent form.    Injection Procedure Note:  Diagnosis: CNVM Right Eye    Topical Proparacaine drop placed then topical 5% Betadine, then subcojunctival lidocaine 2% injection  Sterile gloves used, and sterile lid speculum placed.  5% Betadine placed at injection site again prior to injection.  Eylea 2mg in 0.05cc Injected inferotemporally 3.5-4mm posterior to the limbus.  Complications: None  Va at  least CF at 5 feet post injection.  Retina, ONH, IOP normal after injection.    Followup as below.  Patient should return immediately PRN.  Retinal Detachment and Endophthalmitis precautions given     2. Intermediate 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  Stable dry AMD findings OS, no IRF/SRF   Plan: Observation no CNVM, monitor given OD with Wet AMD conversion   Amsler precautions  Recommend Antioxidants, lutein, omega 3, brown sunglasses (blue blockers).     3. Pseudophakia of both eyes  Good lens position OU  Plan: Observation     4. Vitreous degeneration of both eyes  No RT/RD   Plan: Observation  Pathology of PVD, Retinal Tear, Retinal Detachment reviewed in great detail  RD precautions discussed in detail, patient expressed understanding  RTC immediately PRN (especially ANY change flashes, floaters, vision, visual field)     5. Hypertensive retinopathy of both eyes  PCP Sage Dickson MD - Recent notes reviewed  Mild HTN findings  Plan: Observation for now  Recommend good blood pressure control, tight blood glucose control, and good cholesterol control           RTC 5 weeks DFE/OCTm OU, possible Eylea OD       I saw and examined the patient and reviewed in detail the findings documented. The final examination findings, image interpretations, and plan as documented in the record represent my personal judgment and conclusions.    Christiano Page MD  Vitreoretinal Surgery   [facility]

## 2024-02-21 ENCOUNTER — OFFICE VISIT (OUTPATIENT)
Dept: OPHTHALMOLOGY | Facility: CLINIC | Age: 74
End: 2024-02-21
Payer: MEDICARE

## 2024-02-21 DIAGNOSIS — H35.3211 EXUDATIVE AGE-RELATED MACULAR DEGENERATION OF RIGHT EYE WITH ACTIVE CHOROIDAL NEOVASCULARIZATION: Primary | ICD-10-CM

## 2024-02-21 DIAGNOSIS — H43.813 VITREOUS DEGENERATION OF BOTH EYES: ICD-10-CM

## 2024-02-21 DIAGNOSIS — Z96.1 PSEUDOPHAKIA OF BOTH EYES: ICD-10-CM

## 2024-02-21 DIAGNOSIS — H35.3122 INTERMEDIATE STAGE NONEXUDATIVE AGE-RELATED MACULAR DEGENERATION OF LEFT EYE: ICD-10-CM

## 2024-02-21 DIAGNOSIS — H35.033 HYPERTENSIVE RETINOPATHY OF BOTH EYES: ICD-10-CM

## 2024-02-21 PROCEDURE — 67028 INJECTION EYE DRUG: CPT | Mod: RT,S$GLB,, | Performed by: OPHTHALMOLOGY

## 2024-02-21 PROCEDURE — 92134 CPTRZ OPH DX IMG PST SGM RTA: CPT | Mod: S$GLB,,, | Performed by: OPHTHALMOLOGY

## 2024-02-21 PROCEDURE — 99214 OFFICE O/P EST MOD 30 MIN: CPT | Mod: 25,S$GLB,, | Performed by: OPHTHALMOLOGY

## 2024-02-21 PROCEDURE — 99999 PR PBB SHADOW E&M-EST. PATIENT-LVL III: CPT | Mod: PBBFAC,,, | Performed by: OPHTHALMOLOGY

## 2024-02-23 LAB
OHS CV AF BURDEN PERCENT: 2
OHS CV DC REMOTE DEVICE TYPE: NORMAL
OHS CV RV PACING PERCENT: 2 %

## 2024-02-28 ENCOUNTER — OFFICE VISIT (OUTPATIENT)
Dept: CARDIOLOGY | Facility: CLINIC | Age: 74
End: 2024-02-28
Payer: MEDICARE

## 2024-02-28 VITALS
OXYGEN SATURATION: 98 % | DIASTOLIC BLOOD PRESSURE: 84 MMHG | WEIGHT: 167.31 LBS | HEART RATE: 66 BPM | BODY MASS INDEX: 29.64 KG/M2 | HEIGHT: 63 IN | SYSTOLIC BLOOD PRESSURE: 130 MMHG

## 2024-02-28 DIAGNOSIS — I48.0 PAROXYSMAL ATRIAL FIBRILLATION: Primary | ICD-10-CM

## 2024-02-28 DIAGNOSIS — E78.2 MIXED HYPERLIPIDEMIA: ICD-10-CM

## 2024-02-28 DIAGNOSIS — I10 ESSENTIAL HYPERTENSION: ICD-10-CM

## 2024-02-28 PROCEDURE — 99999 PR PBB SHADOW E&M-EST. PATIENT-LVL III: CPT | Mod: PBBFAC,,, | Performed by: INTERNAL MEDICINE

## 2024-02-28 PROCEDURE — 93005 ELECTROCARDIOGRAM TRACING: CPT | Mod: S$GLB,,, | Performed by: INTERNAL MEDICINE

## 2024-02-28 PROCEDURE — 93010 ELECTROCARDIOGRAM REPORT: CPT | Mod: S$GLB,,, | Performed by: INTERNAL MEDICINE

## 2024-02-28 PROCEDURE — 99214 OFFICE O/P EST MOD 30 MIN: CPT | Mod: S$GLB,,, | Performed by: INTERNAL MEDICINE

## 2024-02-28 RX ORDER — LOSARTAN POTASSIUM 50 MG/1
50 TABLET ORAL DAILY
Qty: 90 TABLET | Refills: 3 | Status: SHIPPED | OUTPATIENT
Start: 2024-02-28 | End: 2025-02-27

## 2024-02-28 RX ORDER — FLECAINIDE ACETATE 50 MG/1
50 TABLET ORAL EVERY 12 HOURS
Qty: 180 TABLET | Refills: 3 | Status: SHIPPED | OUTPATIENT
Start: 2024-02-28 | End: 2024-05-15

## 2024-02-28 NOTE — PROGRESS NOTES
[facility]    Subjective:     Patient ID:  Batool Mensah is a 73 y.o. female patient here for evaluation Atrial Fibrillation (Yearly check up )      HPI:  73-year-old female here for follow-up.  History of tachy-lissy syndrome status post pacemaker.  Has history of paroxysmal AFib.  On flecainide now.  Doing well.  No exertional chest pain or shortness of breath.  Equivocal stress test but no clear anginal symptoms now.  Normal EF with no significant valvular heart disease in the past.  Elevated ASCVD risk score and now well controlled LDL on statin now.    Review of Systems   All other systems reviewed and are negative.       Past Medical History:   Diagnosis Date    A-fib     Anticoagulant long-term use     Arthritis     Encounter for blood transfusion     Hypertension     Mixed hyperlipidemia     Sleep apnea     cpap       Past Surgical History:   Procedure Laterality Date    A-V CARDIAC PACEMAKER INSERTION Left 2023    Procedure: INSERTION, CARDIAC PACEMAKER, DUAL CHAMBER;  Surgeon: Curt Ariza MD;  Location: [facility] EP LAB;  Service: Cardiology;  Laterality: Left;  SSS/Bradycardia, dPPM, SJM, MAC, GP, 3 PREP    APPENDECTOMY      BREAST BIOPSY Left     BREAST LUMPECTOMY      CATARACT EXTRACTION W/  INTRAOCULAR LENS IMPLANT Left 2023    Procedure: CEIOL OS;  Surgeon: Randolph Marie MD;  Location: [facility] to [facility] OR;  Service: Ophthalmology;  Laterality: Left;    CATARACT EXTRACTION W/  INTRAOCULAR LENS IMPLANT Right 2023    Procedure: CEIOL OD;  Surgeon: Randolph Marie MD;  Location: [facility] to [facility] OR;  Service: Ophthalmology;  Laterality: Right;     SECTION      X2    COLONOSCOPY      Dr. Parker -RTC 10 years    EYE SURGERY      Gastric sleeve  2018    Dr Hernandez- hiatal hernia repair    HERNIA REPAIR      LAPAROSCOPIC APPENDECTOMY N/A 10/19/2020    Procedure: APPENDECTOMY, LAPAROSCOPIC;  Surgeon: Konrad Almonte III, MD;  Location:  [facility] OR;  Service: General;  Laterality: N/A;    TONSILLECTOMY         No family history on file.    Social History     Socioeconomic History    Marital status:    Tobacco Use    Smoking status: Never    Smokeless tobacco: Never   Substance and Sexual Activity    Alcohol use: Yes     Comment: socially    Drug use: Never    Sexual activity: Not Currently     Social Determinants of Health     Financial Resource Strain: Low Risk  (11/27/2023)    Overall Financial Resource Strain (CARDIA)     Difficulty of Paying Living Expenses: Not very hard   Food Insecurity: No Food Insecurity (11/27/2023)    Hunger Vital Sign     Worried About Running Out of Food in the Last Year: Never true     Ran Out of Food in the Last Year: Never true   Transportation Needs: No Transportation Needs (11/27/2023)    PRAPARE - Transportation     Lack of Transportation (Medical): No     Lack of Transportation (Non-Medical): No   Physical Activity: Sufficiently Active (11/27/2023)    Exercise Vital Sign     Days of Exercise per Week: 5 days     Minutes of Exercise per Session: 60 min   Stress: No Stress Concern Present (11/27/2023)    [employer] - Occupational Stress Questionnaire     Feeling of Stress : Only a little   Social Connections: Unknown (11/27/2023)    Social Connection and Isolation Panel [NHANES]     Frequency of Communication with Friends and Family: More than three times a week     Frequency of Social Gatherings with Friends and Family: More than three times a week     Active Member of Clubs or Organizations: Yes     Attends Club or Organization Meetings: More than 4 times per year     Marital Status:    Housing Stability: Low Risk  (11/27/2023)    Housing Stability Vital Sign     Unable to Pay for Housing in the Last Year: No     Number of Places Lived in the Last Year: 1     Unstable Housing in the Last Year: No       Current Outpatient Medications   Medication Sig Dispense Refill     alendronate (FOSAMAX) 70 MG tablet Take 1 tablet (70 mg total) by mouth every 7 days. Take on empty stomach with full glass of water-do not eat or lie down for 1 hour For osteoporosis 12 tablet 3    atorvastatin (LIPITOR) 20 MG tablet TAKE 1 TABLET BY MOUTH EVERY DAY IN THE EVENING 90 tablet 3    calcium-vitamin D3 (OS-KRYS 500 + D3) 500 mg-5 mcg (200 unit) per tablet Take 1 tablet by mouth 2 (two) times daily with meals.      metoprolol succinate (TOPROL-XL) 25 MG 24 hr tablet TAKE 1/2 TABLET BY MOUTH DAILY. 45 tablet 7    apixaban (ELIQUIS) 5 mg Tab Take 1 tablet (5 mg total) by mouth 2 (two) times daily. 180 tablet 3    flecainide (TAMBOCOR) 50 MG Tab Take 1 tablet (50 mg total) by mouth every 12 (twelve) hours. 180 tablet 3    losartan (COZAAR) 50 MG tablet Take 1 tablet (50 mg total) by mouth once daily. 90 tablet 3     Current Facility-Administered Medications   Medication Dose Route Frequency Provider Last Rate Last Admin    aflibercept Syrg 2 mg  2 mg Intravitreal Once Christiano Page MD        NON FORMULARY MEDICATION 0.5 mg  0.5 mg Intravitreal Q30 Days Baron Heard, PharmD         Facility-Administered Medications Ordered in Other Visits   Medication Dose Route Frequency Provider Last Rate Last Admin    lactated ringers infusion   Intravenous Continuous Ammon Mcmanus MD   Stopped at 03/29/23 0918    LIDOcaine (PF) 10 mg/ml (1%) injection 10 mg  1 mL Intradermal Once Ammon Mcmanus MD           Review of patient's allergies indicates:   Allergen Reactions    Penicillins Hives         Objective:        Vitals:    02/28/24 1405   BP: 130/84   Pulse: 66       Physical Exam  Vitals reviewed.   Constitutional:       Appearance: Normal appearance.   HENT:      Mouth/Throat:      Mouth: Mucous membranes are moist.   Eyes:      Extraocular Movements: Extraocular movements intact.      Pupils: Pupils are equal, round, and reactive to light.   Cardiovascular:      Rate and Rhythm: Normal rate and regular  rhythm.      Pulses: Normal pulses.      Heart sounds: Normal heart sounds. No murmur heard.     No gallop.   Pulmonary:      Effort: Pulmonary effort is normal.      Breath sounds: Normal breath sounds.   Abdominal:      General: Bowel sounds are normal.      Palpations: Abdomen is soft.   Musculoskeletal:         General: Normal range of motion.   Skin:     General: Skin is warm and dry.   Neurological:      General: No focal deficit present.      Mental Status: She is alert and oriented to person, place, and time.   Psychiatric:         Mood and Affect: Mood normal.         LIPIDS - LAST 2   Lab Results   Component Value Date    CHOL 142 01/08/2024    CHOL 153 01/09/2023    HDL 74 01/08/2024    HDL 94 01/09/2023    LDLCALC 56.0 (L) 01/08/2024    LDLCALC 47 01/09/2023    TRIG 60 01/08/2024    TRIG 43 01/09/2023    CHOLHDL 52.1 (H) 01/08/2024    CHOLHDL 1.6 01/09/2023       CBC - LAST 2  Lab Results   Component Value Date    WBC 3.76 (L) 01/08/2024    WBC 3.22 (L) 05/12/2023    RBC 4.69 01/08/2024    RBC 4.41 05/12/2023    HGB 14.2 01/08/2024    HGB 13.2 05/12/2023    HCT 42.8 01/08/2024    HCT 40.6 05/12/2023    MCV 91 01/08/2024    MCV 92 05/12/2023    MCH 30.3 01/08/2024    MCH 29.9 05/12/2023    MCHC 33.2 01/08/2024    MCHC 32.5 05/12/2023    RDW 12.8 01/08/2024    RDW 13.9 05/12/2023     01/08/2024     05/12/2023    MPV 11.5 01/08/2024    MPV 11.6 05/12/2023    GRAN 2.0 01/08/2024    GRAN 52.9 01/08/2024    LYMPH 1.4 01/08/2024    LYMPH 38.0 01/08/2024    MONO 0.3 01/08/2024    MONO 7.2 01/08/2024    BASO 0.02 01/08/2024    BASO 0.02 05/12/2023    NRBC 0 01/08/2024    NRBC 0 05/12/2023       CHEMISTRY & LIVER FUNCTION - LAST 2  Lab Results   Component Value Date     01/08/2024     05/12/2023    K 4.3 01/08/2024    K 4.4 05/12/2023     01/08/2024     05/12/2023    CO2 28 01/08/2024    CO2 31 (H) 05/12/2023    ANIONGAP 9 01/08/2024    ANIONGAP 3 (L) 05/12/2023    BUN 14  "01/08/2024    BUN 25 (H) 05/12/2023    CREATININE 0.8 01/08/2024    CREATININE 0.8 05/12/2023    GLU 87 01/08/2024    GLU 93 05/12/2023    CALCIUM 10.3 01/08/2024    CALCIUM 9.5 05/12/2023    MG 2.2 10/21/2020    MG 1.9 10/20/2020    ALBUMIN 4.3 01/08/2024    ALBUMIN 4.5 01/09/2023    PROT 6.7 01/08/2024    PROT 6.9 01/09/2023    ALKPHOS 10 (L) 01/08/2024    ALKPHOS 14 (L) 10/21/2020    ALT 18 01/08/2024    ALT 22 01/09/2023    AST 23 01/08/2024    AST 23 01/09/2023    BILITOT 0.7 01/08/2024    BILITOT 0.7 01/09/2023        CARDIAC PROFILE - LAST 2  No results found for: "BNP", "CPK", "CPKMB", "LDH", "TROPONINI"     COAGULATION - LAST 2  Lab Results   Component Value Date    INR 1.0 05/12/2023    APTT 27.5 05/12/2023       ENDOCRINE & PSA - LAST 2  Lab Results   Component Value Date    MICROALBUR 0.3 01/05/2022    TSH 1.867 01/08/2024        ECHOCARDIOGRAM RESULTS  Results for orders placed during the hospital encounter of 11/07/22    Echo    Interpretation Summary  · The left ventricle is normal in size with normal systolic function.  · The estimated ejection fraction is 65%.  · Normal left ventricular diastolic function.  · Normal right ventricular size with normal right ventricular systolic function.  · Normal central venous pressure (3 mmHg).  · The estimated PA systolic pressure is 23 mmHg.      CURRENT/PREVIOUS VISIT EKG  Results for orders placed or performed in visit on 11/29/23   IN OFFICE EKG 12-LEAD (to [place])    Collection Time: 11/29/23  8:08 AM    Narrative    Test Reason : I48.0,Z95.0,    Vent. Rate : 060 BPM     Atrial Rate : 060 BPM     P-R Int : 184 ms          QRS Dur : 074 ms      QT Int : 416 ms       P-R-T Axes : 091 056 036 degrees     QTc Int : 416 ms    Atrial-paced rhythm  Low voltage QRS  Abnormal ECG  When compared with ECG of 16-MAY-2023 16:31,  The axis Shifted right  Nonspecific T wave abnormality, improved in Inferior leads  Confirmed by Kemal Peck MD ([id number]) on 12/17/2023 " 1:25:56 PM    Referred By:  GP           Confirmed By:Kemal Peck MD     No valid procedures specified.   Results for orders placed during the hospital encounter of 11/07/22    Nuclear Stress - Cardiology Interpreted    Interpretation Summary    Likely normal myocardial perfusion imaging.  There is a moderate-sized defect of moderate intensity which is worse at rest and improves with stress, likely breast attenuation artifact.    There are no other significant perfusion abnormalities.    The gated perfusion images showed an ejection fraction of 78% post stress. Normal ejection fraction is greater than 53%.    The EKG portion of this study is negative for ischemia.    The patient reported no chest pain during the stress test.  No evidence of chronotropic incompetence.    During stress, occasional PVCs are noted.    No valid procedures specified.        Assessment:       1. Paroxysmal atrial fibrillation    2. Essential hypertension    3. Mixed hyperlipidemia           Plan:       Paroxysmal atrial fibrillation  -     IN OFFICE EKG 12-LEAD (to [place])  -     flecainide (TAMBOCOR) 50 MG Tab; Take 1 tablet (50 mg total) by mouth every 12 (twelve) hours.  Dispense: 180 tablet; Refill: 3  -     apixaban (ELIQUIS) 5 mg Tab; Take 1 tablet (5 mg total) by mouth 2 (two) times daily.  Dispense: 180 tablet; Refill: 3    Essential hypertension  Comments:  BP stable  Orders:  -     losartan (COZAAR) 50 MG tablet; Take 1 tablet (50 mg total) by mouth once daily.  Dispense: 90 tablet; Refill: 3    Mixed hyperlipidemia  -     IN OFFICE EKG 12-LEAD (to [place])    AFib appears to be well controlled.  Continue with regular device checks.  If AFib burden worsens, will increase flecainide.  Continue follow-up with EP.  Continue with Eliquis.  Hypertension is well controlled, continue with losartan.  LDL nearly at target, continue statin.    Follow up in about 1 year (around 2/28/2025) for f/u HLD, A fib.          Mark Rincon,  MD  [facility] [place]  [place]

## 2024-04-03 ENCOUNTER — OFFICE VISIT (OUTPATIENT)
Dept: OPHTHALMOLOGY | Facility: CLINIC | Age: 74
End: 2024-04-03
Payer: MEDICARE

## 2024-04-03 DIAGNOSIS — H35.033 HYPERTENSIVE RETINOPATHY OF BOTH EYES: ICD-10-CM

## 2024-04-03 DIAGNOSIS — H35.3211 EXUDATIVE AGE-RELATED MACULAR DEGENERATION OF RIGHT EYE WITH ACTIVE CHOROIDAL NEOVASCULARIZATION: Primary | ICD-10-CM

## 2024-04-03 DIAGNOSIS — H43.813 VITREOUS DEGENERATION OF BOTH EYES: ICD-10-CM

## 2024-04-03 DIAGNOSIS — H35.3122 INTERMEDIATE STAGE NONEXUDATIVE AGE-RELATED MACULAR DEGENERATION OF LEFT EYE: ICD-10-CM

## 2024-04-03 DIAGNOSIS — Z96.1 PSEUDOPHAKIA OF BOTH EYES: ICD-10-CM

## 2024-04-03 PROCEDURE — 1159F MED LIST DOCD IN RCRD: CPT | Mod: CPTII,S$GLB,, | Performed by: OPHTHALMOLOGY

## 2024-04-03 PROCEDURE — 67028 INJECTION EYE DRUG: CPT | Mod: RT,S$GLB,, | Performed by: OPHTHALMOLOGY

## 2024-04-03 PROCEDURE — 92134 CPTRZ OPH DX IMG PST SGM RTA: CPT | Mod: S$GLB,,, | Performed by: OPHTHALMOLOGY

## 2024-04-03 PROCEDURE — 1160F RVW MEDS BY RX/DR IN RCRD: CPT | Mod: CPTII,S$GLB,, | Performed by: OPHTHALMOLOGY

## 2024-04-03 PROCEDURE — 99214 OFFICE O/P EST MOD 30 MIN: CPT | Mod: 25,S$GLB,, | Performed by: OPHTHALMOLOGY

## 2024-04-03 PROCEDURE — 1126F AMNT PAIN NOTED NONE PRSNT: CPT | Mod: CPTII,S$GLB,, | Performed by: OPHTHALMOLOGY

## 2024-04-03 PROCEDURE — 4010F ACE/ARB THERAPY RXD/TAKEN: CPT | Mod: CPTII,S$GLB,, | Performed by: OPHTHALMOLOGY

## 2024-04-03 PROCEDURE — 3061F NEG MICROALBUMINURIA REV: CPT | Mod: CPTII,S$GLB,, | Performed by: OPHTHALMOLOGY

## 2024-04-03 PROCEDURE — 99999 PR PBB SHADOW E&M-EST. PATIENT-LVL II: CPT | Mod: PBBFAC,,, | Performed by: OPHTHALMOLOGY

## 2024-04-03 PROCEDURE — 3288F FALL RISK ASSESSMENT DOCD: CPT | Mod: CPTII,S$GLB,, | Performed by: OPHTHALMOLOGY

## 2024-04-03 PROCEDURE — 3066F NEPHROPATHY DOC TX: CPT | Mod: CPTII,S$GLB,, | Performed by: OPHTHALMOLOGY

## 2024-04-03 PROCEDURE — 1101F PT FALLS ASSESS-DOCD LE1/YR: CPT | Mod: CPTII,S$GLB,, | Performed by: OPHTHALMOLOGY

## 2024-04-03 NOTE — PROGRESS NOTES
HPI    Pt here for 5 wk AMD f/u w possible eylea OD. Vision stable, no changes.   Last edited by Dea Watts on 4/3/2024 11:12 AM.         A/P    ICD-10-CM ICD-9-CM   1. Exudative age-related macular degeneration of right eye with active choroidal neovascularization  H35.3211 362.52     362.16   2. 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 Pseudophakia of both eyes  Z96.1 V43.1   4. Vitreous degeneration of both eyes  H43.813 379.21   5. Hypertensive retinopathy of both eyes  H35.033 362.11       1. Exudative age-related macular degeneration of right eye with active choroidal neovascularization  Here for AMD f/u    S/p RUPERTO x4 9/2723  S/p I'VE X3 2/21/24    VA 20/40 (stable), slight incr in SRF with CNVM at 6 weeks. Does well at 5 weeks    Plan:  recommend Eylea given improving SRF, will try to keep at 5 weeks. Decr to 5 weeks, Discussed enrollment in [employer] trial     Based on todays exam, diagnostic studies, and review of records, the determination was made for treatment today.  Schedule Eylea Injection Right Eye today Patient chooses to proceed with injection R/B/A discussed include infection retinal detachment and stroke    Patient identified.  Timeout performed.    Risks, benefits, and alternatives to treatment were discussed in detail with the patient, including bleeding/infection (endophthalmitis)/etc.  The patient voiced understanding and wished to proceed with the procedure.  See separate consent form.    Injection Procedure Note:  Diagnosis: CNVM Right Eye    Topical Proparacaine drop placed then topical 5% Betadine, then subcojunctival lidocaine 2% injection  Sterile gloves used, and sterile lid speculum placed.  5% Betadine placed at injection site again prior to injection.  Eylea 2mg in 0.05cc Injected inferotemporally 3.5-4mm posterior to the limbus.  Complications: None  Va at least CF at 5 feet post injection.  Retina, ONH, IOP normal  after injection.    Followup as below.  Patient should return immediately PRN.  Retinal Detachment and Endophthalmitis precautions given     2. Intermediate 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  Stable dry AMD findings OS, no IRF/SRF   Plan: Observation no CNVM, monitor given OD with Wet AMD conversion   [place] precautions  Recommend Antioxidants, lutein, omega 3, brown sunglasses (blue blockers).     3. Pseudophakia of both eyes  Good lens position OU  Plan: Observation     4. Vitreous degeneration of both eyes  No RT/RD   Plan: Observation  Pathology of PVD, Retinal Tear, Retinal Detachment reviewed in great detail  RD precautions discussed in detail, patient expressed understanding  RTC immediately PRN (especially ANY change flashes, floaters, vision, visual field)     5. Hypertensive retinopathy of both eyes  PCP Sage Dickson MD - Recent notes reviewed  Mild HTN findings  Plan: Observation for now  Recommend good blood pressure control, tight blood glucose control, and good cholesterol control           RTC 5 weeks DFE/OCTm OU, possible Eylea OD       I saw and examined the patient and reviewed in detail the findings documented. The final examination findings, image interpretations, and plan as documented in the record represent my personal judgment and conclusions.    Christiano Page MD  Vitreoretinal Surgery   [facility]

## 2024-05-01 LAB
OHS QRS DURATION: 88 MS
OHS QTC CALCULATION: 445 MS

## 2024-05-12 ENCOUNTER — CLINICAL SUPPORT (OUTPATIENT)
Dept: CARDIOLOGY | Facility: HOSPITAL | Age: 74
End: 2024-05-12
Payer: MEDICARE

## 2024-05-12 ENCOUNTER — CLINICAL SUPPORT (OUTPATIENT)
Dept: CARDIOLOGY | Facility: HOSPITAL | Age: 74
End: 2024-05-12
Attending: INTERNAL MEDICINE
Payer: MEDICARE

## 2024-05-12 DIAGNOSIS — Z95.0 PRESENCE OF CARDIAC PACEMAKER: ICD-10-CM

## 2024-05-12 DIAGNOSIS — I48.0 PAROXYSMAL ATRIAL FIBRILLATION: ICD-10-CM

## 2024-05-12 PROCEDURE — 93296 REM INTERROG EVL PM/IDS: CPT | Performed by: INTERNAL MEDICINE

## 2024-05-12 PROCEDURE — 93294 REM INTERROG EVL PM/LDLS PM: CPT | Mod: ,,, | Performed by: INTERNAL MEDICINE

## 2024-05-13 NOTE — PROGRESS NOTES
Subjective:     HPI    I had the pleasure of seeing Batool Mensah in follow-up for her history of AF. Last seen in 11/2023. She is a 74F with HTN and HLD, who was well until 9/2022 when she was incidentally noted to be in AF at 119 bpm (ECG scanned in EMR). She was started on Toprol and eliquis at the time. At a follow-up visit she was found to be bradycardic, so her toprol dose was reduced to 12.5 mg once daily. She was then seen by cardiology, where her resting HR was 43 bpm. She was asymptomatic.     Echo in 11/2022 showed EF 65%. Exercise NST in 11/2022 showed no obvious perfusion defects. A 48 hour Holter done in 11/2022 showed sinus rhythm with rates of 32 bpm to 141 bpm (mean rate 58 bpm), nonsustained AT, no sustained arrhythmias.    A Zio monitor in 11/2022 showed sinus rhythm with an average HR of 68 bpm, and a 14% AF burden (average HR in  bpm).    St[employer] Bao dual chamber pacemaker implanted in 5/2023 for SSS. Started on flecainide 50 mg bid post-procedure.     At 11/2023 visit, device check showed AF burden 2.3% (longest 1.6 hrs). No sx.     I reviewed today's device check which shows stable device/lead function, roughly 1.5-2% AF burden, RV pacing <1%, battery 8.1-8.4 yrs.    Review of Systems   Constitutional: Negative for decreased appetite, malaise/fatigue, weight gain and weight loss.   HENT:  Negative for sore throat.    Eyes:  Negative for blurred vision.   Cardiovascular:  Negative for chest pain, dyspnea on exertion, irregular heartbeat, leg swelling, near-syncope, orthopnea, palpitations, paroxysmal nocturnal dyspnea and syncope.   Respiratory:  Negative for shortness of breath.    Skin:  Negative for rash.   Musculoskeletal:  Negative for arthritis.   Gastrointestinal:  Negative for abdominal pain.   Neurological:  Negative for focal weakness.   Psychiatric/Behavioral:  Negative for altered mental status.         Objective:    Physical Exam  Vitals and nursing note reviewed.    Constitutional:       General: She is not in acute distress.     Appearance: She is well-developed.   HENT:      Head: Normocephalic and atraumatic.   Eyes:      General: No scleral icterus.     Conjunctiva/sclera: Conjunctivae normal.      Pupils: Pupils are equal, round, and reactive to light.   Neck:      Thyroid: No thyromegaly.      Vascular: No JVD.   Cardiovascular:      Rate and Rhythm: Normal rate and regular rhythm.      Pulses: Intact distal pulses.      Heart sounds: Normal heart sounds. No murmur heard.     No friction rub. No gallop.   Pulmonary:      Effort: Pulmonary effort is normal. No respiratory distress.      Breath sounds: Normal breath sounds.   Abdominal:      General: Bowel sounds are normal. There is no distension.      Palpations: Abdomen is soft.   Musculoskeletal:      Cervical back: Normal range of motion and neck supple.   Skin:     General: Skin is warm and dry.   Neurological:      Mental Status: She is alert and oriented to person, place, and time.   Psychiatric:         Behavior: Behavior normal.         Assessment:       1. Paroxysmal atrial fibrillation    2. Sinus bradycardia    3. Dyslipidemia    4. Essential hypertension    5. Pacemaker         Plan:       In summary, Batool Mensah is a 74F with asymptomatic AF. Her [id number] Score is 3 (HTN, female sex, age) which corresponds to a yearly risk of stroke without AC estimated at 3.2%. She should continue eliquis.    Ms. Yang had a 14% AF burden. Unable to start an antiarrhythmic given profound bradycardia. Given evidence of SSS, St Bao dcPPM implanted in 5/2023. At 11/2023 visit AF burden down to 2% on flecainide 50 mg bid.    Plan is to increase flecainide to 100 mg bid, follow-up 12 months.     Thank you for allowing me to participate in the care of this patient. Please do not hesitate to call me with any questions or concerns.

## 2024-05-14 LAB
OHS CV AF BURDEN PERCENT: 2
OHS CV DC REMOTE DEVICE TYPE: NORMAL
OHS CV ICD SHOCK: NO
OHS CV RV PACING PERCENT: 1.7 %

## 2024-05-15 ENCOUNTER — OFFICE VISIT (OUTPATIENT)
Dept: CARDIOLOGY | Facility: CLINIC | Age: 74
End: 2024-05-15
Payer: MEDICARE

## 2024-05-15 ENCOUNTER — HOSPITAL ENCOUNTER (OUTPATIENT)
Dept: CARDIOLOGY | Facility: CLINIC | Age: 74
Discharge: HOME | End: 2024-05-15
Attending: INTERNAL MEDICINE
Payer: MEDICARE

## 2024-05-15 ENCOUNTER — OFFICE VISIT (OUTPATIENT)
Dept: OPHTHALMOLOGY | Facility: CLINIC | Age: 74
End: 2024-05-15
Payer: MEDICARE

## 2024-05-15 VITALS
OXYGEN SATURATION: 97 % | WEIGHT: 167.31 LBS | HEIGHT: 63 IN | DIASTOLIC BLOOD PRESSURE: 82 MMHG | BODY MASS INDEX: 29.64 KG/M2 | SYSTOLIC BLOOD PRESSURE: 136 MMHG | HEART RATE: 59 BPM | RESPIRATION RATE: 16 BRPM

## 2024-05-15 DIAGNOSIS — I48.0 PAROXYSMAL ATRIAL FIBRILLATION: ICD-10-CM

## 2024-05-15 DIAGNOSIS — E78.5 DYSLIPIDEMIA: ICD-10-CM

## 2024-05-15 DIAGNOSIS — I49.5 SSS (SICK SINUS SYNDROME): ICD-10-CM

## 2024-05-15 DIAGNOSIS — H35.3122 INTERMEDIATE STAGE NONEXUDATIVE AGE-RELATED MACULAR DEGENERATION OF LEFT EYE: ICD-10-CM

## 2024-05-15 DIAGNOSIS — Z96.1 PSEUDOPHAKIA OF BOTH EYES: ICD-10-CM

## 2024-05-15 DIAGNOSIS — R00.1 SINUS BRADYCARDIA: ICD-10-CM

## 2024-05-15 DIAGNOSIS — Z95.0 PACEMAKER: ICD-10-CM

## 2024-05-15 DIAGNOSIS — H35.033 HYPERTENSIVE RETINOPATHY OF BOTH EYES: ICD-10-CM

## 2024-05-15 DIAGNOSIS — H43.813 VITREOUS DEGENERATION OF BOTH EYES: ICD-10-CM

## 2024-05-15 DIAGNOSIS — H35.3211 EXUDATIVE AGE-RELATED MACULAR DEGENERATION OF RIGHT EYE WITH ACTIVE CHOROIDAL NEOVASCULARIZATION: Primary | ICD-10-CM

## 2024-05-15 DIAGNOSIS — I48.0 PAROXYSMAL ATRIAL FIBRILLATION: Primary | ICD-10-CM

## 2024-05-15 DIAGNOSIS — I10 ESSENTIAL HYPERTENSION: ICD-10-CM

## 2024-05-15 PROCEDURE — 99214 OFFICE O/P EST MOD 30 MIN: CPT | Mod: 25,S$GLB,, | Performed by: OPHTHALMOLOGY

## 2024-05-15 PROCEDURE — 1101F PT FALLS ASSESS-DOCD LE1/YR: CPT | Mod: CPTII,S$GLB,, | Performed by: INTERNAL MEDICINE

## 2024-05-15 PROCEDURE — 3061F NEG MICROALBUMINURIA REV: CPT | Mod: CPTII,S$GLB,, | Performed by: OPHTHALMOLOGY

## 2024-05-15 PROCEDURE — 3079F DIAST BP 80-89 MM HG: CPT | Mod: CPTII,S$GLB,, | Performed by: INTERNAL MEDICINE

## 2024-05-15 PROCEDURE — 1159F MED LIST DOCD IN RCRD: CPT | Mod: CPTII,S$GLB,, | Performed by: OPHTHALMOLOGY

## 2024-05-15 PROCEDURE — 1101F PT FALLS ASSESS-DOCD LE1/YR: CPT | Mod: CPTII,S$GLB,, | Performed by: OPHTHALMOLOGY

## 2024-05-15 PROCEDURE — 93280 PM DEVICE PROGR EVAL DUAL: CPT | Mod: ,,, | Performed by: INTERNAL MEDICINE

## 2024-05-15 PROCEDURE — 4010F ACE/ARB THERAPY RXD/TAKEN: CPT | Mod: CPTII,S$GLB,, | Performed by: INTERNAL MEDICINE

## 2024-05-15 PROCEDURE — 99214 OFFICE O/P EST MOD 30 MIN: CPT | Mod: S$GLB,,, | Performed by: INTERNAL MEDICINE

## 2024-05-15 PROCEDURE — 99999 PR PBB SHADOW E&M-EST. PATIENT-LVL III: CPT | Mod: PBBFAC,,, | Performed by: INTERNAL MEDICINE

## 2024-05-15 PROCEDURE — 1159F MED LIST DOCD IN RCRD: CPT | Mod: CPTII,S$GLB,, | Performed by: INTERNAL MEDICINE

## 2024-05-15 PROCEDURE — 3066F NEPHROPATHY DOC TX: CPT | Mod: CPTII,S$GLB,, | Performed by: OPHTHALMOLOGY

## 2024-05-15 PROCEDURE — 3288F FALL RISK ASSESSMENT DOCD: CPT | Mod: CPTII,S$GLB,, | Performed by: OPHTHALMOLOGY

## 2024-05-15 PROCEDURE — 1160F RVW MEDS BY RX/DR IN RCRD: CPT | Mod: CPTII,S$GLB,, | Performed by: OPHTHALMOLOGY

## 2024-05-15 PROCEDURE — 3061F NEG MICROALBUMINURIA REV: CPT | Mod: CPTII,S$GLB,, | Performed by: INTERNAL MEDICINE

## 2024-05-15 PROCEDURE — 1126F AMNT PAIN NOTED NONE PRSNT: CPT | Mod: CPTII,S$GLB,, | Performed by: OPHTHALMOLOGY

## 2024-05-15 PROCEDURE — 67028 INJECTION EYE DRUG: CPT | Mod: RT,S$GLB,, | Performed by: OPHTHALMOLOGY

## 2024-05-15 PROCEDURE — 1160F RVW MEDS BY RX/DR IN RCRD: CPT | Mod: CPTII,S$GLB,, | Performed by: INTERNAL MEDICINE

## 2024-05-15 PROCEDURE — 3066F NEPHROPATHY DOC TX: CPT | Mod: CPTII,S$GLB,, | Performed by: INTERNAL MEDICINE

## 2024-05-15 PROCEDURE — 3288F FALL RISK ASSESSMENT DOCD: CPT | Mod: CPTII,S$GLB,, | Performed by: INTERNAL MEDICINE

## 2024-05-15 PROCEDURE — 99999 PR PBB SHADOW E&M-EST. PATIENT-LVL III: CPT | Mod: PBBFAC,,, | Performed by: OPHTHALMOLOGY

## 2024-05-15 PROCEDURE — 92134 CPTRZ OPH DX IMG PST SGM RTA: CPT | Mod: S$GLB,,, | Performed by: OPHTHALMOLOGY

## 2024-05-15 PROCEDURE — 3075F SYST BP GE 130 - 139MM HG: CPT | Mod: CPTII,S$GLB,, | Performed by: INTERNAL MEDICINE

## 2024-05-15 PROCEDURE — 4010F ACE/ARB THERAPY RXD/TAKEN: CPT | Mod: CPTII,S$GLB,, | Performed by: OPHTHALMOLOGY

## 2024-05-15 RX ORDER — FLECAINIDE ACETATE 100 MG/1
100 TABLET ORAL EVERY 12 HOURS
Qty: 180 TABLET | Refills: 3 | Status: SHIPPED | OUTPATIENT
Start: 2024-05-15 | End: 2025-05-15

## 2024-05-15 NOTE — PROGRESS NOTES
HPI    Pt presents for dfe/oct and poss CARLO OD    States no new ocular complaints     ATS PRN OU   Last edited by Renetta Hurd on 5/15/2024  8:15 AM.         A/P    ICD-10-CM ICD-9-CM   1. Exudative age-related macular degeneration of right eye with active choroidal neovascularization  H35.3211 362.52     362.16   2. 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 Pseudophakia of both eyes  Z96.1 V43.1   4. Vitreous degeneration of both eyes  H43.813 379.21   5. Hypertensive retinopathy of both eyes  H35.033 362.11         1. Exudative age-related macular degeneration of right eye with active choroidal neovascularization  Here for AMD f/u    S/p RUPERTO x4 9/2723  S/p I'VE X 4 4/3/24    VA 20/30 (was 20/40), better SRF with CNVM at 5 weeks. Had some incr at 6 weeks    Plan:  recommend Eylea given improving SRF, will  keep at 5 weeks.  Discussed enrollment in [employer] trial,  will contact     Based on todays exam, diagnostic studies, and review of records, the determination was made for treatment today.  Schedule Eylea Injection Right Eye today Patient chooses to proceed with injection R/B/A discussed include infection retinal detachment and stroke    Patient identified.  Timeout performed.    Risks, benefits, and alternatives to treatment were discussed in detail with the patient, including bleeding/infection (endophthalmitis)/etc.  The patient voiced understanding and wished to proceed with the procedure.  See separate consent form.    Injection Procedure Note:  Diagnosis: CNVM Right Eye    Topical Proparacaine drop placed then topical 5% Betadine, then subcojunctival lidocaine 2% injection  Sterile gloves used, and sterile lid speculum placed.  5% Betadine placed at injection site again prior to injection.  Eylea 2mg in 0.05cc Injected inferotemporally 3.5-4mm posterior to the limbus.  Complications: None  Va at least CF at 5 feet post  injection.  Retina, ONH, IOP normal after injection.    Followup as below.  Patient should return immediately PRN.  Retinal Detachment and Endophthalmitis precautions given     2. Intermediate 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  Stable dry AMD findings OS   Plan: Observation no CNVM, monitor given OD with Wet AMD conversion   [place]ler precautions  Recommend Antioxidants, lutein, omega 3, brown sunglasses (blue blockers).     3. Pseudophakia of both eyes  Good lens position OU  Plan: Observation     4. Vitreous degeneration of both eyes  No RT/RD , few floaters   Plan: Observation  Pathology of PVD, Retinal Tear, Retinal Detachment reviewed in great detail  RD precautions discussed in detail, patient expressed understanding  RTC immediately PRN (especially ANY change flashes, floaters, vision, visual field)     5. Hypertensive retinopathy of both eyes  PCP Sage Dickson MD - Recent notes reviewed  Mild HTN findings  Plan: Observation for now  Recommend good blood pressure control, tight blood glucose control, and good cholesterol control           RTC 5 weeks DFE/OCTm OU, possible Eylea OD       I saw and examined the patient and reviewed in detail the findings documented. The final examination findings, image interpretations, and plan as documented in the record represent my personal judgment and conclusions.    Christiano Page MD  Vitreoretinal Surgery   [facility]

## 2024-05-16 LAB
IMPEDANCE RA LEAD (NATIVE): 540 OHMS
IMPEDANCE RA LEAD: 330 OHMS
P/R-WAVE RA LEAD (NATIVE): 7 MV
P/R-WAVE RA LEAD: 2.7 MV
THRESHOLD MS RA LEAD (NATIVE): 0.4 MS
THRESHOLD MS RA LEAD: 0.4 MS
THRESHOLD V RA LEAD (NATIVE): 1 V
THRESHOLD V RA LEAD: 1 V

## 2024-06-18 NOTE — PROGRESS NOTES
HPI    DLS: 5/15/2024- AMD w/ possible I'VE OD     Pt states eyes have been doing well. No new complaints. Denies pain/ FOL/   floaters.     Tears OU PRN - systane   Last edited by Sunshine Khoury on 6/19/2024 10:45 AM.          A/P    ICD-10-CM ICD-9-CM   1. Exudative age-related macular degeneration of right eye with active choroidal neovascularization  H35.3211 362.52     362.16   2. 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 Pseudophakia of both eyes  Z96.1 V43.1   4. Vitreous degeneration of both eyes  H43.813 379.21   5. Hypertensive retinopathy of both eyes  H35.033 362.11           1. Exudative age-related macular degeneration of right eye with active choroidal neovascularization  Here for AMD f/u    S/p RUPERTO x4 9/2723  S/p I'VE X 5 5/15/24    VA 20/40 (was 20/30), improved SRF with CNVM at 5 weeks. Had some incr at 6 weeks    Plan:  recommend Eylea given improving SRF,  will push to 6 weeks.  Discussed enrollment in [employer] trial,  will contact     Based on todays exam, diagnostic studies, and review of records, the determination was made for treatment today.  Schedule Eylea Injection Right Eye today Patient chooses to proceed with injection R/B/A discussed include infection retinal detachment and stroke    Patient identified.  Timeout performed.    Risks, benefits, and alternatives to treatment were discussed in detail with the patient, including bleeding/infection (endophthalmitis)/etc.  The patient voiced understanding and wished to proceed with the procedure.  See separate consent form.    Injection Procedure Note:  Diagnosis: CNVM Right Eye    Topical Proparacaine drop placed then topical 5% Betadine, then subcojunctival lidocaine 2% injection  Sterile gloves used, and sterile lid speculum placed.  5% Betadine placed at injection site again prior to injection.  Eylea 2mg in 0.05cc Injected inferotemporally 3.5-4mm posterior to the  limbus.  Complications: None  Va at least CF at 5 feet post injection.  Retina, ONH, IOP normal after injection.    Followup as below.  Patient should return immediately PRN.  Retinal Detachment and Endophthalmitis precautions given     2. Intermediate 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  Stable dry AMD findings   Plan: Observation no CNVM, monitor given OD with Wet AMD conversion   Amsler precautions  Recommend Antioxidants, lutein, omega 3, brown sunglasses (blue blockers).     3. Pseudophakia of both eyes  Good lens position OU  Plan: Observation     4. Vitreous degeneration of both eyes  No RT/RD   Plan: Observation  Pathology of PVD, Retinal Tear, Retinal Detachment reviewed in great detail  RD precautions discussed in detail, patient expressed understanding  RTC immediately PRN (especially ANY change flashes, floaters, vision, visual field)     5. Hypertensive retinopathy of both eyes  PCP Sage Dickson MD - Recent notes reviewed  Mild HTN findings  Plan: Observation for now  Recommend good blood pressure control, tight blood glucose control, and good cholesterol control           RTC  6weeks DFE/OCTm OU, possible Eylea OD       I saw and examined the patient and reviewed in detail the findings documented. The final examination findings, image interpretations, and plan as documented in the record represent my personal judgment and conclusions.    Christiano Page MD  Vitreoretinal Surgery   [facility]

## 2024-06-19 ENCOUNTER — OFFICE VISIT (OUTPATIENT)
Dept: OPHTHALMOLOGY | Facility: CLINIC | Age: 74
End: 2024-06-19
Payer: MEDICARE

## 2024-06-19 DIAGNOSIS — H35.3122 INTERMEDIATE STAGE NONEXUDATIVE AGE-RELATED MACULAR DEGENERATION OF LEFT EYE: ICD-10-CM

## 2024-06-19 DIAGNOSIS — Z96.1 PSEUDOPHAKIA OF BOTH EYES: ICD-10-CM

## 2024-06-19 DIAGNOSIS — H43.813 VITREOUS DEGENERATION OF BOTH EYES: ICD-10-CM

## 2024-06-19 DIAGNOSIS — H35.3211 EXUDATIVE AGE-RELATED MACULAR DEGENERATION OF RIGHT EYE WITH ACTIVE CHOROIDAL NEOVASCULARIZATION: Primary | ICD-10-CM

## 2024-06-19 DIAGNOSIS — H35.033 HYPERTENSIVE RETINOPATHY OF BOTH EYES: ICD-10-CM

## 2024-06-19 PROCEDURE — 99999 PR PBB SHADOW E&M-EST. PATIENT-LVL III: CPT | Mod: PBBFAC,,, | Performed by: OPHTHALMOLOGY

## 2024-07-17 ENCOUNTER — OFFICE VISIT (OUTPATIENT)
Dept: FAMILY MEDICINE | Facility: CLINIC | Age: 74
End: 2024-07-17
Payer: MEDICARE

## 2024-07-17 VITALS
HEART RATE: 52 BPM | WEIGHT: 166 LBS | DIASTOLIC BLOOD PRESSURE: 68 MMHG | OXYGEN SATURATION: 99 % | SYSTOLIC BLOOD PRESSURE: 126 MMHG | HEIGHT: 64 IN | BODY MASS INDEX: 28.34 KG/M2

## 2024-07-17 DIAGNOSIS — Z78.0 MENOPAUSE: ICD-10-CM

## 2024-07-17 DIAGNOSIS — G47.33 OSA (OBSTRUCTIVE SLEEP APNEA): ICD-10-CM

## 2024-07-17 DIAGNOSIS — M19.042 PRIMARY OSTEOARTHRITIS OF BOTH HANDS: ICD-10-CM

## 2024-07-17 DIAGNOSIS — H35.3211 EXUDATIVE AGE-RELATED MACULAR DEGENERATION OF RIGHT EYE WITH ACTIVE CHOROIDAL NEOVASCULARIZATION: ICD-10-CM

## 2024-07-17 DIAGNOSIS — M19.041 PRIMARY OSTEOARTHRITIS OF BOTH HANDS: ICD-10-CM

## 2024-07-17 DIAGNOSIS — Z13.820 ENCOUNTER FOR IMAGING TO ASSESS OSTEOPOROSIS: ICD-10-CM

## 2024-07-17 DIAGNOSIS — M17.10 PRIMARY LOCALIZED OSTEOARTHRITIS OF KNEE: ICD-10-CM

## 2024-07-17 DIAGNOSIS — I10 ESSENTIAL HYPERTENSION: ICD-10-CM

## 2024-07-17 DIAGNOSIS — M81.0 AGE-RELATED OSTEOPOROSIS WITHOUT CURRENT PATHOLOGICAL FRACTURE: ICD-10-CM

## 2024-07-17 DIAGNOSIS — E78.5 DYSLIPIDEMIA: ICD-10-CM

## 2024-07-17 DIAGNOSIS — Z12.31 OTHER SCREENING MAMMOGRAM: ICD-10-CM

## 2024-07-17 DIAGNOSIS — Z98.84 HISTORY OF BARIATRIC SURGERY: ICD-10-CM

## 2024-07-17 DIAGNOSIS — E78.2 MIXED HYPERLIPIDEMIA: ICD-10-CM

## 2024-07-17 DIAGNOSIS — Z95.0 PACEMAKER: ICD-10-CM

## 2024-07-17 DIAGNOSIS — I48.0 PAROXYSMAL ATRIAL FIBRILLATION: Primary | ICD-10-CM

## 2024-07-17 PROCEDURE — 3061F NEG MICROALBUMINURIA REV: CPT | Mod: CPTII,S$GLB,, | Performed by: FAMILY MEDICINE

## 2024-07-17 PROCEDURE — 3066F NEPHROPATHY DOC TX: CPT | Mod: CPTII,S$GLB,, | Performed by: FAMILY MEDICINE

## 2024-07-17 PROCEDURE — 4010F ACE/ARB THERAPY RXD/TAKEN: CPT | Mod: CPTII,S$GLB,, | Performed by: FAMILY MEDICINE

## 2024-07-17 PROCEDURE — 3008F BODY MASS INDEX DOCD: CPT | Mod: CPTII,S$GLB,, | Performed by: FAMILY MEDICINE

## 2024-07-17 PROCEDURE — 3288F FALL RISK ASSESSMENT DOCD: CPT | Mod: CPTII,S$GLB,, | Performed by: FAMILY MEDICINE

## 2024-07-17 PROCEDURE — 1101F PT FALLS ASSESS-DOCD LE1/YR: CPT | Mod: CPTII,S$GLB,, | Performed by: FAMILY MEDICINE

## 2024-07-17 PROCEDURE — 3078F DIAST BP <80 MM HG: CPT | Mod: CPTII,S$GLB,, | Performed by: FAMILY MEDICINE

## 2024-07-17 PROCEDURE — 3074F SYST BP LT 130 MM HG: CPT | Mod: CPTII,S$GLB,, | Performed by: FAMILY MEDICINE

## 2024-07-17 PROCEDURE — 1126F AMNT PAIN NOTED NONE PRSNT: CPT | Mod: CPTII,S$GLB,, | Performed by: FAMILY MEDICINE

## 2024-07-17 PROCEDURE — 99214 OFFICE O/P EST MOD 30 MIN: CPT | Mod: S$GLB,,, | Performed by: FAMILY MEDICINE

## 2024-07-17 RX ORDER — ALENDRONATE SODIUM 70 MG/1
70 TABLET ORAL
Qty: 12 TABLET | Refills: 3 | Status: SHIPPED | OUTPATIENT
Start: 2024-07-17 | End: 2025-07-17

## 2024-07-29 NOTE — PROGRESS NOTES
SUBJECTIVE:    Patient ID: Batool Mensah is a 74 y.o. female.    Chief Complaint: Follow-up (OA//PAF//Bottles brought//refills needed//dexa//mammo//-ERL)    74-year-old female follows up with Dr. Rincon and Dr. Ariza for atrial fibrillation.  She does have a pacemaker but is maintained on Eliquis 5 mg b.i.d. flecainide 100 mg b.i.d..  Toprol 12.5 mg daily    2016-colonoscopy -RTC 10 years    Mammogram due this fall    She is nonsmoker but drinks 1 glass of wine per day    AMD-gets eye injections every 6 weeks with Dr. Chaney retinologist.  Dr. Marie ophthalmology    Hyperlipidemia, on atorvastatin daily 20 mg cholesterol excellent at 142    Knees are crepitant due to arthritis some mild aching but she does not want surgery at this time.    Currently on alendronate 70 mg weekly for osteoporosis    Follow-up  Pertinent negatives include no arthralgias, chest pain, headaches, joint swelling, neck pain, vomiting or weakness.       Hospital Outpatient Visit on 05/15/2024   Component Date Value Ref Range Status    Threshold ms RA Lead (native) 05/15/2024 0.4  ms Final    Threshold V RA Lead (native) 05/15/2024 1.0  V Final    Theshold ms RA Lead 05/15/2024 0.4  ms Final    Threshold V RA Lead 05/15/2024 1.0  V Final    Impedance RA Lead (native) 05/15/2024 540  Ohms Final    P/R-wave RA Lead (native) 05/15/2024 7.0  mV Final    Impedance RA Lead 05/15/2024 330  Ohms Final    P/R-wave RA Lead 05/15/2024 2.7  mV Final   Clinical Support on 05/12/2024   Component Date Value Ref Range Status    ICD Shock 05/12/2024 No   Final    Device Type 05/12/2024 Pacemaker   Final    AF [place] % 05/12/2024 2   Final    RV Paccing % 05/12/2024 1.7  % Final   Office Visit on 02/28/2024   Component Date Value Ref Range Status    QRS Duration 02/28/2024 88  ms Final    [facility] QTC Calculation 02/28/2024 445  ms Final   Clinical Support on 02/11/2024   Component Date Value Ref Range Status    Device Type 02/11/2024 Pacemaker   Final    AF  [place] % 02/11/2024 2   Final    RV Paccing % 02/11/2024 2  % Final       Past Medical History:   Diagnosis Date    A-fib     Anticoagulant long-term use     Arthritis     Encounter for blood transfusion     Hypertension     Mixed hyperlipidemia     Sleep apnea     cpap     Social History     Socioeconomic History    Marital status:    Tobacco Use    Smoking status: Never    Smokeless tobacco: Never   Substance and Sexual Activity    Alcohol use: Yes     Comment: socially    Drug use: Never    Sexual activity: Not Currently     Social Determinants of Health     Financial Resource Strain: Low Risk  (11/27/2023)    Overall Financial Resource Strain (CARDIA)     Difficulty of Paying Living Expenses: Not very hard   Food Insecurity: No Food Insecurity (11/27/2023)    Hunger Vital Sign     Worried About Running Out of Food in the Last Year: Never true     Ran Out of Food in the Last Year: Never true   Transportation Needs: No Transportation Needs (11/27/2023)    PRAPARE - Transportation     Lack of Transportation (Medical): No     Lack of Transportation (Non-Medical): No   Physical Activity: Sufficiently Active (11/27/2023)    Exercise Vital Sign     Days of Exercise per Week: 5 days     Minutes of Exercise per Session: 60 min   Stress: No Stress Concern Present (11/27/2023)    [employer] - Occupational Stress Questionnaire     Feeling of Stress : Only a little   Housing Stability: Low Risk  (11/27/2023)    Housing Stability Vital Sign     Unable to Pay for Housing in the Last Year: No     Number of Places Lived in the Last Year: 1     Unstable Housing in the Last Year: No     Past Surgical History:   Procedure Laterality Date    A-V CARDIAC PACEMAKER INSERTION Left 5/16/2023    Procedure: INSERTION, CARDIAC PACEMAKER, DUAL CHAMBER;  Surgeon: Curt Ariza MD;  Location: [facility] LAB;  Service: Cardiology;  Laterality: Left;  SSS/Bradycardia, dPPM, SJM, MAC, GP, 3 PREP     APPENDECTOMY      BREAST BIOPSY Left     BREAST LUMPECTOMY      CATARACT EXTRACTION W/  INTRAOCULAR LENS IMPLANT Left 2023    Procedure: CEIOL OS;  Surgeon: Randolph Marie MD;  Location: [facility] OR;  Service: Ophthalmology;  Laterality: Left;    CATARACT EXTRACTION W/  INTRAOCULAR LENS IMPLANT Right 2023    Procedure: CEIOL OD;  Surgeon: Randolph Marie MD;  Location: [facility] OR;  Service: Ophthalmology;  Laterality: Right;     SECTION      X2    COLONOSCOPY      Dr. Parker -[facility] 10 years    EYE SURGERY      Gastric sleeve      Dr Hernandez- hiatal hernia repair    HERNIA REPAIR      LAPAROSCOPIC APPENDECTOMY N/A 10/19/2020    Procedure: APPENDECTOMY, LAPAROSCOPIC;  Surgeon: Konrad Almonte III, MD;  Location: [facility] OR;  Service: General;  Laterality: N/A;    TONSILLECTOMY       No family history on file.    The 10-year CVD risk score (Sada et al., 2008) is: 7.5%    Values used to calculate the score:      Age: 74 years      Sex: Female      Diabetic: No      Tobacco smoker: No      Systolic Blood Pressure: 126 mmHg      Is BP treated: Yes      HDL Cholesterol: 74 mg/dL      Total Cholesterol: 142 mg/dL    All of your core healthy metrics are met.      Review of patient's allergies indicates:   Allergen Reactions    Penicillins Hives       Current Outpatient Medications:     apixaban (ELIQUIS) 5 mg Tab, Take 1 tablet (5 mg total) by mouth 2 (two) times daily., Disp: 180 tablet, Rfl: 3    atorvastatin (LIPITOR) 20 MG tablet, TAKE 1 TABLET BY MOUTH EVERY DAY IN THE EVENING, Disp: 90 tablet, Rfl: 3    calcium-vitamin D3 (OS-KRYS 500 + D3) 500 mg-5 mcg (200 unit) per tablet, Take 1 tablet by mouth 2 (two) times daily with meals., Disp: , Rfl:     flecainide (TAMBOCOR) 100 MG Tab, Take 1 tablet (100 mg total) by mouth every 12 (twelve) hours., Disp: 180 tablet, Rfl: 3    losartan (COZAAR) 50 MG tablet, Take 1 tablet (50 mg total) by mouth once daily., Disp: 90 tablet, Rfl: 3    metoprolol  "succinate (TOPROL-XL) 25 MG 24 hr tablet, TAKE 1/2 TABLET BY MOUTH DAILY., Disp: 45 tablet, Rfl: 7    alendronate (FOSAMAX) 70 MG tablet, Take 1 tablet (70 mg total) by mouth every 7 days. Take on empty stomach with full glass of water-do not eat or lie down for 1 hour For osteoporosis, Disp: 12 tablet, Rfl: 3    Current Facility-Administered Medications:     NON FORMULARY MEDICATION 0.5 mg, 0.5 mg, Intravitreal, Q30 Days, Baron Heard, PharmD    Facility-Administered Medications Ordered in Other Visits:     lactated ringers infusion, , Intravenous, Continuous, Ammon Mcmanus MD, Stopped at 03/29/23 0918    LIDOcaine (PF) 10 mg/ml (1%) injection 10 mg, 1 mL, Intradermal, Once, Ammon Mcmanus MD    Review of Systems   Constitutional:  Negative for activity change and unexpected weight change.   HENT:  Negative for hearing loss, rhinorrhea and trouble swallowing.    Eyes:  Negative for discharge and visual disturbance.   Respiratory:  Negative for chest tightness and wheezing.    Cardiovascular:  Negative for chest pain and palpitations.   Gastrointestinal:  Negative for blood in stool, constipation, diarrhea and vomiting.   Endocrine: Negative for polydipsia and polyuria.   Genitourinary:  Negative for difficulty urinating, dysuria, hematuria and menstrual problem.   Musculoskeletal:  Negative for arthralgias, joint swelling and neck pain.   Neurological:  Negative for weakness and headaches.   Psychiatric/Behavioral:  Negative for confusion and dysphoric mood.            Objective:      Vitals:    07/17/24 1430   BP: 126/68   Pulse: (!) 52   SpO2: 99%   Weight: 75.3 kg (166 lb)   Height: 5' 4" (1.626 m)     Physical Exam  Vitals and nursing note reviewed.   Constitutional:       General: She is not in acute distress.     Appearance: Normal appearance. She is well-developed. She is not toxic-appearing.   HENT:      Head: Normocephalic and atraumatic.      Right Ear: Tympanic membrane and external ear " normal.      Left Ear: Tympanic membrane and external ear normal.      Nose: Nose normal.      Mouth/Throat:      Pharynx: Oropharynx is clear.   Eyes:      Pupils: Pupils are equal, round, and reactive to light.   Neck:      Thyroid: No thyromegaly.      Vascular: No carotid bruit.   Cardiovascular:      Rate and Rhythm: Normal rate and regular rhythm.      Heart sounds: Normal heart sounds. No murmur heard.  Pulmonary:      Effort: Pulmonary effort is normal.      Breath sounds: Normal breath sounds. No wheezing or rales.   Abdominal:      General: Bowel sounds are normal. There is no distension.      Palpations: Abdomen is soft.      Tenderness: There is no abdominal tenderness.   Musculoskeletal:         General: No tenderness or deformity. Normal range of motion.      Cervical back: Normal range of motion and neck supple.      Lumbar back: Normal. No spasms.      Comments: Bends 90 degrees at  waist, knees are crepitant right side greater than left side   Lymphadenopathy:      Cervical: No cervical adenopathy.   Skin:     General: Skin is warm and dry.      Findings: No rash.   Neurological:      Mental Status: She is alert and oriented to person, place, and time.      Cranial Nerves: No cranial nerve deficit.      Coordination: Coordination normal.      Gait: Gait normal.   Psychiatric:         Mood and Affect: Mood normal.         Behavior: Behavior normal.         Thought Content: Thought content normal.         Judgment: Judgment normal.           Assessment:       1. Paroxysmal atrial fibrillation    2. Age-related osteoporosis without current pathological fracture    3. Menopause    4. Encounter for imaging to assess osteoporosis    5. Other screening mammogram    6. Mixed hyperlipidemia    7. Exudative age-related macular degeneration of right eye with active choroidal neovascularization    8. Pacemaker    9. Essential hypertension    10. Dyslipidemia    11. History of bariatric surgery    12. Primary  localized osteoarthritis of knee    13. Primary osteoarthritis of both hands    14. HUI (obstructive sleep apnea)         Plan:       Paroxysmal atrial fibrillation  -     Comprehensive Metabolic Panel; Future; Expected date: 07/17/2024  -     Lipid Panel; Future; Expected date: 07/17/2024  -     CBC Auto Differential; Future; Expected date: 07/17/2024  -     TSH w/reflex to FT4; Future; Expected date: 07/17/2024  -     Urinalysis; Future; Expected date: 07/17/2024  Stable now with flecainide and Eliquis  Age-related osteoporosis without current pathological fracture  Comments:  reviewed last DEXA with new dx of osteoporosis- now on biphosphanate and stressed Calcium/vitamin D supplement and weight bearing exercise melissa given bariatric s  Orders:  -     alendronate (FOSAMAX) 70 MG tablet; Take 1 tablet (70 mg total) by mouth every 7 days. Take on empty stomach with full glass of water-do not eat or lie down for 1 hour For osteoporosis  Dispense: 12 tablet; Refill: 3    Menopause  -     DXA Bone Density Axial Skeleton 1 or more sites; Future; Expected date: 07/17/2024    Encounter for imaging to assess osteoporosis  -     DXA Bone Density Axial Skeleton 1 or more sites; Future; Expected date: 07/17/2024  DEXA due this fall  Other screening mammogram  -     Mammo Digital Screening Bilat w/ Deuce; Future; Expected date: 07/17/2024  Mammogram due this fall  Mixed hyperlipidemia  -     Comprehensive Metabolic Panel; Future; Expected date: 07/17/2024  -     Lipid Panel; Future; Expected date: 07/17/2024  -     CBC Auto Differential; Future; Expected date: 07/17/2024  -     TSH w/reflex to FT4; Future; Expected date: 07/17/2024  -     Urinalysis; Future; Expected date: 07/17/2024  Last cholesterol 142 triglycerides 60 on atorvastatin and doing well  Exudative age-related macular degeneration of right eye with active choroidal neovascularization  Continue eye injections  Pacemaker  Stable at this  Essential  hypertension  Cholesterol well-controlled  Dyslipidemia    History of bariatric surgery    Primary localized osteoarthritis of knee    Primary osteoarthritis of both hands    HUI (obstructive sleep apnea)      No follow-ups on file.        7/29/2024 Sage Dickson

## 2024-07-31 ENCOUNTER — OFFICE VISIT (OUTPATIENT)
Dept: OPHTHALMOLOGY | Facility: CLINIC | Age: 74
End: 2024-07-31
Payer: MEDICARE

## 2024-07-31 DIAGNOSIS — H35.3122 INTERMEDIATE STAGE NONEXUDATIVE AGE-RELATED MACULAR DEGENERATION OF LEFT EYE: ICD-10-CM

## 2024-07-31 DIAGNOSIS — H35.033 HYPERTENSIVE RETINOPATHY OF BOTH EYES: ICD-10-CM

## 2024-07-31 DIAGNOSIS — H43.813 VITREOUS DEGENERATION OF BOTH EYES: ICD-10-CM

## 2024-07-31 DIAGNOSIS — H35.3211 EXUDATIVE AGE-RELATED MACULAR DEGENERATION OF RIGHT EYE WITH ACTIVE CHOROIDAL NEOVASCULARIZATION: Primary | ICD-10-CM

## 2024-07-31 DIAGNOSIS — Z96.1 PSEUDOPHAKIA OF BOTH EYES: ICD-10-CM

## 2024-07-31 PROCEDURE — 92134 CPTRZ OPH DX IMG PST SGM RTA: CPT | Mod: S$GLB,,, | Performed by: OPHTHALMOLOGY

## 2024-07-31 PROCEDURE — 1160F RVW MEDS BY RX/DR IN RCRD: CPT | Mod: CPTII,S$GLB,, | Performed by: OPHTHALMOLOGY

## 2024-07-31 PROCEDURE — 4010F ACE/ARB THERAPY RXD/TAKEN: CPT | Mod: CPTII,S$GLB,, | Performed by: OPHTHALMOLOGY

## 2024-07-31 PROCEDURE — 3288F FALL RISK ASSESSMENT DOCD: CPT | Mod: CPTII,S$GLB,, | Performed by: OPHTHALMOLOGY

## 2024-07-31 PROCEDURE — 1159F MED LIST DOCD IN RCRD: CPT | Mod: CPTII,S$GLB,, | Performed by: OPHTHALMOLOGY

## 2024-07-31 PROCEDURE — 1126F AMNT PAIN NOTED NONE PRSNT: CPT | Mod: CPTII,S$GLB,, | Performed by: OPHTHALMOLOGY

## 2024-07-31 PROCEDURE — 99999 PR PBB SHADOW E&M-EST. PATIENT-LVL II: CPT | Mod: PBBFAC,,, | Performed by: OPHTHALMOLOGY

## 2024-07-31 PROCEDURE — 3061F NEG MICROALBUMINURIA REV: CPT | Mod: CPTII,S$GLB,, | Performed by: OPHTHALMOLOGY

## 2024-07-31 PROCEDURE — 67028 INJECTION EYE DRUG: CPT | Mod: RT,S$GLB,, | Performed by: OPHTHALMOLOGY

## 2024-07-31 PROCEDURE — 3066F NEPHROPATHY DOC TX: CPT | Mod: CPTII,S$GLB,, | Performed by: OPHTHALMOLOGY

## 2024-07-31 PROCEDURE — 99214 OFFICE O/P EST MOD 30 MIN: CPT | Mod: 25,S$GLB,, | Performed by: OPHTHALMOLOGY

## 2024-07-31 PROCEDURE — 1101F PT FALLS ASSESS-DOCD LE1/YR: CPT | Mod: CPTII,S$GLB,, | Performed by: OPHTHALMOLOGY

## 2024-07-31 NOTE — PROGRESS NOTES
HPI    DLS: 5/15/2024- AMD w/ possible I'VE OD     Pt states eyes have been doing well. No new complaints. Denies pain/ FOL/   floaters.     Tears OU PRN - systane   Last edited by Adeline Springer on 7/31/2024  9:00 AM.          A/P    ICD-10-CM ICD-9-CM   1. Exudative age-related macular degeneration of right eye with active choroidal neovascularization  H35.3211 362.52     362.16   2. 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 Pseudophakia of both eyes  Z96.1 V43.1   4. Vitreous degeneration of both eyes  H43.813 379.21   5. Hypertensive retinopathy of both eyes  H35.033 362.11         1. Exudative age-related macular degeneration of right eye with active choroidal neovascularization  Here for AMD f/u    S/p RUPERTO x4 9/2723  S/p I'VE X 6 6/19/24    VA 20/30 (was 20/40), improved SRF with CNVM at 6 weeks     Plan:  recommend Eylea given improving SRF,  will push to 8 weeks.  Discussed enrollment in [employer] trial,  will contact soon    Based on todays exam, diagnostic studies, and review of records, the determination was made for treatment today.  Schedule Eylea Injection Right Eye today Patient chooses to proceed with injection R/B/A discussed include infection retinal detachment and stroke    Patient identified.  Timeout performed.    Risks, benefits, and alternatives to treatment were discussed in detail with the patient, including bleeding/infection (endophthalmitis)/etc.  The patient voiced understanding and wished to proceed with the procedure.  See separate consent form.    Injection Procedure Note:  Diagnosis: CNVM Right Eye    Topical Proparacaine drop placed then topical 5% Betadine, then subcojunctival lidocaine 2% injection  Sterile gloves used, and sterile lid speculum placed.  5% Betadine placed at injection site again prior to injection.  Eylea 2mg in 0.05cc Injected inferotemporally 3.5-4mm posterior to the  limbus.  Complications: None  Va at least CF at 5 feet post injection.  Retina, ONH, IOP normal after injection.    Followup as below.  Patient should return immediately PRN.  Retinal Detachment and Endophthalmitis precautions given     2. Intermediate 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  Stable dry AMD findings   Plan: Observation no CNVM, monitor given OD with Wet AMD conversion   Amsler precautions  Recommend Antioxidants, lutein, omega 3, brown sunglasses (blue blockers).     3. Pseudophakia of both eyes  Good lens position OU  Plan: Observation     4. Vitreous degeneration of both eyes  No RT/RD few floaters   Plan: Observation  Pathology of PVD, Retinal Tear, Retinal Detachment reviewed in great detail  RD precautions discussed in detail, patient expressed understanding  RTC immediately PRN (especially ANY change flashes, floaters, vision, visual field)     5. Hypertensive retinopathy of both eyes  PCP Sage Dickson MD - Recent notes reviewed  Mild HTN findings  Plan: Observation for now  Recommend good blood pressure control, tight blood glucose control, and good cholesterol control           RTC 8 weeks DFE/OCTm OU, possible Eylea OD       I saw and examined the patient and reviewed in detail the findings documented. The final examination findings, image interpretations, and plan as documented in the record represent my personal judgment and conclusions.    Christiano Page MD  Vitreoretinal Surgery   [facility]

## 2024-08-11 ENCOUNTER — CLINICAL SUPPORT (OUTPATIENT)
Dept: CARDIOLOGY | Facility: HOSPITAL | Age: 74
End: 2024-08-11
Payer: MEDICARE

## 2024-08-11 ENCOUNTER — CLINICAL SUPPORT (OUTPATIENT)
Dept: CARDIOLOGY | Facility: HOSPITAL | Age: 74
End: 2024-08-11
Attending: INTERNAL MEDICINE
Payer: MEDICARE

## 2024-08-11 DIAGNOSIS — I48.0 PAROXYSMAL ATRIAL FIBRILLATION: ICD-10-CM

## 2024-08-11 DIAGNOSIS — Z95.0 PRESENCE OF CARDIAC PACEMAKER: ICD-10-CM

## 2024-08-11 PROCEDURE — 93296 REM INTERROG EVL PM/IDS: CPT | Performed by: INTERNAL MEDICINE

## 2024-08-11 PROCEDURE — 93294 REM INTERROG EVL PM/LDLS PM: CPT | Mod: ,,, | Performed by: INTERNAL MEDICINE

## 2024-08-16 LAB
OHS CV AF BURDEN PERCENT: < 1
OHS CV DC REMOTE DEVICE TYPE: NORMAL
OHS CV ICD SHOCK: NO
OHS CV RV PACING PERCENT: 5.2 %

## 2024-09-18 ENCOUNTER — HOSPITAL ENCOUNTER (OUTPATIENT)
Dept: RADIOLOGY | Facility: HOSPITAL | Age: 74
Discharge: HOME | End: 2024-09-18
Attending: FAMILY MEDICINE
Payer: MEDICARE

## 2024-09-18 DIAGNOSIS — Z13.820 ENCOUNTER FOR IMAGING TO ASSESS OSTEOPOROSIS: ICD-10-CM

## 2024-09-18 DIAGNOSIS — Z12.31 OTHER SCREENING MAMMOGRAM: ICD-10-CM

## 2024-09-18 DIAGNOSIS — Z78.0 MENOPAUSE: ICD-10-CM

## 2024-09-18 PROCEDURE — 77063 BREAST TOMOSYNTHESIS BI: CPT | Mod: 26,,, | Performed by: RADIOLOGY

## 2024-09-18 PROCEDURE — 77080 DXA BONE DENSITY AXIAL: CPT | Mod: TC,PO

## 2024-09-18 PROCEDURE — 77067 SCR MAMMO BI INCL CAD: CPT | Mod: TC,PO

## 2024-09-18 PROCEDURE — 77067 SCR MAMMO BI INCL CAD: CPT | Mod: 26,,, | Performed by: RADIOLOGY

## 2024-09-18 PROCEDURE — 77080 DXA BONE DENSITY AXIAL: CPT | Mod: 26,,, | Performed by: RADIOLOGY

## 2024-09-23 ENCOUNTER — TELEPHONE (OUTPATIENT)
Dept: FAMILY MEDICINE | Facility: CLINIC | Age: 74
End: 2024-09-23
Payer: MEDICARE

## 2024-09-23 NOTE — TELEPHONE ENCOUNTER
----- Message from Sage Dickson MD sent at 9/22/2024 10:13 PM CDT -----  Call patient.  DEXA scan shows osteopenia of the bones.  Treatment includes taking calcium plus D vitamins twice a day.  Recheck DEXA scan in 2 years      Sage Dickson MD  9/22/2024  8:36 PM CDT       Call patient.  Mammogram was normal.  Repeat mammogram in 1 year.

## 2024-09-24 RX ORDER — CALCIUM CARBONATE 600 MG
600 TABLET ORAL 2 TIMES DAILY WITH MEALS
COMMUNITY

## 2024-09-24 RX ORDER — CHOLECALCIFEROL (VITAMIN D3) 25 MCG
1000 TABLET ORAL 2 TIMES DAILY
COMMUNITY

## 2024-09-24 NOTE — TELEPHONE ENCOUNTER
----- Message from Tabitha Clemons sent at 9/24/2024 12:20 PM CDT -----  Returning a phone call.   [phone]

## 2024-09-24 NOTE — TELEPHONE ENCOUNTER
Spoke to patient with information per Dr Dickson. Verbalized understanding on all. Said that she has been taking calcium 600mg BID and Vitamin D 1000 BID for ages.

## 2024-09-25 ENCOUNTER — OFFICE VISIT (OUTPATIENT)
Dept: OPHTHALMOLOGY | Facility: CLINIC | Age: 74
End: 2024-09-25
Payer: MEDICARE

## 2024-09-25 DIAGNOSIS — H35.033 HYPERTENSIVE RETINOPATHY OF BOTH EYES: ICD-10-CM

## 2024-09-25 DIAGNOSIS — H35.3211 EXUDATIVE AGE-RELATED MACULAR DEGENERATION OF RIGHT EYE WITH ACTIVE CHOROIDAL NEOVASCULARIZATION: Primary | ICD-10-CM

## 2024-09-25 DIAGNOSIS — H35.3122 INTERMEDIATE STAGE NONEXUDATIVE AGE-RELATED MACULAR DEGENERATION OF LEFT EYE: ICD-10-CM

## 2024-09-25 DIAGNOSIS — Z96.1 PSEUDOPHAKIA OF BOTH EYES: ICD-10-CM

## 2024-09-25 DIAGNOSIS — H43.813 VITREOUS DEGENERATION OF BOTH EYES: ICD-10-CM

## 2024-09-25 PROCEDURE — 99999 PR PBB SHADOW E&M-EST. PATIENT-LVL III: CPT | Mod: PBBFAC,,, | Performed by: OPHTHALMOLOGY

## 2024-09-25 NOTE — PROGRESS NOTES
HPI    VA OU no changes since last visit.  Eye Meds: A-T PRN OU  Last edited by Adeline Springer on 9/25/2024  9:26 AM.           A/P    ICD-10-CM ICD-9-CM   1. Exudative age-related macular degeneration of right eye with active choroidal neovascularization  H35.3211 362.52     362.16   2. 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 Pseudophakia of both eyes  Z96.1 V43.1   4. Vitreous degeneration of both eyes  H43.813 379.21   5. Hypertensive retinopathy of both eyes  H35.033 362.11           1. Exudative age-related macular degeneration of right eye with active choroidal neovascularization  Here for AMD f/u    S/p RUPERTO x4 9/2723  S/p I'VE X 7 7/31/24    VA 20/30 (stable), worse tr SRF with CNVM at 8 weeks     Plan:  recommend Eylea given SRF,  will keep at 8 weeks.  Discussed enrollment in [employer] trial,  will contact soon today     Based on todays exam, diagnostic studies, and review of records, the determination was made for treatment today.  Schedule Eylea Injection Right Eye today Patient chooses to proceed with injection R/B/A discussed include infection retinal detachment and stroke    Patient identified.  Timeout performed.    Risks, benefits, and alternatives to treatment were discussed in detail with the patient, including bleeding/infection (endophthalmitis)/etc.  The patient voiced understanding and wished to proceed with the procedure.  See separate consent form.    Injection Procedure Note:  Diagnosis: CNVM Right Eye    Topical Proparacaine drop placed then topical 5% Betadine, then subcojunctival lidocaine 2% injection  Sterile gloves used, and sterile lid speculum placed.  5% Betadine placed at injection site again prior to injection.  Eylea 2mg in 0.05cc Injected inferotemporally 3.5-4mm posterior to the limbus.  Complications: None  Va at least CF at 5 feet post injection.  Retina, ONH, IOP normal after injection.    Followup  as below.  Patient should return immediately PRN.  Retinal Detachment and Endophthalmitis precautions given     2. Intermediate 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  Stable dry AMD    Plan: Observation no CNVM, monitor given OD with Wet AMD conversion   Amsler precautions  Recommend Antioxidants, lutein, omega 3, brown sunglasses (blue blockers).     3. Pseudophakia of both eyes  Good lens position OU  Plan: Observation     4. Vitreous degeneration of both eyes  No RT/RD few floaters   Plan: Observation  Pathology of PVD, Retinal Tear, Retinal Detachment reviewed in great detail  RD precautions discussed in detail, patient expressed understanding  RTC immediately PRN (especially ANY change flashes, floaters, vision, visual field)     5. Hypertensive retinopathy of both eyes  PCP Sage Dickson MD - Recent notes reviewed  Mild HTN findings noted  Plan: Observation for now  Recommend good blood pressure control, tight blood glucose control, and good cholesterol control           RTC 8 weeks DFE/OCTm OU, possible Eylea OD       I saw and examined the patient and reviewed in detail the findings documented. The final examination findings, image interpretations, and plan as documented in the record represent my personal judgment and conclusions.    Christiano Page MD  Vitreoretinal Surgery   [facility]

## 2024-10-01 ENCOUNTER — PATIENT MESSAGE (OUTPATIENT)
Dept: RESEARCH | Facility: HOSPITAL | Age: 74
End: 2024-10-01
Payer: MEDICARE

## 2024-11-10 ENCOUNTER — CLINICAL SUPPORT (OUTPATIENT)
Dept: CARDIOLOGY | Facility: HOSPITAL | Age: 74
End: 2024-11-10

## 2024-11-10 ENCOUNTER — CLINICAL SUPPORT (OUTPATIENT)
Dept: CARDIOLOGY | Facility: HOSPITAL | Age: 74
End: 2024-11-10
Attending: INTERNAL MEDICINE
Payer: MEDICARE

## 2024-11-10 DIAGNOSIS — Z95.0 PRESENCE OF CARDIAC PACEMAKER: ICD-10-CM

## 2024-11-10 DIAGNOSIS — I48.0 PAROXYSMAL ATRIAL FIBRILLATION: ICD-10-CM

## 2024-11-10 PROCEDURE — 93296 REM INTERROG EVL PM/IDS: CPT | Performed by: INTERNAL MEDICINE

## 2024-11-10 PROCEDURE — 93294 REM INTERROG EVL PM/LDLS PM: CPT | Mod: ,,, | Performed by: INTERNAL MEDICINE

## 2024-11-20 ENCOUNTER — OFFICE VISIT (OUTPATIENT)
Dept: OPHTHALMOLOGY | Facility: CLINIC | Age: 74
End: 2024-11-20
Payer: MEDICARE

## 2024-11-20 DIAGNOSIS — H35.3211 EXUDATIVE AGE-RELATED MACULAR DEGENERATION OF RIGHT EYE WITH ACTIVE CHOROIDAL NEOVASCULARIZATION: Primary | ICD-10-CM

## 2024-11-20 DIAGNOSIS — H43.813 VITREOUS DEGENERATION OF BOTH EYES: ICD-10-CM

## 2024-11-20 DIAGNOSIS — H35.3122 INTERMEDIATE STAGE NONEXUDATIVE AGE-RELATED MACULAR DEGENERATION OF LEFT EYE: ICD-10-CM

## 2024-11-20 DIAGNOSIS — H35.033 HYPERTENSIVE RETINOPATHY OF BOTH EYES: ICD-10-CM

## 2024-11-20 DIAGNOSIS — Z96.1 PSEUDOPHAKIA OF BOTH EYES: ICD-10-CM

## 2024-11-20 PROCEDURE — 99999 PR PBB SHADOW E&M-EST. PATIENT-LVL II: CPT | Mod: PBBFAC,,, | Performed by: OPHTHALMOLOGY

## 2024-11-20 NOTE — PROGRESS NOTES
HPI    DLS: 9/25/2023 AMD w/ possible eylea OD     Pt states no new complaints.     Denies pain/ FOL/ floaters.     AT's ou prn     Last edited by Sunshine Khoury on 11/20/2024  8:33 AM.            A/P    ICD-10-CM ICD-9-CM   1. Exudative age-related macular degeneration of right eye with active choroidal neovascularization  H35.3211 362.52     362.16   2. 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 Pseudophakia of both eyes  Z96.1 V43.1   4. Vitreous degeneration of both eyes  H43.813 379.21   5. Hypertensive retinopathy of both eyes  H35.033 362.11         1. Exudative age-related macular degeneration of right eye with active choroidal neovascularization  Here for AMD f/u    S/p RUPERTO x4 9/2723  S/p I'VE X 8 9/25/24    VA 20/30 (stable), stable tr SRF with CNVM at 8 weeks     Plan:  recommend Eylea given SRF,  will keep at 8 weeks.       Based on todays exam, diagnostic studies, and review of records, the determination was made for treatment today.  Schedule Eylea Injection Right Eye today Patient chooses to proceed with injection R/B/A discussed include infection retinal detachment and stroke    Patient identified.  Timeout performed.    Risks, benefits, and alternatives to treatment were discussed in detail with the patient, including bleeding/infection (endophthalmitis)/etc.  The patient voiced understanding and wished to proceed with the procedure.  See separate consent form.    Injection Procedure Note:  Diagnosis: CNVM Right Eye    Topical Proparacaine drop placed then topical 5% Betadine, then subcojunctival lidocaine 2% injection  Sterile gloves used, and sterile lid speculum placed.  5% Betadine placed at injection site again prior to injection.  Eylea 2mg in 0.05cc Injected inferotemporally 3.5-4mm posterior to the limbus.  Complications: None  Va at least CF at 5 feet post injection.  Retina, ONH, IOP normal after injection.    Followup as below.   Patient should return immediately PRN.  Retinal Detachment and Endophthalmitis precautions given     2. Intermediate 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  Stable dry AMD  no CNVM  Plan: Observation no CNVM, monitor given OD with Wet AMD conversion   Amsler precautions  Recommend Antioxidants, lutein, omega 3, brown sunglasses (blue blockers).     3. Pseudophakia of both eyes  Good lens position OU  Plan: Observation     4. Vitreous degeneration of both eyes  No RT/RD few floaters   Plan: Observation  Pathology of PVD, Retinal Tear, Retinal Detachment reviewed in great detail  RD precautions discussed in detail, patient expressed understanding  RTC immediately PRN (especially ANY change flashes, floaters, vision, visual field)     5. Hypertensive retinopathy of both eyes  PCP Sage Dickson MD - Recent notes reviewed  Mild HTN findings noted  Plan: Observation for now  Recommend good blood pressure control, tight blood glucose control, and good cholesterol control           RTC 8 weeks DFE/OCTm OU, possible Eylea OD       I saw and examined the patient and reviewed in detail the findings documented. The final examination findings, image interpretations, and plan as documented in the record represent my personal judgment and conclusions.    Christiano Page MD  Vitreoretinal Surgery   [facility]

## 2024-12-02 ENCOUNTER — TELEPHONE (OUTPATIENT)
Dept: FAMILY MEDICINE | Facility: CLINIC | Age: 74
End: 2024-12-02

## 2024-12-02 ENCOUNTER — OFFICE VISIT (OUTPATIENT)
Dept: FAMILY MEDICINE | Facility: CLINIC | Age: 74
End: 2024-12-02
Payer: MEDICARE

## 2024-12-02 VITALS
HEART RATE: 66 BPM | DIASTOLIC BLOOD PRESSURE: 68 MMHG | SYSTOLIC BLOOD PRESSURE: 132 MMHG | TEMPERATURE: 100 F | OXYGEN SATURATION: 99 %

## 2024-12-02 DIAGNOSIS — J01.90 ACUTE BACTERIAL SINUSITIS: Primary | ICD-10-CM

## 2024-12-02 DIAGNOSIS — B96.89 ACUTE BACTERIAL SINUSITIS: Primary | ICD-10-CM

## 2024-12-02 DIAGNOSIS — J02.9 SORE THROAT: ICD-10-CM

## 2024-12-02 DIAGNOSIS — R05.9 COUGH, UNSPECIFIED TYPE: ICD-10-CM

## 2024-12-02 LAB
CTP QC/QA: YES
MOLECULAR STREP A: NEGATIVE
POC MOLECULAR INFLUENZA A AGN: NEGATIVE
POC MOLECULAR INFLUENZA B AGN: NEGATIVE
SARS-COV-2 RDRP RESP QL NAA+PROBE: NEGATIVE

## 2024-12-02 PROCEDURE — 3061F NEG MICROALBUMINURIA REV: CPT | Mod: CPTII,S$GLB,,

## 2024-12-02 PROCEDURE — 3288F FALL RISK ASSESSMENT DOCD: CPT | Mod: CPTII,S$GLB,,

## 2024-12-02 PROCEDURE — 87635 SARS-COV-2 COVID-19 AMP PRB: CPT | Mod: QW,S$GLB,,

## 2024-12-02 PROCEDURE — 3066F NEPHROPATHY DOC TX: CPT | Mod: CPTII,S$GLB,,

## 2024-12-02 PROCEDURE — 4010F ACE/ARB THERAPY RXD/TAKEN: CPT | Mod: CPTII,S$GLB,,

## 2024-12-02 PROCEDURE — 1101F PT FALLS ASSESS-DOCD LE1/YR: CPT | Mod: CPTII,S$GLB,,

## 2024-12-02 PROCEDURE — 1159F MED LIST DOCD IN RCRD: CPT | Mod: CPTII,S$GLB,,

## 2024-12-02 PROCEDURE — 3075F SYST BP GE 130 - 139MM HG: CPT | Mod: CPTII,S$GLB,,

## 2024-12-02 PROCEDURE — 3078F DIAST BP <80 MM HG: CPT | Mod: CPTII,S$GLB,,

## 2024-12-02 PROCEDURE — 87651 STREP A DNA AMP PROBE: CPT | Mod: QW,,,

## 2024-12-02 PROCEDURE — 99213 OFFICE O/P EST LOW 20 MIN: CPT | Mod: S$GLB,,,

## 2024-12-02 PROCEDURE — 87502 INFLUENZA DNA AMP PROBE: CPT | Mod: QW,,,

## 2024-12-02 RX ORDER — CODEINE PHOSPHATE AND GUAIFENESIN 10; 100 MG/5ML; MG/5ML
10 SOLUTION ORAL EVERY 8 HOURS PRN
Qty: 210 ML | Refills: 0 | Status: SHIPPED | OUTPATIENT
Start: 2024-12-02 | End: 2024-12-09

## 2024-12-02 RX ORDER — ALBUTEROL SULFATE 90 UG/1
2 INHALANT RESPIRATORY (INHALATION) EVERY 6 HOURS PRN
Qty: 18 G | Refills: 1 | Status: SHIPPED | OUTPATIENT
Start: 2024-12-02

## 2024-12-02 RX ORDER — DOXYCYCLINE 100 MG/1
100 CAPSULE ORAL 2 TIMES DAILY
Qty: 20 CAPSULE | Refills: 0 | Status: SHIPPED | OUTPATIENT
Start: 2024-12-02 | End: 2024-12-12

## 2024-12-02 RX ORDER — BENZONATATE 100 MG/1
100 CAPSULE ORAL 3 TIMES DAILY PRN
Qty: 30 CAPSULE | Refills: 0 | Status: SHIPPED | OUTPATIENT
Start: 2024-12-02 | End: 2024-12-12

## 2024-12-02 NOTE — PROGRESS NOTES
SUBJECTIVE:    Patient ID: Batool Mensah is a 74 y.o. female.    Chief Complaint: Cough and Sore Throat (Pt stated she just got back from a trip and she has been having cough, congetion and sore throat, pt stated this morning she felt like she could not swallow. Pt came here. Feels better now but throat hurts. No trouble breathing /. Strep covid and flu ordered )    HPI  History of Present Illness    CHIEF COMPLAINT:  Batool presents today with a sore throat and congestion.    RESPIRATORY SYMPTOMS:  She reports a sore throat that feels closed up with a gag reflex when swallowing pills. The throat is described as dry and scratchy. Coughing causes throat muscles to affect her ears, but hearing is not impaired. The cough is localized to the throat, with no chest involvement or wheezing. This morning, she experienced thick yellow mucus from sinuses, which she had to expel through her mouth due to nasal congestion. When blowing her nose, the discharge is clear. She denies fever.    TRAVEL HISTORY:  She traveled to [place], [place] over Thanksgiving without wearing a mask on the plane. She observed a few individuals wearing masks at the airport and on the plane, which reminded her about COVID precautions.    CONCERNS:  She expresses feeling terrible and is concerned about potentially spreading illness to immunocompromised contacts.      MEDICATIONS:  She reports tolerating codeine well, stating that cough medicine containing codeine is the only effective treatment for her cough.      ROS:  General: -fever, -chills, -fatigue, -weight gain, -weight loss  Eyes: -vision changes, -redness, -discharge  ENT: -ear pain, +nasal congestion, +sore throat  Cardiovascular: -chest pain, -palpitations, -lower extremity edema  Respiratory: +cough, -shortness of breath  Gastrointestinal: -abdominal pain, -nausea, -vomiting, -diarrhea, -constipation, -blood in stool  Genitourinary: -dysuria, -hematuria, -frequency  Musculoskeletal:  -joint pain, -muscle pain  Skin: -rash, -lesion  Neurological: -headache, -dizziness, -numbness, -tingling  Psychiatric: -anxiety, -depression, -sleep difficulty         Office Visit on 12/02/2024   Component Date Value Ref Range Status    POC Molecular Influenza A Ag 12/02/2024 Negative  Negative Final    POC Molecular Influenza B Ag 12/02/2024 Negative  Negative Final     Acceptable 12/02/2024 Yes   Final    POC Rapid COVID 12/02/2024 Negative  Negative Final     Acceptable 12/02/2024 Yes   Final    Molecular Strep A, POC 12/02/2024 Negative  Negative Final     Acceptable 12/02/2024 Yes   Final   Clinical Support on 08/11/2024   Component Date Value Ref Range Status    ICD Shock 08/11/2024 No   Final    Device Type 08/11/2024 Pacemaker   Final    AF [place] % 08/11/2024 < 1   Final    RV Paccing % 08/11/2024 5.2  % Final       Past Medical History:   Diagnosis Date    A-fib     Anticoagulant long-term use     Arthritis     Encounter for blood transfusion     Hypertension     Mixed hyperlipidemia     Sleep apnea     cpap     Social History     Socioeconomic History    Marital status:    Tobacco Use    Smoking status: Never    Smokeless tobacco: Never   Substance and Sexual Activity    Alcohol use: Yes     Comment: socially    Drug use: Never    Sexual activity: Not Currently     Social Drivers of Health     Financial Resource Strain: Low Risk  (11/27/2023)    Overall Financial Resource Strain (CARDIA)     Difficulty of Paying Living Expenses: Not very hard   Food Insecurity: No Food Insecurity (11/27/2023)    Hunger Vital Sign     Worried About Running Out of Food in the Last Year: Never true     Ran Out of Food in the Last Year: Never true   Transportation Needs: No Transportation Needs (11/27/2023)    PRAPARE - Transportation     Lack of Transportation (Medical): No     Lack of Transportation (Non-Medical): No   Physical Activity: Sufficiently Active (11/27/2023)     Exercise Vital Sign     Days of Exercise per Week: 5 days     Minutes of Exercise per Session: 60 min   Stress: No Stress Concern Present (2023)    [employer] [place] [place] [employer] - Occupational Stress Questionnaire     Feeling of Stress : Only a little   Housing Stability: Low Risk  (2023)    Housing Stability Vital Sign     Unable to Pay for Housing in the Last Year: No     Number of Places Lived in the Last Year: 1     Unstable Housing in the Last Year: No     Past Surgical History:   Procedure Laterality Date    A-V CARDIAC PACEMAKER INSERTION Left 2023    Procedure: INSERTION, CARDIAC PACEMAKER, DUAL CHAMBER;  Surgeon: Curt Ariza MD;  Location: [facility] EP LAB;  Service: Cardiology;  Laterality: Left;  SSS/Bradycardia, dPPM, SJM, MAC, GP, 3 PREP    APPENDECTOMY      BREAST BIOPSY Left     CATARACT EXTRACTION W/  INTRAOCULAR LENS IMPLANT Left 2023    Procedure: CEIOL OS;  Surgeon: Randolph Marie MD;  Location: [facility] OR;  Service: Ophthalmology;  Laterality: Left;    CATARACT EXTRACTION W/  INTRAOCULAR LENS IMPLANT Right 2023    Procedure: CEIOL OD;  Surgeon: Randolph Marie MD;  Location: [facility] OR;  Service: Ophthalmology;  Laterality: Right;     SECTION      X2    COLONOSCOPY      Dr. Parker -RTC 10 years    EYE SURGERY      Gastric sleeve  2018    Dr Hernandez- hiatal hernia repair    HERNIA REPAIR      LAPAROSCOPIC APPENDECTOMY N/A 10/19/2020    Procedure: APPENDECTOMY, LAPAROSCOPIC;  Surgeon: Konrad Alomnte III, MD;  Location: [facility] OR;  Service: General;  Laterality: N/A;    TONSILLECTOMY       No family history on file.    The 10-year CVD risk score (D'Agostino, et al., 2008) is: 8.5%    Values used to calculate the score:      Age: 74 years      Sex: Female      Diabetic: No      Tobacco smoker: No      Systolic Blood Pressure: 132 mmHg      Is BP treated: Yes      HDL Cholesterol: 74 mg/dL      Total Cholesterol: 142 mg/dL    All of your core healthy  metrics are met.      Review of patient's allergies indicates:   Allergen Reactions    Penicillins Hives       Current Outpatient Medications:     albuterol (VENTOLIN HFA) 90 mcg/actuation inhaler, Inhale 2 puffs into the lungs every 6 (six) hours as needed for Wheezing. Rescue, Disp: 18 g, Rfl: 1    alendronate (FOSAMAX) 70 MG tablet, Take 1 tablet (70 mg total) by mouth every 7 days. Take on empty stomach with full glass of water-do not eat or lie down for 1 hour For osteoporosis, Disp: 12 tablet, Rfl: 3    apixaban (ELIQUIS) 5 mg Tab, Take 1 tablet (5 mg total) by mouth 2 (two) times daily., Disp: 180 tablet, Rfl: 3    atorvastatin (LIPITOR) 20 MG tablet, TAKE 1 TABLET BY MOUTH EVERY DAY IN THE EVENING, Disp: 90 tablet, Rfl: 3    benzonatate (TESSALON) 100 MG capsule, Take 1 capsule (100 mg total) by mouth 3 (three) times daily as needed for Cough., Disp: 30 capsule, Rfl: 0    calcium carbonate (OS-KRYS) 600 mg calcium (1,500 mg) Tab, Take 600 mg by mouth 2 (two) times daily with meals., Disp: , Rfl:     calcium-vitamin D3 (OS-KRYS 500 + D3) 500 mg-5 mcg (200 unit) per tablet, Take 1 tablet by mouth 2 (two) times daily with meals., Disp: , Rfl:     doxycycline (MONODOX) 100 MG capsule, Take 1 capsule (100 mg total) by mouth 2 (two) times daily. for 10 days, Disp: 20 capsule, Rfl: 0    flecainide (TAMBOCOR) 100 MG Tab, Take 1 tablet (100 mg total) by mouth every 12 (twelve) hours., Disp: 180 tablet, Rfl: 3    guaiFENesin-codeine 100-10 mg/5 ml (TUSSI-ORGANIDIN NR)  mg/5 mL syrup, Take 10 mLs by mouth every 8 (eight) hours as needed for Cough., Disp: 210 mL, Rfl: 0    losartan (COZAAR) 50 MG tablet, Take 1 tablet (50 mg total) by mouth once daily., Disp: 90 tablet, Rfl: 3    metoprolol succinate (TOPROL-XL) 25 MG 24 hr tablet, TAKE 1/2 TABLET BY MOUTH DAILY., Disp: 45 tablet, Rfl: 7    vitamin D (VITAMIN D3) 1000 units Tab, Take 1,000 Units by mouth 2 (two) times a day., Disp: , Rfl:     Current  Facility-Administered Medications:     NON FORMULARY MEDICATION 0.5 mg, 0.5 mg, Intravitreal, Q30 Days, Baron Heard, PharmD    Facility-Administered Medications Ordered in Other Visits:     lactated ringers infusion, , Intravenous, Continuous, Ammon Mcmanus MD, Stopped at 03/29/23 0918    LIDOcaine (PF) 10 mg/ml (1%) injection 10 mg, 1 mL, Intradermal, Once, Ammon Mcmanus MD    Review of Systems        Objective:      Vitals:    12/02/24 1115   BP: 132/68   Pulse: 66   Temp: 99.5 °F (37.5 °C)   SpO2: 99%     Physical Exam  Physical Exam    Constitutional: In no acute distress.  Well-developed.  ill-appearing.  HENT: Normocephalic. Atraumatic. External ears normal bilaterally. Swollen turbinates. Normal lips. Pharyngeal erythema. tender sinuses.  Eyes: No scleral icterus. Pupils are equal, round, and reactive to light.  Neck: No thyromegaly. No carotid bruit.  Cardiovascular: Normal rate and regular rhythm. Radial pulses are 2+ bilaterally. Normal heart sounds. No murmur heard.  Pulmonary: Pulmonary effort is normal. Normal breath sounds. Wheezing in chest.            Assessment:       1. Acute bacterial sinusitis    2. Sore throat    3. Cough, unspecified type         Plan:       Acute bacterial sinusitis  -     doxycycline (MONODOX) 100 MG capsule; Take 1 capsule (100 mg total) by mouth 2 (two) times daily. for 10 days  Dispense: 20 capsule; Refill: 0    Sore throat  -     POCT Influenza A/B Molecular  -     POCT COVID-19 Rapid Screening  -     POCT Strep A, Molecular    Cough, unspecified type  -     POCT Influenza A/B Molecular  -     POCT COVID-19 Rapid Screening  -     POCT Strep A, Molecular  -     benzonatate (TESSALON) 100 MG capsule; Take 1 capsule (100 mg total) by mouth 3 (three) times daily as needed for Cough.  Dispense: 30 capsule; Refill: 0  -     guaiFENesin-codeine 100-10 mg/5 ml (TUSSI-ORGANIDIN NR)  mg/5 mL syrup; Take 10 mLs by mouth every 8 (eight) hours as needed for Cough.   Dispense: 210 mL; Refill: 0  -     albuterol (VENTOLIN HFA) 90 mcg/actuation inhaler; Inhale 2 puffs into the lungs every 6 (six) hours as needed for Wheezing. Rescue  Dispense: 18 g; Refill: 1      Assessment & Plan    Diagnosed viral upper respiratory infection with sinus involvement   Ruled out flu, COVID-19, and strep throat based on negative test results  Assessed for possible pneumonia, found no evidence  Determined patient likely no longer contagious  Will prescribe antibiotics for sinus infection  Considered patient's elevated blood pressure when recommending treatments    ACUTE PHARYNGITIS:  - Recommend consuming warm or hot beverages, such as hot tea or broth, to soothe sore throat.    ACUTE SINUSITIS:  - Started antibiotic for sinus infection.    ACUTE BRONCHITIS AND COUGH:  - Started Tessalon pearls as needed for dry, hacky cough.  - Started inhaler (to be used only if feeling wheezy).  - Recommend plain Robitussin DM for OTC use if needed.  - Started Robitussin AC (codeine-containing) every 8 hours as needed, with option to take only at bedtime.  - Batool to avoid driving or operating machinery when taking codeine-containing medication.    NASAL CONGESTION:  - Explained saline use for nasal congestion.  - Batool to use saline for nasal congestion.  - Recommend considering use of Vicks VapoRub or inhaler for soothing effects.  - Reviewed potential side effects of Afrin on heart rate and blood pressure.    HYPERTENSION:  - Discussed appropriate OTC medications considering elevated blood pressure.    GENERAL MANAGEMENT:  - Explained contagiousness typically occurs before symptoms appear.  - Batool to increase hydration.    FOLLOW-UP:  - Follow up as needed if symptoms do not improve or worsen over the next 7-10 days.         Follow up if symptoms worsen or fail to improve.        This note was generated with the assistance of ambient listening technology. Verbal consent was obtained by the patient and  accompanying visitor(s) for the recording of patient appointment to facilitate this note. I attest to having reviewed and edited the generated note for accuracy, though some syntax or spelling errors may persist. Please contact the author of this note for any clarification.      12/2/2024 Kia White

## 2024-12-02 NOTE — TELEPHONE ENCOUNTER
----- Message from Clayton sent at 12/2/2024 10:53 AM CST -----  Pt is here and wants to know if she could be seen because she said she think her throat is closing up on her. She said she might have strep.  [phone]

## 2024-12-02 NOTE — PATIENT INSTRUCTIONS
"The Common Cold & Viral Upper Respiratory Illness (Viral URI) Page 1 of 1    Viral Upper Respiratory Illness Cocktail     What should I do if I think that I have a viral upper respiratory infection (URI)? Get plenty of rest and drink plenty of fluids. Also take one or more of the following medications:    To relieve a "stuffy," clogged nose, try the decongestant oxymetalozone 0.5% nasal spray, 2 sprays up to twice a day for a maximum duration of 3 days. It is recommended to limit use of oxymetalozone to 3 days due to the risk of rebound congestion if it is used longer than this. You can also use plain nasal saline spray. If you do not have high blood pressure or suffer from an elevated heart rate, you can take multi-symptom over the counter medication to control cough and nasal congestion, that includes the medication phenylephrine.   To suppress coughing, take dextromethorphan in an over the counter cough preparation, such as Delsym. Take as directed.  If you have a productive cough and need to help break up mucus, the ingredient you need in your over the counter medication is guaifenesin. This is commonly found in medications like Mucinex and Robitussin, for examples.  For fever and pain, Acetaminophen  is generally preferred if you have gastric reflux, cardiovascular disease, or kidney disease. Naproxen or ibuprofen can be helpful if your doctor deems you an appropriate candidate.   To help with sleep and cough at night, try diphenhydramine 25 mg tab which is an antihistamine that causes sedation and can help your symptoms, OR doxylamine, which is found in medications like Nyquil.  To relieve sore throat and cough, studies have shows that a teaspoon of honey can be helpful in some individuals. Warm, salt water gargles can be beneficial. Keeping the throat from feeling dry with a lozenge is helpful. Some people find hot beverages like tea or broth to be soothing.  If you do not have high blood pressure or suffer " "from an elevated heart rate, you can take multi-symptom over the counter medication to control cough and nasal congestion, that includes the medication phenylephrine. These typically also include the cough medications listed above, and frequently also acetaminophen, so be cautious about taking multiple medications.  If you do have high blood pressure or other cardiac disorders such as tachycardia, "A-fib," or palpitations, you should avoid medications with phenylephrine and pseudoephedrine, as these can make the conditions worse. There are several brands of drugs that are appropriate for use by patients with cardiac conditions, and these include Coricidan HBP.       Seek medical advice or treatment if:    Symptoms are getting worse after 7 days  Symptoms are unchanged or getting worse after 10 days  You experience shortness of breath or have any respiratory difficulty  You experience a high fever (> 102 F)  You develop eye pain/ swelling and/or vision changes  You develop severe head or facial pain/swelling    Did You Know That. . .  Viruses do not respond to antibiotic treatment.  Symptoms due to viral URI typically last 2 - 14 days, but some symptoms can linger for several weeks. (Most people recover in about 7-10 days.)  Productive cough or discolored nasal discharge does not necessarily require antibiotic therapy.    "

## 2024-12-02 NOTE — TELEPHONE ENCOUNTER
Spoke to pt and she stated this morning she was having trouble swallowing her pills came in has cough and cold. Seeing analilia today

## 2024-12-03 LAB
OHS CV AF BURDEN PERCENT: 2
OHS CV DC REMOTE DEVICE TYPE: NORMAL
OHS CV ICD SHOCK: NO
OHS CV RV PACING PERCENT: 4.7 %

## 2025-01-02 RX ORDER — ATORVASTATIN CALCIUM 20 MG/1
20 TABLET, FILM COATED ORAL NIGHTLY
Qty: 90 TABLET | Refills: 3 | Status: SHIPPED | OUTPATIENT
Start: 2025-01-02

## 2025-01-02 RX ORDER — METOPROLOL SUCCINATE 25 MG/1
12.5 TABLET, EXTENDED RELEASE ORAL
Qty: 45 TABLET | Refills: 7 | Status: SHIPPED | OUTPATIENT
Start: 2025-01-02

## 2025-01-15 ENCOUNTER — PATIENT MESSAGE (OUTPATIENT)
Dept: FAMILY MEDICINE | Facility: CLINIC | Age: 75
End: 2025-01-15
Payer: MEDICARE

## 2025-01-16 ENCOUNTER — TELEPHONE (OUTPATIENT)
Dept: FAMILY MEDICINE | Facility: CLINIC | Age: 75
End: 2025-01-16
Payer: MEDICARE

## 2025-01-16 DIAGNOSIS — I10 ESSENTIAL HYPERTENSION: ICD-10-CM

## 2025-01-16 DIAGNOSIS — E78.2 MIXED HYPERLIPIDEMIA: ICD-10-CM

## 2025-01-16 DIAGNOSIS — Z79.899 ENCOUNTER FOR LONG-TERM (CURRENT) USE OF MEDICATIONS: Primary | ICD-10-CM

## 2025-01-16 DIAGNOSIS — Z78.0 MENOPAUSE: ICD-10-CM

## 2025-01-21 ENCOUNTER — PATIENT MESSAGE (OUTPATIENT)
Dept: FAMILY MEDICINE | Facility: CLINIC | Age: 75
End: 2025-01-21
Payer: MEDICARE

## 2025-01-29 ENCOUNTER — OFFICE VISIT (OUTPATIENT)
Dept: OPHTHALMOLOGY | Facility: CLINIC | Age: 75
End: 2025-01-29
Payer: MEDICARE

## 2025-01-29 DIAGNOSIS — H04.123 DRY EYE SYNDROME OF BOTH EYES: ICD-10-CM

## 2025-01-29 DIAGNOSIS — Z96.1 PSEUDOPHAKIA OF BOTH EYES: ICD-10-CM

## 2025-01-29 DIAGNOSIS — H43.813 VITREOUS DEGENERATION OF BOTH EYES: ICD-10-CM

## 2025-01-29 DIAGNOSIS — H35.3211 EXUDATIVE AGE-RELATED MACULAR DEGENERATION OF RIGHT EYE WITH ACTIVE CHOROIDAL NEOVASCULARIZATION: Primary | ICD-10-CM

## 2025-01-29 DIAGNOSIS — H35.033 HYPERTENSIVE RETINOPATHY OF BOTH EYES: ICD-10-CM

## 2025-01-29 DIAGNOSIS — H35.3122 INTERMEDIATE STAGE NONEXUDATIVE AGE-RELATED MACULAR DEGENERATION OF LEFT EYE: ICD-10-CM

## 2025-01-29 PROCEDURE — 1101F PT FALLS ASSESS-DOCD LE1/YR: CPT | Mod: CPTII,S$GLB,, | Performed by: OPHTHALMOLOGY

## 2025-01-29 PROCEDURE — 1126F AMNT PAIN NOTED NONE PRSNT: CPT | Mod: CPTII,S$GLB,, | Performed by: OPHTHALMOLOGY

## 2025-01-29 PROCEDURE — 3288F FALL RISK ASSESSMENT DOCD: CPT | Mod: CPTII,S$GLB,, | Performed by: OPHTHALMOLOGY

## 2025-01-29 PROCEDURE — 1160F RVW MEDS BY RX/DR IN RCRD: CPT | Mod: CPTII,S$GLB,, | Performed by: OPHTHALMOLOGY

## 2025-01-29 PROCEDURE — 92134 CPTRZ OPH DX IMG PST SGM RTA: CPT | Mod: S$GLB,,, | Performed by: OPHTHALMOLOGY

## 2025-01-29 PROCEDURE — 99999 PR PBB SHADOW E&M-EST. PATIENT-LVL III: CPT | Mod: PBBFAC,,, | Performed by: OPHTHALMOLOGY

## 2025-01-29 PROCEDURE — 99214 OFFICE O/P EST MOD 30 MIN: CPT | Mod: 25,S$GLB,, | Performed by: OPHTHALMOLOGY

## 2025-01-29 PROCEDURE — 1159F MED LIST DOCD IN RCRD: CPT | Mod: CPTII,S$GLB,, | Performed by: OPHTHALMOLOGY

## 2025-01-29 PROCEDURE — 67028 INJECTION EYE DRUG: CPT | Mod: RT,S$GLB,, | Performed by: OPHTHALMOLOGY

## 2025-01-29 RX ADMIN — Medication 1.25 MG: at 08:01

## 2025-01-29 NOTE — PROGRESS NOTES
HPI    DLS: 11/20/24 AMD w/ possible eylea OD     VA OU no changes since last visit.    Denies pain/ FOL/ floaters.     AT's ou prn     Last edited by Adeline Springer on 1/29/2025  7:42 AM.       A/P    ICD-10-CM ICD-9-CM   1. Exudative age-related macular degeneration of right eye with active choroidal neovascularization  H35.3211 362.52     362.16   2. 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 Pseudophakia of both eyes  Z96.1 V43.1   4. Vitreous degeneration of both eyes  H43.813 379.21   5. Hypertensive retinopathy of both eyes  H35.033 362.11   6. Dry eye syndrome of both eyes  H04.123 375.15       1. Exudative age-related macular degeneration of right eye with active choroidal neovascularization  Here for AMD f/u    S/p RUPERTO x4 9/2723  S/p I'VE X 9 11/20/24    VA 20/25 (was 20/30), better tr SRF with CNVM today after Eylea     Plan:  recommend Eylea given SRF still but NO GOOD DAYS FUNDING. Will give Avastin OD today     Based on todays exam, diagnostic studies, and review of records, the determination was made for treatment today.  Schedule Avastin Injection Right Eye today Patient chooses to proceed with injection R/B/A discussed include infection retinal detachment and stroke    Patient identified.  Timeout performed.    Risks, benefits, and alternatives to treatment were discussed in detail with the patient, including bleeding/infection (endophthalmitis)/etc.  The patient voiced understanding and wished to proceed with the procedure.  See separate consent form.    Injection Procedure Note:  Diagnosis: CNVM Right Eye    Topical Proparacaine drop placed then topical 5% Betadine, then subcojunctival lidocaine 2% injection  Sterile gloves used, and sterile lid speculum placed.  5% Betadine placed at injection site again prior to injection.  Avastin  1.25mg in 0.05cc Injected inferotemporally 3.5-4mm posterior to the limbus.  Complications: None  Va at least  CF at 5 feet post injection.  Retina, ONH, IOP normal after injection.    Followup as below.  Patient should return immediately PRN.  Retinal Detachment and Endophthalmitis precautions given     2. Intermediate 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  Stable dry AMD    Plan: Observation no CNVM, monitor given OD with Wet AMD conversion   Amsler precautions  Recommend Antioxidants, lutein, omega 3, brown sunglasses (blue blockers).     3. Pseudophakia of both eyes  Good lens position OU  Plan: Observation     4. Vitreous degeneration of both eyes  No RT/RD   Plan: Observation  Pathology of PVD, Retinal Tear, Retinal Detachment reviewed in great detail  RD precautions discussed in detail, patient expressed understanding  RTC immediately PRN (especially ANY change flashes, floaters, vision, visual field)     5. Hypertensive retinopathy of both eyes  PCP Sage Dickson MD - Recent notes reviewed  Mild HTN findings noted  Plan: Observation for now  Recommend good blood pressure control, tight blood glucose control, and good cholesterol control     6. Dry eye syndrome of both eyes  Mild dry eye  Rec ATs prn               RTC 8 weeks DFE/OCTm OU, possible Eylea OD       I saw and examined the patient and reviewed in detail the findings documented. The final examination findings, image interpretations, and plan as documented in the record represent my personal judgment and conclusions.    Christiano Page MD  Vitreoretinal Surgery   [facility]

## 2025-01-30 LAB
ALBUMIN SERPL-MCNC: 4.3 G/DL (ref 3.6–5.1)
ALBUMIN/CREAT UR: NORMAL MG/G CREAT
ALBUMIN/GLOB SERPL: 1.7 (CALC) (ref 1–2.5)
ALP SERPL-CCNC: 13 U/L (ref 37–153)
ALT SERPL-CCNC: 21 U/L (ref 6–29)
APPEARANCE UR: CLEAR
AST SERPL-CCNC: 25 U/L (ref 10–35)
BACTERIA #/AREA URNS HPF: NORMAL /HPF
BACTERIA UR CULT: NORMAL
BASOPHILS # BLD AUTO: 29 CELLS/UL (ref 0–200)
BASOPHILS NFR BLD AUTO: 0.6 %
BILIRUB SERPL-MCNC: 0.6 MG/DL (ref 0.2–1.2)
BILIRUB UR QL STRIP: NEGATIVE
BUN SERPL-MCNC: 18 MG/DL (ref 7–25)
BUN/CREAT SERPL: ABNORMAL (CALC) (ref 6–22)
CALCIUM SERPL-MCNC: 9.1 MG/DL (ref 8.6–10.4)
CHLORIDE SERPL-SCNC: 103 MMOL/L (ref 98–110)
CHOLEST SERPL-MCNC: 159 MG/DL
CHOLEST/HDLC SERPL: 2.1 (CALC)
CO2 SERPL-SCNC: 32 MMOL/L (ref 20–32)
COLOR UR: YELLOW
CREAT SERPL-MCNC: 0.8 MG/DL (ref 0.6–1)
CREAT UR-MCNC: 37 MG/DL (ref 20–275)
EGFR: 77 ML/MIN/1.73M2
EOSINOPHIL # BLD AUTO: 78 CELLS/UL (ref 15–500)
EOSINOPHIL NFR BLD AUTO: 1.6 %
ERYTHROCYTE [DISTWIDTH] IN BLOOD BY AUTOMATED COUNT: 12.6 % (ref 11–15)
GLOBULIN SER CALC-MCNC: 2.5 G/DL (CALC) (ref 1.9–3.7)
GLUCOSE SERPL-MCNC: 90 MG/DL (ref 65–99)
GLUCOSE UR QL STRIP: NEGATIVE
HCT VFR BLD AUTO: 41.9 % (ref 35–45)
HDLC SERPL-MCNC: 74 MG/DL
HGB BLD-MCNC: 13.4 G/DL (ref 11.7–15.5)
HGB UR QL STRIP: NEGATIVE
HYALINE CASTS #/AREA URNS LPF: NORMAL /LPF
KETONES UR QL STRIP: NEGATIVE
LDLC SERPL CALC-MCNC: 70 MG/DL (CALC)
LEUKOCYTE ESTERASE UR QL STRIP: NEGATIVE
LYMPHOCYTES # BLD AUTO: 1495 CELLS/UL (ref 850–3900)
LYMPHOCYTES NFR BLD AUTO: 30.5 %
MCH RBC QN AUTO: 29.8 PG (ref 27–33)
MCHC RBC AUTO-ENTMCNC: 32 G/DL (ref 32–36)
MCV RBC AUTO: 93.3 FL (ref 80–100)
MICROALBUMIN UR-MCNC: <0.2 MG/DL
MONOCYTES # BLD AUTO: 470 CELLS/UL (ref 200–950)
MONOCYTES NFR BLD AUTO: 9.6 %
NEUTROPHILS # BLD AUTO: 2827 CELLS/UL (ref 1500–7800)
NEUTROPHILS NFR BLD AUTO: 57.7 %
NITRITE UR QL STRIP: NEGATIVE
NONHDLC SERPL-MCNC: 85 MG/DL (CALC)
PH UR STRIP: 7 [PH] (ref 5–8)
PLATELET # BLD AUTO: 228 THOUSAND/UL (ref 140–400)
PMV BLD REES-ECKER: 11.4 FL (ref 7.5–12.5)
POTASSIUM SERPL-SCNC: 4.1 MMOL/L (ref 3.5–5.3)
PROT SERPL-MCNC: 6.8 G/DL (ref 6.1–8.1)
PROT UR QL STRIP: NEGATIVE
RBC # BLD AUTO: 4.49 MILLION/UL (ref 3.8–5.1)
RBC #/AREA URNS HPF: NORMAL /HPF
SERVICE CMNT-IMP: NORMAL
SODIUM SERPL-SCNC: 141 MMOL/L (ref 135–146)
SP GR UR STRIP: 1.01 (ref 1–1.03)
SQUAMOUS #/AREA URNS HPF: NORMAL /HPF
TRIGL SERPL-MCNC: 74 MG/DL
TSH SERPL-ACNC: 2.4 MIU/L (ref 0.4–4.5)
WBC # BLD AUTO: 4.9 THOUSAND/UL (ref 3.8–10.8)
WBC #/AREA URNS HPF: NORMAL /HPF

## 2025-02-02 DIAGNOSIS — I48.0 PAROXYSMAL ATRIAL FIBRILLATION: ICD-10-CM

## 2025-02-03 ENCOUNTER — OFFICE VISIT (OUTPATIENT)
Dept: FAMILY MEDICINE | Facility: CLINIC | Age: 75
End: 2025-02-03
Payer: MEDICARE

## 2025-02-03 VITALS
HEART RATE: 77 BPM | DIASTOLIC BLOOD PRESSURE: 74 MMHG | WEIGHT: 175 LBS | HEIGHT: 64 IN | BODY MASS INDEX: 29.88 KG/M2 | SYSTOLIC BLOOD PRESSURE: 126 MMHG | OXYGEN SATURATION: 97 %

## 2025-02-03 DIAGNOSIS — I10 ESSENTIAL HYPERTENSION: ICD-10-CM

## 2025-02-03 DIAGNOSIS — Z98.84 HISTORY OF BARIATRIC SURGERY: ICD-10-CM

## 2025-02-03 DIAGNOSIS — H35.033 HYPERTENSIVE RETINOPATHY OF BOTH EYES: ICD-10-CM

## 2025-02-03 DIAGNOSIS — Z12.11 SPECIAL SCREENING FOR MALIGNANT NEOPLASMS, COLON: ICD-10-CM

## 2025-02-03 DIAGNOSIS — I48.0 PAROXYSMAL ATRIAL FIBRILLATION: Primary | ICD-10-CM

## 2025-02-03 DIAGNOSIS — M17.10 PRIMARY LOCALIZED OSTEOARTHRITIS OF KNEE: ICD-10-CM

## 2025-02-03 DIAGNOSIS — G47.33 OSA (OBSTRUCTIVE SLEEP APNEA): ICD-10-CM

## 2025-02-03 DIAGNOSIS — Z90.49 S/P LAPAROSCOPIC APPENDECTOMY: ICD-10-CM

## 2025-02-03 DIAGNOSIS — E78.2 MIXED HYPERLIPIDEMIA: ICD-10-CM

## 2025-02-03 DIAGNOSIS — Z95.0 PACEMAKER: ICD-10-CM

## 2025-02-03 DIAGNOSIS — Z78.0 MENOPAUSE: ICD-10-CM

## 2025-02-03 PROBLEM — K35.32 RUPTURED APPENDICITIS: Status: RESOLVED | Noted: 2020-10-19 | Resolved: 2025-02-03

## 2025-02-03 PROBLEM — E78.5 DYSLIPIDEMIA: Status: RESOLVED | Noted: 2017-12-18 | Resolved: 2025-02-03

## 2025-02-03 PROCEDURE — 3074F SYST BP LT 130 MM HG: CPT | Mod: CPTII,S$GLB,, | Performed by: FAMILY MEDICINE

## 2025-02-03 PROCEDURE — 3061F NEG MICROALBUMINURIA REV: CPT | Mod: CPTII,S$GLB,, | Performed by: FAMILY MEDICINE

## 2025-02-03 PROCEDURE — 3066F NEPHROPATHY DOC TX: CPT | Mod: CPTII,S$GLB,, | Performed by: FAMILY MEDICINE

## 2025-02-03 PROCEDURE — 99214 OFFICE O/P EST MOD 30 MIN: CPT | Mod: S$GLB,,, | Performed by: FAMILY MEDICINE

## 2025-02-03 PROCEDURE — 1159F MED LIST DOCD IN RCRD: CPT | Mod: CPTII,S$GLB,, | Performed by: FAMILY MEDICINE

## 2025-02-03 PROCEDURE — 1101F PT FALLS ASSESS-DOCD LE1/YR: CPT | Mod: CPTII,S$GLB,, | Performed by: FAMILY MEDICINE

## 2025-02-03 PROCEDURE — 3008F BODY MASS INDEX DOCD: CPT | Mod: CPTII,S$GLB,, | Performed by: FAMILY MEDICINE

## 2025-02-03 PROCEDURE — 3078F DIAST BP <80 MM HG: CPT | Mod: CPTII,S$GLB,, | Performed by: FAMILY MEDICINE

## 2025-02-03 PROCEDURE — 3288F FALL RISK ASSESSMENT DOCD: CPT | Mod: CPTII,S$GLB,, | Performed by: FAMILY MEDICINE

## 2025-02-03 NOTE — PROGRESS NOTES
SUBJECTIVE:    Patient ID: Batool Mensah is a 74 y.o. female.    Chief Complaint: Follow-up (Went over meds verbally, review Lab-results, no complaints, abc )    Follow-up  Associated symptoms include arthralgias and coughing. Pertinent negatives include no abdominal pain, chest pain, chills, fatigue, fever, headaches, joint swelling, myalgias, nausea, numbness or vomiting.       History of Present Illness    CHIEF COMPLAINT:  Batool presents today for follow up after being sick in January    HISTORY OF PRESENT ILLNESS:  She reports having a sinus infection and virus in early January with a severe cough that required sleeping in a recliner for a week. She describes the cough as deep and intense. After initial improvement, she developed a second viral illness with cold-like symptoms and fever blister. She believes she contracted the illness during Thanksgiving in [place] from her son's girlfriend. Currently, she reports only a slight residual cough with significant improvement in her ability to speak without continuous coughing.    CARDIAC:  She has a pacemaker and denies recent AFib episodes. She reports a history of asymptomatic AFib, which was initially discovered incidentally. She continues flecainide and heliquis for cardiac management.    MUSCULOSKELETAL:  She reports chronic knee problems with crepitus and pain, particularly when turning over in bed at night. The pain typically resolves without medication. She also notes mild joint changes in her hands with some bulging but denies pain.    PREVENTIVE CARE:  Her last colonoscopy was approximately 10 years ago. She denies current bowel problems, rectal bleeding, or blood in stool. She has not been performing regular colonoscopies or stool tests.    Exercise-she goes to the gym 5 days a week and takes swim class, Jesus and strength  training class    ROS:  Constitutional: -appetite change, -chills, -fatigue, -fever, -unexpected weight change  HENT: -ear  pain, -trouble swallowing  Eyes: -pain, -discharge, -visual disturbance  Respiratory: -apnea, +cough, -shortness of breath, -wheezing  Cardiovascular: -chest pain, -leg swelling, -palpitations  Gastrointestinal: -abdominal pain, -blood in stool, -constipation, -diarrhea, -nausea, -vomiting, -reflux, -change in bowel habits, -melena  Endocrine: -cold intolerance, -heat intolerance, -polydipsia  Genitourinary: -bladder incontinence, -dysuria, -erectile dysfunction, -frequency, -hematuria, -testicular pain, -urgency, -nocturia  Musculoskeletal: -gait problem, -joint swelling, -myalgia, +joint pain  Neurological: -dizziness, -seizures, -numbness  Psychiatric/Behavioral: -agitation, -hallucinations, -nervous, -anxiety symptoms         Telephone on 01/16/2025   Component Date Value Ref Range Status    Glucose 01/29/2025 90  65 - 99 mg/dL Final    BUN 01/29/2025 18  7 - 25 mg/dL Final    Creatinine 01/29/2025 0.80  0.60 - 1.00 mg/dL Final    eGFR 01/29/2025 77  > OR = 60 mL/min/1.73m2 Final    BUN/Creatinine Ratio 01/29/2025 SEE NOTE:  6 - 22 (calc) Final    Sodium 01/29/2025 141  135 - 146 mmol/L Final    Potassium 01/29/2025 4.1  3.5 - 5.3 mmol/L Final    Chloride 01/29/2025 103  98 - 110 mmol/L Final    CO2 01/29/2025 32  20 - 32 mmol/L Final    Calcium 01/29/2025 9.1  8.6 - 10.4 mg/dL Final    Total Protein 01/29/2025 6.8  6.1 - 8.1 g/dL Final    Albumin 01/29/2025 4.3  3.6 - 5.1 g/dL Final    Globulin, Total 01/29/2025 2.5  1.9 - 3.7 g/dL (calc) Final    Albumin/Globulin Ratio 01/29/2025 1.7  1.0 - 2.5 (calc) Final    Total Bilirubin 01/29/2025 0.6  0.2 - 1.2 mg/dL Final    Alkaline Phosphatase 01/29/2025 13 (L)  37 - 153 U/L Final    AST 01/29/2025 25  10 - 35 U/L Final    ALT 01/29/2025 21  6 - 29 U/L Final    Cholesterol 01/29/2025 159  <200 mg/dL Final    HDL 01/29/2025 74  > OR = 50 mg/dL Final    Triglycerides 01/29/2025 74  <150 mg/dL Final    LDL Cholesterol 01/29/2025 70  mg/dL (calc) Final    HDL/Cholesterol  Ratio 01/29/2025 2.1  <5.0 (calc) Final    Non HDL Chol. (LDL+VLDL) 01/29/2025 85  <130 mg/dL (calc) Final    Creatinine, Urine 01/29/2025 37  20 - 275 mg/dL Final    Microalb, Ur 01/29/2025 <0.2  See Note: mg/dL Final    Microalb/Creat Ratio 01/29/2025 NOTE  <30 mg/g creat Final    Color, UA 01/29/2025 YELLOW  YELLOW Final    Appearance, UA 01/29/2025 CLEAR  CLEAR Final    Specific Gravity, UA 01/29/2025 1.008  1.001 - 1.035 Final    pH, UA 01/29/2025 7.0  5.0 - 8.0 Final    Glucose, UA 01/29/2025 NEGATIVE  NEGATIVE Final    Bilirubin, UA 01/29/2025 NEGATIVE  NEGATIVE Final    Ketones, UA 01/29/2025 NEGATIVE  NEGATIVE Final    Occult Blood UA 01/29/2025 NEGATIVE  NEGATIVE Final    Protein, UA 01/29/2025 NEGATIVE  NEGATIVE Final    Nitrite, UA 01/29/2025 NEGATIVE  NEGATIVE Final    Leukocytes, UA 01/29/2025 NEGATIVE  NEGATIVE Final    WBC Casts, UA 01/29/2025 NONE SEEN  < OR = 5 /HPF Final    RBC Casts, UA 01/29/2025 NONE SEEN  < OR = 2 /HPF Final    Squam Epithel, UA 01/29/2025 NONE SEEN  < OR = 5 /HPF Final    Bacteria, UA 01/29/2025 NONE SEEN  NONE SEEN /HPF Final    Hyaline Casts, UA 01/29/2025 NONE SEEN  NONE SEEN /LPF Final    Service Cmt: 01/29/2025 SEE COMMENT   Final    Reflexive Urine Culture 01/29/2025 SEE COMMENT   Final    TSH w/reflex to FT4 01/29/2025 2.40  0.40 - 4.50 mIU/L Final    WBC 01/29/2025 4.9  3.8 - 10.8 Thousand/uL Final    RBC 01/29/2025 4.49  3.80 - 5.10 Million/uL Final    Hemoglobin 01/29/2025 13.4  11.7 - 15.5 g/dL Final    Hematocrit 01/29/2025 41.9  35.0 - 45.0 % Final    MCV 01/29/2025 93.3  80.0 - 100.0 fL Final    MCH 01/29/2025 29.8  27.0 - 33.0 pg Final    MCHC 01/29/2025 32.0  32.0 - 36.0 g/dL Final    RDW 01/29/2025 12.6  11.0 - 15.0 % Final    Platelets 01/29/2025 228  140 - 400 Thousand/uL Final    MPV 01/29/2025 11.4  7.5 - 12.5 fL Final    Neutrophils, Abs 01/29/2025 2,827  1,500 - 7,800 cells/uL Final    Lymph # 01/29/2025 1,495  850 - 3,900 cells/uL Final    Mono #  01/29/2025 470  200 - 950 cells/uL Final    Eos # 01/29/2025 78  15 - 500 cells/uL Final    Baso # 01/29/2025 29  0 - 200 cells/uL Final    Neutrophils Relative 01/29/2025 57.7  % Final    Lymph % 01/29/2025 30.5  % Final    Mono % 01/29/2025 9.6  % Final    Eosinophil % 01/29/2025 1.6  % Final    Basophil % 01/29/2025 0.6  % Final   Office Visit on 12/02/2024   Component Date Value Ref Range Status    POC Molecular Influenza A Ag 12/02/2024 Negative  Negative Final    POC Molecular Influenza B Ag 12/02/2024 Negative  Negative Final     Acceptable 12/02/2024 Yes   Final    POC Rapid COVID 12/02/2024 Negative  Negative Final     Acceptable 12/02/2024 Yes   Final    Molecular Strep A, POC 12/02/2024 Negative  Negative Final     Acceptable 12/02/2024 Yes   Final   Clinical Support on 11/10/2024   Component Date Value Ref Range Status    ICD Shock 11/10/2024 No   Final    Device Type 11/10/2024 Pacemaker   Final    AF [place] % 11/10/2024 2   Final    RV Paccing % 11/10/2024 4.7  % Final   Clinical Support on 08/11/2024   Component Date Value Ref Range Status    ICD Shock 08/11/2024 No   Final    Device Type 08/11/2024 Pacemaker   Final    AF [place] % 08/11/2024 < 1   Final    RV Paccing % 08/11/2024 5.2  % Final       Past Medical History:   Diagnosis Date    A-fib     Anticoagulant long-term use     Arthritis     Encounter for blood transfusion     Hypertension     Mixed hyperlipidemia     Ruptured appendicitis 10/19/2020    Sleep apnea     cpap     Social History     Socioeconomic History    Marital status:    Tobacco Use    Smoking status: Never    Smokeless tobacco: Never   Substance and Sexual Activity    Alcohol use: Yes     Comment: socially    Drug use: Never    Sexual activity: Not Currently     Social Drivers of Health     Financial Resource Strain: Low Risk  (2/2/2025)    Overall Financial Resource Strain (CARDIA)     Difficulty of Paying Living Expenses:  Not very hard   Food Insecurity: No Food Insecurity (2025)    Hunger Vital Sign     Worried About Running Out of Food in the Last Year: Never true     Ran Out of Food in the Last Year: Never true   Transportation Needs: No Transportation Needs (2023)    PRAPARE - Transportation     Lack of Transportation (Medical): No     Lack of Transportation (Non-Medical): No   Physical Activity: Sufficiently Active (2025)    Exercise Vital Sign     Days of Exercise per Week: 5 days     Minutes of Exercise per Session: 60 min   Stress: No Stress Concern Present (2025)    [place] [employer] - Occupational Stress Questionnaire     Feeling of Stress : Not at all   Housing Stability: Low Risk  (2023)    Housing Stability Vital Sign     Unable to Pay for Housing in the Last Year: No     Number of Places Lived in the Last Year: 1     Unstable Housing in the Last Year: No     Past Surgical History:   Procedure Laterality Date    A-V CARDIAC PACEMAKER INSERTION Left 2023    Procedure: INSERTION, CARDIAC PACEMAKER, DUAL CHAMBER;  Surgeon: Curt Ariza MD;  Location: [facility] EP LAB;  Service: Cardiology;  Laterality: Left;  SSS/Bradycardia, dPPM, SJM, MAC, GP, 3 PREP    APPENDECTOMY      BREAST BIOPSY Left     CATARACT EXTRACTION W/  INTRAOCULAR LENS IMPLANT Left 2023    Procedure: CEIOL OS;  Surgeon: Randolph Marie MD;  Location: [facility] OR;  Service: Ophthalmology;  Laterality: Left;    CATARACT EXTRACTION W/  INTRAOCULAR LENS IMPLANT Right 2023    Procedure: CEIOL OD;  Surgeon: Randolph Marie MD;  Location: [facility] OR;  Service: Ophthalmology;  Laterality: Right;     SECTION      X2    COLONOSCOPY      Dr. Parker -RT 10 years    EYE SURGERY      Gastric sleeve  2018    Dr Hernandez- hiatal hernia repair    HERNIA REPAIR      LAPAROSCOPIC APPENDECTOMY N/A 10/19/2020    Procedure: APPENDECTOMY, LAPAROSCOPIC;  Surgeon: Konrad Almonte III, MD;  Location: [facility] OR;   Service: General;  Laterality: N/A;    TONSILLECTOMY       No family history on file.    The 10-year CVD risk score (LUIS'Agostino, et al., 2008) is: 8.6%    Values used to calculate the score:      Age: 74 years      Sex: Female      Diabetic: No      Tobacco smoker: No      Systolic Blood Pressure: 126 mmHg      Is BP treated: Yes      HDL Cholesterol: 74 mg/dL      Total Cholesterol: 159 mg/dL    All of your core healthy metrics are met.      Review of patient's allergies indicates:   Allergen Reactions    Penicillins Hives       Current Outpatient Medications:     albuterol (VENTOLIN HFA) 90 mcg/actuation inhaler, Inhale 2 puffs into the lungs every 6 (six) hours as needed for Wheezing. Rescue, Disp: 18 g, Rfl: 1    alendronate (FOSAMAX) 70 MG tablet, Take 1 tablet (70 mg total) by mouth every 7 days. Take on empty stomach with full glass of water-do not eat or lie down for 1 hour For osteoporosis, Disp: 12 tablet, Rfl: 3    apixaban (ELIQUIS) 5 mg Tab, Take 1 tablet (5 mg total) by mouth 2 (two) times daily., Disp: 180 tablet, Rfl: 3    atorvastatin (LIPITOR) 20 MG tablet, TAKE 1 TABLET BY MOUTH EVERY DAY IN THE EVENING, Disp: 90 tablet, Rfl: 3    calcium carbonate (OS-KRYS) 600 mg calcium (1,500 mg) Tab, Take 600 mg by mouth 2 (two) times daily with meals., Disp: , Rfl:     calcium-vitamin D3 (OS-KRYS 500 + D3) 500 mg-5 mcg (200 unit) per tablet, Take 1 tablet by mouth 2 (two) times daily with meals., Disp: , Rfl:     flecainide (TAMBOCOR) 100 MG Tab, Take 1 tablet (100 mg total) by mouth every 12 (twelve) hours., Disp: 180 tablet, Rfl: 3    losartan (COZAAR) 50 MG tablet, Take 1 tablet (50 mg total) by mouth once daily., Disp: 90 tablet, Rfl: 3    metoprolol succinate (TOPROL-XL) 25 MG 24 hr tablet, TAKE 1/2 TABLET BY MOUTH ONCE DAILY, Disp: 45 tablet, Rfl: 7    vitamin D (VITAMIN D3) 1000 units Tab, Take 1,000 Units by mouth 2 (two) times a day., Disp: , Rfl:     Current Facility-Administered Medications:     NON  "FORMULARY MEDICATION 0.5 mg, 0.5 mg, Intravitreal, Q30 Days, Baron Heard, PharmD    Facility-Administered Medications Ordered in Other Visits:     lactated ringers infusion, , Intravenous, Continuous, Ammon Mcmanus MD, Stopped at 03/29/23 0918    LIDOcaine (PF) 10 mg/ml (1%) injection 10 mg, 1 mL, Intradermal, Once, Ammon Mcmanus MD    Review of Systems   Constitutional:  Negative for activity change, appetite change, chills, fatigue, fever and unexpected weight change.   HENT:  Negative for ear discharge, ear pain, hearing loss and trouble swallowing.    Eyes:  Negative for pain, discharge and visual disturbance.   Respiratory:  Positive for cough. Negative for apnea, chest tightness, shortness of breath and wheezing.    Cardiovascular:  Negative for chest pain, palpitations and leg swelling.   Gastrointestinal:  Negative for abdominal pain, blood in stool, constipation, diarrhea, nausea, vomiting and reflux.   Endocrine: Negative for cold intolerance, heat intolerance and polydipsia.   Genitourinary:  Negative for bladder incontinence, difficulty urinating, dysuria, hematuria, nocturia and urgency.   Musculoskeletal:  Positive for arthralgias. Negative for gait problem, joint swelling and myalgias.   Neurological:  Negative for dizziness, seizures, numbness and headaches.   Psychiatric/Behavioral:  Negative for behavioral problems, dysphoric mood and hallucinations. The patient is not nervous/anxious.            Objective:      Vitals:    02/03/25 1412   BP: 126/74   Pulse: 77   SpO2: 97%   Weight: 79.4 kg (175 lb)   Height: 5' 4" (1.626 m)     Physical Exam  Vitals and nursing note reviewed.   Constitutional:       General: She is not in acute distress.     Appearance: Normal appearance. She is well-developed. She is obese. She is not toxic-appearing.   HENT:      Head: Normocephalic and atraumatic.      Right Ear: Tympanic membrane and external ear normal.      Left Ear: Tympanic membrane and " external ear normal.      Nose: Nose normal.      Mouth/Throat:      Pharynx: Oropharynx is clear. No posterior oropharyngeal erythema.   Eyes:      Pupils: Pupils are equal, round, and reactive to light.   Neck:      Thyroid: No thyromegaly.      Vascular: No carotid bruit.   Cardiovascular:      Rate and Rhythm: Normal rate and regular rhythm.      Heart sounds: Normal heart sounds. No murmur heard.     Comments: Pacemaker in place left chest  Pulmonary:      Effort: Pulmonary effort is normal.      Breath sounds: Normal breath sounds. No wheezing or rales.   Abdominal:      General: Bowel sounds are normal. There is no distension.      Palpations: Abdomen is soft.      Tenderness: There is no abdominal tenderness.   Musculoskeletal:         General: No tenderness or deformity. Normal range of motion.      Cervical back: Normal range of motion and neck supple.      Lumbar back: Normal. No spasms.      Comments: Bends 90 degrees at  waist, shoulders and knees have full range of motion, no pitting edema to lower extremities, right knee is crepitant.   Lymphadenopathy:      Cervical: No cervical adenopathy.   Skin:     General: Skin is warm and dry.      Findings: No rash.   Neurological:      General: No focal deficit present.      Mental Status: She is alert and oriented to person, place, and time. Mental status is at baseline.      Cranial Nerves: No cranial nerve deficit.      Coordination: Coordination normal.   Psychiatric:         Mood and Affect: Mood normal.         Behavior: Behavior normal.         Thought Content: Thought content normal.         Judgment: Judgment normal.       Physical Exam    HENT: Normal eardrum. Sinus pressure behind eardrums.  Cardiovascular: Normal heart sounds.  Vitals: Normal blood pressure.             Assessment:       1. Paroxysmal atrial fibrillation    2. Hypertensive retinopathy of both eyes    3. Pacemaker    4. Mixed hyperlipidemia    5. Essential hypertension    6.  Menopause    7. History of bariatric surgery    8. S/P laparoscopic appendectomy    9. Primary localized osteoarthritis of knee    10. HUI (obstructive sleep apnea)         Plan:       Paroxysmal atrial fibrillation  Sinus rhythm today  Hypertensive retinopathy of both eyes    Pacemaker    Mixed hyperlipidemia  Cholesterol 159 HDL 74 TG 74 LDL 70, excellent lipid panel.  Essential hypertension    Menopause    History of bariatric surgery    S/P laparoscopic appendectomy    Primary localized osteoarthritis of knee    HUI (obstructive sleep apnea)  Cologuard ordered    Assessment & Plan    IMPRESSION:  - Reviewed patient's recent history of prolonged respiratory illness, likely viral infections  - Assessed cardiovascular status; pacemaker and medication regimen effectively managing arrhythmia  - Evaluated knee arthritis; considering future intervention options  - Reviewed lab results: lipid panel, glucose, kidney and liver function, thyroid, CBC, and urinalysis; all within normal limits  - Performed physical exam, including ear, throat, heart, and gait assessment  - Considered age-appropriate cancer screening options    PAROXYSMAL ATRIAL FIBRILLATION:  - Batool reported no awareness of recent atrial fibrillation episodes or skipping heartbeats.  - Physical exam revealed no skipping rhythm on heart auscultation.  - Blood pressure was optimal, and cholesterol levels were very good (total cholesterol 159, triglycerides 74, low LDL).    ATRIAL FIBRILLATION:  - Batool was asymptomatic before pacemaker placement.  - Continue flecainide for heart condition management.  - Follow up with Dr. Higgins for pacemaker management.    KNEE ARTHRITIS:  - Batool reports ongoing knee pain and crunching sensation, confirmed during exam.  - Discussed potential need for future knee surgery.  - Agreed to provide a handicap parking permit.  - Educated patient on importance of maintaining an active lifestyle for arthritis management.    SINUS  INFECTION:  - Batool reported a recent sinus infection in January, treated with antibiotics.  - Current exam shows sinus pressure behind ear drums but no active infection.    COUGH:  - Batool experienced a severe cough in January, now improved.  - Treatment included prescribed cough medicine with codeine and OTC Robitussin.    ANTICOAGULATION THERAPY:  - Continue Eliquis for anticoagulation therapy.    WEIGHT MANAGEMENT:  - Recommend losing recently gained weight through increased physical activity and dietary moderation.    COLON CANCER SCREENING:  - Explained rationale for transitioning to non-invasive colon cancer screening methods at patient's age.  - Ordered Cologuard test for colon cancer screening.    OTHER INSTRUCTIONS:  - Emphasized importance of maintaining an active lifestyle for overall health.  - Batool to continue current exercise regimen, including swim class, water aerobics, Jesus, and strength training.  - Batool instructed to access test results and medical information via patient portal.         Follow up in about 6 months (around 8/3/2025), or htn OA.        This note was generated with the assistance of ambient listening technology. Verbal consent was obtained by the patient and accompanying visitor(s) for the recording of patient appointment to facilitate this note. I attest to having reviewed and edited the generated note for accuracy, though some syntax or spelling errors may persist. Please contact the author of this note for any clarification.      2/3/2025 Sage Dickson

## 2025-02-04 RX ORDER — APIXABAN 5 MG/1
5 TABLET, FILM COATED ORAL 2 TIMES DAILY
Qty: 180 TABLET | Refills: 3 | Status: SHIPPED | OUTPATIENT
Start: 2025-02-04

## 2025-02-07 ENCOUNTER — OFFICE VISIT (OUTPATIENT)
Dept: CARDIOLOGY | Facility: CLINIC | Age: 75
End: 2025-02-07
Payer: MEDICARE

## 2025-02-07 VITALS
OXYGEN SATURATION: 98 % | DIASTOLIC BLOOD PRESSURE: 68 MMHG | SYSTOLIC BLOOD PRESSURE: 126 MMHG | BODY MASS INDEX: 29.4 KG/M2 | WEIGHT: 172.19 LBS | HEIGHT: 64 IN | HEART RATE: 70 BPM

## 2025-02-07 DIAGNOSIS — Z95.0 PRESENCE OF CARDIAC PACEMAKER: ICD-10-CM

## 2025-02-07 DIAGNOSIS — E78.5 DYSLIPIDEMIA: ICD-10-CM

## 2025-02-07 DIAGNOSIS — I48.0 PAROXYSMAL ATRIAL FIBRILLATION: Primary | ICD-10-CM

## 2025-02-07 DIAGNOSIS — I10 ESSENTIAL HYPERTENSION: ICD-10-CM

## 2025-02-07 PROCEDURE — 3008F BODY MASS INDEX DOCD: CPT | Mod: CPTII,S$GLB,, | Performed by: INTERNAL MEDICINE

## 2025-02-07 PROCEDURE — 3066F NEPHROPATHY DOC TX: CPT | Mod: CPTII,S$GLB,, | Performed by: INTERNAL MEDICINE

## 2025-02-07 PROCEDURE — 99999 PR PBB SHADOW E&M-EST. PATIENT-LVL III: CPT | Mod: PBBFAC,,, | Performed by: INTERNAL MEDICINE

## 2025-02-07 PROCEDURE — 3074F SYST BP LT 130 MM HG: CPT | Mod: CPTII,S$GLB,, | Performed by: INTERNAL MEDICINE

## 2025-02-07 PROCEDURE — 1126F AMNT PAIN NOTED NONE PRSNT: CPT | Mod: CPTII,S$GLB,, | Performed by: INTERNAL MEDICINE

## 2025-02-07 PROCEDURE — 1159F MED LIST DOCD IN RCRD: CPT | Mod: CPTII,S$GLB,, | Performed by: INTERNAL MEDICINE

## 2025-02-07 PROCEDURE — 93000 ELECTROCARDIOGRAM COMPLETE: CPT | Mod: S$GLB,,, | Performed by: GENERAL PRACTICE

## 2025-02-07 PROCEDURE — 3061F NEG MICROALBUMINURIA REV: CPT | Mod: CPTII,S$GLB,, | Performed by: INTERNAL MEDICINE

## 2025-02-07 PROCEDURE — 99214 OFFICE O/P EST MOD 30 MIN: CPT | Mod: S$GLB,,, | Performed by: INTERNAL MEDICINE

## 2025-02-07 PROCEDURE — 3078F DIAST BP <80 MM HG: CPT | Mod: CPTII,S$GLB,, | Performed by: INTERNAL MEDICINE

## 2025-02-07 RX ORDER — FLECAINIDE ACETATE 100 MG/1
100 TABLET ORAL EVERY 12 HOURS
Qty: 180 TABLET | Refills: 3 | Status: SHIPPED | OUTPATIENT
Start: 2025-02-07 | End: 2026-02-07

## 2025-02-07 RX ORDER — LOSARTAN POTASSIUM 50 MG/1
50 TABLET ORAL DAILY
Qty: 90 TABLET | Refills: 3 | Status: SHIPPED | OUTPATIENT
Start: 2025-02-07 | End: 2026-02-07

## 2025-02-07 NOTE — PROGRESS NOTES
[facility] [place] [facility]    Subjective:     Patient ID:  Batool Mensah is a 74 y.o. female patient here for evaluation Hypertension (Annual office visit )      HPI:  74-year-old female with history of paroxysmal AFib and equivocal stress test here for follow-up.  Continues to be active without any exertional chest pain or shortness of breath.  Pacemaker check last November showed 2% AFib burden.  Continues to be on flecainide and Eliquis.    Review of Systems   All other systems reviewed and are negative.       Past Medical History:   Diagnosis Date    A-fib     Anticoagulant long-term use     Arthritis     Encounter for blood transfusion     Hypertension     Mixed hyperlipidemia     Ruptured appendicitis 10/19/2020    Sleep apnea     cpap       Past Surgical History:   Procedure Laterality Date    A-V CARDIAC PACEMAKER INSERTION Left 2023    Procedure: INSERTION, CARDIAC PACEMAKER, DUAL CHAMBER;  Surgeon: uCrt Ariza MD;  Location: [facility] EP LAB;  Service: Cardiology;  Laterality: Left;  SSS/Bradycardia, dPPM, SJM, MAC, GP, 3 PREP    APPENDECTOMY      BREAST BIOPSY Left     CATARACT EXTRACTION W/  INTRAOCULAR LENS IMPLANT Left 2023    Procedure: CEIOL OS;  Surgeon: Randolph Marie MD;  Location: [facility] OR;  Service: Ophthalmology;  Laterality: Left;    CATARACT EXTRACTION W/  INTRAOCULAR LENS IMPLANT Right 2023    Procedure: CEIOL OD;  Surgeon: Randolph Marie MD;  Location: [facility] OR;  Service: Ophthalmology;  Laterality: Right;     SECTION      X2    COLONOSCOPY      Dr. Parker -RTC 10 years    EYE SURGERY      Gastric sleeve  2018    Dr Hernandez- hiatal hernia repair    HERNIA REPAIR      LAPAROSCOPIC APPENDECTOMY N/A 10/19/2020    Procedure: APPENDECTOMY, LAPAROSCOPIC;  Surgeon: Konrad Almonte III, MD;  Location: [facility] OR;  Service: General;  Laterality: N/A;    TONSILLECTOMY         No family history on file.    Social History      Socioeconomic History    Marital status:    Tobacco Use    Smoking status: Never    Smokeless tobacco: Never   Substance and Sexual Activity    Alcohol use: Yes     Comment: socially    Drug use: Never    Sexual activity: Not Currently     Social Drivers of Health     Financial Resource Strain: Low Risk  (2/2/2025)    Overall Financial Resource Strain (CARDIA)     Difficulty of Paying Living Expenses: Not very hard   Food Insecurity: No Food Insecurity (2/2/2025)    Hunger Vital Sign     Worried About Running Out of Food in the Last Year: Never true     Ran Out of Food in the Last Year: Never true   Transportation Needs: No Transportation Needs (11/27/2023)    PRAPARE - Transportation     Lack of Transportation (Medical): No     Lack of Transportation (Non-Medical): No   Physical Activity: Sufficiently Active (2/2/2025)    Exercise Vital Sign     Days of Exercise per Week: 5 days     Minutes of Exercise per Session: 60 min   Stress: No Stress Concern Present (2/2/2025)    [employer] [place] [employer] - Occupational Stress Questionnaire     Feeling of Stress : Not at all   Housing Stability: Low Risk  (11/27/2023)    Housing Stability Vital Sign     Unable to Pay for Housing in the Last Year: No     Number of Places Lived in the Last Year: 1     Unstable Housing in the Last Year: No       Current Outpatient Medications   Medication Sig Dispense Refill    albuterol (VENTOLIN HFA) 90 mcg/actuation inhaler Inhale 2 puffs into the lungs every 6 (six) hours as needed for Wheezing. Rescue 18 g 1    alendronate (FOSAMAX) 70 MG tablet Take 1 tablet (70 mg total) by mouth every 7 days. Take on empty stomach with full glass of water-do not eat or lie down for 1 hour For osteoporosis 12 tablet 3    atorvastatin (LIPITOR) 20 MG tablet TAKE 1 TABLET BY MOUTH EVERY DAY IN THE EVENING 90 tablet 3    calcium carbonate (OS-KRYS) 600 mg calcium (1,500 mg) Tab Take 600 mg by mouth 2 (two) times daily with meals.       calcium-vitamin D3 (OS-KRYS 500 + D3) 500 mg-5 mcg (200 unit) per tablet Take 1 tablet by mouth 2 (two) times daily with meals.      ELIQUIS 5 mg Tab TAKE 1 TABLET BY MOUTH TWICE A  tablet 3    metoprolol succinate (TOPROL-XL) 25 MG 24 hr tablet TAKE 1/2 TABLET BY MOUTH ONCE DAILY 45 tablet 7    vitamin D (VITAMIN D3) 1000 units Tab Take 1,000 Units by mouth 2 (two) times a day.      flecainide (TAMBOCOR) 100 MG Tab Take 1 tablet (100 mg total) by mouth every 12 (twelve) hours. 180 tablet 3    losartan (COZAAR) 50 MG tablet Take 1 tablet (50 mg total) by mouth once daily. 90 tablet 3     Current Facility-Administered Medications   Medication Dose Route Frequency Provider Last Rate Last Admin    NON FORMULARY MEDICATION 0.5 mg  0.5 mg Intravitreal Q30 Days Baron Heard, Tammy         Facility-Administered Medications Ordered in Other Visits   Medication Dose Route Frequency Provider Last Rate Last Admin    lactated ringers infusion   Intravenous Continuous Ammon Mcmanus MD   Stopped at 03/29/23 0918    LIDOcaine (PF) 10 mg/ml (1%) injection 10 mg  1 mL Intradermal Once Ammon Mcmanus MD           Review of patient's allergies indicates:   Allergen Reactions    Penicillins Hives         Objective:        Vitals:    02/07/25 1324   BP: 126/68   Pulse: 70       Physical Exam  Vitals reviewed.   Constitutional:       Appearance: Normal appearance.   Cardiovascular:      Rate and Rhythm: Normal rate and regular rhythm.      Pulses: Normal pulses.      Heart sounds: Normal heart sounds. No murmur heard.     No gallop.   Pulmonary:      Effort: Pulmonary effort is normal.      Breath sounds: Normal breath sounds.   Skin:     General: Skin is warm.   Neurological:      General: No focal deficit present.      Mental Status: She is alert and oriented to person, place, and time.         LIPIDS - LAST 2   Lab Results   Component Value Date    CHOL 159 01/29/2025    CHOL 142 01/08/2024    HDL 74 01/29/2025  "   HDL 74 01/08/2024    LDLCALC 70 01/29/2025    LDLCALC 56.0 (L) 01/08/2024    TRIG 74 01/29/2025    TRIG 60 01/08/2024    CHOLHDL 2.1 01/29/2025    CHOLHDL 52.1 (H) 01/08/2024       CBC - LAST 2  Lab Results   Component Value Date    WBC 4.9 01/29/2025    WBC 3.76 (L) 01/08/2024    RBC 4.49 01/29/2025    RBC 4.69 01/08/2024    HGB 13.4 01/29/2025    HGB 14.2 01/08/2024    HCT 41.9 01/29/2025    HCT 42.8 01/08/2024    MCV 93.3 01/29/2025    MCV 91 01/08/2024    MCH 29.8 01/29/2025    MCH 30.3 01/08/2024    MCHC 32.0 01/29/2025    MCHC 33.2 01/08/2024    RDW 12.6 01/29/2025    RDW 12.8 01/08/2024     01/29/2025     01/08/2024    MPV 11.4 01/29/2025    MPV 11.5 01/08/2024    GRAN 2.0 01/08/2024    GRAN 52.9 01/08/2024    LYMPH 1,495 01/29/2025    LYMPH 30.5 01/29/2025    MONO 470 01/29/2025    MONO 9.6 01/29/2025    BASO 29 01/29/2025    BASO 0.02 01/08/2024    NRBC 0 01/08/2024    NRBC 0 05/12/2023       CHEMISTRY & LIVER FUNCTION - LAST 2  Lab Results   Component Value Date     01/29/2025     01/08/2024    K 4.1 01/29/2025    K 4.3 01/08/2024     01/29/2025     01/08/2024    CO2 32 01/29/2025    CO2 28 01/08/2024    ANIONGAP 9 01/08/2024    ANIONGAP 3 (L) 05/12/2023    BUN 18 01/29/2025    BUN 14 01/08/2024    CREATININE 0.80 01/29/2025    CREATININE 0.8 01/08/2024    GLU 90 01/29/2025    GLU 87 01/08/2024    CALCIUM 9.1 01/29/2025    CALCIUM 10.3 01/08/2024    MG 2.2 10/21/2020    MG 1.9 10/20/2020    ALBUMIN 4.3 01/29/2025    ALBUMIN 4.3 01/08/2024    PROT 6.8 01/29/2025    PROT 6.7 01/08/2024    ALKPHOS 10 (L) 01/08/2024    ALKPHOS 14 (L) 10/21/2020    ALT 21 01/29/2025    ALT 18 01/08/2024    AST 25 01/29/2025    AST 23 01/08/2024    BILITOT 0.6 01/29/2025    BILITOT 0.7 01/08/2024        CARDIAC PROFILE - LAST 2  No results found for: "BNP", "CPK", "CPKMB", "LDH", "TROPONINI"     COAGULATION - LAST 2  Lab Results   Component Value Date    INR 1.0 05/12/2023    APTT 27.5 " 05/12/2023       ENDOCRINE & PSA - LAST 2  Lab Results   Component Value Date    MICROALBUR <0.2 01/29/2025    MICROALBUR 0.3 01/05/2022    TSH 1.867 01/08/2024        ECHOCARDIOGRAM RESULTS  Results for orders placed during the hospital encounter of 11/07/22    Echo    Interpretation Summary  · The left ventricle is normal in size with normal systolic function.  · The estimated ejection fraction is 65%.  · Normal left ventricular diastolic function.  · Normal right ventricular size with normal right ventricular systolic function.  · Normal central venous pressure (3 mmHg).  · The estimated PA systolic pressure is 23 mmHg.      CURRENT/PREVIOUS VISIT EKG  Results for orders placed or performed in visit on 02/28/24   IN OFFICE EKG 12-LEAD (to [facility])    Collection Time: 02/28/24  2:07 PM   Result Value Ref Range    QRS Duration 88 ms    [facility] QTC Calculation 445 ms    Narrative    Test Reason : I48.0,I10,E78.2,    Vent. Rate : 065 BPM     Atrial Rate : 065 BPM     P-R Int : 186 ms          QRS Dur : 088 ms      QT Int : 428 ms       P-R-T Axes : 076 065 014 degrees     QTc Int : 445 ms    Atrial-paced rhythm with occasional supraventricular complexes and with  occasional Premature ventricular complexes  Abnormal ECG  When compared with ECG of 29-NOV-2023 08:08,  Premature ventricular complexes are now Present  Confirmed by ANAMARIA GRANADOS MD ([id number]) on 5/1/2024 2:33:22 PM    Referred By:             Confirmed By:ANAMARIA GRANADOS MD     No valid procedures specified.   Results for orders placed during the hospital encounter of 11/07/22    Nuclear Stress - Cardiology Interpreted    Interpretation Summary    Likely normal myocardial perfusion imaging.  There is a moderate-sized defect of moderate intensity which is worse at rest and improves with stress, likely breast attenuation artifact.    There are no other significant perfusion abnormalities.    The gated perfusion images showed an ejection fraction of 78% post stress.  Normal ejection fraction is greater than 53%.    The EKG portion of this study is negative for ischemia.    The patient reported no chest pain during the stress test.  No evidence of chronotropic incompetence.    During stress, occasional PVCs are noted.    No valid procedures specified.        Assessment:       1. Paroxysmal atrial fibrillation    2. Presence of cardiac pacemaker    3. Essential hypertension    4. Dyslipidemia           Plan:       Paroxysmal atrial fibrillation  -     flecainide (TAMBOCOR) 100 MG Tab; Take 1 tablet (100 mg total) by mouth every 12 (twelve) hours.  Dispense: 180 tablet; Refill: 3    Presence of cardiac pacemaker  -     IN OFFICE EKG 12-LEAD (to [facility])    Essential hypertension  Comments:  BP stable  Orders:  -     losartan (COZAAR) 50 MG tablet; Take 1 tablet (50 mg total) by mouth once daily.  Dispense: 90 tablet; Refill: 3    Dyslipidemia    AFib appears to be well controlled, continue flecainide and Eliquis for now, follow-up with EP to see if this AFib burden a sufficient enough to continue Eliquis or not.    Hypertension is stable, continue with low-dose metoprolol and losartan.    LDL is target, continue with atorvastatin 20 mg.    Patient instructed to maintain a Mediterranean diet and moderate physical exertion 5 days a week 30 minutes a day, let us know if exertional chest pain or shortness of breath.    Follow up in about 2 years (around 2/7/2027) for f/u P afib.          MD [facility] [place] [facility]

## 2025-02-09 ENCOUNTER — CLINICAL SUPPORT (OUTPATIENT)
Dept: CARDIOLOGY | Facility: HOSPITAL | Age: 75
End: 2025-02-09
Payer: MEDICARE

## 2025-02-09 ENCOUNTER — CLINICAL SUPPORT (OUTPATIENT)
Dept: CARDIOLOGY | Facility: HOSPITAL | Age: 75
End: 2025-02-09
Attending: INTERNAL MEDICINE
Payer: MEDICARE

## 2025-02-09 DIAGNOSIS — Z95.0 PRESENCE OF CARDIAC PACEMAKER: ICD-10-CM

## 2025-02-09 DIAGNOSIS — I48.0 PAROXYSMAL ATRIAL FIBRILLATION: ICD-10-CM

## 2025-02-09 PROCEDURE — 93296 REM INTERROG EVL PM/IDS: CPT | Performed by: INTERNAL MEDICINE

## 2025-02-09 PROCEDURE — 93294 REM INTERROG EVL PM/LDLS PM: CPT | Mod: ,,, | Performed by: INTERNAL MEDICINE

## 2025-02-11 LAB
OHS QRS DURATION: 88 MS
OHS QTC CALCULATION: 436 MS

## 2025-02-13 LAB
OHS CV AF BURDEN PERCENT: 3
OHS CV DC REMOTE DEVICE TYPE: NORMAL
OHS CV ICD SHOCK: NO
OHS CV RV PACING PERCENT: 4.8 %

## 2025-03-24 DIAGNOSIS — Z00.00 ENCOUNTER FOR MEDICARE ANNUAL WELLNESS EXAM: ICD-10-CM

## 2025-04-01 ENCOUNTER — HOSPITAL ENCOUNTER (EMERGENCY)
Facility: HOSPITAL | Age: 75
Discharge: HOME | End: 2025-04-01
Attending: STUDENT IN AN ORGANIZED HEALTH CARE EDUCATION/TRAINING PROGRAM
Payer: MEDICARE

## 2025-04-01 ENCOUNTER — TELEPHONE (OUTPATIENT)
Dept: FAMILY MEDICINE | Facility: CLINIC | Age: 75
End: 2025-04-01
Payer: MEDICARE

## 2025-04-01 VITALS
HEART RATE: 70 BPM | HEIGHT: 63 IN | DIASTOLIC BLOOD PRESSURE: 98 MMHG | BODY MASS INDEX: 30.12 KG/M2 | OXYGEN SATURATION: 97 % | WEIGHT: 170 LBS | RESPIRATION RATE: 16 BRPM | TEMPERATURE: 100 F | SYSTOLIC BLOOD PRESSURE: 156 MMHG

## 2025-04-01 DIAGNOSIS — K80.20 CALCULUS OF GALLBLADDER WITHOUT CHOLECYSTITIS WITHOUT OBSTRUCTION: ICD-10-CM

## 2025-04-01 DIAGNOSIS — R10.9 ABDOMINAL PAIN: ICD-10-CM

## 2025-04-01 DIAGNOSIS — R10.13 EPIGASTRIC ABDOMINAL PAIN: Primary | ICD-10-CM

## 2025-04-01 DIAGNOSIS — R11.2 NAUSEA AND VOMITING, UNSPECIFIED VOMITING TYPE: ICD-10-CM

## 2025-04-01 LAB
ABSOLUTE EOSINOPHIL (SMH): 0.02 K/UL
ABSOLUTE MONOCYTE (SMH): 0.34 K/UL (ref 0.3–1)
ABSOLUTE NEUTROPHIL COUNT (SMH): 6.8 K/UL (ref 1.8–7.7)
ALBUMIN SERPL-MCNC: 3.9 G/DL (ref 3.5–5.2)
ALP SERPL-CCNC: 11 UNIT/L (ref 55–135)
ALT SERPL-CCNC: 15 UNIT/L (ref 10–44)
ANION GAP (SMH): 7 MMOL/L (ref 8–16)
AST SERPL-CCNC: 20 UNIT/L (ref 10–40)
BASOPHILS # BLD AUTO: 0.02 K/UL
BASOPHILS NFR BLD AUTO: 0.3 %
BILIRUB SERPL-MCNC: 0.4 MG/DL (ref 0.1–1)
BILIRUB UR QL STRIP.AUTO: NEGATIVE
BUN SERPL-MCNC: 27 MG/DL (ref 8–23)
CALCIUM SERPL-MCNC: 9 MG/DL (ref 8.7–10.5)
CHLORIDE SERPL-SCNC: 106 MMOL/L (ref 95–110)
CLARITY UR: CLEAR
CO2 SERPL-SCNC: 28 MMOL/L (ref 23–29)
COLOR UR AUTO: YELLOW
CREAT SERPL-MCNC: 0.9 MG/DL (ref 0.5–1.4)
ERYTHROCYTE [DISTWIDTH] IN BLOOD BY AUTOMATED COUNT: 13.9 % (ref 11.5–14.5)
GFR SERPLBLD CREATININE-BSD FMLA CKD-EPI: >60 ML/MIN/1.73/M2
GLUCOSE SERPL-MCNC: 117 MG/DL (ref 70–110)
GLUCOSE UR QL STRIP: NEGATIVE
HCT VFR BLD AUTO: 41.4 % (ref 37–48.5)
HGB BLD-MCNC: 13.6 GM/DL (ref 12–16)
HGB UR QL STRIP: NEGATIVE
IMM GRANULOCYTES # BLD AUTO: 0.01 K/UL (ref 0–0.04)
IMM GRANULOCYTES NFR BLD AUTO: 0.1 % (ref 0–0.5)
KETONES UR QL STRIP: ABNORMAL
LEUKOCYTE ESTERASE UR QL STRIP: NEGATIVE
LIPASE SERPL-CCNC: 21 U/L (ref 4–60)
LYMPHOCYTES # BLD AUTO: 0.73 K/UL (ref 1–4.8)
MCH RBC QN AUTO: 29.6 PG (ref 27–31)
MCHC RBC AUTO-ENTMCNC: 32.9 G/DL (ref 32–36)
MCV RBC AUTO: 90 FL (ref 82–98)
NITRITE UR QL STRIP: NEGATIVE
NUCLEATED RBC (/100WBC) (SMH): 0 /100 WBC
OHS QRS DURATION: 84 MS
OHS QTC CALCULATION: 412 MS
PH UR STRIP: 7 [PH]
PLATELET # BLD AUTO: 153 K/UL (ref 150–450)
PMV BLD AUTO: 12.1 FL (ref 9.2–12.9)
POTASSIUM SERPL-SCNC: 4 MMOL/L (ref 3.5–5.1)
PROT SERPL-MCNC: 6.3 GM/DL (ref 6–8.4)
PROT UR QL STRIP: NEGATIVE
RBC # BLD AUTO: 4.59 M/UL (ref 4–5.4)
RELATIVE EOSINOPHIL (SMH): 0.3 % (ref 0–8)
RELATIVE LYMPHOCYTE (SMH): 9.2 % (ref 18–48)
RELATIVE MONOCYTE (SMH): 4.3 % (ref 4–15)
RELATIVE NEUTROPHIL (SMH): 85.8 % (ref 38–73)
SODIUM SERPL-SCNC: 141 MMOL/L (ref 136–145)
SP GR UR STRIP: 1.02
TROPONIN HIGH SENSITIVE (SMH): 3.3 PG/ML
UROBILINOGEN UR STRIP-ACNC: NEGATIVE EU/DL
WBC # BLD AUTO: 7.96 K/UL (ref 3.9–12.7)

## 2025-04-01 PROCEDURE — 80053 COMPREHEN METABOLIC PANEL: CPT | Performed by: STUDENT IN AN ORGANIZED HEALTH CARE EDUCATION/TRAINING PROGRAM

## 2025-04-01 PROCEDURE — 25500020 PHARM REV CODE 255: Performed by: EMERGENCY MEDICINE

## 2025-04-01 PROCEDURE — 93005 ELECTROCARDIOGRAM TRACING: CPT | Performed by: INTERNAL MEDICINE

## 2025-04-01 PROCEDURE — 81003 URINALYSIS AUTO W/O SCOPE: CPT | Performed by: STUDENT IN AN ORGANIZED HEALTH CARE EDUCATION/TRAINING PROGRAM

## 2025-04-01 PROCEDURE — 99285 EMERGENCY DEPT VISIT HI MDM: CPT | Mod: 25

## 2025-04-01 PROCEDURE — 93010 ELECTROCARDIOGRAM REPORT: CPT | Mod: ,,, | Performed by: INTERNAL MEDICINE

## 2025-04-01 PROCEDURE — 84484 ASSAY OF TROPONIN QUANT: CPT | Performed by: EMERGENCY MEDICINE

## 2025-04-01 PROCEDURE — 36415 COLL VENOUS BLD VENIPUNCTURE: CPT | Performed by: STUDENT IN AN ORGANIZED HEALTH CARE EDUCATION/TRAINING PROGRAM

## 2025-04-01 PROCEDURE — 25000003 PHARM REV CODE 250: Performed by: STUDENT IN AN ORGANIZED HEALTH CARE EDUCATION/TRAINING PROGRAM

## 2025-04-01 PROCEDURE — 96374 THER/PROPH/DIAG INJ IV PUSH: CPT

## 2025-04-01 PROCEDURE — 63600175 PHARM REV CODE 636 W HCPCS: Performed by: STUDENT IN AN ORGANIZED HEALTH CARE EDUCATION/TRAINING PROGRAM

## 2025-04-01 PROCEDURE — 83690 ASSAY OF LIPASE: CPT | Performed by: STUDENT IN AN ORGANIZED HEALTH CARE EDUCATION/TRAINING PROGRAM

## 2025-04-01 PROCEDURE — 85025 COMPLETE CBC W/AUTO DIFF WBC: CPT | Performed by: STUDENT IN AN ORGANIZED HEALTH CARE EDUCATION/TRAINING PROGRAM

## 2025-04-01 RX ORDER — DICYCLOMINE HYDROCHLORIDE 10 MG/1
20 CAPSULE ORAL
Status: COMPLETED | OUTPATIENT
Start: 2025-04-01 | End: 2025-04-01

## 2025-04-01 RX ORDER — DICYCLOMINE HYDROCHLORIDE 20 MG/1
20 TABLET ORAL 2 TIMES DAILY
Qty: 20 TABLET | Refills: 0 | Status: SHIPPED | OUTPATIENT
Start: 2025-04-01 | End: 2025-04-11

## 2025-04-01 RX ORDER — ONDANSETRON HYDROCHLORIDE 2 MG/ML
4 INJECTION, SOLUTION INTRAVENOUS
Status: COMPLETED | OUTPATIENT
Start: 2025-04-01 | End: 2025-04-01

## 2025-04-01 RX ADMIN — IOHEXOL 100 ML: 350 INJECTION, SOLUTION INTRAVENOUS at 06:04

## 2025-04-01 RX ADMIN — DICYCLOMINE HYDROCHLORIDE 20 MG: 10 CAPSULE ORAL at 05:04

## 2025-04-01 RX ADMIN — ONDANSETRON 4 MG: 2 INJECTION INTRAMUSCULAR; INTRAVENOUS at 05:04

## 2025-04-01 NOTE — TELEPHONE ENCOUNTER
----- Message from Chasity sent at 4/1/2025 10:48 AM CDT -----  Regarding: pt is here  Pt is here to speak with nurse needs to have recommendation for someone to tx gallstones  has her er notes  thanks kbb

## 2025-04-01 NOTE — TELEPHONE ENCOUNTER
Spoke to pt and she stated she went to the ER this morning, diagnosed with Gallstones. The ER recommended she see Dr. Lerma. She wants to make sure Dr. Dickson agrees or if he has someone else he prefer her see

## 2025-04-01 NOTE — ED PROVIDER NOTES
Encounter Date: 2025       History     Chief Complaint   Patient presents with    Abdominal Pain     Upper mid abd pain started around 10 PM     HPI    Batool Mensah is a 74 y.o. female with a past medical history of AFib currently on Eliquis, hyperlipidemia, hypertension, and permanent pacemaker that presents emergency department for intermittent epigastric abdominal pain that woke her from her sleep.  Describes it as cramping.  She was initially concerned that it may has been constipation, so she gave herself a suppository.  This was ineffective.  Patient states that the pain comes in waves and describes it as cramping.  Pain is well-controlled at this time.  She did have 2 episodes of vomiting which was nonbloody and nonbilious.  Denies fever, chest pain, shortness of breath, numbness, weakness, urinary symptoms, and diarrhea.    Review of patient's allergies indicates:   Allergen Reactions    Penicillins Hives     Past Medical History:   Diagnosis Date    A-fib     Anticoagulant long-term use     Arthritis     Encounter for blood transfusion     Hypertension     Mixed hyperlipidemia     Ruptured appendicitis 10/19/2020    Sleep apnea     cpap     Past Surgical History:   Procedure Laterality Date    A-V CARDIAC PACEMAKER INSERTION Left 2023    Procedure: INSERTION, CARDIAC PACEMAKER, DUAL CHAMBER;  Surgeon: Curt Ariza MD;  Location: [facility] EP LAB;  Service: Cardiology;  Laterality: Left;  SSS/Bradycardia, dPPM, SJM, MAC, GP, 3 PREP    APPENDECTOMY      BREAST BIOPSY Left     CATARACT EXTRACTION W/  INTRAOCULAR LENS IMPLANT Left 2023    Procedure: CEIOL OS;  Surgeon: Randolph Marie MD;  Location: [facility] OR;  Service: Ophthalmology;  Laterality: Left;    CATARACT EXTRACTION W/  INTRAOCULAR LENS IMPLANT Right 2023    Procedure: CEIOL OD;  Surgeon: Randolph Marie MD;  Location: [facility] OR;  Service: Ophthalmology;  Laterality: Right;     SECTION      X2    COLONOSCOPY        Pt has had persistent congestion for 2-3 months. Has had some uris but never completely clears. Does have post nasal drainage present. Will treat with antibiotic for 10 days. If ongoing will plan to check allergy panel and consider budesonide.    [facility]    EYE SURGERY      Gastric sleeve  2018    Dr Hernandez- hiatal hernia repair    HERNIA REPAIR      LAPAROSCOPIC APPENDECTOMY N/A 10/19/2020    Procedure: APPENDECTOMY, LAPAROSCOPIC;  Surgeon: Konrad Almonte III, MD;  Location: [facility];  Service: General;  Laterality: N/A;    TONSILLECTOMY       No family history on file.  Social History[1]  Review of Systems   Constitutional:  Negative for fever.   HENT:  Negative for sore throat.    Respiratory:  Negative for cough and shortness of breath.    Cardiovascular:  Negative for chest pain and leg swelling.   Gastrointestinal:  Positive for abdominal pain and constipation. Negative for diarrhea, nausea and vomiting.   Genitourinary:  Negative for dysuria, flank pain, frequency and hematuria.   Musculoskeletal:  Negative for back pain.   Skin:  Negative for rash.   Neurological:  Negative for dizziness, weakness, numbness and headaches.   Hematological:  Does not bruise/bleed easily.       Physical Exam     Initial Vitals [04/01/25 0351]   BP Pulse Resp Temp SpO2   (!) 159/84 61 18 98.1 °F (36.7 °C) 98 %      MAP       --         Physical Exam    Nursing note and vitals reviewed.  Constitutional: She appears well-developed and well-nourished.   HENT:   Head: Normocephalic and atraumatic.   Eyes: EOM are normal. Pupils are equal, round, and reactive to light.   Neck:   Normal range of motion.  Cardiovascular:  Normal rate, regular rhythm and normal heart sounds.           Pulmonary/Chest: Breath sounds normal. No respiratory distress. She has no wheezes. She has no rhonchi. She has no rales.   Abdominal: Abdomen is soft. She exhibits no distension. There is abdominal tenderness (epigastric). There is no rebound.   Musculoskeletal:         General: Normal range of motion.      Cervical back: Normal range of motion.     Neurological: She is alert and oriented to person, place, and time. She has normal strength. No cranial nerve deficit or sensory  deficit. GCS score is 15. GCS eye subscore is 4. GCS verbal subscore is 5. GCS motor subscore is 6.   Skin: Capillary refill takes less than 2 seconds. No rash noted.   Psychiatric: She has a normal mood and affect. Thought content normal.         ED Course   Procedures  Labs Reviewed   COMPREHENSIVE METABOLIC PANEL - Abnormal       Result Value    Sodium 141      Potassium 4.0      Chloride 106      CO2 28      Glucose 117 (*)     BUN 27 (*)     Creatinine 0.9      Calcium 9.0      Protein Total 6.3      Albumin 3.9      Bilirubin Total 0.4      ALP 11 (*)     AST 20      ALT 15      Anion Gap 7 (*)     eGFR >60     URINALYSIS, REFLEX TO URINE CULTURE - Abnormal    Color, UA Yellow      Appearance, UA Clear      Spec Grav UA 1.025      pH, UA 7.0      Protein, UA Negative      Glucose, UA Negative      Ketones, UA 1+ (*)     Blood, UA Negative      Bilirubin, UA Negative      Urobilinogen, UA Negative      Nitrites, UA Negative      Leukocyte Esterase, UA Negative     CBC WITH DIFFERENTIAL - Abnormal    WBC 7.96      RBC 4.59      Hgb 13.6      Hct 41.4      MCV 90      MCH 29.6      MCHC 32.9      RDW 13.9      Platelet Count 153      MPV 12.1      Nucleated RBC 0      Neut % 85.8 (*)     Lymph % 9.2 (*)     Mono % 4.3      Eos % 0.3      Basophil % 0.3      Imm Grans % 0.1      Neut # 6.8      Lymph # 0.73 (*)     Mono # 0.34      Eos # 0.02      Baso # 0.02      Imm Grans # 0.01     LIPASE - Normal    Lipase Level 21     TROPONIN I HIGH SENSITIVITY - Normal    Troponin High Sensitive 3.3     CBC W/ AUTO DIFFERENTIAL    Narrative:     The following orders were created for panel order CBC W/ AUTO DIFFERENTIAL.  Procedure                               Abnormality         Status                     ---------                               -----------         ------                     CBC with Differential[[id number]]       Abnormal            Final result                 Please view results for these tests on the  individual orders.     EKG Readings: (Independently Interpreted)   Rhythm: Paced Rhythm. Heart Rate: 60. Ectopy: No Ectopy. Conduction: Normal.   Atrial paced rhythm.  Otherwise no acute ST segment or T-wave changes.     ECG Results              EKG 12-lead (In process)        Collection Time Result Time QRS Duration OHS QTC Calculation    04/01/25 03:48:08 04/01/25 05:06:53 84 412                     In process by Interface, Lab In [facility] (04/01/25 05:06:58)                   Narrative:    Test Reason : R10.9,    Vent. Rate :  60 BPM     Atrial Rate :  60 BPM     P-R Int : 186 ms          QRS Dur :  84 ms      QT Int : 412 ms       P-R-T Axes :  38  63  59 degrees    QTcB Int : 412 ms    Atrial-paced rhythm  Abnormal ECG  No previous ECGs available    Referred By:            Confirmed By:                                   Imaging Results              CT Abdomen Pelvis With IV Contrast NO Oral Contrast (Final result)  Result time 04/01/25 07:16:39      Final result by Gorge Oliver MD (04/01/25 07:16:39)                   Impression:      1. Cholelithiasis, unchanged.  2. Minimal distension of several loops of small intestines in left abdomen with occasional small bowel feces sign can be seen with intestinal stasis.  Correlation for mild ileus or signs and symptoms of enteritis requested.  3. Very small volume ascites.      Electronically signed by: Gorge Oliver  Date:    04/01/2025  Time:    07:16               Narrative:    EXAMINATION:  CT ABDOMEN PELVIS WITH IV CONTRAST    CLINICAL HISTORY:  Epigastric pain;    TECHNIQUE:  CT abdomen and pelvis with 100 mL Omnipaque 350.    COMPARISON:  10/18/2020    FINDINGS:  CT ABDOMEN:    Pacing leads terminate in right cardiac chambers, partially visualized.    Peripherally hyperdense cholelithiasis lies in the gallbladder which is otherwise unremarkable.  Liver is normal with no intrahepatic bile duct dilation.  Pancreas, spleen, and adrenals are normal.  Kidneys show no  new abnormality.  Tiny left renal cortical hypodensity is too small to characterize and likely to reflect a cyst.  Left renal sinus cyst also suggested and unchanged.    Abdominal aorta is of normal caliber.    Postsurgical changes of sleeve gastrectomy are evident.  Few small diverticula arise from 3rd portion of duodenum.  Several loops of small intestines in the left abdomen are minimally distended reaching up to 2.8 cm in diameter, with occasional small bowel feces sign suggesting intestinal stasis.  Large and small intestines otherwise unremarkable.  Very small volume ascites is present.  No free intraperitoneal gas.  Tiny fat containing periumbilical hernia unchanged.    Convex left thoracolumbar spine curvature is minor.  Multilevel disc degeneration facet joint osteoarthrosis is present.    CT PELVIS:    Bladder is normal.  Uterus and adnexa are normal.  Very small volume free intraperitoneal fluid lies in the pelvis.  Mild to moderate bilateral hip osteoarthrosis.                                       Medications   ondansetron injection 4 mg (4 mg Intravenous Given 4/1/25 0515)   dicyclomine capsule 20 mg (20 mg Oral Given 4/1/25 0515)   iohexoL (OMNIPAQUE 350) injection 100 mL (100 mLs Intravenous Given 4/1/25 0646)     Medical Decision Making  Batool Mensah is a 74 y.o. female with a past medical history of AFib currently on Eliquis, hyperlipidemia, hypertension, and permanent pacemaker that presents emergency department for intermittent epigastric abdominal pain that woke her from her sleep.  Vitals were stable.  Nontoxic female.  Modest epigastric tenderness.  No peritoneal signs.  Differential includes but isn't limited to ACS, UTI, pancreatitis, biliary colic, pyelonephritis, viral illness, and kidney stone.  Initial EKG shows atrial paced rhythm without acute ST segment or T-wave changes.  CBC is unremarkable.  CMP without significant transaminitis or electrolyte abnormality.  Lipase is  negative.  UA without evidence of infection.  Given Bentyl and Zofran.  We will obtain CT abdomen given age.  Disposition pending CT scan.  Care handed off to oncoming physician.    Amount and/or Complexity of Data Reviewed  Labs: ordered.  Radiology: ordered.    Risk  Prescription drug management.               ED Course as of 04/01/25 0940   Tue Apr 01, 2025   0931 Patient seen evaluated emergency department.  Currently this time patient found with decreased overall abdominal pain.  On examination patient with no rebound, no guarding noted.  Positive bowel sounds.  Patient workup in emergency department was found to be afebrile, white count was 7000, no transaminitis noted with T bili of 0.4, ALT of 15 AST 20 alk phosphatase 11.  Urinalysis normal.  CT abdomen pelvis showed cholelithiasis with questionable clinical correlation for ileus.  Currently at this time patient passing gas.  No nausea vomiting noted.  Most likely could be correlated to biliary colic.  Patient states that she has been eating fatty foods.  At this time patient will be discharged with General surgery follow up.  Given detailed return precautions including fever, increased abdominal pain, persistent or worsening symptoms or additional concerns. [RM]      ED Course User Index  [RM] Robinson Parham MD                           Clinical Impression:  Final diagnoses:  [R10.13] Epigastric abdominal pain (Primary)  [R11.2] Nausea and vomiting, unspecified vomiting type  [K80.20] Calculus of gallbladder without cholecystitis without obstruction          ED Disposition Condition    Discharge Stable          ED Prescriptions       Medication Sig Dispense Start Date End Date Auth. Provider    dicyclomine (BENTYL) 20 mg tablet Take 1 tablet (20 mg total) by mouth 2 (two) times daily. for 10 days 20 tablet 4/1/2025 4/11/2025 Robinson Parham MD          Follow-up Information       Follow up With Specialties Details Why Contact Konstantin Lerma  Justice EAGLE Jr., MD General Surgery Schedule an appointment as soon as possible for a visit in 1 week For recheck/continuing care [address] [phone]  [phone]                   [1]   Social History  Tobacco Use    Smoking status: Never    Smokeless tobacco: Never   Substance Use Topics    Alcohol use: Yes     Comment: socially    Drug use: Never        Robinson Parham MD  04/01/25 0928

## 2025-04-02 ENCOUNTER — PATIENT OUTREACH (OUTPATIENT)
Facility: OTHER | Age: 75
End: 2025-04-02
Payer: MEDICARE

## 2025-04-02 ENCOUNTER — TELEPHONE (OUTPATIENT)
Dept: SURGERY | Facility: CLINIC | Age: 75
End: 2025-04-02
Payer: MEDICARE

## 2025-04-02 ENCOUNTER — OFFICE VISIT (OUTPATIENT)
Dept: OPHTHALMOLOGY | Facility: CLINIC | Age: 75
End: 2025-04-02
Payer: MEDICARE

## 2025-04-02 DIAGNOSIS — H43.813 VITREOUS DEGENERATION OF BOTH EYES: ICD-10-CM

## 2025-04-02 DIAGNOSIS — H35.3122 INTERMEDIATE STAGE NONEXUDATIVE AGE-RELATED MACULAR DEGENERATION OF LEFT EYE: ICD-10-CM

## 2025-04-02 DIAGNOSIS — Z96.1 PSEUDOPHAKIA OF BOTH EYES: ICD-10-CM

## 2025-04-02 DIAGNOSIS — H04.123 DRY EYE SYNDROME OF BOTH EYES: ICD-10-CM

## 2025-04-02 DIAGNOSIS — H35.033 HYPERTENSIVE RETINOPATHY OF BOTH EYES: ICD-10-CM

## 2025-04-02 DIAGNOSIS — H35.3211 EXUDATIVE AGE-RELATED MACULAR DEGENERATION OF RIGHT EYE WITH ACTIVE CHOROIDAL NEOVASCULARIZATION: Primary | ICD-10-CM

## 2025-04-02 PROCEDURE — 67028 INJECTION EYE DRUG: CPT | Mod: RT,S$GLB,, | Performed by: OPHTHALMOLOGY

## 2025-04-02 PROCEDURE — 1126F AMNT PAIN NOTED NONE PRSNT: CPT | Mod: CPTII,S$GLB,, | Performed by: OPHTHALMOLOGY

## 2025-04-02 PROCEDURE — 3061F NEG MICROALBUMINURIA REV: CPT | Mod: CPTII,S$GLB,, | Performed by: OPHTHALMOLOGY

## 2025-04-02 PROCEDURE — 92134 CPTRZ OPH DX IMG PST SGM RTA: CPT | Mod: S$GLB,,, | Performed by: OPHTHALMOLOGY

## 2025-04-02 PROCEDURE — 4010F ACE/ARB THERAPY RXD/TAKEN: CPT | Mod: CPTII,S$GLB,, | Performed by: OPHTHALMOLOGY

## 2025-04-02 PROCEDURE — 99999 PR PBB SHADOW E&M-EST. PATIENT-LVL III: CPT | Mod: PBBFAC,,, | Performed by: OPHTHALMOLOGY

## 2025-04-02 PROCEDURE — 1101F PT FALLS ASSESS-DOCD LE1/YR: CPT | Mod: CPTII,S$GLB,, | Performed by: OPHTHALMOLOGY

## 2025-04-02 PROCEDURE — 1159F MED LIST DOCD IN RCRD: CPT | Mod: CPTII,S$GLB,, | Performed by: OPHTHALMOLOGY

## 2025-04-02 PROCEDURE — 1160F RVW MEDS BY RX/DR IN RCRD: CPT | Mod: CPTII,S$GLB,, | Performed by: OPHTHALMOLOGY

## 2025-04-02 PROCEDURE — 3066F NEPHROPATHY DOC TX: CPT | Mod: CPTII,S$GLB,, | Performed by: OPHTHALMOLOGY

## 2025-04-02 PROCEDURE — 3288F FALL RISK ASSESSMENT DOCD: CPT | Mod: CPTII,S$GLB,, | Performed by: OPHTHALMOLOGY

## 2025-04-02 PROCEDURE — 99214 OFFICE O/P EST MOD 30 MIN: CPT | Mod: 25,S$GLB,, | Performed by: OPHTHALMOLOGY

## 2025-04-02 RX ADMIN — Medication 1.25 MG: at 09:04

## 2025-04-02 NOTE — PROGRESS NOTES
Patient was seen in the ED on 4/2/25. Phoned patient to assist with Post ED Discharge Navigation. Patient had just finished scheduling a ED follow-up appointment with Dr Lerma. Patient appreciative for follow-up. Patient declined additional assistance at this time.  Erik Murillo

## 2025-04-02 NOTE — PROGRESS NOTES
HPI    DLS: 1/29/2025 pt present 2mo DFE/OCT with poss RUPERTO    Pt states had some floaters after last injection but have improved. Also a   few mornings she has a silver light in OD only lasting a few min.    AT's PRN  Last edited by Annette Peters on 4/2/2025  8:42 AM.        A/P    ICD-10-CM ICD-9-CM   1. Exudative age-related macular degeneration of right eye with active choroidal neovascularization  H35.3211 362.52     362.16   2. 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 Pseudophakia of both eyes  Z96.1 V43.1   4. Vitreous degeneration of both eyes  H43.813 379.21   5. Hypertensive retinopathy of both eyes  H35.033 362.11   6. Dry eye syndrome of both eyes  H04.123 375.15         1. Exudative age-related macular degeneration of right eye with active choroidal neovascularization  Here for AMD f/u    S/p RUPERTO x5 1/29/25  S/p I'VE X 9 11/20/24    VA 20/30 (was 20/25), tr SRF with CNVM today after Avastin but at 8 weeks     Plan:  recommend Eylea given SRF still but NO GOOD DAYS FUNDING. Will give Avastin OD today , keep at 8 weeks    Based on todays exam, diagnostic studies, and review of records, the determination was made for treatment today.  Schedule Avastin Injection Right Eye today Patient chooses to proceed with injection R/B/A discussed include infection retinal detachment and stroke    Patient identified.  Timeout performed.    Risks, benefits, and alternatives to treatment were discussed in detail with the patient, including bleeding/infection (endophthalmitis)/etc.  The patient voiced understanding and wished to proceed with the procedure.  See separate consent form.    Injection Procedure Note:  Diagnosis: CNVM Right Eye    Topical Proparacaine drop placed then topical 5% Betadine, then subcojunctival lidocaine 2% injection  Sterile gloves used, and sterile lid speculum placed.  5% Betadine placed at injection site again prior to  injection.  Avastin  1.25mg in 0.05cc Injected inferotemporally 3.5-4mm posterior to the limbus.  Complications: None  Va at least CF at 5 feet post injection.  Retina, ONH, IOP normal after injection.    Followup as below.  Patient should return immediately PRN.  Retinal Detachment and Endophthalmitis precautions given     2. Intermediate 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  Stable dry AMD    Plan: Observation no CNVM, monitor given OD with Wet AMD conversion   Amsler precautions  Recommend Antioxidants, lutein, omega 3, brown sunglasses (blue blockers).     3. Pseudophakia of both eyes  Good lens position OU  Plan: Observation     4. Vitreous degeneration of both eyes  Had incr floaters OD past few weeks, no RT/RD noted today   Plan: Observation  Pathology of PVD, Retinal Tear, Retinal Detachment reviewed in great detail  RD precautions discussed in detail, patient expressed understanding  RTC immediately PRN (especially ANY change flashes, floaters, vision, visual field)     5. Hypertensive retinopathy of both eyes  PCP Sage Dickson MD - Recent notes reviewed  Mild HTN findings noted  Plan: Observation for now  Recommend good blood pressure control, tight blood glucose control, and good cholesterol control     6. Dry eye syndrome of both eyes  Mild dry eye  Rec ATs prn          RTC 8 weeks DFE/OCTm OU, possible Avastin OD       I saw and examined the patient and reviewed in detail the findings documented. The final examination findings, image interpretations, and plan as documented in the record represent my personal judgment and conclusions.    Christiano Page MD  Vitreoretinal Surgery   [facility]

## 2025-04-09 ENCOUNTER — OFFICE VISIT (OUTPATIENT)
Dept: SURGERY | Facility: CLINIC | Age: 75
End: 2025-04-09
Payer: MEDICARE

## 2025-04-09 VITALS — TEMPERATURE: 97 F | HEART RATE: 62 BPM | SYSTOLIC BLOOD PRESSURE: 154 MMHG | DIASTOLIC BLOOD PRESSURE: 84 MMHG

## 2025-04-09 DIAGNOSIS — K80.20 SYMPTOMATIC CHOLELITHIASIS: Primary | ICD-10-CM

## 2025-04-09 PROCEDURE — 3079F DIAST BP 80-89 MM HG: CPT | Mod: CPTII,S$GLB,, | Performed by: SURGERY

## 2025-04-09 PROCEDURE — 1159F MED LIST DOCD IN RCRD: CPT | Mod: CPTII,S$GLB,, | Performed by: SURGERY

## 2025-04-09 PROCEDURE — 3061F NEG MICROALBUMINURIA REV: CPT | Mod: CPTII,S$GLB,, | Performed by: SURGERY

## 2025-04-09 PROCEDURE — 3077F SYST BP >= 140 MM HG: CPT | Mod: CPTII,S$GLB,, | Performed by: SURGERY

## 2025-04-09 PROCEDURE — 3066F NEPHROPATHY DOC TX: CPT | Mod: CPTII,S$GLB,, | Performed by: SURGERY

## 2025-04-09 PROCEDURE — 99204 OFFICE O/P NEW MOD 45 MIN: CPT | Mod: S$GLB,,, | Performed by: SURGERY

## 2025-04-09 PROCEDURE — 4010F ACE/ARB THERAPY RXD/TAKEN: CPT | Mod: CPTII,S$GLB,, | Performed by: SURGERY

## 2025-04-09 PROCEDURE — 99999 PR PBB SHADOW E&M-EST. PATIENT-LVL III: CPT | Mod: PBBFAC,,, | Performed by: SURGERY

## 2025-04-09 PROCEDURE — 1126F AMNT PAIN NOTED NONE PRSNT: CPT | Mod: CPTII,S$GLB,, | Performed by: SURGERY

## 2025-04-09 RX ORDER — METRONIDAZOLE 500 MG/100ML
500 INJECTION, SOLUTION INTRAVENOUS
OUTPATIENT
Start: 2025-04-09

## 2025-04-09 RX ORDER — CIPROFLOXACIN 2 MG/ML
400 INJECTION, SOLUTION INTRAVENOUS
OUTPATIENT
Start: 2025-04-09

## 2025-04-09 NOTE — H&P (VIEW-ONLY)
GENERAL SURGERY  OUTPATIENT H&P    REASON FOR VISIT/CC: Symptomatic cholelithiasis    HPI: Batool Mensah is a 75 y.o. female here for evaluation of suspected symptomatic cholelithiasis.  Patient developed cramping mid abdominal pain that persisted and worsening eventually leading her to the emergency room.  There she underwent labs including CBC, LFTs, lipase and troponin all which were normal.  EKG showed paced rhythm without other significant abnormality. UA was negative.  Underwent CT imaging which showed gallstones but no other significant abnormal findings.  Patient was referred to General surgery for further evaluation. Patient does have a history of gastric sleeve and a few other intra-abdominal surgery such as appendectomy and  sections. She has not take NSAIDs on a regular basis.  Is on Eliquis for history of AFib but again is currently paced. No further episodes of similar symptoms.  Does occasionally have early satiety.    I have reviewed the patient's chart including prior progress notes, procedures and testing.     ROS:   Review of Systems    PROBLEM LIST:  Problem List[1]      HISTORY  Past Medical History:   Diagnosis Date    A-fib     Anticoagulant long-term use     Arthritis     Encounter for blood transfusion     Hypertension     Mixed hyperlipidemia     Ruptured appendicitis 10/19/2020    Sleep apnea     cpap       Past Surgical History:   Procedure Laterality Date    A-V CARDIAC PACEMAKER INSERTION Left 2023    Procedure: INSERTION, CARDIAC PACEMAKER, DUAL CHAMBER;  Surgeon: Curt Ariza MD;  Location: [facility] EP LAB;  Service: Cardiology;  Laterality: Left;  SSS/Bradycardia, dPPM, SJM, MAC, GP, 3 PREP    APPENDECTOMY      BREAST BIOPSY Left     CATARACT EXTRACTION W/  INTRAOCULAR LENS IMPLANT Left 2023    Procedure: CEIOL OS;  Surgeon: Randolph Marie MD;  Location: [facility] OR;  Service: Ophthalmology;  Laterality: Left;    CATARACT EXTRACTION W/  INTRAOCULAR LENS IMPLANT  Right 2023    Procedure: CEIOL OD;  Surgeon: Randolph Marie MD;  Location: [facility] OR;  Service: Ophthalmology;  Laterality: Right;     SECTION      X2    COLONOSCOPY      Dr. Parker -[facility] 10 years    EYE SURGERY      Gastric sleeve  2018    Dr Hernandez- hiatal hernia repair    HERNIA REPAIR      LAPAROSCOPIC APPENDECTOMY N/A 10/19/2020    Procedure: APPENDECTOMY, LAPAROSCOPIC;  Surgeon: Konrad Almonte III, MD;  Location: [facility] OR;  Service: General;  Laterality: N/A;    TONSILLECTOMY         Social History[2]    No family history on file.      MEDS:  Medications Ordered Prior to Encounter[3]    ALLERGIES:  Review of patient's allergies indicates:   Allergen Reactions    Penicillins Hives         VITALS:  Vitals:    25 1318   BP: (!) 154/84   Pulse: 62   Temp: 97.4 °F (36.3 °C)         PHYSICAL EXAM:  Physical Exam  Vitals reviewed.   Constitutional:       General: She is not in acute distress.     Appearance: Normal appearance. She is well-developed.   HENT:      Head: Normocephalic and atraumatic.      Nose: Nose normal.   Eyes:      General: No scleral icterus.     Conjunctiva/sclera: Conjunctivae normal.   Neck:      Trachea: No tracheal tenderness or tracheal deviation.   Cardiovascular:      Rate and Rhythm: Normal rate and regular rhythm.      Pulses: Normal pulses.   Pulmonary:      Effort: Pulmonary effort is normal. No accessory muscle usage or respiratory distress.      Breath sounds: Normal breath sounds.   Abdominal:      General: There is no distension.      Palpations: Abdomen is soft.      Tenderness: There is no abdominal tenderness.   Musculoskeletal:         General: No swelling or tenderness. Normal range of motion.      Cervical back: Normal range of motion and neck supple. No rigidity.   Skin:     General: Skin is warm and dry.      Coloration: Skin is not jaundiced.      Findings: No erythema.   Neurological:      General: No focal deficit present.      Mental Status: She  is alert and oriented to person, place, and time.      Motor: No weakness or abnormal muscle tone.   Psychiatric:         Mood and Affect: Mood normal.         Behavior: Behavior normal.         Thought Content: Thought content normal.         Judgment: Judgment normal.           LABS:  Lab Results   Component Value Date    WBC 7.96 04/01/2025    RBC 4.59 04/01/2025    HGB 13.6 04/01/2025    HCT 41.4 04/01/2025     04/01/2025     Lab Results   Component Value Date    GLU 90 01/29/2025     04/01/2025    K 4.0 04/01/2025     01/29/2025    CO2 28 04/01/2025    BUN 27 (H) 04/01/2025    CREATININE 0.9 04/01/2025    CALCIUM 9.0 04/01/2025     Lab Results   Component Value Date    ALT 15 04/01/2025    AST 20 04/01/2025    ALKPHOS 11 (L) 04/01/2025    BILITOT 0.4 04/01/2025     Lab Results   Component Value Date    MG 2.2 10/21/2020    PHOS 2.4 (L) 10/21/2020       STUDIES:  Images and reports were personally reviewed.  FINDINGS:  CT ABDOMEN:     Pacing leads terminate in right cardiac chambers, partially visualized.     Peripherally hyperdense cholelithiasis lies in the gallbladder which is otherwise unremarkable.  Liver is normal with no intrahepatic bile duct dilation.  Pancreas, spleen, and adrenals are normal.  Kidneys show no new abnormality.  Tiny left renal cortical hypodensity is too small to characterize and likely to reflect a cyst.  Left renal sinus cyst also suggested and unchanged.     Abdominal aorta is of normal caliber.     Postsurgical changes of sleeve gastrectomy are evident.  Few small diverticula arise from 3rd portion of duodenum.  Several loops of small intestines in the left abdomen are minimally distended reaching up to 2.8 cm in diameter, with occasional small bowel feces sign suggesting intestinal stasis.  Large and small intestines otherwise unremarkable.  Very small volume ascites is present.  No free intraperitoneal gas.  Tiny fat containing periumbilical hernia unchanged.      Convex left thoracolumbar spine curvature is minor.  Multilevel disc degeneration facet joint osteoarthrosis is present.     CT PELVIS:     Bladder is normal.  Uterus and adnexa are normal.  Very small volume free intraperitoneal fluid lies in the pelvis.  Mild to moderate bilateral hip osteoarthrosis.     Impression:     1. Cholelithiasis, unchanged.  2. Minimal distension of several loops of small intestines in left abdomen with occasional small bowel feces sign can be seen with intestinal stasis.  Correlation for mild ileus or signs and symptoms of enteritis requested.  3. Very small volume ascites.      ASSESSMENT & PLAN:  75 y.o. female with epigastric abdominal pain, gallstones  -though this has the patient's 1st episode it was severe, suspect this has related to symptomatic cholelithiasis as other etiologies such as pancreatitis, peptic ulcer, cardiac event, etc. or less likely given normal labs and workup   -I discussed options including continued observation, EGD, surgical intervention with the patient, due to severity of symptoms inferior of this recurring she is interested in surgical intervention   -the patient has good candidate for laparoscopic cholecystectomy  - risks and benefits of cholecystectomy were reviewed.  Specifically we discussed the risk of pain, bleeding, scarring, infection, bile leak, injury to common bile duct, injury to surrounding organs, retained stone, pancreatitis, bile leak, failure to relieve symptoms, etc.  we also discussed the need for potential conversion to open procedure or need to perform intraoperative cholangiogram.  The patient expressed understanding of these risks and agreed proceed with surgical intervention.  -will schedule for May 8, 2025 per patient request  -last dose of Eliquis can be May 5th in the morning, plans to restart it 24 hours after surgery  -labs and EKG up-to-date               [1]   Patient Active Problem List  Diagnosis    Essential hypertension     OA (osteoarthritis)    HUI (obstructive sleep apnea)    Primary localized osteoarthritis of knee    History of bariatric surgery    Menopause    S/P laparoscopic appendectomy    Neutropenia    Polycythemia    Mixed hyperlipidemia    Paroxysmal atrial fibrillation    Sinus bradycardia    Family history of premature coronary artery disease    Premature atrial contractions    10 year risk of MI or stroke 7.5% or greater    Intermediate 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    Pseudophakia of both eyes    Vitreous degeneration of both eyes    Exudative age-related macular degeneration of right eye with active choroidal neovascularization    Pacemaker    Hypertensive retinopathy of both eyes    Dry eye syndrome of both eyes   [2]   Social History  Tobacco Use    Smoking status: Never    Smokeless tobacco: Never   Substance Use Topics    Alcohol use: Yes     Comment: socially    Drug use: Never   [3]   Current Outpatient Medications on File Prior to Visit   Medication Sig Dispense Refill    ELIQUIS 5 mg Tab TAKE 1 TABLET BY MOUTH TWICE A  tablet 3    alendronate (FOSAMAX) 70 MG tablet Take 1 tablet (70 mg total) by mouth every 7 days. Take on empty stomach with full glass of water-do not eat or lie down for 1 hour For osteoporosis 12 tablet 3    atorvastatin (LIPITOR) 20 MG tablet TAKE 1 TABLET BY MOUTH EVERY DAY IN THE EVENING 90 tablet 3    calcium carbonate (OS-KRYS) 600 mg calcium (1,500 mg) Tab Take 600 mg by mouth 2 (two) times daily with meals.      calcium-vitamin D3 (OS-KRYS 500 + D3) 500 mg-5 mcg (200 unit) per tablet Take 1 tablet by mouth 2 (two) times daily with meals.      dicyclomine (BENTYL) 20 mg tablet Take 1 tablet (20 mg total) by mouth 2 (two) times daily. for 10 days 20 tablet 0    flecainide (TAMBOCOR) 100 MG Tab Take 1 tablet (100 mg total) by mouth every 12 (twelve) hours. 180 tablet 3    losartan (COZAAR) 50 MG tablet Take 1 tablet (50 mg total) by mouth once  daily. 90 tablet 3    metoprolol succinate (TOPROL-XL) 25 MG 24 hr tablet TAKE 1/2 TABLET BY MOUTH ONCE DAILY 45 tablet 7    vitamin D (VITAMIN D3) 1000 units Tab Take 1,000 Units by mouth 2 (two) times a day.      [DISCONTINUED] albuterol (VENTOLIN HFA) 90 mcg/actuation inhaler Inhale 2 puffs into the lungs every 6 (six) hours as needed for Wheezing. Rescue 18 g 1     Current Facility-Administered Medications on File Prior to Visit   Medication Dose Route Frequency Provider Last Rate Last Admin    lactated ringers infusion   Intravenous Continuous Ammon Mcmanus MD   Stopped at 03/29/23 0918    LIDOcaine (PF) 10 mg/ml (1%) injection 10 mg  1 mL Intradermal Once Ammon Mcmanus MD        NON FORMULARY MEDICATION 0.5 mg  0.5 mg Intravitreal Q30 Days Baron Heard, PharmD

## 2025-04-09 NOTE — H&P
GENERAL SURGERY  OUTPATIENT H&P    REASON FOR VISIT/CC: Symptomatic cholelithiasis    HPI: Batool Mensah is a 75 y.o. female here for evaluation of suspected symptomatic cholelithiasis.  Patient developed cramping mid abdominal pain that persisted and worsening eventually leading her to the emergency room.  There she underwent labs including CBC, LFTs, lipase and troponin all which were normal.  EKG showed paced rhythm without other significant abnormality. UA was negative.  Underwent CT imaging which showed gallstones but no other significant abnormal findings.  Patient was referred to General surgery for further evaluation. Patient does have a history of gastric sleeve and a few other intra-abdominal surgery such as appendectomy and  sections. She has not take NSAIDs on a regular basis.  Is on Eliquis for history of AFib but again is currently paced. No further episodes of similar symptoms.  Does occasionally have early satiety.    I have reviewed the patient's chart including prior progress notes, procedures and testing.     ROS:   Review of Systems    PROBLEM LIST:  Problem List[1]      HISTORY  Past Medical History:   Diagnosis Date    A-fib     Anticoagulant long-term use     Arthritis     Encounter for blood transfusion     Hypertension     Mixed hyperlipidemia     Ruptured appendicitis 10/19/2020    Sleep apnea     cpap       Past Surgical History:   Procedure Laterality Date    A-V CARDIAC PACEMAKER INSERTION Left 2023    Procedure: INSERTION, CARDIAC PACEMAKER, DUAL CHAMBER;  Surgeon: Curt Ariza MD;  Location: [facility] EP LAB;  Service: Cardiology;  Laterality: Left;  SSS/Bradycardia, dPPM, SJM, MAC, GP, 3 PREP    APPENDECTOMY      BREAST BIOPSY Left     CATARACT EXTRACTION W/  INTRAOCULAR LENS IMPLANT Left 2023    Procedure: CEIOL OS;  Surgeon: Randolph Marie MD;  Location: [facility] OR;  Service: Ophthalmology;  Laterality: Left;    CATARACT EXTRACTION W/  INTRAOCULAR LENS IMPLANT  Right 2023    Procedure: CEIOL OD;  Surgeon: Randolph Marie MD;  Location: [facility] OR;  Service: Ophthalmology;  Laterality: Right;     SECTION      X2    COLONOSCOPY      Dr. Parker -[facility] 10 years    EYE SURGERY      Gastric sleeve  2018    Dr Hernandez- hiatal hernia repair    HERNIA REPAIR      LAPAROSCOPIC APPENDECTOMY N/A 10/19/2020    Procedure: APPENDECTOMY, LAPAROSCOPIC;  Surgeon: Konrad Almonte III, MD;  Location: [facility] OR;  Service: General;  Laterality: N/A;    TONSILLECTOMY         Social History[2]    No family history on file.      MEDS:  Medications Ordered Prior to Encounter[3]    ALLERGIES:  Review of patient's allergies indicates:   Allergen Reactions    Penicillins Hives         VITALS:  Vitals:    25 1318   BP: (!) 154/84   Pulse: 62   Temp: 97.4 °F (36.3 °C)         PHYSICAL EXAM:  Physical Exam  Vitals reviewed.   Constitutional:       General: She is not in acute distress.     Appearance: Normal appearance. She is well-developed.   HENT:      Head: Normocephalic and atraumatic.      Nose: Nose normal.   Eyes:      General: No scleral icterus.     Conjunctiva/sclera: Conjunctivae normal.   Neck:      Trachea: No tracheal tenderness or tracheal deviation.   Cardiovascular:      Rate and Rhythm: Normal rate and regular rhythm.      Pulses: Normal pulses.   Pulmonary:      Effort: Pulmonary effort is normal. No accessory muscle usage or respiratory distress.      Breath sounds: Normal breath sounds.   Abdominal:      General: There is no distension.      Palpations: Abdomen is soft.      Tenderness: There is no abdominal tenderness.   Musculoskeletal:         General: No swelling or tenderness. Normal range of motion.      Cervical back: Normal range of motion and neck supple. No rigidity.   Skin:     General: Skin is warm and dry.      Coloration: Skin is not jaundiced.      Findings: No erythema.   Neurological:      General: No focal deficit present.      Mental Status: She  is alert and oriented to person, place, and time.      Motor: No weakness or abnormal muscle tone.   Psychiatric:         Mood and Affect: Mood normal.         Behavior: Behavior normal.         Thought Content: Thought content normal.         Judgment: Judgment normal.           LABS:  Lab Results   Component Value Date    WBC 7.96 04/01/2025    RBC 4.59 04/01/2025    HGB 13.6 04/01/2025    HCT 41.4 04/01/2025     04/01/2025     Lab Results   Component Value Date    GLU 90 01/29/2025     04/01/2025    K 4.0 04/01/2025     01/29/2025    CO2 28 04/01/2025    BUN 27 (H) 04/01/2025    CREATININE 0.9 04/01/2025    CALCIUM 9.0 04/01/2025     Lab Results   Component Value Date    ALT 15 04/01/2025    AST 20 04/01/2025    ALKPHOS 11 (L) 04/01/2025    BILITOT 0.4 04/01/2025     Lab Results   Component Value Date    MG 2.2 10/21/2020    PHOS 2.4 (L) 10/21/2020       STUDIES:  Images and reports were personally reviewed.  FINDINGS:  CT ABDOMEN:     Pacing leads terminate in right cardiac chambers, partially visualized.     Peripherally hyperdense cholelithiasis lies in the gallbladder which is otherwise unremarkable.  Liver is normal with no intrahepatic bile duct dilation.  Pancreas, spleen, and adrenals are normal.  Kidneys show no new abnormality.  Tiny left renal cortical hypodensity is too small to characterize and likely to reflect a cyst.  Left renal sinus cyst also suggested and unchanged.     Abdominal aorta is of normal caliber.     Postsurgical changes of sleeve gastrectomy are evident.  Few small diverticula arise from 3rd portion of duodenum.  Several loops of small intestines in the left abdomen are minimally distended reaching up to 2.8 cm in diameter, with occasional small bowel feces sign suggesting intestinal stasis.  Large and small intestines otherwise unremarkable.  Very small volume ascites is present.  No free intraperitoneal gas.  Tiny fat containing periumbilical hernia unchanged.      Convex left thoracolumbar spine curvature is minor.  Multilevel disc degeneration facet joint osteoarthrosis is present.     CT PELVIS:     Bladder is normal.  Uterus and adnexa are normal.  Very small volume free intraperitoneal fluid lies in the pelvis.  Mild to moderate bilateral hip osteoarthrosis.     Impression:     1. Cholelithiasis, unchanged.  2. Minimal distension of several loops of small intestines in left abdomen with occasional small bowel feces sign can be seen with intestinal stasis.  Correlation for mild ileus or signs and symptoms of enteritis requested.  3. Very small volume ascites.      ASSESSMENT & PLAN:  75 y.o. female with epigastric abdominal pain, gallstones  -though this has the patient's 1st episode it was severe, suspect this has related to symptomatic cholelithiasis as other etiologies such as pancreatitis, peptic ulcer, cardiac event, etc. or less likely given normal labs and workup   -I discussed options including continued observation, EGD, surgical intervention with the patient, due to severity of symptoms inferior of this recurring she is interested in surgical intervention   -the patient has good candidate for laparoscopic cholecystectomy  - risks and benefits of cholecystectomy were reviewed.  Specifically we discussed the risk of pain, bleeding, scarring, infection, bile leak, injury to common bile duct, injury to surrounding organs, retained stone, pancreatitis, bile leak, failure to relieve symptoms, etc.  we also discussed the need for potential conversion to open procedure or need to perform intraoperative cholangiogram.  The patient expressed understanding of these risks and agreed proceed with surgical intervention.  -will schedule for May 8, 2025 per patient request  -last dose of Eliquis can be May 5th in the morning, plans to restart it 24 hours after surgery  -labs and EKG up-to-date               [1]   Patient Active Problem List  Diagnosis    Essential hypertension     OA (osteoarthritis)    HUI (obstructive sleep apnea)    Primary localized osteoarthritis of knee    History of bariatric surgery    Menopause    S/P laparoscopic appendectomy    Neutropenia    Polycythemia    Mixed hyperlipidemia    Paroxysmal atrial fibrillation    Sinus bradycardia    Family history of premature coronary artery disease    Premature atrial contractions    10 year risk of MI or stroke 7.5% or greater    Intermediate 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    Pseudophakia of both eyes    Vitreous degeneration of both eyes    Exudative age-related macular degeneration of right eye with active choroidal neovascularization    Pacemaker    Hypertensive retinopathy of both eyes    Dry eye syndrome of both eyes   [2]   Social History  Tobacco Use    Smoking status: Never    Smokeless tobacco: Never   Substance Use Topics    Alcohol use: Yes     Comment: socially    Drug use: Never   [3]   Current Outpatient Medications on File Prior to Visit   Medication Sig Dispense Refill    ELIQUIS 5 mg Tab TAKE 1 TABLET BY MOUTH TWICE A  tablet 3    alendronate (FOSAMAX) 70 MG tablet Take 1 tablet (70 mg total) by mouth every 7 days. Take on empty stomach with full glass of water-do not eat or lie down for 1 hour For osteoporosis 12 tablet 3    atorvastatin (LIPITOR) 20 MG tablet TAKE 1 TABLET BY MOUTH EVERY DAY IN THE EVENING 90 tablet 3    calcium carbonate (OS-KRYS) 600 mg calcium (1,500 mg) Tab Take 600 mg by mouth 2 (two) times daily with meals.      calcium-vitamin D3 (OS-KRYS 500 + D3) 500 mg-5 mcg (200 unit) per tablet Take 1 tablet by mouth 2 (two) times daily with meals.      dicyclomine (BENTYL) 20 mg tablet Take 1 tablet (20 mg total) by mouth 2 (two) times daily. for 10 days 20 tablet 0    flecainide (TAMBOCOR) 100 MG Tab Take 1 tablet (100 mg total) by mouth every 12 (twelve) hours. 180 tablet 3    losartan (COZAAR) 50 MG tablet Take 1 tablet (50 mg total) by mouth once  daily. 90 tablet 3    metoprolol succinate (TOPROL-XL) 25 MG 24 hr tablet TAKE 1/2 TABLET BY MOUTH ONCE DAILY 45 tablet 7    vitamin D (VITAMIN D3) 1000 units Tab Take 1,000 Units by mouth 2 (two) times a day.      [DISCONTINUED] albuterol (VENTOLIN HFA) 90 mcg/actuation inhaler Inhale 2 puffs into the lungs every 6 (six) hours as needed for Wheezing. Rescue 18 g 1     Current Facility-Administered Medications on File Prior to Visit   Medication Dose Route Frequency Provider Last Rate Last Admin    lactated ringers infusion   Intravenous Continuous Ammon Mcmanus MD   Stopped at 03/29/23 0918    LIDOcaine (PF) 10 mg/ml (1%) injection 10 mg  1 mL Intradermal Once Ammon Mcmanus MD        NON FORMULARY MEDICATION 0.5 mg  0.5 mg Intravitreal Q30 Days Baron Heard, PharmD

## 2025-05-02 ENCOUNTER — TELEPHONE (OUTPATIENT)
Dept: SURGERY | Facility: CLINIC | Age: 75
End: 2025-05-02
Payer: MEDICARE

## 2025-05-02 NOTE — TELEPHONE ENCOUNTER
Advised she does not have to come in for her pre-admit admit appointment, they will call her on the phone

## 2025-05-02 NOTE — TELEPHONE ENCOUNTER
----- Message from Paula sent at 5/2/2025  1:44 PM CDT -----  Name of Who is Calling:PT What is the request in detail:Pt would like a callback from the office in regards to upcoming SX questions.Please advise thank you Can the clinic reply by [facility]:NO What Number to Call Back [facility] not [facility] [phone]

## 2025-05-05 DIAGNOSIS — Z95.0 PACEMAKER: Primary | ICD-10-CM

## 2025-05-05 NOTE — PROGRESS NOTES
Subjective:     HPI    I had the pleasure of seeing Batolo Mensah in follow-up for her history of AF. Last seen in 5/2024. She is a 74F with HTN and HLD, who was well until 9/2022 when she was incidentally noted to be in AF at 119 bpm (ECG scanned in EMR). She was started on Toprol and eliquis at the time. At a follow-up visit she was found to be bradycardic, so her toprol dose was reduced to 12.5 mg once daily. She was then seen by cardiology, where her resting HR was 43 bpm. She was asymptomatic.     Echo in 11/2022 showed EF 65%. Exercise NST in 11/2022 showed no obvious perfusion defects. A 48 hour Holter done in 11/2022 showed sinus rhythm with rates of 32 bpm to 141 bpm (mean rate 58 bpm), nonsustained AT, no sustained arrhythmias.    A Zio monitor in 11/2022 showed sinus rhythm with an average HR of 68 bpm, and a 14% AF burden (average HR in  bpm).    St. Bao dual chamber pacemaker implanted in 5/2023 for SSS. Started on flecainide 50 mg bid post-procedure.     At 11/2023 visit, device check showed AF burden 2.3% (longest 1.6 hrs). No sx.  At 5/2024 visit AF burden 1.5-2%. Flecainide increased to 100 mg bid at that visit.    Today in the office Ms. Yang has no complaints. Asymptomatic with AF. No bleeding issues on eliquis.    I reviewed today's device check which shows stable device/lead function, RVp 3.8%, 1.6% since 2/2025--longest 1 hr on 5/1/2025, RV pacing <1%, battery 7.3 yrs.    Review of Systems   Constitutional: Negative for decreased appetite, malaise/fatigue, weight gain and weight loss.   HENT:  Negative for sore throat.    Eyes:  Negative for blurred vision.   Cardiovascular:  Negative for chest pain, dyspnea on exertion, irregular heartbeat, leg swelling, near-syncope, orthopnea, palpitations, paroxysmal nocturnal dyspnea and syncope.   Respiratory:  Negative for shortness of breath.    Skin:  Negative for rash.   Musculoskeletal:  Negative for arthritis.   Gastrointestinal:   Negative for abdominal pain.   Neurological:  Negative for focal weakness.   Psychiatric/Behavioral:  Negative for altered mental status.         Objective:    Physical Exam  Vitals and nursing note reviewed.   Constitutional:       General: She is not in acute distress.     Appearance: She is well-developed.   HENT:      Head: Normocephalic and atraumatic.   Eyes:      General: No scleral icterus.     Conjunctiva/sclera: Conjunctivae normal.      Pupils: Pupils are equal, round, and reactive to light.   Neck:      Thyroid: No thyromegaly.      Vascular: No JVD.   Cardiovascular:      Rate and Rhythm: Normal rate and regular rhythm.      Pulses: Intact distal pulses.      Heart sounds: Normal heart sounds. No murmur heard.     No friction rub. No gallop.   Pulmonary:      Effort: Pulmonary effort is normal. No respiratory distress.      Breath sounds: Normal breath sounds.   Abdominal:      General: Bowel sounds are normal. There is no distension.      Palpations: Abdomen is soft.   Musculoskeletal:      Cervical back: Normal range of motion and neck supple.   Skin:     General: Skin is warm and dry.   Neurological:      Mental Status: She is alert and oriented to person, place, and time.   Psychiatric:         Behavior: Behavior normal.           Assessment:       1. Paroxysmal atrial fibrillation    2. Essential hypertension    3. Mixed hyperlipidemia    4. Pacemaker    5. S/P laparoscopic appendectomy    6. HUI (obstructive sleep apnea)         Plan:       In summary, Batool Mensah is a 75F with asymptomatic AF. Her [id number] Score is 3 (HTN, female sex, age) which corresponds to a yearly risk of stroke without AC estimated at 3.2%. She should continue eliquis.    Ms. Yang had a 14% AF burden. Unable to start an antiarrhythmic given profound bradycardia. Given evidence of SSS, St Bao dcPPM implanted in 5/2023. At 11/2023 visit AF burden down to 2% on flecainide 50 mg bid. Now on 100 mg bid, with  AF burden currently 1.6%.      Plan is to hold the course, follow-up 1 year.    Thank you for allowing me to participate in the care of this patient. Please do not hesitate to call me with any questions or concerns.

## 2025-05-07 ENCOUNTER — OFFICE VISIT (OUTPATIENT)
Dept: CARDIOLOGY | Facility: CLINIC | Age: 75
End: 2025-05-07
Payer: MEDICARE

## 2025-05-07 ENCOUNTER — HOSPITAL ENCOUNTER (OUTPATIENT)
Dept: CARDIOLOGY | Facility: CLINIC | Age: 75
Discharge: HOME | End: 2025-05-07
Attending: INTERNAL MEDICINE
Payer: MEDICARE

## 2025-05-07 VITALS
RESPIRATION RATE: 16 BRPM | WEIGHT: 165 LBS | SYSTOLIC BLOOD PRESSURE: 120 MMHG | HEIGHT: 63 IN | OXYGEN SATURATION: 99 % | DIASTOLIC BLOOD PRESSURE: 70 MMHG | BODY MASS INDEX: 29.23 KG/M2 | HEART RATE: 60 BPM

## 2025-05-07 DIAGNOSIS — Z95.0 PACEMAKER: ICD-10-CM

## 2025-05-07 DIAGNOSIS — I10 ESSENTIAL HYPERTENSION: ICD-10-CM

## 2025-05-07 DIAGNOSIS — G47.33 OSA (OBSTRUCTIVE SLEEP APNEA): ICD-10-CM

## 2025-05-07 DIAGNOSIS — Z90.49 S/P LAPAROSCOPIC APPENDECTOMY: ICD-10-CM

## 2025-05-07 DIAGNOSIS — I48.0 PAROXYSMAL ATRIAL FIBRILLATION: Primary | ICD-10-CM

## 2025-05-07 DIAGNOSIS — E78.2 MIXED HYPERLIPIDEMIA: ICD-10-CM

## 2025-05-07 PROCEDURE — 1101F PT FALLS ASSESS-DOCD LE1/YR: CPT | Mod: CPTII,S$GLB,, | Performed by: INTERNAL MEDICINE

## 2025-05-07 PROCEDURE — 93288 INTERROG EVL PM/LDLS PM IP: CPT | Mod: S$GLB,,, | Performed by: INTERNAL MEDICINE

## 2025-05-07 PROCEDURE — 3078F DIAST BP <80 MM HG: CPT | Mod: CPTII,S$GLB,, | Performed by: INTERNAL MEDICINE

## 2025-05-07 PROCEDURE — 99214 OFFICE O/P EST MOD 30 MIN: CPT | Mod: S$GLB,,, | Performed by: INTERNAL MEDICINE

## 2025-05-07 PROCEDURE — 3066F NEPHROPATHY DOC TX: CPT | Mod: CPTII,S$GLB,, | Performed by: INTERNAL MEDICINE

## 2025-05-07 PROCEDURE — 3288F FALL RISK ASSESSMENT DOCD: CPT | Mod: CPTII,S$GLB,, | Performed by: INTERNAL MEDICINE

## 2025-05-07 PROCEDURE — 3074F SYST BP LT 130 MM HG: CPT | Mod: CPTII,S$GLB,, | Performed by: INTERNAL MEDICINE

## 2025-05-07 PROCEDURE — 3061F NEG MICROALBUMINURIA REV: CPT | Mod: CPTII,S$GLB,, | Performed by: INTERNAL MEDICINE

## 2025-05-07 PROCEDURE — 99999 PR PBB SHADOW E&M-EST. PATIENT-LVL III: CPT | Mod: PBBFAC,,, | Performed by: INTERNAL MEDICINE

## 2025-05-07 PROCEDURE — 4010F ACE/ARB THERAPY RXD/TAKEN: CPT | Mod: CPTII,S$GLB,, | Performed by: INTERNAL MEDICINE

## 2025-05-07 PROCEDURE — 1160F RVW MEDS BY RX/DR IN RCRD: CPT | Mod: CPTII,S$GLB,, | Performed by: INTERNAL MEDICINE

## 2025-05-07 PROCEDURE — 1159F MED LIST DOCD IN RCRD: CPT | Mod: CPTII,S$GLB,, | Performed by: INTERNAL MEDICINE

## 2025-05-09 ENCOUNTER — ANESTHESIA EVENT (OUTPATIENT)
Dept: SURGERY | Facility: HOSPITAL | Age: 75
End: 2025-05-09
Payer: MEDICARE

## 2025-05-09 ENCOUNTER — HOSPITAL ENCOUNTER (OUTPATIENT)
Facility: HOSPITAL | Age: 75
Discharge: HOME | End: 2025-05-09
Attending: SURGERY | Admitting: SURGERY
Payer: MEDICARE

## 2025-05-09 ENCOUNTER — ANESTHESIA (OUTPATIENT)
Dept: SURGERY | Facility: HOSPITAL | Age: 75
End: 2025-05-09
Payer: MEDICARE

## 2025-05-09 VITALS
HEIGHT: 63 IN | SYSTOLIC BLOOD PRESSURE: 154 MMHG | RESPIRATION RATE: 16 BRPM | DIASTOLIC BLOOD PRESSURE: 64 MMHG | OXYGEN SATURATION: 99 % | WEIGHT: 164.88 LBS | HEART RATE: 60 BPM | TEMPERATURE: 98 F | BODY MASS INDEX: 29.21 KG/M2

## 2025-05-09 DIAGNOSIS — K80.20 SYMPTOMATIC CHOLELITHIASIS: Primary | ICD-10-CM

## 2025-05-09 LAB
BATTERY VOLTAGE (V): 2.99 V
IMPEDANCE RA LEAD (NATIVE): 540 OHMS
IMPEDANCE RA LEAD: 330 OHMS
P/R-WAVE RA LEAD (NATIVE): 6.1 MV
P/R-WAVE RA LEAD: 3.1 MV
THRESHOLD MS RA LEAD (NATIVE): 0.4 MS
THRESHOLD MS RA LEAD: 0.4 MS
THRESHOLD V RA LEAD (NATIVE): 0.75 V
THRESHOLD V RA LEAD: 0.87 V

## 2025-05-09 PROCEDURE — 63600175 PHARM REV CODE 636 W HCPCS: Performed by: NURSE ANESTHETIST, CERTIFIED REGISTERED

## 2025-05-09 PROCEDURE — 25000003 PHARM REV CODE 250: Performed by: NURSE ANESTHETIST, CERTIFIED REGISTERED

## 2025-05-09 PROCEDURE — 71000039 HC RECOVERY, EACH ADD'L HOUR: Performed by: SURGERY

## 2025-05-09 PROCEDURE — 63600175 PHARM REV CODE 636 W HCPCS: Performed by: SURGERY

## 2025-05-09 PROCEDURE — 36000709 HC OR TIME LEV III EA ADD 15 MIN: Performed by: SURGERY

## 2025-05-09 PROCEDURE — 71000033 HC RECOVERY, INTIAL HOUR: Performed by: SURGERY

## 2025-05-09 PROCEDURE — 63600175 PHARM REV CODE 636 W HCPCS: Mod: JZ | Performed by: STUDENT IN AN ORGANIZED HEALTH CARE EDUCATION/TRAINING PROGRAM

## 2025-05-09 PROCEDURE — 37000009 HC ANESTHESIA EA ADD 15 MINS: Performed by: SURGERY

## 2025-05-09 PROCEDURE — 47562 LAPAROSCOPIC CHOLECYSTECTOMY: CPT | Mod: ,,, | Performed by: SURGERY

## 2025-05-09 PROCEDURE — 36000708 HC OR TIME LEV III 1ST 15 MIN: Performed by: SURGERY

## 2025-05-09 PROCEDURE — 63600175 PHARM REV CODE 636 W HCPCS: Mod: JZ,TB | Performed by: SURGERY

## 2025-05-09 PROCEDURE — 25000003 PHARM REV CODE 250: Performed by: SURGERY

## 2025-05-09 PROCEDURE — 25000003 PHARM REV CODE 250: Performed by: STUDENT IN AN ORGANIZED HEALTH CARE EDUCATION/TRAINING PROGRAM

## 2025-05-09 PROCEDURE — 71000015 HC POSTOP RECOV 1ST HR: Performed by: SURGERY

## 2025-05-09 PROCEDURE — 88304 TISSUE EXAM BY PATHOLOGIST: CPT | Mod: TC | Performed by: PATHOLOGY

## 2025-05-09 PROCEDURE — 37000008 HC ANESTHESIA 1ST 15 MINUTES: Performed by: SURGERY

## 2025-05-09 PROCEDURE — 27201423 OPTIME MED/SURG SUP & DEVICES STERILE SUPPLY: Performed by: SURGERY

## 2025-05-09 RX ORDER — LIDOCAINE HYDROCHLORIDE 20 MG/ML
JELLY TOPICAL
Status: DISCONTINUED | OUTPATIENT
Start: 2025-05-09 | End: 2025-05-09

## 2025-05-09 RX ORDER — SODIUM CHLORIDE, SODIUM LACTATE, POTASSIUM CHLORIDE, CALCIUM CHLORIDE 600; 310; 30; 20 MG/100ML; MG/100ML; MG/100ML; MG/100ML
INJECTION, SOLUTION INTRAVENOUS CONTINUOUS PRN
Status: DISCONTINUED | OUTPATIENT
Start: 2025-05-09 | End: 2025-05-09

## 2025-05-09 RX ORDER — BUPIVACAINE HYDROCHLORIDE AND EPINEPHRINE 2.5; 5 MG/ML; UG/ML
INJECTION, SOLUTION EPIDURAL; INFILTRATION; INTRACAUDAL; PERINEURAL
Status: DISCONTINUED | OUTPATIENT
Start: 2025-05-09 | End: 2025-05-09 | Stop reason: HOSPADM

## 2025-05-09 RX ORDER — HYDROMORPHONE HYDROCHLORIDE 1 MG/ML
0.2 INJECTION, SOLUTION INTRAMUSCULAR; INTRAVENOUS; SUBCUTANEOUS EVERY 5 MIN PRN
Status: DISCONTINUED | OUTPATIENT
Start: 2025-05-09 | End: 2025-05-09 | Stop reason: HOSPADM

## 2025-05-09 RX ORDER — GLUCAGON 1 MG
1 KIT INJECTION
Status: DISCONTINUED | OUTPATIENT
Start: 2025-05-09 | End: 2025-05-09 | Stop reason: HOSPADM

## 2025-05-09 RX ORDER — FENTANYL CITRATE 50 UG/ML
INJECTION, SOLUTION INTRAMUSCULAR; INTRAVENOUS
Status: DISCONTINUED | OUTPATIENT
Start: 2025-05-09 | End: 2025-05-09

## 2025-05-09 RX ORDER — DIPHENHYDRAMINE HYDROCHLORIDE 50 MG/ML
12.5 INJECTION, SOLUTION INTRAMUSCULAR; INTRAVENOUS
Status: DISCONTINUED | OUTPATIENT
Start: 2025-05-09 | End: 2025-05-09 | Stop reason: HOSPADM

## 2025-05-09 RX ORDER — DEXAMETHASONE SODIUM PHOSPHATE 4 MG/ML
INJECTION, SOLUTION INTRA-ARTICULAR; INTRALESIONAL; INTRAMUSCULAR; INTRAVENOUS; SOFT TISSUE
Status: DISCONTINUED | OUTPATIENT
Start: 2025-05-09 | End: 2025-05-09

## 2025-05-09 RX ORDER — ONDANSETRON HYDROCHLORIDE 2 MG/ML
4 INJECTION, SOLUTION INTRAVENOUS DAILY PRN
Status: DISCONTINUED | OUTPATIENT
Start: 2025-05-09 | End: 2025-05-09 | Stop reason: HOSPADM

## 2025-05-09 RX ORDER — PROPOFOL 10 MG/ML
VIAL (ML) INTRAVENOUS
Status: DISCONTINUED | OUTPATIENT
Start: 2025-05-09 | End: 2025-05-09

## 2025-05-09 RX ORDER — SUCCINYLCHOLINE CHLORIDE 20 MG/ML
INJECTION INTRAMUSCULAR; INTRAVENOUS
Status: DISCONTINUED | OUTPATIENT
Start: 2025-05-09 | End: 2025-05-09

## 2025-05-09 RX ORDER — CIPROFLOXACIN 2 MG/ML
400 INJECTION, SOLUTION INTRAVENOUS
Status: DISCONTINUED | OUTPATIENT
Start: 2025-05-09 | End: 2025-05-09 | Stop reason: HOSPADM

## 2025-05-09 RX ORDER — LIDOCAINE HYDROCHLORIDE 20 MG/ML
INJECTION INTRAVENOUS
Status: DISCONTINUED | OUTPATIENT
Start: 2025-05-09 | End: 2025-05-09

## 2025-05-09 RX ORDER — METRONIDAZOLE 500 MG/100ML
500 INJECTION, SOLUTION INTRAVENOUS
Status: COMPLETED | OUTPATIENT
Start: 2025-05-09 | End: 2025-05-09

## 2025-05-09 RX ORDER — BUPIVACAINE 13.3 MG/ML
INJECTION, SUSPENSION, LIPOSOMAL INFILTRATION
Status: DISCONTINUED | OUTPATIENT
Start: 2025-05-09 | End: 2025-05-09 | Stop reason: HOSPADM

## 2025-05-09 RX ORDER — ONDANSETRON HYDROCHLORIDE 2 MG/ML
INJECTION, SOLUTION INTRAVENOUS
Status: DISCONTINUED | OUTPATIENT
Start: 2025-05-09 | End: 2025-05-09

## 2025-05-09 RX ORDER — HYDROCODONE BITARTRATE AND ACETAMINOPHEN 5; 325 MG/1; MG/1
1 TABLET ORAL EVERY 6 HOURS PRN
Qty: 20 TABLET | Refills: 0 | Status: SHIPPED | OUTPATIENT
Start: 2025-05-09

## 2025-05-09 RX ORDER — ROCURONIUM BROMIDE 10 MG/ML
INJECTION, SOLUTION INTRAVENOUS
Status: DISCONTINUED | OUTPATIENT
Start: 2025-05-09 | End: 2025-05-09

## 2025-05-09 RX ORDER — ACETAMINOPHEN 10 MG/ML
INJECTION, SOLUTION INTRAVENOUS
Status: DISCONTINUED | OUTPATIENT
Start: 2025-05-09 | End: 2025-05-09

## 2025-05-09 RX ORDER — LEVOFLOXACIN 5 MG/ML
INJECTION, SOLUTION INTRAVENOUS
Status: DISCONTINUED | OUTPATIENT
Start: 2025-05-09 | End: 2025-05-09

## 2025-05-09 RX ORDER — FAMOTIDINE 10 MG/ML
INJECTION, SOLUTION INTRAVENOUS
Status: DISCONTINUED | OUTPATIENT
Start: 2025-05-09 | End: 2025-05-09

## 2025-05-09 RX ORDER — OXYCODONE HYDROCHLORIDE 5 MG/1
5 TABLET ORAL
Status: DISCONTINUED | OUTPATIENT
Start: 2025-05-09 | End: 2025-05-09 | Stop reason: HOSPADM

## 2025-05-09 RX ADMIN — ACETAMINOPHEN 1000 MG: 10 INJECTION, SOLUTION INTRAVENOUS at 12:05

## 2025-05-09 RX ADMIN — DEXAMETHASONE SODIUM PHOSPHATE 8 MG: 4 INJECTION, SOLUTION INTRAMUSCULAR; INTRAVENOUS at 12:05

## 2025-05-09 RX ADMIN — PROPOFOL 120 MG: 10 INJECTION, EMULSION INTRAVENOUS at 12:05

## 2025-05-09 RX ADMIN — ONDANSETRON 4 MG: 2 INJECTION INTRAMUSCULAR; INTRAVENOUS at 12:05

## 2025-05-09 RX ADMIN — SUGAMMADEX 200 MG: 100 INJECTION, SOLUTION INTRAVENOUS at 12:05

## 2025-05-09 RX ADMIN — ROCURONIUM BROMIDE 10 MG: 10 INJECTION, SOLUTION INTRAVENOUS at 12:05

## 2025-05-09 RX ADMIN — SODIUM CHLORIDE, SODIUM LACTATE, POTASSIUM CHLORIDE, AND CALCIUM CHLORIDE: .6; .31; .03; .02 INJECTION, SOLUTION INTRAVENOUS at 11:05

## 2025-05-09 RX ADMIN — FENTANYL CITRATE 50 MCG: 50 INJECTION, SOLUTION INTRAMUSCULAR; INTRAVENOUS at 11:05

## 2025-05-09 RX ADMIN — METRONIDAZOLE 500 MG: 500 INJECTION, SOLUTION INTRAVENOUS at 11:05

## 2025-05-09 RX ADMIN — OXYCODONE HYDROCHLORIDE 5 MG: 5 TABLET ORAL at 01:05

## 2025-05-09 RX ADMIN — HYDROMORPHONE HYDROCHLORIDE 0.2 MG: 1 INJECTION, SOLUTION INTRAMUSCULAR; INTRAVENOUS; SUBCUTANEOUS at 01:05

## 2025-05-09 RX ADMIN — LIDOCAINE HYDROCHLORIDE 75 MG: 20 INJECTION, SOLUTION INTRAVENOUS at 12:05

## 2025-05-09 RX ADMIN — LEVOFLOXACIN 500 MG: 500 INJECTION, SOLUTION INTRAVENOUS at 12:05

## 2025-05-09 RX ADMIN — Medication 140 MG: at 12:05

## 2025-05-09 RX ADMIN — FAMOTIDINE 20 MG: 10 INJECTION, SOLUTION INTRAVENOUS at 12:05

## 2025-05-09 RX ADMIN — ROCURONIUM BROMIDE 25 MG: 10 INJECTION, SOLUTION INTRAVENOUS at 12:05

## 2025-05-09 RX ADMIN — LIDOCAINE HYDROCHLORIDE 3 ML: 20 JELLY TOPICAL at 12:05

## 2025-05-09 NOTE — ANESTHESIA POSTPROCEDURE EVALUATION
Anesthesia Post Evaluation    Patient: Batool Mensah    Procedure(s) Performed: Procedure(s) (LRB):  CHOLECYSTECTOMY, LAPAROSCOPIC (N/A)    Final Anesthesia Type: general      Patient location during evaluation: PACU  Patient participation: Yes- Able to Participate  Level of consciousness: awake and alert  Post-procedure vital signs: reviewed and stable  Pain management: adequate  Airway patency: patent    PONV status at discharge: No PONV  Anesthetic complications: no      Cardiovascular status: hemodynamically stable  Respiratory status: unassisted, spontaneous ventilation and room air  Hydration status: euvolemic  Follow-up not needed.              Vitals Value Taken Time   /65 05/09/25 13:13   Temp 36.4 °C (97.6 °F) 05/09/25 12:53   Pulse 60 05/09/25 13:13   Resp 17 05/09/25 13:13   SpO2 100 % 05/09/25 13:13   Vitals shown include unfiled device data.      No case tracking events are documented in the log.      Pain/Fartun Score: Pain Rating Prior to Med Admin: 5 (5/9/2025  1:13 PM)  Fartun Score: 10 (5/9/2025  1:05 PM)

## 2025-05-09 NOTE — ANESTHESIA PROCEDURE NOTES
Intubation    Date/Time: 5/9/2025 12:05 PM    Performed by: Sage Dawson CRNA  Authorized by: Micha Story MD    Intubation:     Induction:  Intravenous    Intubated:  Postinduction    Mask Ventilation:  Easy mask    Attempts:  1    Attempted By:  CRNA    Method of Intubation:  Video laryngoscopy    Blade:  [place] 3    Laryngeal View Grade: Grade I - full view of cords      Difficult Airway Encountered?: No      Complications:  None    Airway Device:  Oral endotracheal tube    Airway Device Size:  7.5    Style/Cuff Inflation:  Cuffed (inflated to minimal occlusive pressure)    Tube secured:  21    Secured at:  The lips    Placement Verified By:  Capnometry    Complicating Factors:  None    Findings Post-Intubation:  BS equal bilateral and atraumatic/condition of teeth unchanged

## 2025-05-09 NOTE — DISCHARGE SUMMARY
[facility]  Discharge Note  Short Stay    Procedure(s) (LRB):  CHOLECYSTECTOMY, LAPAROSCOPIC (N/A)      OUTCOME: Patient tolerated treatment/procedure well without complication and is now ready for discharge.    DISPOSITION: Home or Self Care    FINAL DIAGNOSIS:  <principal problem not specified>    FOLLOWUP: In clinic    DISCHARGE INSTRUCTIONS:    Discharge Procedure Orders   Diet Adult Regular     Ice to affected area     Lifting restrictions   Order Comments: Please avoid lifting greater than 40 lb, straining, strenuous activity for four weeks.     Change dressing (specify)   Order Comments: Post-Operative Wound Care    A surgical glue has been placed over your incisions, please leave the glue in place and do not attempt to remove it.  It is ok to shower using mild soap and water over the incisions the day after your procedure. Pat dry your incisions. Do not soak in a bathtub or other body of water for 2 weeks or until cleared by your surgeon.     If you noticed redness, swelling, fever, increasing pain or significant drainage from your wound please call/message the office or the 24 hr nurse hotline after hours.     Notify your health care provider if you experience any of the following:  temperature >100.4     Notify your health care provider if you experience any of the following:  persistent nausea and vomiting or diarrhea     Notify your health care provider if you experience any of the following:  severe uncontrolled pain     Notify your health care provider if you experience any of the following:  redness, tenderness, or signs of infection (pain, swelling, redness, odor or green/yellow discharge around incision site)     Notify your health care provider if you experience any of the following:  worsening rash     Notify your health care provider if you experience any of the following:  increased confusion or weakness     Shower on day dressing removed (No bath)        TIME SPENT ON DISCHARGE:  10 minutes

## 2025-05-09 NOTE — PLAN OF CARE
Nursing Transfer Note      Reason patient is being transferred: PACU 07    Transfer To: [id number]    Transfer via stretcher    Transfer with None    Transported by Agustina. RN    Medicines sent: None    Any special needs or follow-up needed: Routine Care    Chart send with patient: Yes    Notified: joe, friend - via secure message    Patient reassessed at: 5/9/2025 4747 (date, time)     LOBO Rodrigues came to the bedside to assess patient.

## 2025-05-09 NOTE — ANESTHESIA PREPROCEDURE EVALUATION
05/09/2025  Batool Mensah is a 75 y.o., female.      Pre-op Assessment    I have reviewed the Patient Summary Reports.     I have reviewed the Nursing Notes. I have reviewed the NPO Status.   I have reviewed the Medications.     Review of Systems  Anesthesia Hx:  No problems with previous Anesthesia             Denies Family Hx of Anesthesia complications.    Denies Personal Hx of Anesthesia complications.                    Hematology/Oncology:  Hematology Normal   Oncology Normal                                   EENT/Dental:  EENT/Dental Normal           Cardiovascular:    Pacemaker Hypertension    Dysrhythmias atrial fibrillation      hyperlipidemia   ECG has been reviewed.                      Hypertension     Atrial Fibrillation     Pulmonary:        Sleep Apnea     Obstructive Sleep Apnea (HIU).           Renal/:  Renal/ Normal                 Hepatic/GI:  Hepatic/GI Normal                    Musculoskeletal:  Arthritis        Arthritis          Neurological:  Neurology Normal              Arthritis                           Endocrine:  Endocrine Normal            Psych:  Psychiatric Normal                    Physical Exam  General: Well nourished, Cooperative, Alert and Oriented    Airway:  Mallampati: II   Mouth Opening: Normal  TM Distance: Normal  Tongue: Normal  Neck ROM: Normal ROM    Dental:  Intact    Chest/Lungs:  Clear to auscultation    Heart:  Rate: Normal  Rhythm: Regular Rhythm  Sounds: Normal        Anesthesia Plan  Type of Anesthesia, risks & benefits discussed:    Anesthesia Type: Gen ETT  Intra-op Monitoring Plan: Standard ASA Monitors  Post Op Pain Control Plan: multimodal analgesia  Induction:  IV  Airway Plan: Video and Direct  Informed Consent: Informed consent signed with the Patient and all parties understand the risks and agree with anesthesia plan.  All questions  answered.   ASA Score: 3    Ready For Surgery From Anesthesia Perspective.     .

## 2025-05-09 NOTE — DISCHARGE INSTRUCTIONS
NO DRIVING, DRINKING ALCOHOL AND NO SIGNING LEGAL DOCUMENTS FOR 24 HOURS OR WHILE TAKING PAIN MEDICATIONS.  DO NOT SHOWER FOR 24 HOURS.  KEEP DRESSING DRY TO PREVENT INFECTION.  NO HEAVY LIFTING UNTIL YOU ARE RELEASED FROM YOUR DOCTOR.   NOTIFY YOUR DOCTOR FOR UNCONTROLLED PAIN, TEMPERATURE GREATER THAN 100.5 AND/OR UNCONTROLLED NAUSEA/VOMITING

## 2025-05-11 ENCOUNTER — CLINICAL SUPPORT (OUTPATIENT)
Dept: CARDIOLOGY | Facility: HOSPITAL | Age: 75
End: 2025-05-11
Attending: INTERNAL MEDICINE
Payer: MEDICARE

## 2025-05-11 ENCOUNTER — CLINICAL SUPPORT (OUTPATIENT)
Dept: CARDIOLOGY | Facility: HOSPITAL | Age: 75
End: 2025-05-11
Payer: MEDICARE

## 2025-05-11 DIAGNOSIS — Z95.0 PRESENCE OF CARDIAC PACEMAKER: ICD-10-CM

## 2025-05-11 DIAGNOSIS — I48.0 PAROXYSMAL ATRIAL FIBRILLATION: ICD-10-CM

## 2025-05-11 PROCEDURE — 93296 REM INTERROG EVL PM/IDS: CPT | Performed by: INTERNAL MEDICINE

## 2025-05-11 PROCEDURE — 93294 REM INTERROG EVL PM/LDLS PM: CPT | Mod: ,,, | Performed by: INTERNAL MEDICINE

## 2025-05-15 LAB
OHS CV AF BURDEN PERCENT: < 1
OHS CV DC REMOTE DEVICE TYPE: NORMAL
OHS CV RV PACING PERCENT: 3.9 %

## 2025-05-21 ENCOUNTER — OFFICE VISIT (OUTPATIENT)
Dept: SURGERY | Facility: CLINIC | Age: 75
End: 2025-05-21
Payer: MEDICARE

## 2025-05-21 VITALS — SYSTOLIC BLOOD PRESSURE: 130 MMHG | DIASTOLIC BLOOD PRESSURE: 65 MMHG | TEMPERATURE: 97 F | HEART RATE: 64 BPM

## 2025-05-21 DIAGNOSIS — Z90.49 S/P LAPAROSCOPIC CHOLECYSTECTOMY: Primary | ICD-10-CM

## 2025-05-21 PROCEDURE — 99024 POSTOP FOLLOW-UP VISIT: CPT | Mod: S$GLB,,, | Performed by: SURGERY

## 2025-05-21 PROCEDURE — 99999 PR PBB SHADOW E&M-EST. PATIENT-LVL III: CPT | Mod: PBBFAC,,, | Performed by: SURGERY

## 2025-05-21 PROCEDURE — 3066F NEPHROPATHY DOC TX: CPT | Mod: CPTII,S$GLB,, | Performed by: SURGERY

## 2025-05-21 PROCEDURE — 3061F NEG MICROALBUMINURIA REV: CPT | Mod: CPTII,S$GLB,, | Performed by: SURGERY

## 2025-05-21 PROCEDURE — 1159F MED LIST DOCD IN RCRD: CPT | Mod: CPTII,S$GLB,, | Performed by: SURGERY

## 2025-05-21 PROCEDURE — 3078F DIAST BP <80 MM HG: CPT | Mod: CPTII,S$GLB,, | Performed by: SURGERY

## 2025-05-21 PROCEDURE — 4010F ACE/ARB THERAPY RXD/TAKEN: CPT | Mod: CPTII,S$GLB,, | Performed by: SURGERY

## 2025-05-21 PROCEDURE — 1126F AMNT PAIN NOTED NONE PRSNT: CPT | Mod: CPTII,S$GLB,, | Performed by: SURGERY

## 2025-05-21 PROCEDURE — 3075F SYST BP GE 130 - 139MM HG: CPT | Mod: CPTII,S$GLB,, | Performed by: SURGERY

## 2025-05-26 NOTE — PROGRESS NOTES
GENERAL SURGERY  POST-OP PROGRESS NOTE    HPI: Batool Mensah is a 75 y.o. female status post laparoscopic cholecystectomy on 05/09/2025 for symptomatic cholelithiasis. Patient had moderate adhesions the transverse colon to the gallbladder but otherwise surgery was unremarkable. Here today for follow-up.  Overall doing well.  No nausea, vomiting, fevers, chills, yellowing of the skin or eyes.  Pain adequately controlled.    VITALS:  Vitals:    05/21/25 1325   BP: 130/65   Pulse: 64   Temp: 96.7 °F (35.9 °C)       PHYSICAL EXAM:  Abdominal port site incisions healing well, expected postoperative firmness under the incision sites    PATHOLOGY:  GALLBLADDER, CHOLECYSTECTOMY     - PYLORIC GLAND ADENOMAS, MARGINS NEGATIVE     - CHRONIC CHOLECYSTITIS     - CHOLELITHIASIS     ASSESSMENT & PLAN:  75 y.o. female s/p laparoscopic cholecystectomy  -symptoms improve  -pathology benign  -return to clinic as needed

## 2025-05-31 ENCOUNTER — RESULTS FOLLOW-UP (OUTPATIENT)
Dept: FAMILY MEDICINE | Facility: CLINIC | Age: 75
End: 2025-05-31

## 2025-05-31 NOTE — PROGRESS NOTES
Patients Cologuard test came back negative.  This shows no active colon cancer at this time.  May repeat the test in 3 years.

## 2025-06-11 ENCOUNTER — OFFICE VISIT (OUTPATIENT)
Dept: OPHTHALMOLOGY | Facility: CLINIC | Age: 75
End: 2025-06-11
Payer: MEDICARE

## 2025-06-11 DIAGNOSIS — H35.3122 INTERMEDIATE STAGE NONEXUDATIVE AGE-RELATED MACULAR DEGENERATION OF LEFT EYE: ICD-10-CM

## 2025-06-11 DIAGNOSIS — H04.123 DRY EYE SYNDROME OF BOTH EYES: ICD-10-CM

## 2025-06-11 DIAGNOSIS — H35.033 HYPERTENSIVE RETINOPATHY OF BOTH EYES: ICD-10-CM

## 2025-06-11 DIAGNOSIS — H35.3211 EXUDATIVE AGE-RELATED MACULAR DEGENERATION OF RIGHT EYE WITH ACTIVE CHOROIDAL NEOVASCULARIZATION: Primary | ICD-10-CM

## 2025-06-11 DIAGNOSIS — H43.813 VITREOUS DEGENERATION OF BOTH EYES: ICD-10-CM

## 2025-06-11 DIAGNOSIS — Z96.1 PSEUDOPHAKIA OF BOTH EYES: ICD-10-CM

## 2025-06-11 PROCEDURE — 99214 OFFICE O/P EST MOD 30 MIN: CPT | Mod: 25,S$GLB,, | Performed by: OPHTHALMOLOGY

## 2025-06-11 PROCEDURE — 1126F AMNT PAIN NOTED NONE PRSNT: CPT | Mod: CPTII,S$GLB,, | Performed by: OPHTHALMOLOGY

## 2025-06-11 PROCEDURE — 67028 INJECTION EYE DRUG: CPT | Mod: RT,S$GLB,, | Performed by: OPHTHALMOLOGY

## 2025-06-11 PROCEDURE — 3061F NEG MICROALBUMINURIA REV: CPT | Mod: CPTII,S$GLB,, | Performed by: OPHTHALMOLOGY

## 2025-06-11 PROCEDURE — 99999 PR PBB SHADOW E&M-EST. PATIENT-LVL III: CPT | Mod: PBBFAC,,, | Performed by: OPHTHALMOLOGY

## 2025-06-11 PROCEDURE — 1159F MED LIST DOCD IN RCRD: CPT | Mod: CPTII,S$GLB,, | Performed by: OPHTHALMOLOGY

## 2025-06-11 PROCEDURE — 92134 CPTRZ OPH DX IMG PST SGM RTA: CPT | Mod: S$GLB,,, | Performed by: OPHTHALMOLOGY

## 2025-06-11 PROCEDURE — 1160F RVW MEDS BY RX/DR IN RCRD: CPT | Mod: CPTII,S$GLB,, | Performed by: OPHTHALMOLOGY

## 2025-06-11 PROCEDURE — 4010F ACE/ARB THERAPY RXD/TAKEN: CPT | Mod: CPTII,S$GLB,, | Performed by: OPHTHALMOLOGY

## 2025-06-11 PROCEDURE — 3066F NEPHROPATHY DOC TX: CPT | Mod: CPTII,S$GLB,, | Performed by: OPHTHALMOLOGY

## 2025-06-11 PROCEDURE — 3288F FALL RISK ASSESSMENT DOCD: CPT | Mod: CPTII,S$GLB,, | Performed by: OPHTHALMOLOGY

## 2025-06-11 PROCEDURE — 1101F PT FALLS ASSESS-DOCD LE1/YR: CPT | Mod: CPTII,S$GLB,, | Performed by: OPHTHALMOLOGY

## 2025-06-11 RX ADMIN — Medication 1.25 MG: at 09:06

## 2025-06-11 NOTE — PROGRESS NOTES
HPI    DLS: 4/2/2025 pt present for 2mo DFE/OCT poss RUPERTO    Pt states not having any new complaints with vision.    Denies pain/FOL/floaters  AT's PRN  Last edited by Annette Peters on 6/11/2025  9:26 AM.         A/P    ICD-10-CM ICD-9-CM   1. Exudative age-related macular degeneration of right eye with active choroidal neovascularization  H35.3211 362.52     362.16   2. 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 Pseudophakia of both eyes  Z96.1 V43.1   4. Vitreous degeneration of both eyes  H43.813 379.21   5. Hypertensive retinopathy of both eyes  H35.033 362.11   6. Dry eye syndrome of both eyes  H04.123 375.15           1. Exudative age-related macular degeneration of right eye with active choroidal neovascularization  Here for AMD f/u    S/p RUPERTO x6 4/2/25  S/p I'VE X 9 11/20/24    VA 20/40 (was 20/30), slight incr SRF with CNVM today after Avastin 8 weeks     Plan:  recommend Eylea given SRF still but NO GOOD DAYS FUNDING. Will give Avastin OD today , keep at 8 weeks, can consider reducing interval if still worsening     Based on todays exam, diagnostic studies, and review of records, the determination was made for treatment today.  Schedule Avastin Injection Right Eye today Patient chooses to proceed with injection R/B/A discussed include infection retinal detachment and stroke    Patient identified.  Timeout performed.    Risks, benefits, and alternatives to treatment were discussed in detail with the patient, including bleeding/infection (endophthalmitis)/etc.  The patient voiced understanding and wished to proceed with the procedure.  See separate consent form.    Injection Procedure Note:  Diagnosis: CNVM Right Eye    Topical Proparacaine drop placed then topical 5% Betadine, then subcojunctival lidocaine 2% injection  Sterile gloves used, and sterile lid speculum placed.  5% Betadine placed at injection site again prior to injection.  Avastin  1.25mg  in 0.05cc Injected inferotemporally 3.5-4mm posterior to the limbus.  Complications: None  Va at least CF at 5 feet post injection.  Retina, ONH, IOP normal after injection.    Followup as below.  Patient should return immediately PRN.  Retinal Detachment and Endophthalmitis precautions given     2. Intermediate 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  Stable dry AMD  no fluid no heme   Plan: Observation no CNVM, monitor given OD with Wet AMD conversion   Amsler precautions  Recommend Antioxidants, lutein, omega 3, brown sunglasses (blue blockers).     3. Pseudophakia of both eyes  Good lens position OU  Plan: Observation     4. Vitreous degeneration of both eyes  No RT/RD noted today   Plan: Observation  Pathology of PVD, Retinal Tear, Retinal Detachment reviewed in great detail  RD precautions discussed in detail, patient expressed understanding  RTC immediately PRN (especially ANY change flashes, floaters, vision, visual field)     5. Hypertensive retinopathy of both eyes  PCP Sage Dickson MD - Recent notes reviewed  Mild HTN findings noted  Plan: Observation for now  Recommend good blood pressure control, tight blood glucose control, and good cholesterol control     6. Dry eye syndrome of both eyes  Mild dry eye  Rec ATs prn          RTC 8 weeks DFE/OCTm OU, possible Avastin OD       I saw and examined the patient and reviewed in detail the findings documented. The final examination findings, image interpretations, and plan as documented in the record represent my personal judgment and conclusions.    Christiano Page MD  Vitreoretinal Surgery   [facility]

## 2025-06-25 ENCOUNTER — OFFICE VISIT (OUTPATIENT)
Dept: OPTOMETRY | Facility: CLINIC | Age: 75
End: 2025-06-25
Payer: COMMERCIAL

## 2025-06-25 DIAGNOSIS — H35.3122 INTERMEDIATE STAGE NONEXUDATIVE AGE-RELATED MACULAR DEGENERATION OF LEFT EYE: ICD-10-CM

## 2025-06-25 DIAGNOSIS — H52.7 REFRACTIVE ERROR: ICD-10-CM

## 2025-06-25 DIAGNOSIS — H35.3211 EXUDATIVE AGE-RELATED MACULAR DEGENERATION OF RIGHT EYE WITH ACTIVE CHOROIDAL NEOVASCULARIZATION: ICD-10-CM

## 2025-06-25 DIAGNOSIS — Z01.00 EXAMINATION OF EYES AND VISION: Primary | ICD-10-CM

## 2025-06-25 DIAGNOSIS — Z96.1 PSEUDOPHAKIA: ICD-10-CM

## 2025-06-25 PROCEDURE — 92004 COMPRE OPH EXAM NEW PT 1/>: CPT | Mod: S$GLB,,, | Performed by: OPTOMETRIST

## 2025-06-25 PROCEDURE — 92015 DETERMINE REFRACTIVE STATE: CPT | Mod: S$GLB,,, | Performed by: OPTOMETRIST

## 2025-06-25 PROCEDURE — 99999 PR PBB SHADOW E&M-EST. PATIENT-LVL III: CPT | Mod: PBBFAC,,, | Performed by: OPTOMETRIST

## 2025-06-25 NOTE — PROGRESS NOTES
HPI    Pt here today for refraction only per Dr. Page.       States blurred vision at both near & distance.  Would like updated rx for SVLs distance glasses.     Will continue using OTC readers for near.    Pt actively getting injections - OD.    (+) dry eyes  (+) Systane OU bid-tid  Last edited by Yosef Humphreys, OD on 6/25/2025  3:11 PM.            Assessment /Plan     For exam results, see Encounter Report.    Examination of eyes and vision    Pseudophakia    Refractive error    Intermediate 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    Exudative age-related macular degeneration of right eye with active choroidal neovascularization      1. Examination of eyes and vision (Primary)    2. Pseudophakia  S/p cataract extraction  No PCO  Observe     3. Refractive error  Dispensed updated spectacle Rx. Discussed various spectacle lens options. Discussed adaptation period to new specs.   Demonstrated new spec Rx vs current specs in phoropter with patient satisfaction    4. Intermediate 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  5. Exudative age-related macular degeneration of right eye with active choroidal neovascularization  Continue f/u with Dr. Page as directed

## 2025-07-04 DIAGNOSIS — M81.0 AGE-RELATED OSTEOPOROSIS WITHOUT CURRENT PATHOLOGICAL FRACTURE: ICD-10-CM

## 2025-07-07 RX ORDER — ALENDRONATE SODIUM 70 MG/1
70 TABLET ORAL
Qty: 12 TABLET | Refills: 3 | Status: SHIPPED | OUTPATIENT
Start: 2025-07-07 | End: 2026-07-07

## 2025-07-17 NOTE — OP NOTE
Sent an e-fax to [facility] in [place] for recent diabetes eye exam.    DATE OF PROCEDURE: 05/09/2025    PREOPERATIVE DIAGNOSIS: Symptomatic cholelithiasis    POSTOPERATIVE DIAGNOSIS: Same    PROCEDURE: Laparoscopic cholecystectomy    SURGEON: Justice Lerma M.D    ASSISTANT: None    ANESTHESIA: General endotracheal    ESTIMATED BLOOD LOSS: Minimal    SPECIMEN: Gallbladder    CONDITION: Stable    COMPLICATIONS: None    FINDINGS:   1. Gallbladder distended and thin-walled, moderate adhesions of transverse colon to the gallbladder fundus    INDICATIONS: The patient is a 75 y.o. female who presented to clinic with a  history of right upper quadrant epigastric pain suggestive of biliary etiology. The patient was counseled on their surgical options and desired surgical intervention. The risks of the procedure were described to the patient including pain, infection, bleeding, scarring, wound complications, injury to local structures such as bile duct, liver or intestine, warranting more extensive surgery, retained common bile duct stone or need for further intervention. The patient demonstrated understanding of these risks and a consent form was obtained.    PROCEDURE IN DETAIL: PROCEDURE IN DETAIL: The patient was identified in the Preoperative Unit and taken back to the Operating Room and laid supine on the operating room table. IV antibiotics were administered and general anesthesia was induced without complication. The patient was then prepped and draped in the standard sterile fashion. A timeout was performed in accordance with hospital protocol.  A Veress needle was introduced into the abdominal cavity and insufflation was attached. We had appropriate initial pressures and pneumoperitoneum was achieved. Local anesthetic was injected then a stab incision was sharply made just above the umbilicus. A 5 mm Optiview trocar was introduced into the peritoneal cavity under direct visualization.  We examined the trocar and Veress needle insertion sites and no obvious injury was identified.  An 11 mm trocar was then placed in subxiphoid region under direct visualization after injecting local anesthetic.  Two additional 5 mm trocars were placed in the right mid and right lateral abdomen under direct visualization again after injecting local anesthetic. The gallbladder was identified and found to distended and thin-walled but without acute inflammation. The transverse colon was rather densely adherent to it and required sharp dissection to free it. The gallbladder fundus was grasped and retracted into the right upper quadrant and the infundibulum retracted laterally. The peritoneum over the infundibulum and cystic structures was then gently stripped until the cystic duct and artery were identified and the critical view of safety was achieved.The cystic duct and artery were doubly clipped proximally and once distally and then divided. The gallbladder was then dissected off the gallbladder fossa using electrocautery. Once dissection was completed, the gallbladder was placed into an EndoCatch bag and removed through the subxiphoid port. The right upper quadrant was then irrigated and then suctioned. The dissection field was inspected for hemostasis, bile leak and to confirmed clips were in place. Additional local anesthetic was injected around the port sites under direct visualization.  The subxiphoid fascial incision was closed with a 0 Vicryl suture. The abdomen was desufflated and ports removed. Additional local anesthetic was injected and all the skin incisions were closed using a 4-0 Monocryl subcuticular stitch. Dermabond was then applied. The patient was awakened from general anesthesia without complication and returned to the Postoperative Recovery Unit in stable condition. At the end of the case, sponge, instrument and needle counts were correct on 2 occasions. I was present and scrubbed throughout the entirety of the case.

## 2025-07-22 ENCOUNTER — TELEPHONE (OUTPATIENT)
Dept: FAMILY MEDICINE | Facility: CLINIC | Age: 75
End: 2025-07-22
Payer: MEDICARE

## 2025-07-22 DIAGNOSIS — Z79.899 ENCOUNTER FOR LONG-TERM (CURRENT) USE OF MEDICATIONS: Primary | ICD-10-CM

## 2025-07-22 DIAGNOSIS — E78.2 MIXED HYPERLIPIDEMIA: ICD-10-CM

## 2025-07-22 DIAGNOSIS — I10 ESSENTIAL HYPERTENSION: ICD-10-CM

## 2025-08-01 ENCOUNTER — APPOINTMENT (OUTPATIENT)
Dept: LAB | Facility: HOSPITAL | Age: 75
End: 2025-08-01
Attending: FAMILY MEDICINE
Payer: MEDICARE

## 2025-08-04 ENCOUNTER — OFFICE VISIT (OUTPATIENT)
Dept: FAMILY MEDICINE | Facility: CLINIC | Age: 75
End: 2025-08-04
Payer: MEDICARE

## 2025-08-04 VITALS
WEIGHT: 162.81 LBS | SYSTOLIC BLOOD PRESSURE: 126 MMHG | BODY MASS INDEX: 28.85 KG/M2 | DIASTOLIC BLOOD PRESSURE: 60 MMHG | HEART RATE: 60 BPM | HEIGHT: 63 IN | OXYGEN SATURATION: 93 %

## 2025-08-04 DIAGNOSIS — Z91.89 10 YEAR RISK OF MI OR STROKE 7.5% OR GREATER: ICD-10-CM

## 2025-08-04 DIAGNOSIS — Z12.31 OTHER SCREENING MAMMOGRAM: ICD-10-CM

## 2025-08-04 DIAGNOSIS — G47.33 OSA (OBSTRUCTIVE SLEEP APNEA): ICD-10-CM

## 2025-08-04 DIAGNOSIS — E78.2 MIXED HYPERLIPIDEMIA: Primary | ICD-10-CM

## 2025-08-04 DIAGNOSIS — Z98.84 HISTORY OF BARIATRIC SURGERY: ICD-10-CM

## 2025-08-04 DIAGNOSIS — Z78.0 MENOPAUSE: ICD-10-CM

## 2025-08-04 DIAGNOSIS — Z82.49 FAMILY HISTORY OF PREMATURE CORONARY ARTERY DISEASE: ICD-10-CM

## 2025-08-04 DIAGNOSIS — Z90.49 STATUS POST LAPAROSCOPIC CHOLECYSTECTOMY: ICD-10-CM

## 2025-08-04 DIAGNOSIS — Z95.0 PACEMAKER: ICD-10-CM

## 2025-08-04 DIAGNOSIS — I10 ESSENTIAL HYPERTENSION: ICD-10-CM

## 2025-08-04 DIAGNOSIS — M19.042 PRIMARY OSTEOARTHRITIS OF BOTH HANDS: ICD-10-CM

## 2025-08-04 DIAGNOSIS — M17.10 PRIMARY LOCALIZED OSTEOARTHRITIS OF KNEE: ICD-10-CM

## 2025-08-04 DIAGNOSIS — M19.041 PRIMARY OSTEOARTHRITIS OF BOTH HANDS: ICD-10-CM

## 2025-08-04 DIAGNOSIS — H35.033 HYPERTENSIVE RETINOPATHY OF BOTH EYES: ICD-10-CM

## 2025-08-04 DIAGNOSIS — M85.852 OSTEOPENIA OF NECKS OF BOTH FEMURS: ICD-10-CM

## 2025-08-04 DIAGNOSIS — M85.851 OSTEOPENIA OF NECKS OF BOTH FEMURS: ICD-10-CM

## 2025-08-04 DIAGNOSIS — I48.0 PAROXYSMAL ATRIAL FIBRILLATION: ICD-10-CM

## 2025-08-04 PROCEDURE — 3288F FALL RISK ASSESSMENT DOCD: CPT | Mod: CPTII,S$GLB,, | Performed by: FAMILY MEDICINE

## 2025-08-04 PROCEDURE — 1126F AMNT PAIN NOTED NONE PRSNT: CPT | Mod: CPTII,S$GLB,, | Performed by: FAMILY MEDICINE

## 2025-08-04 PROCEDURE — 3066F NEPHROPATHY DOC TX: CPT | Mod: CPTII,S$GLB,, | Performed by: FAMILY MEDICINE

## 2025-08-04 PROCEDURE — 3074F SYST BP LT 130 MM HG: CPT | Mod: CPTII,S$GLB,, | Performed by: FAMILY MEDICINE

## 2025-08-04 PROCEDURE — 4010F ACE/ARB THERAPY RXD/TAKEN: CPT | Mod: CPTII,S$GLB,, | Performed by: FAMILY MEDICINE

## 2025-08-04 PROCEDURE — 99214 OFFICE O/P EST MOD 30 MIN: CPT | Mod: S$GLB,,, | Performed by: FAMILY MEDICINE

## 2025-08-04 PROCEDURE — 1159F MED LIST DOCD IN RCRD: CPT | Mod: CPTII,S$GLB,, | Performed by: FAMILY MEDICINE

## 2025-08-04 PROCEDURE — 1101F PT FALLS ASSESS-DOCD LE1/YR: CPT | Mod: CPTII,S$GLB,, | Performed by: FAMILY MEDICINE

## 2025-08-04 PROCEDURE — 3061F NEG MICROALBUMINURIA REV: CPT | Mod: CPTII,S$GLB,, | Performed by: FAMILY MEDICINE

## 2025-08-04 PROCEDURE — 3078F DIAST BP <80 MM HG: CPT | Mod: CPTII,S$GLB,, | Performed by: FAMILY MEDICINE

## 2025-08-04 NOTE — PROGRESS NOTES
SUBJECTIVE:    Patient ID: Batool Mensah is a 75 y.o. female.    Chief Complaint: Follow-up (No bottles// Pt is here for a follow up// KMS)    Follow-up  Pertinent negatives include no arthralgias, chest pain, headaches, joint swelling, neck pain, vomiting or weakness.       History of Present Illness    CHIEF COMPLAINT:  Batool presents today for follow up.    CARDIAC HISTORY:  She has a [place] Pacemaker and asymptomatic atrial fibrillation. Recent cardiology follow-up with Dr. Armas was positive with recommendation to return in 2 years. Device check showed 67% paced rhythm with longest atrial fibrillation episode lasting one hour. She denies shortness of breath or chest pain. She maintains an active lifestyle including water aerobics and Silver Sne[employer] exercise program.    RECENT SURGICAL HISTORY:  She underwent outpatient gallbladder surgery by Dr. Lerma on April 1st following a significant gallbladder attack. Initially, symptoms caused concern for potential heart attack and progressively worsened over several hours. She temporarily managed symptoms with medication due to planned travel to [place] before proceeding with surgery.    PAST SURGICAL HISTORY:  She has history of gastric sleeve surgery with subsequent weight loss of over 100 lbs, which she has maintained.    BONE HEALTH:  Recent DEXA scan shows improvement in bone density compared to previous year, with slight positive trend in bone loss reduction.    CURRENT MEDICATIONS:  She continues:  - Eliquis for stroke prevention  - Flecainide  - Fosamax weekly (long-term use)  - Metoprolol 1/2 pill daily  - Calcium and Vitamin D supplements  She reports good medication tolerance without adverse effects.      ROS:  Constitutional: -appetite change, -chills, -fatigue, -fever, -unexpected weight change  HENT: -ear pain, -trouble swallowing  Eyes: -pain, -discharge, -visual disturbance  Respiratory: -apnea, -cough, -shortness of breath,  -wheezing  Cardiovascular: -chest pain, -leg swelling, +palpitations  Gastrointestinal: +abdominal pain, -blood in stool, -constipation, -diarrhea, -nausea, -vomiting, -reflux, +indigestion  Endocrine: -cold intolerance, -heat intolerance, -polydipsia  Genitourinary: -bladder incontinence, -dysuria, -erectile dysfunction, -frequency, -hematuria, -testicular pain, -urgency, -nocturia  Musculoskeletal: -gait problem, -joint swelling, -myalgia, +limb pain, +joint pain  Neurological: -dizziness, -seizures, -numbness  Psychiatric/Behavioral: -agitation, -hallucinations, -nervous, -anxiety symptoms         Telephone on 07/22/2025   Component Date Value Ref Range Status    Cholesterol Total 08/01/2025 165  120 - 199 mg/dL Final    Triglyceride 08/01/2025 57  30 - 150 mg/dL Final    HDL Cholesterol 08/01/2025 91 (H)  40 - 75 mg/dL Final    LDL Cholesterol 08/01/2025 62.6 (L)  63.0 - 159.0 mg/dL Final    HDL/Cholesterol Ratio 08/01/2025 55.2 (H)  20.0 - 50.0 % Final    Cholesterol/HDL Ratio 08/01/2025 1.8 (L)  2.0 - 5.0 Final    Non HDL Cholesterol 08/01/2025 74  mg/dL Final    Sodium 08/01/2025 144  136 - 145 mmol/L Final    Potassium 08/01/2025 4.1  3.5 - 5.1 mmol/L Final    Chloride 08/01/2025 107  95 - 110 mmol/L Final    CO2 08/01/2025 31 (H)  23 - 29 mmol/L Final    Glucose 08/01/2025 91  70 - 110 mg/dL Final    BUN 08/01/2025 23  8 - 23 mg/dL Final    Creatinine 08/01/2025 0.8  0.5 - 1.4 mg/dL Final    Calcium 08/01/2025 9.4  8.7 - 10.5 mg/dL Final    Protein Total 08/01/2025 6.9  6.0 - 8.4 gm/dL Final    Albumin 08/01/2025 4.4  3.5 - 5.2 g/dL Final    Bilirubin Total 08/01/2025 0.6  0.1 - 1.0 mg/dL Final    ALP 08/01/2025 13 (L)  55 - 135 unit/L Final    AST 08/01/2025 26  10 - 40 unit/L Final    ALT 08/01/2025 21  10 - 44 unit/L Final    Anion Gap 08/01/2025 6 (L)  8 - 16 mmol/L Final    eGFR 08/01/2025 >60  >60 mL/min/1.73/m2 Final   Clinical Support on 05/11/2025   Component Date Value Ref Range Status    Device  Type 05/11/2025 Pacemaker   Final    AF [place] % 05/11/2025 < 1   Final    RV Paccing % 05/11/2025 3.9  % Final   Hospital Outpatient Visit on 05/07/2025   Component Date Value Ref Range Status    Battery Voltage (V) 05/09/2025 2.99  V Final    P/R-wave RA Lead 05/09/2025 3.1  mV Final    Impedance RA Lead 05/09/2025 330  Ohms Final    P/R-wave RA Lead (native) 05/09/2025 6.1  mV Final    Impedance RA Lead (native) 05/09/2025 540  Ohms Final    Threshold V RA Lead 05/09/2025 0.87  V Final    Theshold ms RA Lead 05/09/2025 0.4  ms Final    Threshold V RA Lead (native) 05/09/2025 0.75  V Final    Threshold ms RA Lead (native) 05/09/2025 0.4  ms Final   Admission on 04/01/2025, Discharged on 04/01/2025   Component Date Value Ref Range Status    QRS Duration 04/01/2025 84  ms Final    OHS QTC Calculation 04/01/2025 412  ms Final    Sodium 04/01/2025 141  136 - 145 mmol/L Final    Potassium 04/01/2025 4.0  3.5 - 5.1 mmol/L Final    Chloride 04/01/2025 106  95 - 110 mmol/L Final    CO2 04/01/2025 28  23 - 29 mmol/L Final    Glucose 04/01/2025 117 (H)  70 - 110 mg/dL Final    BUN 04/01/2025 27 (H)  8 - 23 mg/dL Final    Creatinine 04/01/2025 0.9  0.5 - 1.4 mg/dL Final    Calcium 04/01/2025 9.0  8.7 - 10.5 mg/dL Final    Protein Total 04/01/2025 6.3  6.0 - 8.4 gm/dL Final    Albumin 04/01/2025 3.9  3.5 - 5.2 g/dL Final    Bilirubin Total 04/01/2025 0.4  0.1 - 1.0 mg/dL Final    ALP 04/01/2025 11 (L)  55 - 135 unit/L Final    AST 04/01/2025 20  10 - 40 unit/L Final    ALT 04/01/2025 15  10 - 44 unit/L Final    Anion Gap 04/01/2025 7 (L)  8 - 16 mmol/L Final    eGFR 04/01/2025 >60  >60 mL/min/1.73/m2 Final    Lipase Level 04/01/2025 21  4 - 60 U/L Final    Color, UA 04/01/2025 Yellow  Yellow, Straw, Mi Final    Appearance, UA 04/01/2025 Clear  Clear Final    Spec Grav UA 04/01/2025 1.025  1.005 - 1.030 Final    pH, UA 04/01/2025 7.0  5.0 - 8.0 Final    Protein, UA 04/01/2025 Negative  Negative Final    Glucose, UA  04/01/2025 Negative  Negative Final    Ketones, UA 04/01/2025 1+ (A)  Negative Final    Blood, UA 04/01/2025 Negative  Negative Final    Bilirubin, UA 04/01/2025 Negative  Negative Final    Urobilinogen, UA 04/01/2025 Negative  <2.0 EU/dL Final    Nitrites, UA 04/01/2025 Negative  Negative Final    Leukocyte Esterase, UA 04/01/2025 Negative  Negative Final    WBC 04/01/2025 7.96  3.90 - 12.70 K/uL Final    RBC 04/01/2025 4.59  4.00 - 5.40 M/uL Final    Hgb 04/01/2025 13.6  12.0 - 16.0 gm/dL Final    Hct 04/01/2025 41.4  37.0 - 48.5 % Final    MCV 04/01/2025 90  82 - 98 fL Final    MCH 04/01/2025 29.6  27.0 - 31.0 pg Final    MCHC 04/01/2025 32.9  32.0 - 36.0 g/dL Final    RDW 04/01/2025 13.9  11.5 - 14.5 % Final    Platelet Count 04/01/2025 153  150 - 450 K/uL Final    MPV 04/01/2025 12.1  9.2 - 12.9 fL Final    Nucleated RBC 04/01/2025 0  <=0 /100 WBC Final    Neut % 04/01/2025 85.8 (H)  38 - 73 % Final    Lymph % 04/01/2025 9.2 (L)  18 - 48 % Final    Mono % 04/01/2025 4.3  4 - 15 % Final    Eos % 04/01/2025 0.3  0 - 8 % Final    Basophil % 04/01/2025 0.3  <=1.9 % Final    Imm Grans % 04/01/2025 0.1  0.0 - 0.5 % Final    Neut # 04/01/2025 6.8  1.8 - 7.7 K/uL Final    Lymph # 04/01/2025 0.73 (L)  1 - 4.8 K/uL Final    Mono # 04/01/2025 0.34  0.3 - 1 K/uL Final    Eos # 04/01/2025 0.02  <=0.5 K/uL Final    Baso # 04/01/2025 0.02  <=0.2 K/uL Final    Imm Grans # 04/01/2025 0.01  0.00 - 0.04 K/uL Final    Troponin High Sensitive 04/01/2025 3.3  <=14.9 pg/mL Final   Clinical Support on 02/09/2025   Component Date Value Ref Range Status    ICD Shock 02/09/2025 No   Final    Device Type 02/09/2025 Pacemaker   Final    AF [place] % 02/09/2025 3   Final    RV Paccing % 02/09/2025 4.8  % Final   Office Visit on 02/07/2025   Component Date Value Ref Range Status    QRS Duration 02/07/2025 88  ms Final    OHS QTC Calculation 02/07/2025 436  ms Final       Past Medical History:   Diagnosis Date    A-fib     Anticoagulant  long-term use     Arthritis     Encounter for blood transfusion     Hypertension     Mixed hyperlipidemia     Ruptured appendicitis 10/19/2020    Sleep apnea     cpap     Social History[1]  Past Surgical History:   Procedure Laterality Date    A-V CARDIAC PACEMAKER INSERTION Left 2023    Procedure: INSERTION, CARDIAC PACEMAKER, DUAL CHAMBER;  Surgeon: Curt Ariza MD;  Location: [facility] EP LAB;  Service: Cardiology;  Laterality: Left;  SSS/Bradycardia, dPPM, SJM, MAC, GP, 3 PREP    APPENDECTOMY      BREAST BIOPSY Left     BREAST SURGERY      CATARACT EXTRACTION W/  INTRAOCULAR LENS IMPLANT Left 2023    Procedure: CEIOL OS;  Surgeon: Randolph Marie MD;  Location: [facility] OR;  Service: Ophthalmology;  Laterality: Left;    CATARACT EXTRACTION W/  INTRAOCULAR LENS IMPLANT Right 2023    Procedure: CEIOL OD;  Surgeon: Randolph Marie MD;  Location: [facility] OR;  Service: Ophthalmology;  Laterality: Right;     SECTION      X2    CHOLECYSTECTOMY      COLONOSCOPY      Dr. Parker -RTC 10 years    EYE SURGERY      Gastric sleeve      Dr Hernandez- hiatal hernia repair    HERNIA REPAIR      LAPAROSCOPIC APPENDECTOMY N/A 10/19/2020    Procedure: APPENDECTOMY, LAPAROSCOPIC;  Surgeon: Konrad Almonte III, MD;  Location: [facility] OR;  Service: General;  Laterality: N/A;    LAPAROSCOPIC CHOLECYSTECTOMY N/A 2025    Procedure: CHOLECYSTECTOMY, LAPAROSCOPIC;  Surgeon: Justice Lerma Jr., MD;  Location: [facility] OR;  Service: General;  Laterality: N/A;    TONSILLECTOMY      TUBAL LIGATION       Family History   Problem Relation Name Age of Onset    Cancer Mother Susanne     Hypertension Mother Susanne     Early death Father Jerel     Heart disease Father Jerel     Diabetes Maternal Grandmother Zakia Narvaez        The CVD Risk score (LUIS'Agostino et al., 2008) failed to calculate for the following reasons:    The 2008 CVD risk score is only valid for ages 30 to 74    All of your core healthy metrics  "are met.      Review of patient's allergies indicates:   Allergen Reactions    Penicillins Hives     Current Medications[2]    Review of Systems   Constitutional:  Negative for activity change and unexpected weight change.   HENT:  Negative for hearing loss, rhinorrhea and trouble swallowing.    Eyes:  Negative for discharge and visual disturbance.   Respiratory:  Negative for chest tightness and wheezing.    Cardiovascular:  Negative for chest pain and palpitations.   Gastrointestinal:  Negative for blood in stool, constipation, diarrhea and vomiting.   Endocrine: Negative for polydipsia and polyuria.   Genitourinary:  Negative for difficulty urinating, dysuria, hematuria and menstrual problem.   Musculoskeletal:  Negative for arthralgias, joint swelling and neck pain.   Neurological:  Negative for weakness and headaches.   Psychiatric/Behavioral:  Negative for confusion and dysphoric mood.            Objective:      Vitals:    08/04/25 1413   BP: 126/60   Pulse: 60   SpO2: (!) 93%   Weight: 73.8 kg (162 lb 12.8 oz)   Height: 5' 3" (1.6 m)     Physical Exam  Vitals and nursing note reviewed.   Constitutional:       General: She is not in acute distress.     Appearance: Normal appearance. She is well-developed. She is not toxic-appearing.   HENT:      Head: Normocephalic and atraumatic.      Right Ear: Tympanic membrane and external ear normal.      Left Ear: Tympanic membrane and external ear normal.      Nose: Nose normal.      Mouth/Throat:      Pharynx: Oropharynx is clear. No posterior oropharyngeal erythema.   Eyes:      Pupils: Pupils are equal, round, and reactive to light.   Neck:      Thyroid: No thyromegaly.      Vascular: No carotid bruit.   Cardiovascular:      Rate and Rhythm: Normal rate and regular rhythm.      Heart sounds: Normal heart sounds. No murmur heard.  Pulmonary:      Effort: Pulmonary effort is normal.      Breath sounds: Normal breath sounds. No wheezing or rales.   Abdominal:      " General: Bowel sounds are normal. There is no distension.      Palpations: Abdomen is soft.      Tenderness: There is no abdominal tenderness.   Musculoskeletal:         General: No tenderness or deformity. Normal range of motion.      Cervical back: Normal range of motion and neck supple.      Lumbar back: Normal. No spasms.      Comments: Bends 90 degrees at  waist, shoulders and knees have full range of motion, no pitting edema to lower extremities, knees are crepitant right greater than left.   Lymphadenopathy:      Cervical: No cervical adenopathy.   Skin:     General: Skin is warm and dry.      Findings: No rash.   Neurological:      General: No focal deficit present.      Mental Status: She is alert and oriented to person, place, and time. Mental status is at baseline.      Cranial Nerves: No cranial nerve deficit.      Coordination: Coordination normal.      Gait: Gait normal.   Psychiatric:         Mood and Affect: Mood normal.         Behavior: Behavior normal.         Thought Content: Thought content normal.         Judgment: Judgment normal.       Physical Exam                  Assessment:       1. Mixed hyperlipidemia    2. Pacemaker    3. Family history of premature coronary artery disease    4. Essential hypertension    5. 10 year risk of MI or stroke 7.5% or greater    6. Hypertensive retinopathy of both eyes    7. Paroxysmal atrial fibrillation    8. Menopause    9. History of bariatric surgery    10. Status post laparoscopic cholecystectomy    11. Primary osteoarthritis of both hands    12. Primary localized osteoarthritis of knee    13. HUI (obstructive sleep apnea)    14. Osteopenia of necks of both femurs         Plan:       Mixed hyperlipidemia  Cholesterol excellent at 165 TG 57 HDL 91 LDL 62  Pacemaker  Pacemaker working well, followed by Dr. Ariza  Family history of premature coronary artery disease    Essential hypertension  Blood pressure well controlled  10 year risk of MI or stroke 7.5%  or greater    Hypertensive retinopathy of both eyes    Paroxysmal atrial fibrillation  Sinus rhythm today with paced rhythm  Menopause    History of bariatric surgery  Has lost 100 lb with gastric sleeve surgery  Status post laparoscopic cholecystectomy  Status post laparoscopic cholecystectomy and doing well  Primary osteoarthritis of both hands    Primary localized osteoarthritis of knee  Suggested patient consider knee injections with  of Orthopedics  HUI (obstructive sleep apnea)  Compliant with CPAP at night  Osteopenia of necks of both femurs  Fosamax and calcium plus D vitamins    Assessment & Plan    I48.20 Chronic atrial fibrillation, unspecified  M81.0 Age-related osteoporosis without current pathological fracture  Z95.0 Presence of cardiac pacemaker  Z90.49 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  - Pacemaker data shows 67% of time in paced rhythm with longest AFib episode being 1 hour.  - Continuing Flecainide for arrhythmia management and metoprolol at half pill daily.  - Advised to stay on Eliquis due to asymptomatic AFib.  - Cardiologist recommended no follow-up needed for 2 years, indicating stable condition.    ## AGE-RELATED OSTEOPOROSIS:  - DEXA scan shows slight improvement in bone density compared to previous year.  - Continuing calcium and Vitamin D supplementation for bone health along with weekly Fosamax therapy, which patient has been taking for many years.  - Scheduled follow-up DEXA scan in 1 year to reassess bone density and consider potential Fosamax holiday.    ## CARDIAC PACEMAKER:  - Device is functioning effectively with 67% of time in paced rhythm and successfully controlling heart rhythm without AFib symptoms.  - Pacemaker check  confirmed proper function.  - Scheduled routine follow-up check in 1 year.    ## GALLBLADDER SURGERY STATUS:  - Batool underwent gallbladder surgery by Dr. Lemra in April without requiring overnight hospital stay or post-surgical hydrocodone.  - Reports no issues with digestion post-surgery.    ## BARIATRIC SURGERY STATUS:  - Batool lost over 100 lbs after gastric sleeve surgery and maintains weight loss with improved quality of life post-procedure.    ## LONG-TERM ANTICOAGULANT USE:  - Continuing Eliquis for AFib management.  - Batool remains asymptomatic, indicating effective anticoagulation therapy.    ## LONG-TERM BISPHOSPHONATE USE:  - Weekly Fosamax therapy showing effectiveness with improved bone density.  - Will continue current regimen until next DEXA scan for reassessment.         Follow up in about 6 months (around 2/4/2026), or PAF  pacemaker  OA knees.        This note was generated with the assistance of ambient listening technology. Verbal consent was obtained by the patient and accompanying visitor(s) for the recording of patient appointment to facilitate this note. I attest to having reviewed and edited the generated note for accuracy, though some syntax or spelling errors may persist. Please contact the author of this note for any clarification.      8/4/2025 Sage Dickson           [1]   Social History  Socioeconomic History    Marital status:    Tobacco Use    Smoking status: Never    Smokeless tobacco: Never   Substance and Sexual Activity    Alcohol use: Yes     Alcohol/week: 5.0 standard drinks of alcohol     Types: 5 Glasses of wine per week     Comment: socially    Drug use: Never    Sexual activity: Not Currently     Social Drivers of Health     Financial Resource Strain: Low Risk  (2/2/2025)    Overall Financial Resource Strain (CARDIA)     Difficulty of Paying Living Expenses: Not very hard   Food Insecurity: No Food Insecurity (2/2/2025)    Hunger Vital Sign     Worried About  Running Out of Food in the Last Year: Never true     Ran Out of Food in the Last Year: Never true   Transportation Needs: No Transportation Needs (11/27/2023)    PRAPARE - Transportation     Lack of Transportation (Medical): No     Lack of Transportation (Non-Medical): No   Physical Activity: Sufficiently Active (2/2/2025)    Exercise Vital Sign     Days of Exercise per Week: 5 days     Minutes of Exercise per Session: 60 min   Stress: No Stress Concern Present (2/2/2025)    [employer] - Occupational Stress Questionnaire     Feeling of Stress : Not at all   Housing Stability: Low Risk  (11/27/2023)    Housing Stability Vital Sign     Unable to Pay for Housing in the Last Year: No     Number of Places Lived in the Last Year: 1     Unstable Housing in the Last Year: No   [2]   Current Outpatient Medications:     alendronate (FOSAMAX) 70 MG tablet, Take 1 tablet (70 mg total) by mouth every 7 days. Take on empty stomach with full glass of water-do not eat or lie down for 1 hour For osteoporosis, Disp: 12 tablet, Rfl: 3    atorvastatin (LIPITOR) 20 MG tablet, TAKE 1 TABLET BY MOUTH EVERY DAY IN THE EVENING, Disp: 90 tablet, Rfl: 3    calcium-vitamin D3 (OS-KRYS 500 + D3) 500 mg-5 mcg (200 unit) per tablet, Take 1 tablet by mouth 2 (two) times daily with meals., Disp: , Rfl:     ELIQUIS 5 mg Tab, TAKE 1 TABLET BY MOUTH TWICE A DAY, Disp: 180 tablet, Rfl: 3    flecainide (TAMBOCOR) 100 MG Tab, Take 1 tablet (100 mg total) by mouth every 12 (twelve) hours., Disp: 180 tablet, Rfl: 3    HYDROcodone-acetaminophen (NORCO) 5-325 mg per tablet, Take 1 tablet by mouth every 6 (six) hours as needed for Pain., Disp: 20 tablet, Rfl: 0    losartan (COZAAR) 50 MG tablet, Take 1 tablet (50 mg total) by mouth once daily., Disp: 90 tablet, Rfl: 3    metoprolol succinate (TOPROL-XL) 25 MG 24 hr tablet, TAKE 1/2 TABLET BY MOUTH ONCE DAILY, Disp: 45 tablet, Rfl: 7    Current Facility-Administered Medications:      NON FORMULARY MEDICATION 0.5 mg, 0.5 mg, Intravitreal, Q30 Days, Baron Heard, PharmD    Facility-Administered Medications Ordered in Other Visits:     lactated ringers infusion, , Intravenous, Continuous, Ammon Mcmanus MD, Stopped at 03/29/23 0918    LIDOcaine (PF) 10 mg/ml (1%) injection 10 mg, 1 mL, Intradermal, Once, Ammon Mcmanus MD

## 2025-08-05 ENCOUNTER — PATIENT MESSAGE (OUTPATIENT)
Dept: OPHTHALMOLOGY | Facility: CLINIC | Age: 75
End: 2025-08-05
Payer: MEDICARE

## 2025-08-06 ENCOUNTER — OFFICE VISIT (OUTPATIENT)
Dept: OPHTHALMOLOGY | Facility: CLINIC | Age: 75
End: 2025-08-06
Payer: MEDICARE

## 2025-08-06 DIAGNOSIS — H04.123 DRY EYE SYNDROME OF BOTH EYES: ICD-10-CM

## 2025-08-06 DIAGNOSIS — H43.813 VITREOUS DEGENERATION OF BOTH EYES: ICD-10-CM

## 2025-08-06 DIAGNOSIS — Z96.1 PSEUDOPHAKIA OF BOTH EYES: ICD-10-CM

## 2025-08-06 DIAGNOSIS — H35.033 HYPERTENSIVE RETINOPATHY OF BOTH EYES: ICD-10-CM

## 2025-08-06 DIAGNOSIS — H35.3122 INTERMEDIATE STAGE NONEXUDATIVE AGE-RELATED MACULAR DEGENERATION OF LEFT EYE: ICD-10-CM

## 2025-08-06 DIAGNOSIS — H35.3211 EXUDATIVE AGE-RELATED MACULAR DEGENERATION OF RIGHT EYE WITH ACTIVE CHOROIDAL NEOVASCULARIZATION: Primary | ICD-10-CM

## 2025-08-06 PROCEDURE — 1159F MED LIST DOCD IN RCRD: CPT | Mod: CPTII,S$GLB,, | Performed by: OPHTHALMOLOGY

## 2025-08-06 PROCEDURE — 99214 OFFICE O/P EST MOD 30 MIN: CPT | Mod: 25,S$GLB,, | Performed by: OPHTHALMOLOGY

## 2025-08-06 PROCEDURE — 67028 INJECTION EYE DRUG: CPT | Mod: RT,S$GLB,, | Performed by: OPHTHALMOLOGY

## 2025-08-06 PROCEDURE — 99999 PR PBB SHADOW E&M-EST. PATIENT-LVL III: CPT | Mod: PBBFAC,,, | Performed by: OPHTHALMOLOGY

## 2025-08-06 PROCEDURE — 3066F NEPHROPATHY DOC TX: CPT | Mod: CPTII,S$GLB,, | Performed by: OPHTHALMOLOGY

## 2025-08-06 PROCEDURE — 1126F AMNT PAIN NOTED NONE PRSNT: CPT | Mod: CPTII,S$GLB,, | Performed by: OPHTHALMOLOGY

## 2025-08-06 PROCEDURE — 3061F NEG MICROALBUMINURIA REV: CPT | Mod: CPTII,S$GLB,, | Performed by: OPHTHALMOLOGY

## 2025-08-06 PROCEDURE — 1160F RVW MEDS BY RX/DR IN RCRD: CPT | Mod: CPTII,S$GLB,, | Performed by: OPHTHALMOLOGY

## 2025-08-06 PROCEDURE — 1101F PT FALLS ASSESS-DOCD LE1/YR: CPT | Mod: CPTII,S$GLB,, | Performed by: OPHTHALMOLOGY

## 2025-08-06 PROCEDURE — 4010F ACE/ARB THERAPY RXD/TAKEN: CPT | Mod: CPTII,S$GLB,, | Performed by: OPHTHALMOLOGY

## 2025-08-06 PROCEDURE — 3288F FALL RISK ASSESSMENT DOCD: CPT | Mod: CPTII,S$GLB,, | Performed by: OPHTHALMOLOGY

## 2025-08-06 PROCEDURE — 92134 CPTRZ OPH DX IMG PST SGM RTA: CPT | Mod: S$GLB,,, | Performed by: OPHTHALMOLOGY

## 2025-08-06 RX ORDER — EFINACONAZOLE 100 MG/ML
SOLUTION TOPICAL
COMMUNITY
Start: 2025-05-20

## 2025-08-06 RX ORDER — TRIAMCINOLONE ACETONIDE 1 MG/G
CREAM TOPICAL
COMMUNITY

## 2025-08-06 RX ORDER — CICLOPIROX 80 MG/ML
1 SOLUTION TOPICAL 2 TIMES DAILY
COMMUNITY
Start: 2025-06-21

## 2025-08-06 RX ADMIN — Medication 1.25 MG: at 02:08

## 2025-08-06 NOTE — PROGRESS NOTES
HPI    DLS: 6/11/2025 pt present for DFE/OCT poss RUPERTO OD    Pt states has noticed some wavy vision in OD, also some blurry vision at   times.    Denies pain/ FOL/ floaters.     Last edited by Christiano Page MD on 8/6/2025  2:49 PM.        A/P    ICD-10-CM ICD-9-CM   1. Exudative age-related macular degeneration of right eye with active choroidal neovascularization  H35.3211 362.52     362.16   2. 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 Pseudophakia of both eyes  Z96.1 V43.1   4. Vitreous degeneration of both eyes  H43.813 379.21   5. Hypertensive retinopathy of both eyes  H35.033 362.11   6. Dry eye syndrome of both eyes  H04.123 375.15         1. Exudative age-related macular degeneration of right eye with active choroidal neovascularization  Here for AMD f/u    S/p RUPERTO x7 6/11/25  S/p I'VE X 9 11/20/24    VA 20/60 (was 20/40), incr SRF with CNVM today after Avastin 8 weeks     Plan:  recommend Eylea given SRF still but NO GOOD DAYS FUNDING. Will give Avastin OD today , decr to 6 weeks as SRF worsened     Based on todays exam, diagnostic studies, and review of records, the determination was made for treatment today.  Schedule Avastin Injection Right Eye today Patient chooses to proceed with injection R/B/A discussed include infection retinal detachment and stroke    Patient identified.  Timeout performed.    Risks, benefits, and alternatives to treatment were discussed in detail with the patient, including bleeding/infection (endophthalmitis)/etc.  The patient voiced understanding and wished to proceed with the procedure.  See separate consent form.    Injection Procedure Note:  Diagnosis: CNVM Right Eye    Topical Proparacaine drop placed then topical 5% Betadine, then subcojunctival lidocaine 2% injection  Sterile gloves used, and sterile lid speculum placed.  5% Betadine placed at injection site again prior to injection.  Avastin  1.25mg in 0.05cc  Injected inferotemporally 3.5-4mm posterior to the limbus.  Complications: None  Va at least CF at 5 feet post injection.  Retina, ONH, IOP normal after injection.    Followup as below.  Patient should return immediately PRN.  Retinal Detachment and Endophthalmitis precautions given     2. Intermediate 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  Stable dry AMD   Plan: Observation no CNVM, monitor given OD with Wet AMD conversion   [place] precautions  Recommend Antioxidants, lutein, omega 3, brown sunglasses (blue blockers).     3. Pseudophakia of both eyes  Good lens position OU  Plan: Observation     4. Vitreous degeneration of both eyes  No RT/RD noted     Plan: Observation  Pathology of PVD, Retinal Tear, Retinal Detachment reviewed in great detail  RD precautions discussed in detail, patient expressed understanding  RTC immediately PRN (especially ANY change flashes, floaters, vision, visual field)     5. Hypertensive retinopathy of both eyes  PCP Sage Dickson MD - Recent notes reviewed  Mild HTN findings noted  Plan: Observation for now  Recommend good blood pressure control, tight blood glucose control, and good cholesterol control     6. Dry eye syndrome of both eyes  Mild dry eye  Rec ATs prn          RTC 6 weeks DFE/OCTm OU, possible Avastin OD       I saw and examined the patient and reviewed in detail the findings documented. The final examination findings, image interpretations, and plan as documented in the record represent my personal judgment and conclusions.    Christiano Page MD  Vitreoretinal Surgery   [facility]

## 2025-08-10 ENCOUNTER — CLINICAL SUPPORT (OUTPATIENT)
Dept: CARDIOLOGY | Facility: HOSPITAL | Age: 75
End: 2025-08-10
Attending: INTERNAL MEDICINE
Payer: MEDICARE

## 2025-08-10 ENCOUNTER — CLINICAL SUPPORT (OUTPATIENT)
Dept: CARDIOLOGY | Facility: HOSPITAL | Age: 75
End: 2025-08-10
Payer: MEDICARE

## 2025-08-10 DIAGNOSIS — Z95.0 PRESENCE OF CARDIAC PACEMAKER: ICD-10-CM

## 2025-08-10 DIAGNOSIS — I48.0 PAROXYSMAL ATRIAL FIBRILLATION: ICD-10-CM

## 2025-08-10 PROCEDURE — 93296 REM INTERROG EVL PM/IDS: CPT | Performed by: INTERNAL MEDICINE

## 2025-08-10 PROCEDURE — 93294 REM INTERROG EVL PM/LDLS PM: CPT | Mod: ,,, | Performed by: INTERNAL MEDICINE

## 2025-08-25 LAB
OHS CV AF BURDEN PERCENT: < 1
OHS CV DC REMOTE DEVICE TYPE: NORMAL
OHS CV ICD SHOCK: NO
OHS CV RV PACING PERCENT: 6.2 %

## (undated) DEVICE — SOLUTION IRRI H2O BOTTLE 1000ML

## (undated) DEVICE — SHIELD EYE PLASTIC 3100G

## (undated) DEVICE — SOL BETADINE 5%

## (undated) DEVICE — BAG TISS RETRV MONARCH 10MM

## (undated) DEVICE — IRRIGATOR SUCTION W/TIP

## (undated) DEVICE — MARKER SKIN W/ RULER 77734

## (undated) DEVICE — GLOVE SENSICARE PI SURG 7

## (undated) DEVICE — TROCAR BALL. BLNT HASSON C0R47

## (undated) DEVICE — UNDERGLOVE BIOGEL PI MICRO BLUE SZ 8

## (undated) DEVICE — GLOVE BIOGEL PI MICRO SZ 7

## (undated) DEVICE — PACK PACER PERMANENT OMC

## (undated) DEVICE — TUBING INSUFFLATION 10FT

## (undated) DEVICE — SYR LUER LOCK 1CC

## (undated) DEVICE — GLOVE BIOGEL PI  GOLD SZ 7

## (undated) DEVICE — CORD MPLR STR PLUG DISP 10FT

## (undated) DEVICE — COVER LIGHT HANDLE 80/CA

## (undated) DEVICE — SKIN MARKER STER DUAL TIP

## (undated) DEVICE — TIP I & A

## (undated) DEVICE — BLADE SURG BVL ANG COAX 2.4MM

## (undated) DEVICE — TRAY GENERAL SURGERY

## (undated) DEVICE — TIP PHACO 45 DEGREE KELMAN

## (undated) DEVICE — SAFESHEATH II 8FR 13CM

## (undated) DEVICE — PAD BOVIE ADULT

## (undated) DEVICE — GLOVE SURG ULTRA TOUCH 6

## (undated) DEVICE — SOL NACL IRR 1000ML BTL

## (undated) DEVICE — RETRIEVER ENDOPOUCH SPEC BAG

## (undated) DEVICE — DRAPE LAP CHOLE 89223

## (undated) DEVICE — GLOVE SURGEONS ULTRA TOUCH 6.5

## (undated) DEVICE — CYSTOTOME IRRIG 24G 13MM

## (undated) DEVICE — KNIFE ANGLE 1MM

## (undated) DEVICE — DRAPE INCISE IOBAN 2 23X17IN

## (undated) DEVICE — UNDERGLOVE BIOGEL PI MICRO BLUE SZ 7

## (undated) DEVICE — SUCTION/IRRIGATOR W/TIP

## (undated) DEVICE — TRAY GENERAL LAPAROSCOPY SMH

## (undated) DEVICE — SLEEVE ULTRA INFUSION

## (undated) DEVICE — SLING SWATHE UNIVERSAL FOAM

## (undated) DEVICE — APPLIER CLIP ENDO LIGAMAX 5MM

## (undated) DEVICE — SUT MCRYL PLUS 4-0 PS2 27IN

## (undated) DEVICE — LIGASURE MARYLAND LF1937 REPLACES LF1737

## (undated) DEVICE — NDL PNEUMOPERITONEUM 150MM

## (undated) DEVICE — NEEDLE INSUFFLATION 120MM 172015

## (undated) DEVICE — SYRINGE 10ML 302995

## (undated) DEVICE — SOL NACL IRR 3000ML

## (undated) DEVICE — GOWN SURGICAL BASIC LG

## (undated) DEVICE — ELECTRODE L HOOK 13IN 33M

## (undated) DEVICE — SUTURE ETHIBOND 0 MO-6 18 CX45D

## (undated) DEVICE — PACK CUSTOM EYE KIT

## (undated) DEVICE — COVER CAMERA OPERATING ROOM

## (undated) DEVICE — ADHESIVE DERMABOND ADVANCED

## (undated) DEVICE — DRAIN FLAT 3/4 100CC 0071370

## (undated) DEVICE — GLOVE BIOGEL MICRO SURGEON PINK SZ 7

## (undated) DEVICE — SYR LUER LOCK STERILE 10ML

## (undated) DEVICE — GLOVE BIOGEL PI ULTRA TOUCH GRAY SZ7.5

## (undated) DEVICE — SOLUTION IRRI NS BOTTLE 1000ML R5200-01

## (undated) DEVICE — GLOVE SURG ULTRA TOUCH 7.5

## (undated) DEVICE — BLADE SCALPEL #11 371111

## (undated) DEVICE — Device

## (undated) DEVICE — DRESSING TRANS 4X4 TEGADERM

## (undated) DEVICE — DISSECTOR KITTNER 5MM T 45CM S

## (undated) DEVICE — GOWN SMART LRG 044673

## (undated) DEVICE — DRESSING POST OP MEPILEX AG 4X6  498300

## (undated) DEVICE — SCISSOR 5MMX35CM DIRECT DRIVE

## (undated) DEVICE — DRAPE OPTHALMIC W/POUCH

## (undated) DEVICE — TROCAR KII FIOS ZTHREAD 12X100

## (undated) DEVICE — SUT VICRYL+ 27 UR-6 VIOL

## (undated) DEVICE — ELECTRODE REM PLYHSV RETURN 9

## (undated) DEVICE — TROCAR ENDOPATH XCEL 5X100MM

## (undated) DEVICE — DRESSING AQUACEL AG ADV 3.5X6

## (undated) DEVICE — SLEEVE TROCAR 5MMX100MM  CTS02

## (undated) DEVICE — PAD DEFIB CADENCE ADULT R2

## (undated) DEVICE — SYRINGE HYPODERMC LL 22G 1.5 ECLIPSE 30

## (undated) DEVICE — DRESSING TEGADERM 2 1/3 X 2 3/4 TD1002

## (undated) DEVICE — GLOVE SURG ULTRA TOUCH 7

## (undated) DEVICE — CANNULA ENDOPATH XCEL 5X100MM

## (undated) DEVICE — NDL HYPODERMIC BLUNT 18G 1.5IN

## (undated) DEVICE — SOLUTION NACL 0.9% 3000ML

## (undated) DEVICE — SUTURE ETHILON 2-0 PS 18 585H

## (undated) DEVICE — NDL FLTR 5MCRN BLNT TIP 18GX1

## (undated) DEVICE — TROCAR OPTICAL ZTHREAD 5MMX100MM CTF03

## (undated) DEVICE — SYR DISP LL 5CC

## (undated) DEVICE — WIPE MICRO TIP

## (undated) DEVICE — SYS SEE SHARP SCP ANTIFG LNG

## (undated) DEVICE — SUTURE MONOCRYL 4-0 PS-2 27 MCP426H